# Patient Record
Sex: MALE | Race: BLACK OR AFRICAN AMERICAN | NOT HISPANIC OR LATINO | Employment: OTHER | URBAN - METROPOLITAN AREA
[De-identification: names, ages, dates, MRNs, and addresses within clinical notes are randomized per-mention and may not be internally consistent; named-entity substitution may affect disease eponyms.]

---

## 2017-01-01 ENCOUNTER — HOSPITAL ENCOUNTER (INPATIENT)
Facility: HOSPITAL | Age: 77
LOS: 11 days | Discharge: HOME HEALTH | DRG: 100 | End: 2017-05-19
Attending: EMERGENCY MEDICINE | Admitting: PSYCHIATRY & NEUROLOGY
Payer: MEDICARE

## 2017-01-01 ENCOUNTER — OUTPATIENT CASE MANAGEMENT (OUTPATIENT)
Dept: ADMINISTRATIVE | Facility: OTHER | Age: 77
End: 2017-01-01

## 2017-01-01 ENCOUNTER — HOSPITAL ENCOUNTER (OUTPATIENT)
Dept: VASCULAR SURGERY | Facility: CLINIC | Age: 77
Discharge: HOME | End: 2017-07-11
Attending: SURGERY
Payer: OTHER GOVERNMENT

## 2017-01-01 ENCOUNTER — HOSPITAL ENCOUNTER (INPATIENT)
Facility: HOSPITAL | Age: 77
LOS: 1 days | Discharge: HOME | DRG: 280 | End: 2017-08-09
Attending: EMERGENCY MEDICINE | Admitting: HOSPITALIST
Payer: MEDICARE

## 2017-01-01 ENCOUNTER — HOSPITAL ENCOUNTER (INPATIENT)
Facility: HOSPITAL | Age: 77
LOS: 3 days | DRG: 871 | End: 2017-09-02
Attending: EMERGENCY MEDICINE | Admitting: HOSPITALIST
Payer: MEDICARE

## 2017-01-01 ENCOUNTER — TELEPHONE (OUTPATIENT)
Dept: VASCULAR SURGERY | Facility: CLINIC | Age: 77
End: 2017-01-01

## 2017-01-01 ENCOUNTER — HOSPITAL ENCOUNTER (INPATIENT)
Facility: HOSPITAL | Age: 77
LOS: 1 days | Discharge: HOME | DRG: 246 | End: 2017-08-03
Attending: EMERGENCY MEDICINE | Admitting: HOSPITALIST
Payer: MEDICARE

## 2017-01-01 ENCOUNTER — INITIAL CONSULT (OUTPATIENT)
Dept: VASCULAR SURGERY | Facility: CLINIC | Age: 77
End: 2017-01-01
Attending: SURGERY
Payer: OTHER GOVERNMENT

## 2017-01-01 ENCOUNTER — SURGERY (OUTPATIENT)
Age: 77
End: 2017-01-01

## 2017-01-01 ENCOUNTER — DOCUMENTATION ONLY (OUTPATIENT)
Dept: CARDIOLOGY | Facility: CLINIC | Age: 77
End: 2017-01-01

## 2017-01-01 VITALS
BODY MASS INDEX: 26.33 KG/M2 | SYSTOLIC BLOOD PRESSURE: 144 MMHG | DIASTOLIC BLOOD PRESSURE: 62 MMHG | RESPIRATION RATE: 18 BRPM | OXYGEN SATURATION: 97 % | HEIGHT: 73 IN | TEMPERATURE: 99 F | WEIGHT: 198.69 LBS | HEART RATE: 100 BPM

## 2017-01-01 VITALS
DIASTOLIC BLOOD PRESSURE: 56 MMHG | SYSTOLIC BLOOD PRESSURE: 93 MMHG | HEIGHT: 70 IN | WEIGHT: 176.81 LBS | BODY MASS INDEX: 25.31 KG/M2 | HEART RATE: 114 BPM | OXYGEN SATURATION: 90 % | TEMPERATURE: 98 F | RESPIRATION RATE: 20 BRPM

## 2017-01-01 VITALS
TEMPERATURE: 98 F | DIASTOLIC BLOOD PRESSURE: 59 MMHG | RESPIRATION RATE: 20 BRPM | WEIGHT: 181 LBS | OXYGEN SATURATION: 96 % | SYSTOLIC BLOOD PRESSURE: 112 MMHG | BODY MASS INDEX: 25.91 KG/M2 | HEART RATE: 88 BPM | HEIGHT: 70 IN

## 2017-01-01 VITALS
DIASTOLIC BLOOD PRESSURE: 54 MMHG | OXYGEN SATURATION: 99 % | WEIGHT: 201 LBS | SYSTOLIC BLOOD PRESSURE: 116 MMHG | TEMPERATURE: 97 F | HEART RATE: 88 BPM | HEIGHT: 70 IN | RESPIRATION RATE: 18 BRPM | BODY MASS INDEX: 28.77 KG/M2

## 2017-01-01 DIAGNOSIS — R41.82 ALTERED MENTAL STATUS, UNSPECIFIED ALTERED MENTAL STATUS TYPE: ICD-10-CM

## 2017-01-01 DIAGNOSIS — R79.89 ELEVATED TROPONIN: ICD-10-CM

## 2017-01-01 DIAGNOSIS — Z92.82 TISSUE PLASMINOGEN ACTIVATOR (T-PA) ADMINISTERED AT OTHER FACILITY WITHIN 24 HOURS PRIOR TO CURRENT ADMISSION: ICD-10-CM

## 2017-01-01 DIAGNOSIS — I21.4 NSTEMI (NON-ST ELEVATED MYOCARDIAL INFARCTION): ICD-10-CM

## 2017-01-01 DIAGNOSIS — I10 ESSENTIAL HYPERTENSION: ICD-10-CM

## 2017-01-01 DIAGNOSIS — Z99.2 ESRD ON HEMODIALYSIS: ICD-10-CM

## 2017-01-01 DIAGNOSIS — Z99.2 ESRD (END STAGE RENAL DISEASE) ON DIALYSIS: Primary | ICD-10-CM

## 2017-01-01 DIAGNOSIS — R57.9 SHOCK: Primary | ICD-10-CM

## 2017-01-01 DIAGNOSIS — N18.6 ESRD ON HEMODIALYSIS: ICD-10-CM

## 2017-01-01 DIAGNOSIS — E83.9 CHRONIC KIDNEY DISEASE-MINERAL AND BONE DISORDER: ICD-10-CM

## 2017-01-01 DIAGNOSIS — T82.590A MALFUNCTION OF ARTERIOVENOUS DIALYSIS FISTULA: ICD-10-CM

## 2017-01-01 DIAGNOSIS — N18.6 END STAGE RENAL DISEASE: ICD-10-CM

## 2017-01-01 DIAGNOSIS — N18.6 ESRD (END STAGE RENAL DISEASE) ON DIALYSIS: ICD-10-CM

## 2017-01-01 DIAGNOSIS — I35.9 AORTIC VALVE DISORDERS: ICD-10-CM

## 2017-01-01 DIAGNOSIS — N18.6 ESRD (END STAGE RENAL DISEASE): Primary | ICD-10-CM

## 2017-01-01 DIAGNOSIS — R00.0 TACHYCARDIA: ICD-10-CM

## 2017-01-01 DIAGNOSIS — Z79.4 TYPE 2 DIABETES MELLITUS WITH DIABETIC NEUROPATHY, WITH LONG-TERM CURRENT USE OF INSULIN: ICD-10-CM

## 2017-01-01 DIAGNOSIS — I35.0 AORTIC VALVE STENOSIS, UNSPECIFIED ETIOLOGY: Primary | ICD-10-CM

## 2017-01-01 DIAGNOSIS — I35.0 SEVERE AORTIC STENOSIS: ICD-10-CM

## 2017-01-01 DIAGNOSIS — Z99.2 ESRD ON HEMODIALYSIS: Primary | ICD-10-CM

## 2017-01-01 DIAGNOSIS — I63.512 ACUTE ISCHEMIC LEFT MCA STROKE: ICD-10-CM

## 2017-01-01 DIAGNOSIS — Z92.82 STATUS POST ADMINISTRATION OF TPA (RTPA) IN A DIFFERENT FACILITY WITHIN THE LAST 24 HOURS PRIOR TO ADMISSION TO CURRENT FACILITY: ICD-10-CM

## 2017-01-01 DIAGNOSIS — R56.9 SEIZURE: ICD-10-CM

## 2017-01-01 DIAGNOSIS — M89.9 CHRONIC KIDNEY DISEASE-MINERAL AND BONE DISORDER: ICD-10-CM

## 2017-01-01 DIAGNOSIS — N18.9 ANEMIA ASSOCIATED WITH CHRONIC RENAL FAILURE: ICD-10-CM

## 2017-01-01 DIAGNOSIS — D63.1 ANEMIA ASSOCIATED WITH CHRONIC RENAL FAILURE: ICD-10-CM

## 2017-01-01 DIAGNOSIS — N18.9 CHRONIC KIDNEY DISEASE-MINERAL AND BONE DISORDER: ICD-10-CM

## 2017-01-01 DIAGNOSIS — Z99.2 ESRD (END STAGE RENAL DISEASE) ON DIALYSIS: ICD-10-CM

## 2017-01-01 DIAGNOSIS — I20.0 UNSTABLE ANGINA PECTORIS: ICD-10-CM

## 2017-01-01 DIAGNOSIS — N18.6 ESRD ON HEMODIALYSIS: Primary | ICD-10-CM

## 2017-01-01 DIAGNOSIS — N18.5 CHRONIC KIDNEY DISEASE, STAGE V: ICD-10-CM

## 2017-01-01 DIAGNOSIS — R07.9 CHEST PAIN: ICD-10-CM

## 2017-01-01 DIAGNOSIS — N18.6 ESRD (END STAGE RENAL DISEASE): ICD-10-CM

## 2017-01-01 DIAGNOSIS — I25.10 CAD (CORONARY ARTERY DISEASE): ICD-10-CM

## 2017-01-01 DIAGNOSIS — I35.0 AORTIC STENOSIS, SEVERE: ICD-10-CM

## 2017-01-01 DIAGNOSIS — I10 ESSENTIAL HYPERTENSION: Primary | ICD-10-CM

## 2017-01-01 DIAGNOSIS — I73.9 CLAUDICATION IN PERIPHERAL VASCULAR DISEASE: ICD-10-CM

## 2017-01-01 DIAGNOSIS — E11.40 TYPE 2 DIABETES MELLITUS WITH DIABETIC NEUROPATHY, WITH LONG-TERM CURRENT USE OF INSULIN: ICD-10-CM

## 2017-01-01 DIAGNOSIS — T82.590S MALFUNCTION OF ARTERIOVENOUS DIALYSIS FISTULA, SEQUELA: ICD-10-CM

## 2017-01-01 DIAGNOSIS — T82.858A ARTERIOVENOUS FISTULA STENOSIS, INITIAL ENCOUNTER: Primary | ICD-10-CM

## 2017-01-01 DIAGNOSIS — G40.901 STATUS EPILEPTICUS: ICD-10-CM

## 2017-01-01 DIAGNOSIS — Z99.2 DEPENDENCE ON RENAL DIALYSIS: ICD-10-CM

## 2017-01-01 DIAGNOSIS — R07.9 CHEST PAIN: Primary | ICD-10-CM

## 2017-01-01 DIAGNOSIS — Z95.5 STATUS POST PRIMARY ANGIOPLASTY WITH CORONARY STENT: Primary | ICD-10-CM

## 2017-01-01 DIAGNOSIS — I63.412 EMBOLIC STROKE INVOLVING LEFT MIDDLE CEREBRAL ARTERY: ICD-10-CM

## 2017-01-01 DIAGNOSIS — E78.5 HYPERLIPIDEMIA LDL GOAL <70: ICD-10-CM

## 2017-01-01 DIAGNOSIS — R41.82 ALTERED MENTAL STATUS: ICD-10-CM

## 2017-01-01 DIAGNOSIS — N18.6 ESRD (END STAGE RENAL DISEASE) ON DIALYSIS: Primary | ICD-10-CM

## 2017-01-01 DIAGNOSIS — I25.10 CORONARY ARTERY DISEASE, ANGINA PRESENCE UNSPECIFIED, UNSPECIFIED VESSEL OR LESION TYPE, UNSPECIFIED WHETHER NATIVE OR TRANSPLANTED HEART: ICD-10-CM

## 2017-01-01 LAB
ABO + RH BLD: NORMAL
ABO + RH BLD: NORMAL
ALBUMIN SERPL BCP-MCNC: 2.4 G/DL
ALBUMIN SERPL BCP-MCNC: 2.4 G/DL
ALBUMIN SERPL BCP-MCNC: 2.5 G/DL
ALBUMIN SERPL BCP-MCNC: 2.6 G/DL
ALBUMIN SERPL BCP-MCNC: 2.8 G/DL
ALBUMIN SERPL BCP-MCNC: 2.8 G/DL
ALBUMIN SERPL BCP-MCNC: 2.9 G/DL
ALBUMIN SERPL BCP-MCNC: 3 G/DL
ALBUMIN SERPL BCP-MCNC: 3.1 G/DL
ALBUMIN SERPL BCP-MCNC: 3.2 G/DL
ALBUMIN SERPL BCP-MCNC: 3.3 G/DL
ALBUMIN SERPL BCP-MCNC: 3.4 G/DL
ALBUMIN SERPL BCP-MCNC: 3.5 G/DL
ALLENS TEST: ABNORMAL
ALP SERPL-CCNC: 102 U/L
ALP SERPL-CCNC: 102 U/L
ALP SERPL-CCNC: 104 U/L
ALP SERPL-CCNC: 72 U/L
ALP SERPL-CCNC: 73 U/L
ALP SERPL-CCNC: 73 U/L
ALP SERPL-CCNC: 76 U/L
ALP SERPL-CCNC: 77 U/L
ALP SERPL-CCNC: 78 U/L
ALP SERPL-CCNC: 79 U/L
ALP SERPL-CCNC: 79 U/L
ALP SERPL-CCNC: 83 U/L
ALP SERPL-CCNC: 84 U/L
ALP SERPL-CCNC: 85 U/L
ALP SERPL-CCNC: 87 U/L
ALP SERPL-CCNC: 87 U/L
ALP SERPL-CCNC: 90 U/L
ALP SERPL-CCNC: 91 U/L
ALP SERPL-CCNC: 91 U/L
ALT SERPL W/O P-5'-P-CCNC: 10 U/L
ALT SERPL W/O P-5'-P-CCNC: 12 U/L
ALT SERPL W/O P-5'-P-CCNC: 12 U/L
ALT SERPL W/O P-5'-P-CCNC: 14 U/L
ALT SERPL W/O P-5'-P-CCNC: 15 U/L
ALT SERPL W/O P-5'-P-CCNC: 16 U/L
ALT SERPL W/O P-5'-P-CCNC: 18 U/L
ALT SERPL W/O P-5'-P-CCNC: 18 U/L
ALT SERPL W/O P-5'-P-CCNC: 22 U/L
ALT SERPL W/O P-5'-P-CCNC: 25 U/L
ALT SERPL W/O P-5'-P-CCNC: 28 U/L
ALT SERPL W/O P-5'-P-CCNC: 30 U/L
ALT SERPL W/O P-5'-P-CCNC: 7 U/L
ALT SERPL W/O P-5'-P-CCNC: 8 U/L
ALT SERPL W/O P-5'-P-CCNC: 9 U/L
ALT SERPL W/O P-5'-P-CCNC: 93 U/L
ANION GAP SERPL CALC-SCNC: 10 MMOL/L
ANION GAP SERPL CALC-SCNC: 10 MMOL/L
ANION GAP SERPL CALC-SCNC: 11 MMOL/L
ANION GAP SERPL CALC-SCNC: 12 MMOL/L
ANION GAP SERPL CALC-SCNC: 14 MMOL/L
ANION GAP SERPL CALC-SCNC: 14 MMOL/L
ANION GAP SERPL CALC-SCNC: 15 MMOL/L
ANION GAP SERPL CALC-SCNC: 16 MMOL/L
ANION GAP SERPL CALC-SCNC: 17 MMOL/L
ANION GAP SERPL CALC-SCNC: 18 MMOL/L
ANION GAP SERPL CALC-SCNC: 19 MMOL/L
ANION GAP SERPL CALC-SCNC: 20 MMOL/L
ANION GAP SERPL CALC-SCNC: 21 MMOL/L
ANISOCYTOSIS BLD QL SMEAR: SLIGHT
AORTIC VALVE REGURGITATION: ABNORMAL
AORTIC VALVE STENOSIS: ABNORMAL
APTT BLDCRRT: 24 SEC
APTT BLDCRRT: 26.5 SEC
AST SERPL-CCNC: 10 U/L
AST SERPL-CCNC: 12 U/L
AST SERPL-CCNC: 13 U/L
AST SERPL-CCNC: 135 U/L
AST SERPL-CCNC: 15 U/L
AST SERPL-CCNC: 16 U/L
AST SERPL-CCNC: 16 U/L
AST SERPL-CCNC: 18 U/L
AST SERPL-CCNC: 18 U/L
AST SERPL-CCNC: 21 U/L
AST SERPL-CCNC: 23 U/L
AST SERPL-CCNC: 23 U/L
AST SERPL-CCNC: 24 U/L
AST SERPL-CCNC: 24 U/L
AST SERPL-CCNC: 246 U/L
AST SERPL-CCNC: 25 U/L
AST SERPL-CCNC: 44 U/L
AST SERPL-CCNC: 48 U/L
AST SERPL-CCNC: 7 U/L
BACTERIA #/AREA URNS AUTO: ABNORMAL /HPF
BACTERIA #/AREA URNS AUTO: ABNORMAL /HPF
BACTERIA BLD CULT: NORMAL
BACTERIA SPEC AEROBE CULT: NORMAL
BACTERIA UR CULT: NO GROWTH
BASOPHILS # BLD AUTO: 0 K/UL
BASOPHILS # BLD AUTO: 0.01 K/UL
BASOPHILS # BLD AUTO: 0.02 K/UL
BASOPHILS # BLD AUTO: 0.03 K/UL
BASOPHILS # BLD AUTO: 0.04 K/UL
BASOPHILS NFR BLD: 0 %
BASOPHILS NFR BLD: 0.1 %
BASOPHILS NFR BLD: 0.2 %
BASOPHILS NFR BLD: 0.3 %
BASOPHILS NFR BLD: 0.4 %
BASOPHILS NFR BLD: 0.4 %
BASOPHILS NFR BLD: 0.5 %
BILIRUB SERPL-MCNC: 0.5 MG/DL
BILIRUB SERPL-MCNC: 0.5 MG/DL
BILIRUB SERPL-MCNC: 0.6 MG/DL
BILIRUB SERPL-MCNC: 0.7 MG/DL
BILIRUB SERPL-MCNC: 0.9 MG/DL
BILIRUB SERPL-MCNC: 1.2 MG/DL
BILIRUB UR QL STRIP: NEGATIVE
BILIRUB UR QL STRIP: NEGATIVE
BLD GP AB SCN CELLS X3 SERPL QL: NORMAL
BLD GP AB SCN CELLS X3 SERPL QL: NORMAL
BNP SERPL-MCNC: 377 PG/ML
BNP SERPL-MCNC: 400 PG/ML
BNP SERPL-MCNC: 476 PG/ML
BNP SERPL-MCNC: 476 PG/ML
BUN SERPL-MCNC: 105 MG/DL
BUN SERPL-MCNC: 106 MG/DL
BUN SERPL-MCNC: 107 MG/DL
BUN SERPL-MCNC: 36 MG/DL
BUN SERPL-MCNC: 37 MG/DL
BUN SERPL-MCNC: 41 MG/DL
BUN SERPL-MCNC: 50 MG/DL
BUN SERPL-MCNC: 53 MG/DL
BUN SERPL-MCNC: 53 MG/DL
BUN SERPL-MCNC: 55 MG/DL
BUN SERPL-MCNC: 63 MG/DL
BUN SERPL-MCNC: 65 MG/DL
BUN SERPL-MCNC: 66 MG/DL
BUN SERPL-MCNC: 66 MG/DL
BUN SERPL-MCNC: 67 MG/DL
BUN SERPL-MCNC: 78 MG/DL
BUN SERPL-MCNC: 78 MG/DL
BUN SERPL-MCNC: 79 MG/DL
BUN SERPL-MCNC: 79 MG/DL
BUN SERPL-MCNC: 82 MG/DL
BUN SERPL-MCNC: 84 MG/DL
BUN SERPL-MCNC: 86 MG/DL
BUN SERPL-MCNC: 87 MG/DL
BUN SERPL-MCNC: 88 MG/DL
BURR CELLS BLD QL SMEAR: ABNORMAL
CALCIUM SERPL-MCNC: 8.5 MG/DL
CALCIUM SERPL-MCNC: 8.6 MG/DL
CALCIUM SERPL-MCNC: 8.7 MG/DL
CALCIUM SERPL-MCNC: 8.8 MG/DL
CALCIUM SERPL-MCNC: 8.8 MG/DL
CALCIUM SERPL-MCNC: 8.9 MG/DL
CALCIUM SERPL-MCNC: 9 MG/DL
CALCIUM SERPL-MCNC: 9.2 MG/DL
CALCIUM SERPL-MCNC: 9.3 MG/DL
CALCIUM SERPL-MCNC: 9.4 MG/DL
CALCIUM SERPL-MCNC: 9.5 MG/DL
CALCIUM SERPL-MCNC: 9.5 MG/DL
CALCIUM SERPL-MCNC: 9.7 MG/DL
CALCIUM SERPL-MCNC: 9.8 MG/DL
CHLORIDE SERPL-SCNC: 100 MMOL/L
CHLORIDE SERPL-SCNC: 101 MMOL/L
CHLORIDE SERPL-SCNC: 102 MMOL/L
CHLORIDE SERPL-SCNC: 103 MMOL/L
CHLORIDE SERPL-SCNC: 104 MMOL/L
CHLORIDE SERPL-SCNC: 106 MMOL/L
CHLORIDE SERPL-SCNC: 107 MMOL/L
CHLORIDE SERPL-SCNC: 94 MMOL/L
CHLORIDE SERPL-SCNC: 96 MMOL/L
CHLORIDE SERPL-SCNC: 97 MMOL/L
CHLORIDE SERPL-SCNC: 98 MMOL/L
CLARITY UR REFRACT.AUTO: ABNORMAL
CLARITY UR REFRACT.AUTO: ABNORMAL
CO2 SERPL-SCNC: 15 MMOL/L
CO2 SERPL-SCNC: 16 MMOL/L
CO2 SERPL-SCNC: 17 MMOL/L
CO2 SERPL-SCNC: 17 MMOL/L
CO2 SERPL-SCNC: 18 MMOL/L
CO2 SERPL-SCNC: 19 MMOL/L
CO2 SERPL-SCNC: 19 MMOL/L
CO2 SERPL-SCNC: 20 MMOL/L
CO2 SERPL-SCNC: 21 MMOL/L
CO2 SERPL-SCNC: 21 MMOL/L
CO2 SERPL-SCNC: 22 MMOL/L
CO2 SERPL-SCNC: 23 MMOL/L
CO2 SERPL-SCNC: 24 MMOL/L
CO2 SERPL-SCNC: 25 MMOL/L
CO2 SERPL-SCNC: 25 MMOL/L
CO2 SERPL-SCNC: 26 MMOL/L
CO2 SERPL-SCNC: 27 MMOL/L
CO2 SERPL-SCNC: 9 MMOL/L
COLOR UR AUTO: YELLOW
COLOR UR AUTO: YELLOW
CORONARY STENOSIS: ABNORMAL
CORONARY STENT: YES
CREAT SERPL-MCNC: 10.1 MG/DL
CREAT SERPL-MCNC: 10.2 MG/DL
CREAT SERPL-MCNC: 10.3 MG/DL
CREAT SERPL-MCNC: 10.8 MG/DL
CREAT SERPL-MCNC: 10.9 MG/DL
CREAT SERPL-MCNC: 11.1 MG/DL
CREAT SERPL-MCNC: 11.6 MG/DL
CREAT SERPL-MCNC: 11.6 MG/DL
CREAT SERPL-MCNC: 11.8 MG/DL
CREAT SERPL-MCNC: 11.9 MG/DL
CREAT SERPL-MCNC: 12.3 MG/DL
CREAT SERPL-MCNC: 12.4 MG/DL
CREAT SERPL-MCNC: 12.4 MG/DL
CREAT SERPL-MCNC: 13 MG/DL
CREAT SERPL-MCNC: 13.7 MG/DL
CREAT SERPL-MCNC: 13.8 MG/DL
CREAT SERPL-MCNC: 13.9 MG/DL
CREAT SERPL-MCNC: 14.7 MG/DL
CREAT SERPL-MCNC: 15.5 MG/DL
CREAT SERPL-MCNC: 16.3 MG/DL
CREAT SERPL-MCNC: 8.3 MG/DL
CREAT SERPL-MCNC: 8.9 MG/DL
CREAT SERPL-MCNC: 9.4 MG/DL
CREAT SERPL-MCNC: 9.6 MG/DL
DACRYOCYTES BLD QL SMEAR: ABNORMAL
DELSYS: ABNORMAL
DIASTOLIC DYSFUNCTION: NO
DIASTOLIC DYSFUNCTION: YES
DIFFERENTIAL METHOD: ABNORMAL
EOSINOPHIL # BLD AUTO: 0 K/UL
EOSINOPHIL # BLD AUTO: 0.1 K/UL
EOSINOPHIL # BLD AUTO: 0.2 K/UL
EOSINOPHIL # BLD AUTO: 0.3 K/UL
EOSINOPHIL # BLD AUTO: 0.3 K/UL
EOSINOPHIL NFR BLD: 0 %
EOSINOPHIL NFR BLD: 0.1 %
EOSINOPHIL NFR BLD: 0.7 %
EOSINOPHIL NFR BLD: 0.8 %
EOSINOPHIL NFR BLD: 0.8 %
EOSINOPHIL NFR BLD: 0.9 %
EOSINOPHIL NFR BLD: 0.9 %
EOSINOPHIL NFR BLD: 1.2 %
EOSINOPHIL NFR BLD: 1.4 %
EOSINOPHIL NFR BLD: 1.6 %
EOSINOPHIL NFR BLD: 1.7 %
EOSINOPHIL NFR BLD: 1.9 %
EOSINOPHIL NFR BLD: 2 %
EOSINOPHIL NFR BLD: 2.1 %
EOSINOPHIL NFR BLD: 2.3 %
EOSINOPHIL NFR BLD: 2.4 %
EOSINOPHIL NFR BLD: 2.4 %
EOSINOPHIL NFR BLD: 2.5 %
EOSINOPHIL NFR BLD: 2.8 %
EOSINOPHIL NFR BLD: 3.2 %
EOSINOPHIL NFR BLD: 3.6 %
ERYTHROCYTE [DISTWIDTH] IN BLOOD BY AUTOMATED COUNT: 14.9 %
ERYTHROCYTE [DISTWIDTH] IN BLOOD BY AUTOMATED COUNT: 15 %
ERYTHROCYTE [DISTWIDTH] IN BLOOD BY AUTOMATED COUNT: 15.1 %
ERYTHROCYTE [DISTWIDTH] IN BLOOD BY AUTOMATED COUNT: 15.2 %
ERYTHROCYTE [DISTWIDTH] IN BLOOD BY AUTOMATED COUNT: 15.5 %
ERYTHROCYTE [DISTWIDTH] IN BLOOD BY AUTOMATED COUNT: 15.7 %
ERYTHROCYTE [DISTWIDTH] IN BLOOD BY AUTOMATED COUNT: 15.8 %
ERYTHROCYTE [DISTWIDTH] IN BLOOD BY AUTOMATED COUNT: 15.9 %
ERYTHROCYTE [DISTWIDTH] IN BLOOD BY AUTOMATED COUNT: 15.9 %
ERYTHROCYTE [DISTWIDTH] IN BLOOD BY AUTOMATED COUNT: 16 %
ERYTHROCYTE [DISTWIDTH] IN BLOOD BY AUTOMATED COUNT: 16.1 %
ERYTHROCYTE [DISTWIDTH] IN BLOOD BY AUTOMATED COUNT: 16.2 %
ERYTHROCYTE [DISTWIDTH] IN BLOOD BY AUTOMATED COUNT: 16.3 %
ERYTHROCYTE [DISTWIDTH] IN BLOOD BY AUTOMATED COUNT: 16.4 %
ERYTHROCYTE [DISTWIDTH] IN BLOOD BY AUTOMATED COUNT: 16.5 %
ERYTHROCYTE [DISTWIDTH] IN BLOOD BY AUTOMATED COUNT: 16.6 %
ERYTHROCYTE [DISTWIDTH] IN BLOOD BY AUTOMATED COUNT: 16.8 %
ERYTHROCYTE [SEDIMENTATION RATE] IN BLOOD BY WESTERGREN METHOD: 20 MM/H
EST. GFR  (AFRICAN AMERICAN): 2.8 ML/MIN/1.73 M^2
EST. GFR  (AFRICAN AMERICAN): 3 ML/MIN/1.73 M^2
EST. GFR  (AFRICAN AMERICAN): 3.2 ML/MIN/1.73 M^2
EST. GFR  (AFRICAN AMERICAN): 3.4 ML/MIN/1.73 M^2
EST. GFR  (AFRICAN AMERICAN): 3.5 ML/MIN/1.73 M^2
EST. GFR  (AFRICAN AMERICAN): 3.7 ML/MIN/1.73 M^2
EST. GFR  (AFRICAN AMERICAN): 4 ML/MIN/1.73 M^2
EST. GFR  (AFRICAN AMERICAN): 4.2 ML/MIN/1.73 M^2
EST. GFR  (AFRICAN AMERICAN): 4.2 ML/MIN/1.73 M^2
EST. GFR  (AFRICAN AMERICAN): 4.3 ML/MIN/1.73 M^2
EST. GFR  (AFRICAN AMERICAN): 4.3 ML/MIN/1.73 M^2
EST. GFR  (AFRICAN AMERICAN): 4.5 ML/MIN/1.73 M^2
EST. GFR  (AFRICAN AMERICAN): 4.6 ML/MIN/1.73 M^2
EST. GFR  (AFRICAN AMERICAN): 4.7 ML/MIN/1.73 M^2
EST. GFR  (AFRICAN AMERICAN): 4.9 ML/MIN/1.73 M^2
EST. GFR  (AFRICAN AMERICAN): 5 ML/MIN/1.73 M^2
EST. GFR  (AFRICAN AMERICAN): 5.1 ML/MIN/1.73 M^2
EST. GFR  (AFRICAN AMERICAN): 5.4 ML/MIN/1.73 M^2
EST. GFR  (AFRICAN AMERICAN): 5.5 ML/MIN/1.73 M^2
EST. GFR  (AFRICAN AMERICAN): 5.9 ML/MIN/1.73 M^2
EST. GFR  (AFRICAN AMERICAN): 6.4 ML/MIN/1.73 M^2
EST. GFR  (NON AFRICAN AMERICAN): 2.5 ML/MIN/1.73 M^2
EST. GFR  (NON AFRICAN AMERICAN): 2.6 ML/MIN/1.73 M^2
EST. GFR  (NON AFRICAN AMERICAN): 2.8 ML/MIN/1.73 M^2
EST. GFR  (NON AFRICAN AMERICAN): 3 ML/MIN/1.73 M^2
EST. GFR  (NON AFRICAN AMERICAN): 3.2 ML/MIN/1.73 M^2
EST. GFR  (NON AFRICAN AMERICAN): 3.4 ML/MIN/1.73 M^2
EST. GFR  (NON AFRICAN AMERICAN): 3.4 ML/MIN/1.73 M^2
EST. GFR  (NON AFRICAN AMERICAN): 3.5 ML/MIN/1.73 M^2
EST. GFR  (NON AFRICAN AMERICAN): 3.6 ML/MIN/1.73 M^2
EST. GFR  (NON AFRICAN AMERICAN): 3.6 ML/MIN/1.73 M^2
EST. GFR  (NON AFRICAN AMERICAN): 3.7 ML/MIN/1.73 M^2
EST. GFR  (NON AFRICAN AMERICAN): 3.7 ML/MIN/1.73 M^2
EST. GFR  (NON AFRICAN AMERICAN): 3.9 ML/MIN/1.73 M^2
EST. GFR  (NON AFRICAN AMERICAN): 4 ML/MIN/1.73 M^2
EST. GFR  (NON AFRICAN AMERICAN): 4 ML/MIN/1.73 M^2
EST. GFR  (NON AFRICAN AMERICAN): 4.3 ML/MIN/1.73 M^2
EST. GFR  (NON AFRICAN AMERICAN): 4.3 ML/MIN/1.73 M^2
EST. GFR  (NON AFRICAN AMERICAN): 4.4 ML/MIN/1.73 M^2
EST. GFR  (NON AFRICAN AMERICAN): 4.7 ML/MIN/1.73 M^2
EST. GFR  (NON AFRICAN AMERICAN): 4.8 ML/MIN/1.73 M^2
EST. GFR  (NON AFRICAN AMERICAN): 5.1 ML/MIN/1.73 M^2
EST. GFR  (NON AFRICAN AMERICAN): 5.6 ML/MIN/1.73 M^2
ESTIMATED AVG GLUCOSE: 134 MG/DL
ESTIMATED AVG GLUCOSE: 146 MG/DL
ESTIMATED PA SYSTOLIC PRESSURE: 12.11
FACT X PPP CHRO-ACNC: 0.5 IU/ML
FACT X PPP CHRO-ACNC: 0.56 IU/ML
FACT X PPP CHRO-ACNC: 0.57 IU/ML
FACT X PPP CHRO-ACNC: <0.1 IU/ML
FERRITIN SERPL-MCNC: 365 NG/ML
FIO2: 50
GLOBAL PERICARDIAL EFFUSION: ABNORMAL
GLOBAL PERICARDIAL EFFUSION: ABNORMAL
GLUCOSE SERPL-MCNC: 100 MG/DL
GLUCOSE SERPL-MCNC: 103 MG/DL
GLUCOSE SERPL-MCNC: 103 MG/DL
GLUCOSE SERPL-MCNC: 106 MG/DL
GLUCOSE SERPL-MCNC: 108 MG/DL
GLUCOSE SERPL-MCNC: 111 MG/DL
GLUCOSE SERPL-MCNC: 113 MG/DL
GLUCOSE SERPL-MCNC: 121 MG/DL
GLUCOSE SERPL-MCNC: 125 MG/DL
GLUCOSE SERPL-MCNC: 128 MG/DL
GLUCOSE SERPL-MCNC: 131 MG/DL
GLUCOSE SERPL-MCNC: 139 MG/DL
GLUCOSE SERPL-MCNC: 152 MG/DL
GLUCOSE SERPL-MCNC: 156 MG/DL
GLUCOSE SERPL-MCNC: 188 MG/DL
GLUCOSE SERPL-MCNC: 192 MG/DL
GLUCOSE SERPL-MCNC: 214 MG/DL
GLUCOSE SERPL-MCNC: 232 MG/DL
GLUCOSE SERPL-MCNC: 71 MG/DL
GLUCOSE SERPL-MCNC: 78 MG/DL
GLUCOSE SERPL-MCNC: 88 MG/DL
GLUCOSE SERPL-MCNC: 88 MG/DL
GLUCOSE SERPL-MCNC: 91 MG/DL
GLUCOSE SERPL-MCNC: 97 MG/DL
GLUCOSE SERPL-MCNC: 99 MG/DL
GLUCOSE UR QL STRIP: ABNORMAL
GLUCOSE UR QL STRIP: NEGATIVE
GRAM STN SPEC: NORMAL
HAV IGM SERPL QL IA: NEGATIVE
HBA1C MFR BLD HPLC: 6.3 %
HBA1C MFR BLD HPLC: 6.7 %
HBV CORE IGM SERPL QL IA: NEGATIVE
HBV SURFACE AG SERPL QL IA: NEGATIVE
HCO3 UR-SCNC: 22.6 MMOL/L (ref 24–28)
HCT VFR BLD AUTO: 25.1 %
HCT VFR BLD AUTO: 25.6 %
HCT VFR BLD AUTO: 25.6 %
HCT VFR BLD AUTO: 25.9 %
HCT VFR BLD AUTO: 27 %
HCT VFR BLD AUTO: 27.2 %
HCT VFR BLD AUTO: 27.4 %
HCT VFR BLD AUTO: 27.4 %
HCT VFR BLD AUTO: 27.7 %
HCT VFR BLD AUTO: 28.1 %
HCT VFR BLD AUTO: 28.2 %
HCT VFR BLD AUTO: 28.2 %
HCT VFR BLD AUTO: 28.7 %
HCT VFR BLD AUTO: 29 %
HCT VFR BLD AUTO: 29.3 %
HCT VFR BLD AUTO: 29.5 %
HCT VFR BLD AUTO: 29.5 %
HCT VFR BLD AUTO: 29.8 %
HCT VFR BLD AUTO: 29.8 %
HCT VFR BLD AUTO: 30 %
HCT VFR BLD AUTO: 30.3 %
HCT VFR BLD AUTO: 31 %
HCT VFR BLD AUTO: 31.5 %
HCT VFR BLD AUTO: 31.7 %
HCT VFR BLD AUTO: 32.1 %
HCT VFR BLD AUTO: 32.5 %
HCT VFR BLD AUTO: 33.9 %
HCT VFR BLD AUTO: 35.2 %
HCT VFR BLD AUTO: 35.9 %
HCT VFR BLD AUTO: 36.2 %
HCT VFR BLD AUTO: 36.6 %
HCV AB SERPL QL IA: NEGATIVE
HGB BLD-MCNC: 10.1 G/DL
HGB BLD-MCNC: 10.3 G/DL
HGB BLD-MCNC: 10.5 G/DL
HGB BLD-MCNC: 11 G/DL
HGB BLD-MCNC: 11.2 G/DL
HGB BLD-MCNC: 11.4 G/DL
HGB BLD-MCNC: 11.5 G/DL
HGB BLD-MCNC: 11.7 G/DL
HGB BLD-MCNC: 7.7 G/DL
HGB BLD-MCNC: 7.8 G/DL
HGB BLD-MCNC: 8 G/DL
HGB BLD-MCNC: 8.2 G/DL
HGB BLD-MCNC: 8.4 G/DL
HGB BLD-MCNC: 8.4 G/DL
HGB BLD-MCNC: 8.5 G/DL
HGB BLD-MCNC: 8.6 G/DL
HGB BLD-MCNC: 8.7 G/DL
HGB BLD-MCNC: 8.8 G/DL
HGB BLD-MCNC: 9 G/DL
HGB BLD-MCNC: 9.2 G/DL
HGB BLD-MCNC: 9.2 G/DL
HGB BLD-MCNC: 9.3 G/DL
HGB BLD-MCNC: 9.3 G/DL
HGB BLD-MCNC: 9.4 G/DL
HGB BLD-MCNC: 9.4 G/DL
HGB BLD-MCNC: 9.7 G/DL
HGB BLD-MCNC: 9.7 G/DL
HGB BLD-MCNC: 9.9 G/DL
HGB BLD-MCNC: 9.9 G/DL
HGB UR QL STRIP: ABNORMAL
HGB UR QL STRIP: NEGATIVE
HYALINE CASTS UR QL AUTO: 0 /LPF
HYALINE CASTS UR QL AUTO: 0 /LPF
HYPOCHROMIA BLD QL SMEAR: ABNORMAL
INR PPP: 1
INR PPP: 1.1
IRON SERPL-MCNC: 20 UG/DL
KETONES UR QL STRIP: NEGATIVE
KETONES UR QL STRIP: NEGATIVE
LACTATE SERPL-SCNC: 1.2 MMOL/L
LACTATE SERPL-SCNC: 1.2 MMOL/L
LACTATE SERPL-SCNC: 4.2 MMOL/L
LACTATE SERPL-SCNC: 5.6 MMOL/L
LACTATE SERPL-SCNC: 8.6 MMOL/L
LEUKOCYTE ESTERASE UR QL STRIP: ABNORMAL
LEUKOCYTE ESTERASE UR QL STRIP: ABNORMAL
LEVETIRACETAM SERPL-MCNC: 27.5 UG/ML (ref 3–60)
LYMPHOCYTES # BLD AUTO: 0.5 K/UL
LYMPHOCYTES # BLD AUTO: 0.5 K/UL
LYMPHOCYTES # BLD AUTO: 0.6 K/UL
LYMPHOCYTES # BLD AUTO: 0.6 K/UL
LYMPHOCYTES # BLD AUTO: 0.7 K/UL
LYMPHOCYTES # BLD AUTO: 0.9 K/UL
LYMPHOCYTES # BLD AUTO: 0.9 K/UL
LYMPHOCYTES # BLD AUTO: 1 K/UL
LYMPHOCYTES # BLD AUTO: 1.1 K/UL
LYMPHOCYTES # BLD AUTO: 1.2 K/UL
LYMPHOCYTES # BLD AUTO: 1.2 K/UL
LYMPHOCYTES # BLD AUTO: 1.3 K/UL
LYMPHOCYTES # BLD AUTO: 1.4 K/UL
LYMPHOCYTES # BLD AUTO: 1.5 K/UL
LYMPHOCYTES # BLD AUTO: 1.6 K/UL
LYMPHOCYTES # BLD AUTO: 1.7 K/UL
LYMPHOCYTES # BLD AUTO: 1.8 K/UL
LYMPHOCYTES # BLD AUTO: 2 K/UL
LYMPHOCYTES # BLD AUTO: 2.1 K/UL
LYMPHOCYTES NFR BLD: 10.6 %
LYMPHOCYTES NFR BLD: 12.2 %
LYMPHOCYTES NFR BLD: 12.6 %
LYMPHOCYTES NFR BLD: 14.1 %
LYMPHOCYTES NFR BLD: 14.4 %
LYMPHOCYTES NFR BLD: 15.5 %
LYMPHOCYTES NFR BLD: 15.8 %
LYMPHOCYTES NFR BLD: 17.6 %
LYMPHOCYTES NFR BLD: 18.7 %
LYMPHOCYTES NFR BLD: 18.8 %
LYMPHOCYTES NFR BLD: 18.9 %
LYMPHOCYTES NFR BLD: 19.1 %
LYMPHOCYTES NFR BLD: 19.2 %
LYMPHOCYTES NFR BLD: 20.3 %
LYMPHOCYTES NFR BLD: 21.2 %
LYMPHOCYTES NFR BLD: 22.3 %
LYMPHOCYTES NFR BLD: 23.1 %
LYMPHOCYTES NFR BLD: 23.1 %
LYMPHOCYTES NFR BLD: 24.1 %
LYMPHOCYTES NFR BLD: 24.5 %
LYMPHOCYTES NFR BLD: 27.7 %
LYMPHOCYTES NFR BLD: 4.9 %
LYMPHOCYTES NFR BLD: 4.9 %
LYMPHOCYTES NFR BLD: 5.9 %
LYMPHOCYTES NFR BLD: 6.2 %
LYMPHOCYTES NFR BLD: 6.5 %
LYMPHOCYTES NFR BLD: 6.5 %
LYMPHOCYTES NFR BLD: 6.9 %
LYMPHOCYTES NFR BLD: 7.8 %
LYMPHOCYTES NFR BLD: 8.9 %
LYMPHOCYTES NFR BLD: 9 %
MAGNESIUM SERPL-MCNC: 1.7 MG/DL
MAGNESIUM SERPL-MCNC: 1.8 MG/DL
MAGNESIUM SERPL-MCNC: 1.9 MG/DL
MAGNESIUM SERPL-MCNC: 2 MG/DL
MAGNESIUM SERPL-MCNC: 2 MG/DL
MAGNESIUM SERPL-MCNC: 2.1 MG/DL
MAGNESIUM SERPL-MCNC: 2.2 MG/DL
MCH RBC QN AUTO: 26.9 PG
MCH RBC QN AUTO: 27.1 PG
MCH RBC QN AUTO: 27.1 PG
MCH RBC QN AUTO: 27.2 PG
MCH RBC QN AUTO: 27.3 PG
MCH RBC QN AUTO: 27.6 PG
MCH RBC QN AUTO: 27.6 PG
MCH RBC QN AUTO: 27.7 PG
MCH RBC QN AUTO: 27.8 PG
MCH RBC QN AUTO: 28 PG
MCH RBC QN AUTO: 28.1 PG
MCH RBC QN AUTO: 28.4 PG
MCH RBC QN AUTO: 28.5 PG
MCH RBC QN AUTO: 28.6 PG
MCH RBC QN AUTO: 28.8 PG
MCH RBC QN AUTO: 29.1 PG
MCH RBC QN AUTO: 29.2 PG
MCH RBC QN AUTO: 29.4 PG
MCH RBC QN AUTO: 29.4 PG
MCH RBC QN AUTO: 29.6 PG
MCH RBC QN AUTO: 29.6 PG
MCH RBC QN AUTO: 29.7 PG
MCH RBC QN AUTO: 30.3 PG
MCHC RBC AUTO-ENTMCNC: 29.7 G/DL
MCHC RBC AUTO-ENTMCNC: 30.5 G/DL
MCHC RBC AUTO-ENTMCNC: 30.7 G/DL
MCHC RBC AUTO-ENTMCNC: 30.7 G/DL
MCHC RBC AUTO-ENTMCNC: 30.9 G/DL
MCHC RBC AUTO-ENTMCNC: 31 G/DL
MCHC RBC AUTO-ENTMCNC: 31 G/DL
MCHC RBC AUTO-ENTMCNC: 31.1 %
MCHC RBC AUTO-ENTMCNC: 31.1 %
MCHC RBC AUTO-ENTMCNC: 31.2 %
MCHC RBC AUTO-ENTMCNC: 31.2 G/DL
MCHC RBC AUTO-ENTMCNC: 31.3 %
MCHC RBC AUTO-ENTMCNC: 31.3 G/DL
MCHC RBC AUTO-ENTMCNC: 31.4 G/DL
MCHC RBC AUTO-ENTMCNC: 31.5 G/DL
MCHC RBC AUTO-ENTMCNC: 31.7 G/DL
MCHC RBC AUTO-ENTMCNC: 31.7 G/DL
MCHC RBC AUTO-ENTMCNC: 31.8 G/DL
MCHC RBC AUTO-ENTMCNC: 32.1 %
MCHC RBC AUTO-ENTMCNC: 32.3 %
MCHC RBC AUTO-ENTMCNC: 32.4 %
MCHC RBC AUTO-ENTMCNC: 32.6 %
MCHC RBC AUTO-ENTMCNC: 32.7 %
MCHC RBC AUTO-ENTMCNC: 33.1 %
MCHC RBC AUTO-ENTMCNC: 33.4 %
MCHC RBC AUTO-ENTMCNC: 33.6 %
MCV RBC AUTO: 85 FL
MCV RBC AUTO: 86 FL
MCV RBC AUTO: 87 FL
MCV RBC AUTO: 88 FL
MCV RBC AUTO: 89 FL
MCV RBC AUTO: 90 FL
MCV RBC AUTO: 91 FL
MCV RBC AUTO: 92 FL
MCV RBC AUTO: 94 FL
MCV RBC AUTO: 94 FL
MICROSCOPIC COMMENT: ABNORMAL
MICROSCOPIC COMMENT: ABNORMAL
MIN VOL: 9.43
MITRAL VALVE MOBILITY: NORMAL
MITRAL VALVE MOBILITY: NORMAL
MODE: ABNORMAL
MONOCYTES # BLD AUTO: 0.4 K/UL
MONOCYTES # BLD AUTO: 0.5 K/UL
MONOCYTES # BLD AUTO: 0.6 K/UL
MONOCYTES # BLD AUTO: 0.7 K/UL
MONOCYTES # BLD AUTO: 0.8 K/UL
MONOCYTES # BLD AUTO: 1 K/UL
MONOCYTES # BLD AUTO: 1 K/UL
MONOCYTES # BLD AUTO: 1.1 K/UL
MONOCYTES # BLD AUTO: 1.2 K/UL
MONOCYTES # BLD AUTO: 1.4 K/UL
MONOCYTES # BLD AUTO: 1.4 K/UL
MONOCYTES # BLD AUTO: 1.5 K/UL
MONOCYTES # BLD AUTO: 1.6 K/UL
MONOCYTES NFR BLD: 11.3 %
MONOCYTES NFR BLD: 11.4 %
MONOCYTES NFR BLD: 11.8 %
MONOCYTES NFR BLD: 12.1 %
MONOCYTES NFR BLD: 12.5 %
MONOCYTES NFR BLD: 4.7 %
MONOCYTES NFR BLD: 5.6 %
MONOCYTES NFR BLD: 5.7 %
MONOCYTES NFR BLD: 5.8 %
MONOCYTES NFR BLD: 5.9 %
MONOCYTES NFR BLD: 6.1 %
MONOCYTES NFR BLD: 6.7 %
MONOCYTES NFR BLD: 7 %
MONOCYTES NFR BLD: 7.1 %
MONOCYTES NFR BLD: 7.2 %
MONOCYTES NFR BLD: 7.5 %
MONOCYTES NFR BLD: 7.6 %
MONOCYTES NFR BLD: 7.7 %
MONOCYTES NFR BLD: 7.7 %
MONOCYTES NFR BLD: 7.8 %
MONOCYTES NFR BLD: 7.9 %
MONOCYTES NFR BLD: 7.9 %
MONOCYTES NFR BLD: 8.1 %
MONOCYTES NFR BLD: 8.1 %
MONOCYTES NFR BLD: 8.3 %
MONOCYTES NFR BLD: 8.3 %
MONOCYTES NFR BLD: 8.5 %
MONOCYTES NFR BLD: 8.6 %
MONOCYTES NFR BLD: 8.8 %
MONOCYTES NFR BLD: 9.7 %
MONOCYTES NFR BLD: 9.8 %
NEUTROPHILS # BLD AUTO: 10.7 K/UL
NEUTROPHILS # BLD AUTO: 11.7 K/UL
NEUTROPHILS # BLD AUTO: 12.5 K/UL
NEUTROPHILS # BLD AUTO: 12.6 K/UL
NEUTROPHILS # BLD AUTO: 16.8 K/UL
NEUTROPHILS # BLD AUTO: 4.7 K/UL
NEUTROPHILS # BLD AUTO: 4.8 K/UL
NEUTROPHILS # BLD AUTO: 4.9 K/UL
NEUTROPHILS # BLD AUTO: 5 K/UL
NEUTROPHILS # BLD AUTO: 5.2 K/UL
NEUTROPHILS # BLD AUTO: 5.2 K/UL
NEUTROPHILS # BLD AUTO: 5.4 K/UL
NEUTROPHILS # BLD AUTO: 5.4 K/UL
NEUTROPHILS # BLD AUTO: 5.6 K/UL
NEUTROPHILS # BLD AUTO: 5.7 K/UL
NEUTROPHILS # BLD AUTO: 6 K/UL
NEUTROPHILS # BLD AUTO: 6.1 K/UL
NEUTROPHILS # BLD AUTO: 6.1 K/UL
NEUTROPHILS # BLD AUTO: 6.3 K/UL
NEUTROPHILS # BLD AUTO: 6.4 K/UL
NEUTROPHILS # BLD AUTO: 6.5 K/UL
NEUTROPHILS # BLD AUTO: 6.6 K/UL
NEUTROPHILS # BLD AUTO: 6.6 K/UL
NEUTROPHILS # BLD AUTO: 6.9 K/UL
NEUTROPHILS # BLD AUTO: 8.7 K/UL
NEUTROPHILS # BLD AUTO: 9 K/UL
NEUTROPHILS # BLD AUTO: 9.4 K/UL
NEUTROPHILS # BLD AUTO: 9.6 K/UL
NEUTROPHILS NFR BLD: 61.1 %
NEUTROPHILS NFR BLD: 65.9 %
NEUTROPHILS NFR BLD: 66 %
NEUTROPHILS NFR BLD: 66 %
NEUTROPHILS NFR BLD: 66.6 %
NEUTROPHILS NFR BLD: 67.5 %
NEUTROPHILS NFR BLD: 67.8 %
NEUTROPHILS NFR BLD: 69.5 %
NEUTROPHILS NFR BLD: 70.1 %
NEUTROPHILS NFR BLD: 70.6 %
NEUTROPHILS NFR BLD: 71.1 %
NEUTROPHILS NFR BLD: 71.2 %
NEUTROPHILS NFR BLD: 71.8 %
NEUTROPHILS NFR BLD: 72.1 %
NEUTROPHILS NFR BLD: 72.3 %
NEUTROPHILS NFR BLD: 72.3 %
NEUTROPHILS NFR BLD: 73.1 %
NEUTROPHILS NFR BLD: 74.4 %
NEUTROPHILS NFR BLD: 76.7 %
NEUTROPHILS NFR BLD: 77.5 %
NEUTROPHILS NFR BLD: 79.1 %
NEUTROPHILS NFR BLD: 81.6 %
NEUTROPHILS NFR BLD: 82.1 %
NEUTROPHILS NFR BLD: 82.4 %
NEUTROPHILS NFR BLD: 82.7 %
NEUTROPHILS NFR BLD: 84.6 %
NEUTROPHILS NFR BLD: 85.4 %
NEUTROPHILS NFR BLD: 86.4 %
NEUTROPHILS NFR BLD: 86.9 %
NEUTROPHILS NFR BLD: 87.6 %
NEUTROPHILS NFR BLD: 88.4 %
NITRITE UR QL STRIP: NEGATIVE
NITRITE UR QL STRIP: NEGATIVE
OVALOCYTES BLD QL SMEAR: ABNORMAL
PCO2 BLDA: 42.5 MMHG (ref 35–45)
PEEP: 5
PH SMN: 7.33 [PH] (ref 7.35–7.45)
PH UR STRIP: 5 [PH] (ref 5–8)
PH UR STRIP: 8 [PH] (ref 5–8)
PHOSPHATE SERPL-MCNC: 10 MG/DL
PHOSPHATE SERPL-MCNC: 5.3 MG/DL
PHOSPHATE SERPL-MCNC: 5.3 MG/DL
PHOSPHATE SERPL-MCNC: 5.9 MG/DL
PHOSPHATE SERPL-MCNC: 6.1 MG/DL
PHOSPHATE SERPL-MCNC: 6.2 MG/DL
PHOSPHATE SERPL-MCNC: 6.3 MG/DL
PHOSPHATE SERPL-MCNC: 6.9 MG/DL
PHOSPHATE SERPL-MCNC: 7.1 MG/DL
PHOSPHATE SERPL-MCNC: 7.2 MG/DL
PHOSPHATE SERPL-MCNC: 7.5 MG/DL
PHOSPHATE SERPL-MCNC: 7.8 MG/DL
PHOSPHATE SERPL-MCNC: 8.4 MG/DL
PHOSPHATE SERPL-MCNC: 8.6 MG/DL
PHOSPHATE SERPL-MCNC: 8.7 MG/DL
PHOSPHATE SERPL-MCNC: 9 MG/DL
PHOSPHATE SERPL-MCNC: 9.1 MG/DL
PHOSPHATE SERPL-MCNC: 9.4 MG/DL
PHOSPHATE SERPL-MCNC: 9.8 MG/DL
PIP: 26
PLATELET # BLD AUTO: 108 K/UL
PLATELET # BLD AUTO: 117 K/UL
PLATELET # BLD AUTO: 120 K/UL
PLATELET # BLD AUTO: 131 K/UL
PLATELET # BLD AUTO: 134 K/UL
PLATELET # BLD AUTO: 134 K/UL
PLATELET # BLD AUTO: 137 K/UL
PLATELET # BLD AUTO: 143 K/UL
PLATELET # BLD AUTO: 157 K/UL
PLATELET # BLD AUTO: 157 K/UL
PLATELET # BLD AUTO: 164 K/UL
PLATELET # BLD AUTO: 165 K/UL
PLATELET # BLD AUTO: 170 K/UL
PLATELET # BLD AUTO: 177 K/UL
PLATELET # BLD AUTO: 179 K/UL
PLATELET # BLD AUTO: 181 K/UL
PLATELET # BLD AUTO: 182 K/UL
PLATELET # BLD AUTO: 183 K/UL
PLATELET # BLD AUTO: 192 K/UL
PLATELET # BLD AUTO: 193 K/UL
PLATELET # BLD AUTO: 195 K/UL
PLATELET # BLD AUTO: 198 K/UL
PLATELET # BLD AUTO: 199 K/UL
PLATELET # BLD AUTO: 200 K/UL
PLATELET # BLD AUTO: 205 K/UL
PLATELET # BLD AUTO: 206 K/UL
PLATELET # BLD AUTO: 229 K/UL
PLATELET # BLD AUTO: 231 K/UL
PLATELET # BLD AUTO: 235 K/UL
PLATELET # BLD AUTO: 238 K/UL
PLATELET # BLD AUTO: 250 K/UL
PLATELET BLD QL SMEAR: ABNORMAL
PLATELET RESPONSE PLAVIX: 337 PRU
PMV BLD AUTO: 10 FL
PMV BLD AUTO: 10.1 FL
PMV BLD AUTO: 10.1 FL
PMV BLD AUTO: 10.2 FL
PMV BLD AUTO: 10.3 FL
PMV BLD AUTO: 10.3 FL
PMV BLD AUTO: 10.5 FL
PMV BLD AUTO: 10.5 FL
PMV BLD AUTO: 10.6 FL
PMV BLD AUTO: 10.6 FL
PMV BLD AUTO: 10.7 FL
PMV BLD AUTO: 10.8 FL
PMV BLD AUTO: 10.8 FL
PMV BLD AUTO: 10.9 FL
PMV BLD AUTO: 10.9 FL
PMV BLD AUTO: 11 FL
PMV BLD AUTO: 11.2 FL
PMV BLD AUTO: 11.3 FL
PMV BLD AUTO: 11.4 FL
PMV BLD AUTO: 11.6 FL
PMV BLD AUTO: 11.8 FL
PMV BLD AUTO: 9.6 FL
PMV BLD AUTO: 9.7 FL
PMV BLD AUTO: 9.7 FL
PMV BLD AUTO: 9.9 FL
PO2 BLDA: 32 MMHG (ref 40–60)
POC BE: -3 MMOL/L
POC SATURATED O2: 58 % (ref 95–100)
POC TCO2: 24 MMOL/L (ref 24–29)
POCT GLUCOSE: 102 MG/DL (ref 70–110)
POCT GLUCOSE: 104 MG/DL (ref 70–110)
POCT GLUCOSE: 108 MG/DL (ref 70–110)
POCT GLUCOSE: 110 MG/DL (ref 70–110)
POCT GLUCOSE: 112 MG/DL (ref 70–110)
POCT GLUCOSE: 112 MG/DL (ref 70–110)
POCT GLUCOSE: 113 MG/DL (ref 70–110)
POCT GLUCOSE: 114 MG/DL (ref 70–110)
POCT GLUCOSE: 119 MG/DL (ref 70–110)
POCT GLUCOSE: 121 MG/DL (ref 70–110)
POCT GLUCOSE: 122 MG/DL (ref 70–110)
POCT GLUCOSE: 124 MG/DL (ref 70–110)
POCT GLUCOSE: 126 MG/DL (ref 70–110)
POCT GLUCOSE: 132 MG/DL (ref 70–110)
POCT GLUCOSE: 133 MG/DL (ref 70–110)
POCT GLUCOSE: 136 MG/DL (ref 70–110)
POCT GLUCOSE: 138 MG/DL (ref 70–110)
POCT GLUCOSE: 138 MG/DL (ref 70–110)
POCT GLUCOSE: 139 MG/DL (ref 70–110)
POCT GLUCOSE: 140 MG/DL (ref 70–110)
POCT GLUCOSE: 141 MG/DL (ref 70–110)
POCT GLUCOSE: 142 MG/DL (ref 70–110)
POCT GLUCOSE: 143 MG/DL (ref 70–110)
POCT GLUCOSE: 144 MG/DL (ref 70–110)
POCT GLUCOSE: 145 MG/DL (ref 70–110)
POCT GLUCOSE: 146 MG/DL (ref 70–110)
POCT GLUCOSE: 148 MG/DL (ref 70–110)
POCT GLUCOSE: 149 MG/DL (ref 70–110)
POCT GLUCOSE: 150 MG/DL (ref 70–110)
POCT GLUCOSE: 153 MG/DL (ref 70–110)
POCT GLUCOSE: 163 MG/DL (ref 70–110)
POCT GLUCOSE: 165 MG/DL (ref 70–110)
POCT GLUCOSE: 171 MG/DL (ref 70–110)
POCT GLUCOSE: 174 MG/DL (ref 70–110)
POCT GLUCOSE: 176 MG/DL (ref 70–110)
POCT GLUCOSE: 177 MG/DL (ref 70–110)
POCT GLUCOSE: 185 MG/DL (ref 70–110)
POCT GLUCOSE: 185 MG/DL (ref 70–110)
POCT GLUCOSE: 193 MG/DL (ref 70–110)
POCT GLUCOSE: 195 MG/DL (ref 70–110)
POCT GLUCOSE: 195 MG/DL (ref 70–110)
POCT GLUCOSE: 199 MG/DL (ref 70–110)
POCT GLUCOSE: 209 MG/DL (ref 70–110)
POCT GLUCOSE: 212 MG/DL (ref 70–110)
POCT GLUCOSE: 213 MG/DL (ref 70–110)
POCT GLUCOSE: 214 MG/DL (ref 70–110)
POCT GLUCOSE: 227 MG/DL (ref 70–110)
POCT GLUCOSE: 230 MG/DL (ref 70–110)
POCT GLUCOSE: 233 MG/DL (ref 70–110)
POCT GLUCOSE: 237 MG/DL (ref 70–110)
POCT GLUCOSE: 242 MG/DL (ref 70–110)
POCT GLUCOSE: 285 MG/DL (ref 70–110)
POCT GLUCOSE: 70 MG/DL (ref 70–110)
POCT GLUCOSE: 73 MG/DL (ref 70–110)
POCT GLUCOSE: 75 MG/DL (ref 70–110)
POCT GLUCOSE: 79 MG/DL (ref 70–110)
POCT GLUCOSE: 82 MG/DL (ref 70–110)
POCT GLUCOSE: 88 MG/DL (ref 70–110)
POCT GLUCOSE: 90 MG/DL (ref 70–110)
POCT GLUCOSE: 92 MG/DL (ref 70–110)
POCT GLUCOSE: 92 MG/DL (ref 70–110)
POCT GLUCOSE: 93 MG/DL (ref 70–110)
POCT GLUCOSE: 95 MG/DL (ref 70–110)
POCT GLUCOSE: 96 MG/DL (ref 70–110)
POCT GLUCOSE: 97 MG/DL (ref 70–110)
POIKILOCYTOSIS BLD QL SMEAR: SLIGHT
POLYCHROMASIA BLD QL SMEAR: ABNORMAL
POTASSIUM SERPL-SCNC: 4.1 MMOL/L
POTASSIUM SERPL-SCNC: 4.1 MMOL/L
POTASSIUM SERPL-SCNC: 4.2 MMOL/L
POTASSIUM SERPL-SCNC: 4.2 MMOL/L
POTASSIUM SERPL-SCNC: 4.3 MMOL/L
POTASSIUM SERPL-SCNC: 4.4 MMOL/L
POTASSIUM SERPL-SCNC: 4.5 MMOL/L
POTASSIUM SERPL-SCNC: 4.5 MMOL/L
POTASSIUM SERPL-SCNC: 4.6 MMOL/L
POTASSIUM SERPL-SCNC: 4.6 MMOL/L
POTASSIUM SERPL-SCNC: 4.7 MMOL/L
POTASSIUM SERPL-SCNC: 4.8 MMOL/L
POTASSIUM SERPL-SCNC: 4.9 MMOL/L
POTASSIUM SERPL-SCNC: 4.9 MMOL/L
POTASSIUM SERPL-SCNC: 5.1 MMOL/L
POTASSIUM SERPL-SCNC: 5.1 MMOL/L
POTASSIUM SERPL-SCNC: 5.2 MMOL/L
POTASSIUM SERPL-SCNC: 5.4 MMOL/L
POTASSIUM SERPL-SCNC: 5.4 MMOL/L
POTASSIUM SERPL-SCNC: 5.5 MMOL/L
PRE FEV1 FVC: 79
PRE FEV1: 2.5
PRE FVC: 3.18
PREDICTED FEV1 FVC: 77
PREDICTED FEV1: 3.05
PREDICTED FVC: 3.88
PROCALCITONIN SERPL IA-MCNC: 2.11 NG/ML
PROT SERPL-MCNC: 6.1 G/DL
PROT SERPL-MCNC: 6.3 G/DL
PROT SERPL-MCNC: 6.3 G/DL
PROT SERPL-MCNC: 6.4 G/DL
PROT SERPL-MCNC: 6.5 G/DL
PROT SERPL-MCNC: 6.6 G/DL
PROT SERPL-MCNC: 6.7 G/DL
PROT SERPL-MCNC: 6.7 G/DL
PROT SERPL-MCNC: 6.8 G/DL
PROT SERPL-MCNC: 7 G/DL
PROT SERPL-MCNC: 7 G/DL
PROT SERPL-MCNC: 7.1 G/DL
PROT SERPL-MCNC: 7.1 G/DL
PROT SERPL-MCNC: 7.2 G/DL
PROT SERPL-MCNC: 7.3 G/DL
PROT SERPL-MCNC: 7.7 G/DL
PROT UR QL STRIP: ABNORMAL
PROT UR QL STRIP: ABNORMAL
PROTHROMBIN TIME: 10.9 SEC
PROTHROMBIN TIME: 11 SEC
PROTHROMBIN TIME: 11.1 SEC
PROTHROMBIN TIME: 11.2 SEC
RBC # BLD AUTO: 2.77 M/UL
RBC # BLD AUTO: 2.86 M/UL
RBC # BLD AUTO: 2.89 M/UL
RBC # BLD AUTO: 2.97 M/UL
RBC # BLD AUTO: 3.03 M/UL
RBC # BLD AUTO: 3.09 M/UL
RBC # BLD AUTO: 3.09 M/UL
RBC # BLD AUTO: 3.15 M/UL
RBC # BLD AUTO: 3.16 M/UL
RBC # BLD AUTO: 3.17 M/UL
RBC # BLD AUTO: 3.23 M/UL
RBC # BLD AUTO: 3.23 M/UL
RBC # BLD AUTO: 3.27 M/UL
RBC # BLD AUTO: 3.28 M/UL
RBC # BLD AUTO: 3.31 M/UL
RBC # BLD AUTO: 3.31 M/UL
RBC # BLD AUTO: 3.32 M/UL
RBC # BLD AUTO: 3.32 M/UL
RBC # BLD AUTO: 3.34 M/UL
RBC # BLD AUTO: 3.37 M/UL
RBC # BLD AUTO: 3.38 M/UL
RBC # BLD AUTO: 3.39 M/UL
RBC # BLD AUTO: 3.5 M/UL
RBC # BLD AUTO: 3.54 M/UL
RBC # BLD AUTO: 3.54 M/UL
RBC # BLD AUTO: 3.61 M/UL
RBC # BLD AUTO: 3.82 M/UL
RBC # BLD AUTO: 3.84 M/UL
RBC # BLD AUTO: 3.86 M/UL
RBC # BLD AUTO: 3.88 M/UL
RBC # BLD AUTO: 3.98 M/UL
RBC #/AREA URNS AUTO: 3 /HPF (ref 0–4)
RBC #/AREA URNS AUTO: >100 /HPF (ref 0–4)
RETIRED EF AND QEF - SEE NOTES: 40 (ref 55–65)
RETIRED EF AND QEF - SEE NOTES: 43 (ref 55–65)
RETIRED EF AND QEF - SEE NOTES: 60 (ref 55–65)
RPR SER QL: NORMAL
SAMPLE: ABNORMAL
SATURATED IRON: 11 %
SCHISTOCYTES BLD QL SMEAR: ABNORMAL
SITE: ABNORMAL
SODIUM SERPL-SCNC: 124 MMOL/L
SODIUM SERPL-SCNC: 134 MMOL/L
SODIUM SERPL-SCNC: 135 MMOL/L
SODIUM SERPL-SCNC: 136 MMOL/L
SODIUM SERPL-SCNC: 137 MMOL/L
SODIUM SERPL-SCNC: 138 MMOL/L
SODIUM SERPL-SCNC: 139 MMOL/L
SODIUM SERPL-SCNC: 139 MMOL/L
SODIUM SERPL-SCNC: 140 MMOL/L
SODIUM SERPL-SCNC: 141 MMOL/L
SODIUM SERPL-SCNC: 141 MMOL/L
SP GR UR STRIP: 1.01 (ref 1–1.03)
SP GR UR STRIP: 1.02 (ref 1–1.03)
SP02: 100
SQUAMOUS #/AREA URNS AUTO: 0 /HPF
TB INDURATION 48 - 72 HR READ: 0 MM
TOTAL IRON BINDING CAPACITY: 178 UG/DL
TRANSFERRIN SERPL-MCNC: 120 MG/DL
TRANSFERRIN SERPL-MCNC: 120 MG/DL
TROPONIN I SERPL DL<=0.01 NG/ML-MCNC: 0.05 NG/ML
TROPONIN I SERPL DL<=0.01 NG/ML-MCNC: 0.06 NG/ML
TROPONIN I SERPL DL<=0.01 NG/ML-MCNC: 0.09 NG/ML
TROPONIN I SERPL DL<=0.01 NG/ML-MCNC: 0.1 NG/ML
TROPONIN I SERPL DL<=0.01 NG/ML-MCNC: 0.11 NG/ML
TROPONIN I SERPL DL<=0.01 NG/ML-MCNC: 0.11 NG/ML
TROPONIN I SERPL DL<=0.01 NG/ML-MCNC: 0.12 NG/ML
TROPONIN I SERPL DL<=0.01 NG/ML-MCNC: 0.27 NG/ML
TROPONIN I SERPL DL<=0.01 NG/ML-MCNC: 0.27 NG/ML
TROPONIN I SERPL DL<=0.01 NG/ML-MCNC: 10.32 NG/ML
TROPONIN I SERPL DL<=0.01 NG/ML-MCNC: 12.44 NG/ML
TROPONIN I SERPL DL<=0.01 NG/ML-MCNC: 23.06 NG/ML
TROPONIN I SERPL DL<=0.01 NG/ML-MCNC: 27.91 NG/ML
TROPONIN I SERPL DL<=0.01 NG/ML-MCNC: 43.59 NG/ML
TROPONIN I SERPL DL<=0.01 NG/ML-MCNC: >50 NG/ML
TSH SERPL DL<=0.005 MIU/L-ACNC: 3.73 UIU/ML
URN SPEC COLLECT METH UR: ABNORMAL
URN SPEC COLLECT METH UR: ABNORMAL
UROBILINOGEN UR STRIP-ACNC: NEGATIVE EU/DL
UROBILINOGEN UR STRIP-ACNC: NEGATIVE EU/DL
VANCOMYCIN SERPL-MCNC: 18.5 UG/ML
VANCOMYCIN SERPL-MCNC: 19.7 UG/ML
VIT B12 SERPL-MCNC: 747 PG/ML
VIT B2 SERPL-MCNC: 44 MCG/L (ref 1–19)
VT: 440
WBC # BLD AUTO: 10.01 K/UL
WBC # BLD AUTO: 10.9 K/UL
WBC # BLD AUTO: 11.09 K/UL
WBC # BLD AUTO: 11.73 K/UL
WBC # BLD AUTO: 13.11 K/UL
WBC # BLD AUTO: 14.15 K/UL
WBC # BLD AUTO: 14.32 K/UL
WBC # BLD AUTO: 15.42 K/UL
WBC # BLD AUTO: 19.44 K/UL
WBC # BLD AUTO: 7 K/UL
WBC # BLD AUTO: 7.2 K/UL
WBC # BLD AUTO: 7.3 K/UL
WBC # BLD AUTO: 7.44 K/UL
WBC # BLD AUTO: 7.63 K/UL
WBC # BLD AUTO: 7.69 K/UL
WBC # BLD AUTO: 7.74 K/UL
WBC # BLD AUTO: 7.83 K/UL
WBC # BLD AUTO: 7.83 K/UL
WBC # BLD AUTO: 7.94 K/UL
WBC # BLD AUTO: 7.99 K/UL
WBC # BLD AUTO: 8.12 K/UL
WBC # BLD AUTO: 8.2 K/UL
WBC # BLD AUTO: 8.22 K/UL
WBC # BLD AUTO: 8.27 K/UL
WBC # BLD AUTO: 8.44 K/UL
WBC # BLD AUTO: 8.46 K/UL
WBC # BLD AUTO: 8.55 K/UL
WBC # BLD AUTO: 8.94 K/UL
WBC # BLD AUTO: 8.99 K/UL
WBC # BLD AUTO: 9.01 K/UL
WBC # BLD AUTO: 9.13 K/UL
WBC #/AREA URNS AUTO: 29 /HPF (ref 0–5)
WBC #/AREA URNS AUTO: 47 /HPF (ref 0–5)

## 2017-01-01 PROCEDURE — 80100016 HC MAINTENANCE HEMODIALYSIS

## 2017-01-01 PROCEDURE — 99900026 HC AIRWAY MAINTENANCE (STAT)

## 2017-01-01 PROCEDURE — 93454 CORONARY ARTERY ANGIO S&I: CPT | Mod: 26,,, | Performed by: INTERNAL MEDICINE

## 2017-01-01 PROCEDURE — 93010 ELECTROCARDIOGRAM REPORT: CPT | Mod: 77,,, | Performed by: INTERNAL MEDICINE

## 2017-01-01 PROCEDURE — 63600175 PHARM REV CODE 636 W HCPCS: Performed by: HOSPITALIST

## 2017-01-01 PROCEDURE — 99291 CRITICAL CARE FIRST HOUR: CPT | Mod: ,,, | Performed by: EMERGENCY MEDICINE

## 2017-01-01 PROCEDURE — 80053 COMPREHEN METABOLIC PANEL: CPT

## 2017-01-01 PROCEDURE — 63600175 PHARM REV CODE 636 W HCPCS: Performed by: INTERNAL MEDICINE

## 2017-01-01 PROCEDURE — 99291 CRITICAL CARE FIRST HOUR: CPT | Mod: ,,, | Performed by: PSYCHIATRY & NEUROLOGY

## 2017-01-01 PROCEDURE — 63600175 PHARM REV CODE 636 W HCPCS

## 2017-01-01 PROCEDURE — 85025 COMPLETE CBC W/AUTO DIFF WBC: CPT

## 2017-01-01 PROCEDURE — 25000003 PHARM REV CODE 250: Performed by: PHYSICIAN ASSISTANT

## 2017-01-01 PROCEDURE — 84100 ASSAY OF PHOSPHORUS: CPT

## 2017-01-01 PROCEDURE — 25000003 PHARM REV CODE 250: Performed by: STUDENT IN AN ORGANIZED HEALTH CARE EDUCATION/TRAINING PROGRAM

## 2017-01-01 PROCEDURE — 25000003 PHARM REV CODE 250: Performed by: HOSPITALIST

## 2017-01-01 PROCEDURE — 80177 DRUG SCRN QUAN LEVETIRACETAM: CPT

## 2017-01-01 PROCEDURE — 36415 COLL VENOUS BLD VENIPUNCTURE: CPT

## 2017-01-01 PROCEDURE — 25000003 PHARM REV CODE 250

## 2017-01-01 PROCEDURE — 27000221 HC OXYGEN, UP TO 24 HOURS

## 2017-01-01 PROCEDURE — 85730 THROMBOPLASTIN TIME PARTIAL: CPT

## 2017-01-01 PROCEDURE — 80100014 HC HEMODIALYSIS 1:1

## 2017-01-01 PROCEDURE — 25000003 PHARM REV CODE 250: Performed by: INTERNAL MEDICINE

## 2017-01-01 PROCEDURE — 25000003 PHARM REV CODE 250: Performed by: NURSE PRACTITIONER

## 2017-01-01 PROCEDURE — 85520 HEPARIN ASSAY: CPT

## 2017-01-01 PROCEDURE — 83605 ASSAY OF LACTIC ACID: CPT

## 2017-01-01 PROCEDURE — 99232 SBSQ HOSP IP/OBS MODERATE 35: CPT | Mod: GC,,, | Performed by: HOSPITALIST

## 2017-01-01 PROCEDURE — 94761 N-INVAS EAR/PLS OXIMETRY MLT: CPT

## 2017-01-01 PROCEDURE — 84484 ASSAY OF TROPONIN QUANT: CPT

## 2017-01-01 PROCEDURE — 99214 OFFICE O/P EST MOD 30 MIN: CPT | Mod: ,,, | Performed by: INTERNAL MEDICINE

## 2017-01-01 PROCEDURE — 80202 ASSAY OF VANCOMYCIN: CPT

## 2017-01-01 PROCEDURE — 93010 ELECTROCARDIOGRAM REPORT: CPT | Mod: 76,,, | Performed by: INTERNAL MEDICINE

## 2017-01-01 PROCEDURE — 63600175 PHARM REV CODE 636 W HCPCS: Performed by: PHYSICIAN ASSISTANT

## 2017-01-01 PROCEDURE — 99239 HOSP IP/OBS DSCHRG MGMT >30: CPT | Mod: ,,, | Performed by: NURSE PRACTITIONER

## 2017-01-01 PROCEDURE — 99233 SBSQ HOSP IP/OBS HIGH 50: CPT | Mod: ,,, | Performed by: PSYCHIATRY & NEUROLOGY

## 2017-01-01 PROCEDURE — 93010 ELECTROCARDIOGRAM REPORT: CPT | Mod: ,,, | Performed by: INTERNAL MEDICINE

## 2017-01-01 PROCEDURE — 83735 ASSAY OF MAGNESIUM: CPT

## 2017-01-01 PROCEDURE — G8996 SWALLOW CURRENT STATUS: HCPCS | Mod: CJ

## 2017-01-01 PROCEDURE — 63600175 PHARM REV CODE 636 W HCPCS: Performed by: NURSE PRACTITIONER

## 2017-01-01 PROCEDURE — 85610 PROTHROMBIN TIME: CPT | Mod: 91

## 2017-01-01 PROCEDURE — S0077 INJECTION, CLINDAMYCIN PHOSP: HCPCS

## 2017-01-01 PROCEDURE — C9113 INJ PANTOPRAZOLE SODIUM, VIA: HCPCS | Performed by: EMERGENCY MEDICINE

## 2017-01-01 PROCEDURE — 11000001 HC ACUTE MED/SURG PRIVATE ROOM

## 2017-01-01 PROCEDURE — 99233 SBSQ HOSP IP/OBS HIGH 50: CPT | Mod: GC,,, | Performed by: INTERNAL MEDICINE

## 2017-01-01 PROCEDURE — 02HV33Z INSERTION OF INFUSION DEVICE INTO SUPERIOR VENA CAVA, PERCUTANEOUS APPROACH: ICD-10-PCS | Performed by: INTERNAL MEDICINE

## 2017-01-01 PROCEDURE — 86901 BLOOD TYPING SEROLOGIC RH(D): CPT

## 2017-01-01 PROCEDURE — 97161 PT EVAL LOW COMPLEX 20 MIN: CPT

## 2017-01-01 PROCEDURE — 82962 GLUCOSE BLOOD TEST: CPT

## 2017-01-01 PROCEDURE — 94620 *HC PUL STRESS; SIMPLE (6MIN WALK): CPT | Performed by: INTERNAL MEDICINE

## 2017-01-01 PROCEDURE — 99233 SBSQ HOSP IP/OBS HIGH 50: CPT | Mod: ,,, | Performed by: INTERNAL MEDICINE

## 2017-01-01 PROCEDURE — 99232 SBSQ HOSP IP/OBS MODERATE 35: CPT | Mod: ,,, | Performed by: INTERNAL MEDICINE

## 2017-01-01 PROCEDURE — 80048 BASIC METABOLIC PNL TOTAL CA: CPT

## 2017-01-01 PROCEDURE — 81001 URINALYSIS AUTO W/SCOPE: CPT

## 2017-01-01 PROCEDURE — 97803 MED NUTRITION INDIV SUBSEQ: CPT

## 2017-01-01 PROCEDURE — G8987 SELF CARE CURRENT STATUS: HCPCS | Mod: CL

## 2017-01-01 PROCEDURE — 84443 ASSAY THYROID STIM HORMONE: CPT

## 2017-01-01 PROCEDURE — 83036 HEMOGLOBIN GLYCOSYLATED A1C: CPT

## 2017-01-01 PROCEDURE — 83880 ASSAY OF NATRIURETIC PEPTIDE: CPT

## 2017-01-01 PROCEDURE — 87077 CULTURE AEROBIC IDENTIFY: CPT

## 2017-01-01 PROCEDURE — 85520 HEPARIN ASSAY: CPT | Mod: 91

## 2017-01-01 PROCEDURE — 84145 PROCALCITONIN (PCT): CPT

## 2017-01-01 PROCEDURE — 63600175 PHARM REV CODE 636 W HCPCS: Performed by: PSYCHIATRY & NEUROLOGY

## 2017-01-01 PROCEDURE — 97530 THERAPEUTIC ACTIVITIES: CPT

## 2017-01-01 PROCEDURE — 99285 EMERGENCY DEPT VISIT HI MDM: CPT | Mod: 25

## 2017-01-01 PROCEDURE — 86850 RBC ANTIBODY SCREEN: CPT

## 2017-01-01 PROCEDURE — 80069 RENAL FUNCTION PANEL: CPT

## 2017-01-01 PROCEDURE — 92523 SPEECH SOUND LANG COMPREHEN: CPT

## 2017-01-01 PROCEDURE — 92526 ORAL FUNCTION THERAPY: CPT

## 2017-01-01 PROCEDURE — 63600175 PHARM REV CODE 636 W HCPCS: Performed by: STUDENT IN AN ORGANIZED HEALTH CARE EDUCATION/TRAINING PROGRAM

## 2017-01-01 PROCEDURE — 97110 THERAPEUTIC EXERCISES: CPT

## 2017-01-01 PROCEDURE — 85025 COMPLETE CBC W/AUTO DIFF WBC: CPT | Mod: 91

## 2017-01-01 PROCEDURE — 99233 SBSQ HOSP IP/OBS HIGH 50: CPT | Mod: GC,,, | Performed by: PSYCHIATRY & NEUROLOGY

## 2017-01-01 PROCEDURE — 94003 VENT MGMT INPAT SUBQ DAY: CPT

## 2017-01-01 PROCEDURE — 96365 THER/PROPH/DIAG IV INF INIT: CPT

## 2017-01-01 PROCEDURE — 90935 HEMODIALYSIS ONE EVALUATION: CPT | Mod: GC,,, | Performed by: INTERNAL MEDICINE

## 2017-01-01 PROCEDURE — 20000000 HC ICU ROOM

## 2017-01-01 PROCEDURE — B211YZZ FLUOROSCOPY OF MULTIPLE CORONARY ARTERIES USING OTHER CONTRAST: ICD-10-PCS | Performed by: INTERNAL MEDICINE

## 2017-01-01 PROCEDURE — 97116 GAIT TRAINING THERAPY: CPT

## 2017-01-01 PROCEDURE — 90935 HEMODIALYSIS ONE EVALUATION: CPT | Mod: ,,, | Performed by: INTERNAL MEDICINE

## 2017-01-01 PROCEDURE — 86920 COMPATIBILITY TEST SPIN: CPT

## 2017-01-01 PROCEDURE — 99232 SBSQ HOSP IP/OBS MODERATE 35: CPT | Mod: GC,,, | Performed by: INTERNAL MEDICINE

## 2017-01-01 PROCEDURE — C1752 CATH,HEMODIALYSIS,SHORT-TERM: HCPCS

## 2017-01-01 PROCEDURE — 99233 SBSQ HOSP IP/OBS HIGH 50: CPT | Mod: ,,, | Performed by: NURSE PRACTITIONER

## 2017-01-01 PROCEDURE — 36556 INSERT NON-TUNNEL CV CATH: CPT

## 2017-01-01 PROCEDURE — B2111ZZ FLUOROSCOPY OF MULTIPLE CORONARY ARTERIES USING LOW OSMOLAR CONTRAST: ICD-10-PCS | Performed by: INTERNAL MEDICINE

## 2017-01-01 PROCEDURE — 92507 TX SP LANG VOICE COMM INDIV: CPT

## 2017-01-01 PROCEDURE — 83540 ASSAY OF IRON: CPT

## 2017-01-01 PROCEDURE — 94002 VENT MGMT INPAT INIT DAY: CPT

## 2017-01-01 PROCEDURE — G0378 HOSPITAL OBSERVATION PER HR: HCPCS

## 2017-01-01 PROCEDURE — 99152 MOD SED SAME PHYS/QHP 5/>YRS: CPT

## 2017-01-01 PROCEDURE — 95816 EEG AWAKE AND DROWSY: CPT

## 2017-01-01 PROCEDURE — 97535 SELF CARE MNGMENT TRAINING: CPT

## 2017-01-01 PROCEDURE — 87205 SMEAR GRAM STAIN: CPT

## 2017-01-01 PROCEDURE — 94010 BREATHING CAPACITY TEST: CPT | Performed by: INTERNAL MEDICINE

## 2017-01-01 PROCEDURE — 36901 INTRO CATH DIALYSIS CIRCUIT: CPT | Mod: ,,, | Performed by: INTERNAL MEDICINE

## 2017-01-01 PROCEDURE — G0257 UNSCHED DIALYSIS ESRD PT HOS: HCPCS

## 2017-01-01 PROCEDURE — 93005 ELECTROCARDIOGRAM TRACING: CPT

## 2017-01-01 PROCEDURE — 99238 HOSP IP/OBS DSCHRG MGMT 30/<: CPT | Mod: GC,,, | Performed by: HOSPITALIST

## 2017-01-01 PROCEDURE — 84484 ASSAY OF TROPONIN QUANT: CPT | Mod: 91

## 2017-01-01 PROCEDURE — 99285 EMERGENCY DEPT VISIT HI MDM: CPT | Mod: ,,, | Performed by: EMERGENCY MEDICINE

## 2017-01-01 PROCEDURE — 99223 1ST HOSP IP/OBS HIGH 75: CPT | Mod: ,,, | Performed by: PHYSICIAN ASSISTANT

## 2017-01-01 PROCEDURE — 93306 TTE W/DOPPLER COMPLETE: CPT | Mod: 26,,, | Performed by: INTERNAL MEDICINE

## 2017-01-01 PROCEDURE — A4216 STERILE WATER/SALINE, 10 ML: HCPCS | Performed by: EMERGENCY MEDICINE

## 2017-01-01 PROCEDURE — 25000003 PHARM REV CODE 250: Performed by: EMERGENCY MEDICINE

## 2017-01-01 PROCEDURE — 99285 EMERGENCY DEPT VISIT HI MDM: CPT

## 2017-01-01 PROCEDURE — 99222 1ST HOSP IP/OBS MODERATE 55: CPT | Mod: GC,,, | Performed by: INTERNAL MEDICINE

## 2017-01-01 PROCEDURE — 25500020 PHARM REV CODE 255: Performed by: HOSPITALIST

## 2017-01-01 PROCEDURE — 27000207 HC ISOLATION

## 2017-01-01 PROCEDURE — 99215 OFFICE O/P EST HI 40 MIN: CPT | Mod: S$GLB,,, | Performed by: SURGERY

## 2017-01-01 PROCEDURE — 85610 PROTHROMBIN TIME: CPT

## 2017-01-01 PROCEDURE — 82728 ASSAY OF FERRITIN: CPT

## 2017-01-01 PROCEDURE — 86900 BLOOD TYPING SEROLOGIC ABO: CPT

## 2017-01-01 PROCEDURE — 027034Z DILATION OF CORONARY ARTERY, ONE ARTERY WITH DRUG-ELUTING INTRALUMINAL DEVICE, PERCUTANEOUS APPROACH: ICD-10-PCS | Performed by: INTERNAL MEDICINE

## 2017-01-01 PROCEDURE — 99222 1ST HOSP IP/OBS MODERATE 55: CPT | Mod: GC,,, | Performed by: SURGERY

## 2017-01-01 PROCEDURE — 99152 MOD SED SAME PHYS/QHP 5/>YRS: CPT | Mod: ,,, | Performed by: INTERNAL MEDICINE

## 2017-01-01 PROCEDURE — 25000003 PHARM REV CODE 250: Performed by: ANESTHESIOLOGY

## 2017-01-01 PROCEDURE — S0073 INJECTION, AZTREONAM, 500 MG: HCPCS | Performed by: STUDENT IN AN ORGANIZED HEALTH CARE EDUCATION/TRAINING PROGRAM

## 2017-01-01 PROCEDURE — 80053 COMPREHEN METABOLIC PANEL: CPT | Mod: 91

## 2017-01-01 PROCEDURE — 99291 CRITICAL CARE FIRST HOUR: CPT | Mod: 25

## 2017-01-01 PROCEDURE — 83605 ASSAY OF LACTIC ACID: CPT | Mod: 91

## 2017-01-01 PROCEDURE — 87186 SC STD MICRODIL/AGAR DIL: CPT

## 2017-01-01 PROCEDURE — 96372 THER/PROPH/DIAG INJ SC/IM: CPT

## 2017-01-01 PROCEDURE — 84100 ASSAY OF PHOSPHORUS: CPT | Mod: 91

## 2017-01-01 PROCEDURE — 90791 PSYCH DIAGNOSTIC EVALUATION: CPT | Mod: ,,, | Performed by: NURSE PRACTITIONER

## 2017-01-01 PROCEDURE — B51WYZZ FLUOROSCOPY OF DIALYSIS SHUNT/FISTULA USING OTHER CONTRAST: ICD-10-PCS | Performed by: INTERNAL MEDICINE

## 2017-01-01 PROCEDURE — 99223 1ST HOSP IP/OBS HIGH 75: CPT | Mod: ,,, | Performed by: HOSPITALIST

## 2017-01-01 PROCEDURE — 99232 SBSQ HOSP IP/OBS MODERATE 35: CPT | Mod: ,,, | Performed by: NURSE PRACTITIONER

## 2017-01-01 PROCEDURE — 95951 PR EEG MONITORING/VIDEORECORD: CPT | Mod: 26,52,, | Performed by: PSYCHIATRY & NEUROLOGY

## 2017-01-01 PROCEDURE — 83735 ASSAY OF MAGNESIUM: CPT | Mod: 91

## 2017-01-01 PROCEDURE — 85576 BLOOD PLATELET AGGREGATION: CPT

## 2017-01-01 PROCEDURE — 99233 SBSQ HOSP IP/OBS HIGH 50: CPT | Mod: ,,, | Performed by: PHYSICIAN ASSISTANT

## 2017-01-01 PROCEDURE — 27201236 HC CRRT SET UP & CANCELED

## 2017-01-01 PROCEDURE — 82607 VITAMIN B-12: CPT

## 2017-01-01 PROCEDURE — B548ZZA ULTRASONOGRAPHY OF SUPERIOR VENA CAVA, GUIDANCE: ICD-10-PCS | Performed by: INTERNAL MEDICINE

## 2017-01-01 PROCEDURE — 86580 TB INTRADERMAL TEST: CPT | Performed by: PSYCHIATRY & NEUROLOGY

## 2017-01-01 PROCEDURE — 92610 EVALUATE SWALLOWING FUNCTION: CPT

## 2017-01-01 PROCEDURE — C9113 INJ PANTOPRAZOLE SODIUM, VIA: HCPCS | Performed by: HOSPITALIST

## 2017-01-01 PROCEDURE — 97802 MEDICAL NUTRITION INDIV IN: CPT

## 2017-01-01 PROCEDURE — 80074 ACUTE HEPATITIS PANEL: CPT

## 2017-01-01 PROCEDURE — 27100171 HC OXYGEN HIGH FLOW UP TO 24 HOURS

## 2017-01-01 PROCEDURE — 93454 CORONARY ARTERY ANGIO S&I: CPT | Mod: 26,59,, | Performed by: INTERNAL MEDICINE

## 2017-01-01 PROCEDURE — G8997 SWALLOW GOAL STATUS: HCPCS | Mod: CJ

## 2017-01-01 PROCEDURE — 20600001 HC STEP DOWN PRIVATE ROOM

## 2017-01-01 PROCEDURE — 99222 1ST HOSP IP/OBS MODERATE 55: CPT | Mod: ,,, | Performed by: NURSE PRACTITIONER

## 2017-01-01 PROCEDURE — 84207 ASSAY OF VITAMIN B-6: CPT

## 2017-01-01 PROCEDURE — C1894 INTRO/SHEATH, NON-LASER: HCPCS

## 2017-01-01 PROCEDURE — 87086 URINE CULTURE/COLONY COUNT: CPT

## 2017-01-01 PROCEDURE — 82803 BLOOD GASES ANY COMBINATION: CPT

## 2017-01-01 PROCEDURE — 99223 1ST HOSP IP/OBS HIGH 75: CPT | Mod: ,,, | Performed by: INTERNAL MEDICINE

## 2017-01-01 PROCEDURE — 84252 ASSAY OF VITAMIN B-2: CPT

## 2017-01-01 PROCEDURE — 27200966 HC CLOSED SUCTION SYSTEM

## 2017-01-01 PROCEDURE — 63600175 PHARM REV CODE 636 W HCPCS: Performed by: EMERGENCY MEDICINE

## 2017-01-01 PROCEDURE — 25500020 PHARM REV CODE 255: Performed by: EMERGENCY MEDICINE

## 2017-01-01 PROCEDURE — G8988 SELF CARE GOAL STATUS: HCPCS | Mod: CK

## 2017-01-01 PROCEDURE — 5A1945Z RESPIRATORY VENTILATION, 24-96 CONSECUTIVE HOURS: ICD-10-PCS | Performed by: INTERNAL MEDICINE

## 2017-01-01 PROCEDURE — 99291 CRITICAL CARE FIRST HOUR: CPT | Mod: GC,,, | Performed by: INTERNAL MEDICINE

## 2017-01-01 PROCEDURE — G0365 VESSEL MAPPING HEMO ACCESS: HCPCS | Mod: S$GLB,,, | Performed by: SURGERY

## 2017-01-01 PROCEDURE — 96367 TX/PROPH/DG ADDL SEQ IV INF: CPT

## 2017-01-01 PROCEDURE — 99223 1ST HOSP IP/OBS HIGH 75: CPT | Mod: GC,,, | Performed by: PSYCHIATRY & NEUROLOGY

## 2017-01-01 PROCEDURE — 87040 BLOOD CULTURE FOR BACTERIA: CPT

## 2017-01-01 PROCEDURE — 96376 TX/PRO/DX INJ SAME DRUG ADON: CPT

## 2017-01-01 PROCEDURE — 99214 OFFICE O/P EST MOD 30 MIN: CPT | Mod: ,,, | Performed by: NURSE PRACTITIONER

## 2017-01-01 PROCEDURE — 93306 TTE W/DOPPLER COMPLETE: CPT

## 2017-01-01 PROCEDURE — 94729 DIFFUSING CAPACITY: CPT | Performed by: INTERNAL MEDICINE

## 2017-01-01 PROCEDURE — 96374 THER/PROPH/DIAG INJ IV PUSH: CPT

## 2017-01-01 PROCEDURE — 84425 ASSAY OF VITAMIN B-1: CPT

## 2017-01-01 PROCEDURE — C1887 CATHETER, GUIDING: HCPCS

## 2017-01-01 PROCEDURE — 87040 BLOOD CULTURE FOR BACTERIA: CPT | Mod: 59

## 2017-01-01 PROCEDURE — 99283 EMERGENCY DEPT VISIT LOW MDM: CPT | Mod: GC,,, | Performed by: INTERNAL MEDICINE

## 2017-01-01 PROCEDURE — 92928 PRQ TCAT PLMT NTRAC ST 1 LES: CPT | Mod: LC,,, | Performed by: INTERNAL MEDICINE

## 2017-01-01 PROCEDURE — 31500 INSERT EMERGENCY AIRWAY: CPT

## 2017-01-01 PROCEDURE — 0BH17EZ INSERTION OF ENDOTRACHEAL AIRWAY INTO TRACHEA, VIA NATURAL OR ARTIFICIAL OPENING: ICD-10-PCS | Performed by: INTERNAL MEDICINE

## 2017-01-01 PROCEDURE — 97165 OT EVAL LOW COMPLEX 30 MIN: CPT

## 2017-01-01 PROCEDURE — 87070 CULTURE OTHR SPECIMN AEROBIC: CPT

## 2017-01-01 PROCEDURE — C8929 TTE W OR WO FOL WCON,DOPPLER: HCPCS

## 2017-01-01 PROCEDURE — 96375 TX/PRO/DX INJ NEW DRUG ADDON: CPT

## 2017-01-01 PROCEDURE — 86592 SYPHILIS TEST NON-TREP QUAL: CPT

## 2017-01-01 PROCEDURE — 99220 PR INITIAL OBSERVATION CARE,LEVL III: CPT | Mod: ,,, | Performed by: HOSPITALIST

## 2017-01-01 PROCEDURE — 51798 US URINE CAPACITY MEASURE: CPT

## 2017-01-01 PROCEDURE — 99220 PR INITIAL OBSERVATION CARE,LEVL III: CPT | Mod: ,,, | Performed by: NURSE PRACTITIONER

## 2017-01-01 RX ORDER — RISPERIDONE 0.25 MG/1
0.5 TABLET ORAL ONCE AS NEEDED
Status: ACTIVE | OUTPATIENT
Start: 2017-01-01 | End: 2017-01-01

## 2017-01-01 RX ORDER — DIPHENHYDRAMINE HCL 25 MG
50 CAPSULE ORAL ONCE
Status: CANCELLED | OUTPATIENT
Start: 2017-01-01 | End: 2017-01-01

## 2017-01-01 RX ORDER — RAMELTEON 8 MG/1
8 TABLET ORAL NIGHTLY PRN
Status: DISCONTINUED | OUTPATIENT
Start: 2017-01-01 | End: 2017-01-01

## 2017-01-01 RX ORDER — SEVELAMER CARBONATE 800 MG/1
1600 TABLET, FILM COATED ORAL
Status: DISCONTINUED | OUTPATIENT
Start: 2017-01-01 | End: 2017-01-01

## 2017-01-01 RX ORDER — LORAZEPAM 2 MG/ML
1 INJECTION INTRAMUSCULAR ONCE
Status: DISCONTINUED | OUTPATIENT
Start: 2017-01-01 | End: 2017-01-01 | Stop reason: HOSPADM

## 2017-01-01 RX ORDER — ATORVASTATIN CALCIUM 40 MG/1
40 TABLET, FILM COATED ORAL DAILY
Status: ON HOLD | COMMUNITY
End: 2017-01-01

## 2017-01-01 RX ORDER — ATORVASTATIN CALCIUM 20 MG/1
80 TABLET, FILM COATED ORAL DAILY
Status: DISCONTINUED | OUTPATIENT
Start: 2017-01-01 | End: 2017-01-01

## 2017-01-01 RX ORDER — MORPHINE SULFATE 2 MG/ML
INJECTION, SOLUTION INTRAMUSCULAR; INTRAVENOUS
Status: DISCONTINUED
Start: 2017-01-01 | End: 2017-01-01 | Stop reason: HOSPADM

## 2017-01-01 RX ORDER — METOPROLOL TARTRATE 1 MG/ML
5 INJECTION, SOLUTION INTRAVENOUS EVERY 5 MIN PRN
Status: DISCONTINUED | OUTPATIENT
Start: 2017-01-01 | End: 2017-01-01

## 2017-01-01 RX ORDER — NITROGLYCERIN 0.4 MG/1
0.4 TABLET SUBLINGUAL EVERY 5 MIN PRN
Status: DISCONTINUED | OUTPATIENT
Start: 2017-01-01 | End: 2017-01-01

## 2017-01-01 RX ORDER — ATORVASTATIN CALCIUM 20 MG/1
80 TABLET, FILM COATED ORAL DAILY
Status: DISCONTINUED | OUTPATIENT
Start: 2017-01-01 | End: 2017-01-01 | Stop reason: HOSPADM

## 2017-01-01 RX ORDER — DIPHENHYDRAMINE HCL 50 MG
50 CAPSULE ORAL NIGHTLY PRN
Status: DISCONTINUED | OUTPATIENT
Start: 2017-01-01 | End: 2017-01-01 | Stop reason: HOSPADM

## 2017-01-01 RX ORDER — DEXMEDETOMIDINE HYDROCHLORIDE 4 UG/ML
0.2 INJECTION, SOLUTION INTRAVENOUS CONTINUOUS
Status: DISCONTINUED | OUTPATIENT
Start: 2017-01-01 | End: 2017-01-01 | Stop reason: HOSPADM

## 2017-01-01 RX ORDER — ONDANSETRON 2 MG/ML
INJECTION INTRAMUSCULAR; INTRAVENOUS
Status: DISPENSED
Start: 2017-01-01 | End: 2017-01-01

## 2017-01-01 RX ORDER — FENTANYL CITRATE 50 UG/ML
25 INJECTION, SOLUTION INTRAMUSCULAR; INTRAVENOUS ONCE
Status: DISCONTINUED | OUTPATIENT
Start: 2017-01-01 | End: 2017-01-01 | Stop reason: HOSPADM

## 2017-01-01 RX ORDER — OMEPRAZOLE 40 MG/1
40 CAPSULE, DELAYED RELEASE ORAL DAILY
Status: ON HOLD | COMMUNITY
End: 2017-01-01 | Stop reason: HOSPADM

## 2017-01-01 RX ORDER — ASPIRIN 325 MG
325 TABLET ORAL
Status: DISCONTINUED | OUTPATIENT
Start: 2017-01-01 | End: 2017-01-01

## 2017-01-01 RX ORDER — NITROGLYCERIN 0.4 MG/1
TABLET SUBLINGUAL
Status: DISPENSED
Start: 2017-01-01 | End: 2017-01-01

## 2017-01-01 RX ORDER — SEVELAMER CARBONATE 800 MG/1
1600 TABLET, FILM COATED ORAL
Status: DISCONTINUED | OUTPATIENT
Start: 2017-01-01 | End: 2017-01-01 | Stop reason: HOSPADM

## 2017-01-01 RX ORDER — NITROGLYCERIN 0.4 MG/1
0.4 TABLET SUBLINGUAL EVERY 5 MIN PRN
Status: DISCONTINUED | OUTPATIENT
Start: 2017-01-01 | End: 2017-01-01 | Stop reason: HOSPADM

## 2017-01-01 RX ORDER — ISOSORBIDE MONONITRATE 30 MG/1
30 TABLET, EXTENDED RELEASE ORAL DAILY
Status: DISCONTINUED | OUTPATIENT
Start: 2017-01-01 | End: 2017-01-01

## 2017-01-01 RX ORDER — CITALOPRAM 20 MG/1
20 TABLET, FILM COATED ORAL DAILY
Status: DISCONTINUED | OUTPATIENT
Start: 2017-01-01 | End: 2017-01-01

## 2017-01-01 RX ORDER — LEVETIRACETAM 500 MG/1
1000 TABLET ORAL ONCE
Status: DISCONTINUED | OUTPATIENT
Start: 2017-01-01 | End: 2017-01-01

## 2017-01-01 RX ORDER — MIRTAZAPINE 15 MG/1
15 TABLET, FILM COATED ORAL NIGHTLY
Status: DISCONTINUED | OUTPATIENT
Start: 2017-01-01 | End: 2017-01-01

## 2017-01-01 RX ORDER — SEVELAMER CARBONATE 800 MG/1
800 TABLET, FILM COATED ORAL
Status: ON HOLD | COMMUNITY
End: 2017-01-01

## 2017-01-01 RX ORDER — SODIUM CHLORIDE 9 MG/ML
INJECTION, SOLUTION INTRAVENOUS
Status: DISCONTINUED | OUTPATIENT
Start: 2017-01-01 | End: 2017-01-01 | Stop reason: HOSPADM

## 2017-01-01 RX ORDER — SODIUM CHLORIDE 9 MG/ML
INJECTION, SOLUTION INTRAVENOUS
Status: DISCONTINUED | OUTPATIENT
Start: 2017-01-01 | End: 2017-01-01

## 2017-01-01 RX ORDER — ISOSORBIDE MONONITRATE 30 MG/1
30 TABLET, EXTENDED RELEASE ORAL DAILY
Qty: 90 TABLET | Refills: 0 | Status: ON HOLD | OUTPATIENT
Start: 2017-01-01 | End: 2017-01-01 | Stop reason: HOSPADM

## 2017-01-01 RX ORDER — DIPHENHYDRAMINE HYDROCHLORIDE 50 MG/ML
25 INJECTION INTRAMUSCULAR; INTRAVENOUS ONCE
Status: COMPLETED | OUTPATIENT
Start: 2017-01-01 | End: 2017-01-01

## 2017-01-01 RX ORDER — METOPROLOL TARTRATE 25 MG/1
25 TABLET, FILM COATED ORAL 2 TIMES DAILY
Status: DISCONTINUED | OUTPATIENT
Start: 2017-01-01 | End: 2017-01-01

## 2017-01-01 RX ORDER — LORAZEPAM 0.5 MG/1
0.5 TABLET ORAL 2 TIMES DAILY PRN
Status: ON HOLD | COMMUNITY
End: 2017-01-01 | Stop reason: HOSPADM

## 2017-01-01 RX ORDER — SCOLOPAMINE TRANSDERMAL SYSTEM 1 MG/1
1 PATCH, EXTENDED RELEASE TRANSDERMAL
Status: DISCONTINUED | OUTPATIENT
Start: 2017-01-01 | End: 2017-01-01 | Stop reason: HOSPADM

## 2017-01-01 RX ORDER — GLUCAGON 1 MG
1 KIT INJECTION
Status: DISCONTINUED | OUTPATIENT
Start: 2017-01-01 | End: 2017-01-01 | Stop reason: HOSPADM

## 2017-01-01 RX ORDER — LEVETIRACETAM 5 MG/ML
500 INJECTION INTRAVASCULAR EVERY 12 HOURS
Status: DISCONTINUED | OUTPATIENT
Start: 2017-01-01 | End: 2017-01-01

## 2017-01-01 RX ORDER — MORPHINE SULFATE 2 MG/ML
2 INJECTION, SOLUTION INTRAMUSCULAR; INTRAVENOUS ONCE
Status: DISCONTINUED | OUTPATIENT
Start: 2017-01-01 | End: 2017-01-01 | Stop reason: HOSPADM

## 2017-01-01 RX ORDER — SODIUM CHLORIDE 0.9 % (FLUSH) 0.9 %
3 SYRINGE (ML) INJECTION EVERY 8 HOURS
Status: DISCONTINUED | OUTPATIENT
Start: 2017-01-01 | End: 2017-01-01 | Stop reason: HOSPADM

## 2017-01-01 RX ORDER — SODIUM CHLORIDE 9 MG/ML
INJECTION, SOLUTION INTRAVENOUS ONCE
Status: DISCONTINUED | OUTPATIENT
Start: 2017-01-01 | End: 2017-01-01 | Stop reason: HOSPADM

## 2017-01-01 RX ORDER — IPRATROPIUM BROMIDE AND ALBUTEROL SULFATE 2.5; .5 MG/3ML; MG/3ML
3 SOLUTION RESPIRATORY (INHALATION) EVERY 4 HOURS PRN
Status: DISCONTINUED | OUTPATIENT
Start: 2017-01-01 | End: 2017-01-01 | Stop reason: HOSPADM

## 2017-01-01 RX ORDER — MIDODRINE HYDROCHLORIDE 5 MG/1
10 TABLET ORAL ONCE AS NEEDED
Status: ACTIVE | OUTPATIENT
Start: 2017-01-01 | End: 2017-01-01

## 2017-01-01 RX ORDER — ATORVASTATIN CALCIUM 20 MG/1
40 TABLET, FILM COATED ORAL DAILY
Status: DISCONTINUED | OUTPATIENT
Start: 2017-01-01 | End: 2017-01-01 | Stop reason: HOSPADM

## 2017-01-01 RX ORDER — AMOXICILLIN 250 MG
2 CAPSULE ORAL 2 TIMES DAILY PRN
Status: DISCONTINUED | OUTPATIENT
Start: 2017-01-01 | End: 2017-01-01 | Stop reason: HOSPADM

## 2017-01-01 RX ORDER — LEVETIRACETAM 5 MG/ML
500 INJECTION INTRAVASCULAR EVERY 12 HOURS
Status: DISCONTINUED | OUTPATIENT
Start: 2017-01-01 | End: 2017-01-01 | Stop reason: HOSPADM

## 2017-01-01 RX ORDER — LABETALOL HYDROCHLORIDE 5 MG/ML
INJECTION, SOLUTION INTRAVENOUS
Status: COMPLETED
Start: 2017-01-01 | End: 2017-01-01

## 2017-01-01 RX ORDER — CLOPIDOGREL BISULFATE 75 MG/1
75 TABLET ORAL DAILY
Status: DISCONTINUED | OUTPATIENT
Start: 2017-01-01 | End: 2017-01-01

## 2017-01-01 RX ORDER — DIPHENHYDRAMINE HCL 50 MG
50 CAPSULE ORAL NIGHTLY PRN
Status: ON HOLD | COMMUNITY
End: 2017-01-01 | Stop reason: HOSPADM

## 2017-01-01 RX ORDER — CARBOXYMETHYLCELLULOSE SODIUM 5 MG/ML
1 SOLUTION/ DROPS OPHTHALMIC
Status: ON HOLD | COMMUNITY
End: 2017-01-01 | Stop reason: HOSPADM

## 2017-01-01 RX ORDER — AMLODIPINE BESYLATE 10 MG/1
10 TABLET ORAL DAILY
Status: DISCONTINUED | OUTPATIENT
Start: 2017-01-01 | End: 2017-01-01 | Stop reason: HOSPADM

## 2017-01-01 RX ORDER — ASPIRIN 81 MG/1
81 TABLET ORAL DAILY
Status: DISCONTINUED | OUTPATIENT
Start: 2017-01-01 | End: 2017-01-01 | Stop reason: HOSPADM

## 2017-01-01 RX ORDER — METRONIDAZOLE 500 MG/1
500 TABLET ORAL EVERY 8 HOURS
Status: DISCONTINUED | OUTPATIENT
Start: 2017-01-01 | End: 2017-01-01

## 2017-01-01 RX ORDER — CIPROFLOXACIN 500 MG/1
500 TABLET ORAL DAILY
Qty: 3 TABLET | Refills: 0 | Status: ON HOLD | OUTPATIENT
Start: 2017-01-01 | End: 2017-01-01 | Stop reason: HOSPADM

## 2017-01-01 RX ORDER — CIPROFLOXACIN 500 MG/1
500 TABLET ORAL DAILY
Qty: 5 TABLET | Refills: 0
Start: 2017-01-01 | End: 2017-01-01

## 2017-01-01 RX ORDER — SODIUM CHLORIDE 9 MG/ML
INJECTION, SOLUTION INTRAVENOUS ONCE
Status: COMPLETED | OUTPATIENT
Start: 2017-01-01 | End: 2017-01-01

## 2017-01-01 RX ORDER — ISOSORBIDE MONONITRATE 30 MG/1
30 TABLET, EXTENDED RELEASE ORAL ONCE
Status: COMPLETED | OUTPATIENT
Start: 2017-01-01 | End: 2017-01-01

## 2017-01-01 RX ORDER — FENTANYL CITRATE 50 UG/ML
25 INJECTION, SOLUTION INTRAMUSCULAR; INTRAVENOUS ONCE
Status: COMPLETED | OUTPATIENT
Start: 2017-01-01 | End: 2017-01-01

## 2017-01-01 RX ORDER — LORAZEPAM 0.5 MG/1
0.5 TABLET ORAL 2 TIMES DAILY PRN
Status: DISCONTINUED | OUTPATIENT
Start: 2017-01-01 | End: 2017-01-01 | Stop reason: HOSPADM

## 2017-01-01 RX ORDER — DEXTROSE MONOHYDRATE AND SODIUM CHLORIDE 5; .45 G/100ML; G/100ML
INJECTION, SOLUTION INTRAVENOUS CONTINUOUS
Status: CANCELLED | OUTPATIENT
Start: 2017-01-01

## 2017-01-01 RX ORDER — MIDAZOLAM HYDROCHLORIDE 1 MG/ML
2 INJECTION INTRAMUSCULAR; INTRAVENOUS
Status: DISCONTINUED | OUTPATIENT
Start: 2017-01-01 | End: 2017-01-01

## 2017-01-01 RX ORDER — ISOSORBIDE MONONITRATE 60 MG/1
60 TABLET, EXTENDED RELEASE ORAL DAILY
Status: DISCONTINUED | OUTPATIENT
Start: 2017-01-01 | End: 2017-01-01

## 2017-01-01 RX ORDER — HEPARIN SODIUM 5000 [USP'U]/ML
5000 INJECTION, SOLUTION INTRAVENOUS; SUBCUTANEOUS EVERY 8 HOURS
Status: DISCONTINUED | OUTPATIENT
Start: 2017-01-01 | End: 2017-01-01 | Stop reason: HOSPADM

## 2017-01-01 RX ORDER — LEVETIRACETAM 500 MG/1
500 TABLET ORAL 2 TIMES DAILY
Status: DISCONTINUED | OUTPATIENT
Start: 2017-01-01 | End: 2017-01-01

## 2017-01-01 RX ORDER — INSULIN ASPART 100 [IU]/ML
0-5 INJECTION, SOLUTION INTRAVENOUS; SUBCUTANEOUS
Status: DISCONTINUED | OUTPATIENT
Start: 2017-01-01 | End: 2017-01-01 | Stop reason: HOSPADM

## 2017-01-01 RX ORDER — ONDANSETRON 2 MG/ML
4 INJECTION INTRAMUSCULAR; INTRAVENOUS
Status: COMPLETED | OUTPATIENT
Start: 2017-01-01 | End: 2017-01-01

## 2017-01-01 RX ORDER — IPRATROPIUM BROMIDE AND ALBUTEROL SULFATE 2.5; .5 MG/3ML; MG/3ML
3 SOLUTION RESPIRATORY (INHALATION) EVERY 4 HOURS PRN
Status: DISCONTINUED | OUTPATIENT
Start: 2017-01-01 | End: 2017-01-01

## 2017-01-01 RX ORDER — HYDROMORPHONE HYDROCHLORIDE 1 MG/ML
0.5 INJECTION, SOLUTION INTRAMUSCULAR; INTRAVENOUS; SUBCUTANEOUS ONCE
Status: COMPLETED | OUTPATIENT
Start: 2017-01-01 | End: 2017-01-01

## 2017-01-01 RX ORDER — INSULIN ASPART 100 [IU]/ML
1-10 INJECTION, SOLUTION INTRAVENOUS; SUBCUTANEOUS EVERY 6 HOURS PRN
Status: DISCONTINUED | OUTPATIENT
Start: 2017-01-01 | End: 2017-01-01

## 2017-01-01 RX ORDER — LEVETIRACETAM 10 MG/ML
1000 INJECTION INTRAVASCULAR ONCE
Status: COMPLETED | OUTPATIENT
Start: 2017-01-01 | End: 2017-01-01

## 2017-01-01 RX ORDER — RISPERIDONE 0.25 MG/1
0.5 TABLET ORAL EVERY 8 HOURS PRN
Status: DISCONTINUED | OUTPATIENT
Start: 2017-01-01 | End: 2017-01-01 | Stop reason: HOSPADM

## 2017-01-01 RX ORDER — HYDRALAZINE HYDROCHLORIDE 50 MG/1
50 TABLET, FILM COATED ORAL EVERY 8 HOURS
Status: ON HOLD | COMMUNITY
End: 2017-01-01 | Stop reason: HOSPADM

## 2017-01-01 RX ORDER — CLOPIDOGREL 300 MG/1
300 TABLET, FILM COATED ORAL ONCE
Status: COMPLETED | OUTPATIENT
Start: 2017-01-01 | End: 2017-01-01

## 2017-01-01 RX ORDER — IBUPROFEN 200 MG
16 TABLET ORAL
Status: DISCONTINUED | OUTPATIENT
Start: 2017-01-01 | End: 2017-01-01

## 2017-01-01 RX ORDER — MORPHINE SULFATE 2 MG/ML
4 INJECTION, SOLUTION INTRAMUSCULAR; INTRAVENOUS
Status: COMPLETED | OUTPATIENT
Start: 2017-01-01 | End: 2017-01-01

## 2017-01-01 RX ORDER — FENTANYL CITRATE 50 UG/ML
INJECTION, SOLUTION INTRAMUSCULAR; INTRAVENOUS
Status: COMPLETED
Start: 2017-01-01 | End: 2017-01-01

## 2017-01-01 RX ORDER — HEPARIN SODIUM 1000 [USP'U]/ML
2000 INJECTION, SOLUTION INTRAVENOUS; SUBCUTANEOUS
Status: DISCONTINUED | OUTPATIENT
Start: 2017-01-01 | End: 2017-01-01 | Stop reason: HOSPADM

## 2017-01-01 RX ORDER — ONDANSETRON 2 MG/ML
INJECTION INTRAMUSCULAR; INTRAVENOUS
Status: COMPLETED
Start: 2017-01-01 | End: 2017-01-01

## 2017-01-01 RX ORDER — HYDROCODONE BITARTRATE AND ACETAMINOPHEN 500; 5 MG/1; MG/1
TABLET ORAL
Status: DISCONTINUED | OUTPATIENT
Start: 2017-01-01 | End: 2017-01-01 | Stop reason: HOSPADM

## 2017-01-01 RX ORDER — DIPHENHYDRAMINE HCL 25 MG
25 CAPSULE ORAL NIGHTLY PRN
Status: DISCONTINUED | OUTPATIENT
Start: 2017-01-01 | End: 2017-01-01

## 2017-01-01 RX ORDER — SEVELAMER CARBONATE 800 MG/1
1600 TABLET, FILM COATED ORAL
Status: ON HOLD
Start: 2017-01-01 | End: 2017-01-01 | Stop reason: HOSPADM

## 2017-01-01 RX ORDER — ATORVASTATIN CALCIUM 80 MG/1
80 TABLET, FILM COATED ORAL DAILY
Qty: 30 TABLET | Refills: 11 | Status: ON HOLD | OUTPATIENT
Start: 2017-01-01 | End: 2017-01-01 | Stop reason: HOSPADM

## 2017-01-01 RX ORDER — POLYETHYLENE GLYCOL 3350 17 G/17G
17 POWDER, FOR SOLUTION ORAL DAILY
Status: DISCONTINUED | OUTPATIENT
Start: 2017-01-01 | End: 2017-01-01 | Stop reason: HOSPADM

## 2017-01-01 RX ORDER — AMOXICILLIN 250 MG
1 CAPSULE ORAL 2 TIMES DAILY
Status: DISCONTINUED | OUTPATIENT
Start: 2017-01-01 | End: 2017-01-01

## 2017-01-01 RX ORDER — SODIUM,POTASSIUM PHOSPHATES 280-250MG
1 POWDER IN PACKET (EA) ORAL ONCE
Status: DISCONTINUED | OUTPATIENT
Start: 2017-01-01 | End: 2017-01-01

## 2017-01-01 RX ORDER — IBUPROFEN 200 MG
24 TABLET ORAL
Status: DISCONTINUED | OUTPATIENT
Start: 2017-01-01 | End: 2017-01-01 | Stop reason: HOSPADM

## 2017-01-01 RX ORDER — HEPARIN SODIUM 1000 [USP'U]/ML
500 INJECTION, SOLUTION INTRAVENOUS; SUBCUTANEOUS
Status: DISCONTINUED | OUTPATIENT
Start: 2017-01-01 | End: 2017-01-01 | Stop reason: HOSPADM

## 2017-01-01 RX ORDER — GLUCAGON 1 MG
1 KIT INJECTION
Status: DISCONTINUED | OUTPATIENT
Start: 2017-01-01 | End: 2017-01-01

## 2017-01-01 RX ORDER — METOPROLOL TARTRATE 1 MG/ML
5 INJECTION, SOLUTION INTRAVENOUS ONCE
Status: COMPLETED | OUTPATIENT
Start: 2017-01-01 | End: 2017-01-01

## 2017-01-01 RX ORDER — CITALOPRAM 20 MG/1
20 TABLET, FILM COATED ORAL DAILY
Status: ON HOLD | COMMUNITY
End: 2017-01-01 | Stop reason: HOSPADM

## 2017-01-01 RX ORDER — CARVEDILOL 6.25 MG/1
6.25 TABLET ORAL 2 TIMES DAILY
Status: DISCONTINUED | OUTPATIENT
Start: 2017-01-01 | End: 2017-01-01 | Stop reason: HOSPADM

## 2017-01-01 RX ORDER — HYDROCORTISONE 1 %
CREAM (GRAM) TOPICAL 2 TIMES DAILY
Status: DISCONTINUED | OUTPATIENT
Start: 2017-01-01 | End: 2017-01-01 | Stop reason: HOSPADM

## 2017-01-01 RX ORDER — LISINOPRIL 5 MG/1
5 TABLET ORAL DAILY
Status: DISCONTINUED | OUTPATIENT
Start: 2017-01-01 | End: 2017-01-01 | Stop reason: HOSPADM

## 2017-01-01 RX ORDER — CIPROFLOXACIN 500 MG/1
500 TABLET ORAL DAILY
Qty: 4 TABLET | Refills: 0 | Status: SHIPPED | OUTPATIENT
Start: 2017-01-01 | End: 2017-01-01

## 2017-01-01 RX ORDER — IBUPROFEN 200 MG
16 TABLET ORAL
Status: DISCONTINUED | OUTPATIENT
Start: 2017-01-01 | End: 2017-01-01 | Stop reason: HOSPADM

## 2017-01-01 RX ORDER — LORAZEPAM 2 MG/ML
INJECTION INTRAMUSCULAR
Status: COMPLETED
Start: 2017-01-01 | End: 2017-01-01

## 2017-01-01 RX ORDER — CIPROFLOXACIN 250 MG/1
500 TABLET, FILM COATED ORAL ONCE
Status: COMPLETED | OUTPATIENT
Start: 2017-01-01 | End: 2017-01-01

## 2017-01-01 RX ORDER — HEPARIN SODIUM,PORCINE/D5W 25000/250
17 INTRAVENOUS SOLUTION INTRAVENOUS CONTINUOUS
Status: DISCONTINUED | OUTPATIENT
Start: 2017-01-01 | End: 2017-01-01 | Stop reason: HOSPADM

## 2017-01-01 RX ORDER — FENTANYL CITRATE 50 UG/ML
50 INJECTION, SOLUTION INTRAMUSCULAR; INTRAVENOUS ONCE
Status: DISCONTINUED | OUTPATIENT
Start: 2017-01-01 | End: 2017-01-01

## 2017-01-01 RX ORDER — LORAZEPAM 2 MG/ML
2 INJECTION INTRAMUSCULAR ONCE
Status: COMPLETED | OUTPATIENT
Start: 2017-01-01 | End: 2017-01-01

## 2017-01-01 RX ORDER — HYDROCODONE BITARTRATE AND ACETAMINOPHEN 5; 325 MG/1; MG/1
1 TABLET ORAL EVERY 4 HOURS PRN
Status: DISCONTINUED | OUTPATIENT
Start: 2017-01-01 | End: 2017-01-01 | Stop reason: HOSPADM

## 2017-01-01 RX ORDER — CEFEPIME HYDROCHLORIDE 1 G/50ML
1 INJECTION, SOLUTION INTRAVENOUS
Status: DISCONTINUED | OUTPATIENT
Start: 2017-01-01 | End: 2017-01-01

## 2017-01-01 RX ORDER — SEVELAMER CARBONATE 800 MG/1
2400 TABLET, FILM COATED ORAL
Status: DISCONTINUED | OUTPATIENT
Start: 2017-01-01 | End: 2017-01-01 | Stop reason: HOSPADM

## 2017-01-01 RX ORDER — LORAZEPAM 2 MG/ML
INJECTION INTRAMUSCULAR
Status: DISCONTINUED
Start: 2017-01-01 | End: 2017-01-01 | Stop reason: WASHOUT

## 2017-01-01 RX ORDER — ACETAMINOPHEN 325 MG/1
650 TABLET ORAL EVERY 8 HOURS PRN
Status: DISCONTINUED | OUTPATIENT
Start: 2017-01-01 | End: 2017-01-01 | Stop reason: HOSPADM

## 2017-01-01 RX ORDER — HYDRALAZINE HYDROCHLORIDE 50 MG/1
50 TABLET, FILM COATED ORAL EVERY 8 HOURS
Status: DISCONTINUED | OUTPATIENT
Start: 2017-01-01 | End: 2017-01-01

## 2017-01-01 RX ORDER — PHENYLEPHRINE HCL IN 0.9% NACL 1 MG/10 ML
SYRINGE (ML) INTRAVENOUS
Status: COMPLETED
Start: 2017-01-01 | End: 2017-01-01

## 2017-01-01 RX ORDER — DIPHENHYDRAMINE HCL 50 MG
50 CAPSULE ORAL ONCE
Status: COMPLETED | OUTPATIENT
Start: 2017-01-01 | End: 2017-01-01

## 2017-01-01 RX ORDER — CARVEDILOL 12.5 MG/1
12.5 TABLET ORAL 2 TIMES DAILY
Status: DISCONTINUED | OUTPATIENT
Start: 2017-01-01 | End: 2017-01-01

## 2017-01-01 RX ORDER — MORPHINE SULFATE 2 MG/ML
6 INJECTION, SOLUTION INTRAMUSCULAR; INTRAVENOUS ONCE
Status: DISCONTINUED | OUTPATIENT
Start: 2017-01-01 | End: 2017-01-01 | Stop reason: HOSPADM

## 2017-01-01 RX ORDER — ONDANSETRON 2 MG/ML
4 INJECTION INTRAMUSCULAR; INTRAVENOUS EVERY 6 HOURS PRN
Status: DISCONTINUED | OUTPATIENT
Start: 2017-01-01 | End: 2017-01-01 | Stop reason: HOSPADM

## 2017-01-01 RX ORDER — HYDROCODONE BITARTRATE AND ACETAMINOPHEN 500; 5 MG/1; MG/1
TABLET ORAL
Status: DISCONTINUED | OUTPATIENT
Start: 2017-01-01 | End: 2017-01-01

## 2017-01-01 RX ORDER — NOREPINEPHRINE BITARTRATE/D5W 4MG/250ML
PLASTIC BAG, INJECTION (ML) INTRAVENOUS
Status: DISPENSED
Start: 2017-01-01 | End: 2017-01-01

## 2017-01-01 RX ORDER — ACETAMINOPHEN 325 MG/1
650 TABLET ORAL EVERY 6 HOURS PRN
Status: DISCONTINUED | OUTPATIENT
Start: 2017-01-01 | End: 2017-01-01 | Stop reason: HOSPADM

## 2017-01-01 RX ORDER — ASPIRIN 81 MG/1
81 TABLET ORAL DAILY
Status: DISCONTINUED | OUTPATIENT
Start: 2017-01-01 | End: 2017-01-01

## 2017-01-01 RX ORDER — PROPOFOL 10 MG/ML
INJECTION, EMULSION INTRAVENOUS
Status: COMPLETED
Start: 2017-01-01 | End: 2017-01-01

## 2017-01-01 RX ORDER — PANTOPRAZOLE SODIUM 40 MG/10ML
40 INJECTION, POWDER, LYOPHILIZED, FOR SOLUTION INTRAVENOUS DAILY
Status: DISCONTINUED | OUTPATIENT
Start: 2017-01-01 | End: 2017-01-01

## 2017-01-01 RX ORDER — SEVELAMER CARBONATE 800 MG/1
800 TABLET, FILM COATED ORAL
Status: DISCONTINUED | OUTPATIENT
Start: 2017-01-01 | End: 2017-01-01

## 2017-01-01 RX ORDER — HYDROMORPHONE HYDROCHLORIDE 1 MG/ML
0.5 INJECTION, SOLUTION INTRAMUSCULAR; INTRAVENOUS; SUBCUTANEOUS
Status: DISCONTINUED | OUTPATIENT
Start: 2017-01-01 | End: 2017-01-01

## 2017-01-01 RX ORDER — HYDRALAZINE HYDROCHLORIDE 20 MG/ML
10 INJECTION INTRAMUSCULAR; INTRAVENOUS EVERY 4 HOURS PRN
Status: DISCONTINUED | OUTPATIENT
Start: 2017-01-01 | End: 2017-01-01

## 2017-01-01 RX ORDER — NITROGLYCERIN 0.4 MG/1
0.4 TABLET SUBLINGUAL
Status: COMPLETED | OUTPATIENT
Start: 2017-01-01 | End: 2017-01-01

## 2017-01-01 RX ORDER — INSULIN ASPART 100 [IU]/ML
1-10 INJECTION, SOLUTION INTRAVENOUS; SUBCUTANEOUS EVERY 6 HOURS PRN
Status: DISCONTINUED | OUTPATIENT
Start: 2017-01-01 | End: 2017-01-01 | Stop reason: HOSPADM

## 2017-01-01 RX ORDER — MIRTAZAPINE 15 MG/1
15 TABLET, FILM COATED ORAL NIGHTLY
Status: ON HOLD | COMMUNITY
End: 2017-01-01 | Stop reason: HOSPADM

## 2017-01-01 RX ORDER — CARVEDILOL 6.25 MG/1
6.25 TABLET ORAL 2 TIMES DAILY
Status: DISCONTINUED | OUTPATIENT
Start: 2017-01-01 | End: 2017-01-01

## 2017-01-01 RX ORDER — ATORVASTATIN CALCIUM 20 MG/1
40 TABLET, FILM COATED ORAL DAILY
Status: DISCONTINUED | OUTPATIENT
Start: 2017-01-01 | End: 2017-01-01

## 2017-01-01 RX ORDER — IBUPROFEN 200 MG
24 TABLET ORAL
Status: DISCONTINUED | OUTPATIENT
Start: 2017-01-01 | End: 2017-01-01

## 2017-01-01 RX ORDER — INSULIN ASPART 100 [IU]/ML
1-10 INJECTION, SOLUTION INTRAVENOUS; SUBCUTANEOUS
Status: DISCONTINUED | OUTPATIENT
Start: 2017-01-01 | End: 2017-01-01 | Stop reason: HOSPADM

## 2017-01-01 RX ORDER — MAGNESIUM SULFATE HEPTAHYDRATE 40 MG/ML
2 INJECTION, SOLUTION INTRAVENOUS
Status: DISCONTINUED | OUTPATIENT
Start: 2017-01-01 | End: 2017-01-01 | Stop reason: HOSPADM

## 2017-01-01 RX ORDER — CITALOPRAM 20 MG/1
20 TABLET, FILM COATED ORAL DAILY
Status: DISCONTINUED | OUTPATIENT
Start: 2017-01-01 | End: 2017-01-01 | Stop reason: HOSPADM

## 2017-01-01 RX ORDER — LEVETIRACETAM 10 MG/ML
2000 INJECTION INTRAVASCULAR
Status: DISCONTINUED | OUTPATIENT
Start: 2017-01-01 | End: 2017-01-01 | Stop reason: SDUPTHER

## 2017-01-01 RX ORDER — PANTOPRAZOLE SODIUM 40 MG/1
40 TABLET, DELAYED RELEASE ORAL DAILY
Status: DISCONTINUED | OUTPATIENT
Start: 2017-01-01 | End: 2017-01-01 | Stop reason: HOSPADM

## 2017-01-01 RX ORDER — ASPIRIN 81 MG/1
81 TABLET ORAL DAILY
Qty: 30 TABLET | Refills: 11 | Status: ON HOLD | OUTPATIENT
Start: 2017-01-01 | End: 2017-01-01 | Stop reason: HOSPADM

## 2017-01-01 RX ORDER — HEPARIN SODIUM,PORCINE/D5W 25000/250
17 INTRAVENOUS SOLUTION INTRAVENOUS CONTINUOUS
Status: DISCONTINUED | OUTPATIENT
Start: 2017-01-01 | End: 2017-01-01

## 2017-01-01 RX ORDER — LORAZEPAM 0.5 MG/1
1 TABLET ORAL EVERY 4 HOURS PRN
Status: DISCONTINUED | OUTPATIENT
Start: 2017-01-01 | End: 2017-01-01 | Stop reason: HOSPADM

## 2017-01-01 RX ORDER — LISINOPRIL 2.5 MG/1
2.5 TABLET ORAL DAILY
Status: DISCONTINUED | OUTPATIENT
Start: 2017-01-01 | End: 2017-01-01

## 2017-01-01 RX ORDER — CEFEPIME HYDROCHLORIDE 2 G/50ML
2 INJECTION, SOLUTION INTRAVENOUS
Status: DISCONTINUED | OUTPATIENT
Start: 2017-01-01 | End: 2017-01-01 | Stop reason: HOSPADM

## 2017-01-01 RX ORDER — DIPHENHYDRAMINE HCL 25 MG
25 CAPSULE ORAL EVERY 6 HOURS PRN
Status: DISCONTINUED | OUTPATIENT
Start: 2017-01-01 | End: 2017-01-01 | Stop reason: HOSPADM

## 2017-01-01 RX ORDER — PANTOPRAZOLE SODIUM 40 MG/1
40 TABLET, DELAYED RELEASE ORAL DAILY
Status: DISCONTINUED | OUTPATIENT
Start: 2017-01-01 | End: 2017-01-01

## 2017-01-01 RX ORDER — SEVELAMER CARBONATE 800 MG/1
1600 TABLET, FILM COATED ORAL
Qty: 90 TABLET | Refills: 11 | Status: CANCELLED
Start: 2017-01-01 | End: 2018-05-15

## 2017-01-01 RX ORDER — ISOSORBIDE MONONITRATE 30 MG/1
30 TABLET, EXTENDED RELEASE ORAL DAILY
Status: DISCONTINUED | OUTPATIENT
Start: 2017-01-01 | End: 2017-01-01 | Stop reason: HOSPADM

## 2017-01-01 RX ORDER — HYDROMORPHONE HYDROCHLORIDE 1 MG/ML
1 INJECTION, SOLUTION INTRAMUSCULAR; INTRAVENOUS; SUBCUTANEOUS
Status: DISCONTINUED | OUTPATIENT
Start: 2017-01-01 | End: 2017-01-01 | Stop reason: HOSPADM

## 2017-01-01 RX ORDER — METOPROLOL TARTRATE 25 MG/1
25 TABLET, FILM COATED ORAL 2 TIMES DAILY
Status: DISCONTINUED | OUTPATIENT
Start: 2017-01-01 | End: 2017-01-01 | Stop reason: HOSPADM

## 2017-01-01 RX ORDER — NOREPINEPHRINE BITARTRATE/D5W 4MG/250ML
0.02 PLASTIC BAG, INJECTION (ML) INTRAVENOUS CONTINUOUS
Status: DISCONTINUED | OUTPATIENT
Start: 2017-01-01 | End: 2017-01-01

## 2017-01-01 RX ORDER — FENTANYL CITRAT/DEXTROSE 5%/PF 100 MCG/10
PATIENT CONTROLLED ANALGESIA SYRINGE INTRAVENOUS CONTINUOUS
Status: DISCONTINUED | OUTPATIENT
Start: 2017-01-01 | End: 2017-01-01 | Stop reason: HOSPADM

## 2017-01-01 RX ORDER — NOREPINEPHRINE BITARTRATE/D5W 4MG/250ML
PLASTIC BAG, INJECTION (ML) INTRAVENOUS
Status: COMPLETED
Start: 2017-01-01 | End: 2017-01-01

## 2017-01-01 RX ORDER — MORPHINE SULFATE 2 MG/ML
2 INJECTION, SOLUTION INTRAMUSCULAR; INTRAVENOUS ONCE
Status: COMPLETED | OUTPATIENT
Start: 2017-01-01 | End: 2017-01-01

## 2017-01-01 RX ORDER — DIPHENHYDRAMINE HCL 25 MG
25 CAPSULE ORAL ONCE AS NEEDED
Status: COMPLETED | OUTPATIENT
Start: 2017-01-01 | End: 2017-01-01

## 2017-01-01 RX ORDER — RISPERIDONE 0.5 MG/1
0.5 TABLET ORAL ONCE
Status: COMPLETED | OUTPATIENT
Start: 2017-01-01 | End: 2017-01-01

## 2017-01-01 RX ORDER — CARVEDILOL 6.25 MG/1
6.25 TABLET ORAL 2 TIMES DAILY
Qty: 60 TABLET | Refills: 11 | Status: ON HOLD | OUTPATIENT
Start: 2017-01-01 | End: 2017-01-01 | Stop reason: HOSPADM

## 2017-01-01 RX ORDER — PANTOPRAZOLE SODIUM 40 MG/10ML
40 INJECTION, POWDER, LYOPHILIZED, FOR SOLUTION INTRAVENOUS 2 TIMES DAILY
Status: DISCONTINUED | OUTPATIENT
Start: 2017-01-01 | End: 2017-01-01 | Stop reason: HOSPADM

## 2017-01-01 RX ORDER — POLYETHYLENE GLYCOL 3350 17 G/17G
17 POWDER, FOR SOLUTION ORAL DAILY PRN
Status: DISCONTINUED | OUTPATIENT
Start: 2017-01-01 | End: 2017-01-01

## 2017-01-01 RX ADMIN — HYPROMELLOSE 2910 1 DROP: 5 SOLUTION OPHTHALMIC at 05:08

## 2017-01-01 RX ADMIN — ONDANSETRON 4 MG: 2 INJECTION INTRAMUSCULAR; INTRAVENOUS at 04:05

## 2017-01-01 RX ADMIN — HEPARIN SODIUM 5000 UNITS: 5000 INJECTION, SOLUTION INTRAVENOUS; SUBCUTANEOUS at 05:05

## 2017-01-01 RX ADMIN — HEPARIN SODIUM AND DEXTROSE 17 UNITS/KG/HR: 10000; 5 INJECTION INTRAVENOUS at 11:09

## 2017-01-01 RX ADMIN — HEPARIN SODIUM 5000 UNITS: 5000 INJECTION, SOLUTION INTRAVENOUS; SUBCUTANEOUS at 02:05

## 2017-01-01 RX ADMIN — SODIUM CHLORIDE: 0.9 INJECTION, SOLUTION INTRAVENOUS at 11:05

## 2017-01-01 RX ADMIN — ASCORBIC ACID, FOLIC ACID, NIACIN, THIAMINE, RIBOFLAVIN, PYRIDOXINE, CYANOCOBALAMIN, PANTOTHENIC ACID, BIOTIN 1 CAPSULE: 100; 150; 6; 1; 20; 5; 10; 1.7; 1.5 CAPSULE, LIQUID FILLED ORAL at 09:08

## 2017-01-01 RX ADMIN — DIPHENHYDRAMINE HYDROCHLORIDE 25 MG: 25 CAPSULE ORAL at 11:05

## 2017-01-01 RX ADMIN — Medication 0.02 MCG/KG/MIN: at 02:08

## 2017-01-01 RX ADMIN — SEVELAMER CARBONATE 1600 MG: 800 TABLET, FILM COATED ORAL at 01:05

## 2017-01-01 RX ADMIN — SEVELAMER CARBONATE 1600 MG: 800 TABLET, FILM COATED ORAL at 06:08

## 2017-01-01 RX ADMIN — ASPIRIN 81 MG: 81 TABLET, COATED ORAL at 08:08

## 2017-01-01 RX ADMIN — Medication 3 ML: at 02:05

## 2017-01-01 RX ADMIN — LEVETIRACETAM 500 MG: 500 TABLET, FILM COATED ORAL at 09:05

## 2017-01-01 RX ADMIN — CEFTRIAXONE 1 G: 1 INJECTION, SOLUTION INTRAVENOUS at 01:05

## 2017-01-01 RX ADMIN — ONDANSETRON 4 MG: 2 INJECTION INTRAMUSCULAR; INTRAVENOUS at 01:05

## 2017-01-01 RX ADMIN — LORAZEPAM 2 MG: 2 INJECTION INTRAMUSCULAR at 09:08

## 2017-01-01 RX ADMIN — INSULIN ASPART 4 UNITS: 100 INJECTION, SOLUTION INTRAVENOUS; SUBCUTANEOUS at 11:09

## 2017-01-01 RX ADMIN — HEPARIN SODIUM 5000 UNITS: 5000 INJECTION, SOLUTION INTRAVENOUS; SUBCUTANEOUS at 08:05

## 2017-01-01 RX ADMIN — HEPARIN SODIUM 5000 UNITS: 5000 INJECTION, SOLUTION INTRAVENOUS; SUBCUTANEOUS at 01:05

## 2017-01-01 RX ADMIN — CITALOPRAM HYDROBROMIDE 20 MG: 10 TABLET ORAL at 09:08

## 2017-01-01 RX ADMIN — SEVELAMER CARBONATE 1600 MG: 800 TABLET, FILM COATED ORAL at 08:05

## 2017-01-01 RX ADMIN — HEPARIN SODIUM AND DEXTROSE 17 UNITS/KG/HR: 10000; 5 INJECTION INTRAVENOUS at 01:09

## 2017-01-01 RX ADMIN — SODIUM CHLORIDE: 0.9 INJECTION, SOLUTION INTRAVENOUS at 03:05

## 2017-01-01 RX ADMIN — TICAGRELOR 90 MG: 90 TABLET ORAL at 12:08

## 2017-01-01 RX ADMIN — ACETAMINOPHEN 650 MG: 325 TABLET ORAL at 07:08

## 2017-01-01 RX ADMIN — DIPHENHYDRAMINE HYDROCHLORIDE 25 MG: 25 CAPSULE ORAL at 06:05

## 2017-01-01 RX ADMIN — METRONIDAZOLE 500 MG: 500 TABLET ORAL at 06:05

## 2017-01-01 RX ADMIN — Medication 3 ML: at 09:05

## 2017-01-01 RX ADMIN — DEXMEDETOMIDINE HYDROCHLORIDE 0.8 MCG/KG/HR: 4 INJECTION, SOLUTION INTRAVENOUS at 04:08

## 2017-01-01 RX ADMIN — CEFEPIME HYDROCHLORIDE 2 G: 2 INJECTION, SOLUTION INTRAVENOUS at 08:09

## 2017-01-01 RX ADMIN — HYPROMELLOSE 2910 1 DROP: 5 SOLUTION OPHTHALMIC at 12:08

## 2017-01-01 RX ADMIN — Medication 3 ML: at 05:05

## 2017-01-01 RX ADMIN — ISOSORBIDE MONONITRATE 30 MG: 30 TABLET, EXTENDED RELEASE ORAL at 05:08

## 2017-01-01 RX ADMIN — METOPROLOL TARTRATE 25 MG: 25 TABLET ORAL at 09:08

## 2017-01-01 RX ADMIN — INSULIN ASPART 1 UNITS: 100 INJECTION, SOLUTION INTRAVENOUS; SUBCUTANEOUS at 09:05

## 2017-01-01 RX ADMIN — SEVELAMER CARBONATE 1600 MG: 800 TABLET, FILM COATED ORAL at 08:08

## 2017-01-01 RX ADMIN — HEPARIN SODIUM 5000 UNITS: 5000 INJECTION, SOLUTION INTRAVENOUS; SUBCUTANEOUS at 10:05

## 2017-01-01 RX ADMIN — ASPIRIN 81 MG: 81 TABLET, COATED ORAL at 12:05

## 2017-01-01 RX ADMIN — SODIUM CHLORIDE, PRESERVATIVE FREE 3 ML: 5 INJECTION INTRAVENOUS at 09:09

## 2017-01-01 RX ADMIN — LISINOPRIL 5 MG: 5 TABLET ORAL at 08:08

## 2017-01-01 RX ADMIN — ISOSORBIDE MONONITRATE 30 MG: 30 TABLET, EXTENDED RELEASE ORAL at 09:08

## 2017-01-01 RX ADMIN — LORAZEPAM 0.5 MG: 0.5 TABLET ORAL at 10:08

## 2017-01-01 RX ADMIN — LEVETIRACETAM 500 MG: 5 INJECTION INTRAVENOUS at 08:08

## 2017-01-01 RX ADMIN — CARVEDILOL 6.25 MG: 6.25 TABLET, FILM COATED ORAL at 08:08

## 2017-01-01 RX ADMIN — INSULIN ASPART 2 UNITS: 100 INJECTION, SOLUTION INTRAVENOUS; SUBCUTANEOUS at 02:08

## 2017-01-01 RX ADMIN — TICAGRELOR 180 MG: 90 TABLET ORAL at 05:08

## 2017-01-01 RX ADMIN — LEVETIRACETAM 500 MG: 5 INJECTION INTRAVENOUS at 10:05

## 2017-01-01 RX ADMIN — ASPIRIN 81 MG: 81 TABLET, COATED ORAL at 09:08

## 2017-01-01 RX ADMIN — RISPERIDONE 0.5 MG: 0.25 TABLET, FILM COATED ORAL at 11:05

## 2017-01-01 RX ADMIN — ERYTHROPOIETIN 8000 UNITS: 10000 INJECTION, SOLUTION INTRAVENOUS; SUBCUTANEOUS at 06:08

## 2017-01-01 RX ADMIN — Medication 3 ML: at 06:05

## 2017-01-01 RX ADMIN — METRONIDAZOLE 500 MG: 500 TABLET ORAL at 02:05

## 2017-01-01 RX ADMIN — SEVELAMER CARBONATE 1600 MG: 800 TABLET, FILM COATED ORAL at 06:07

## 2017-01-01 RX ADMIN — ATORVASTATIN CALCIUM 40 MG: 20 TABLET, FILM COATED ORAL at 12:05

## 2017-01-01 RX ADMIN — HYDROMORPHONE HYDROCHLORIDE 0.5 MG: 1 INJECTION, SOLUTION INTRAMUSCULAR; INTRAVENOUS; SUBCUTANEOUS at 05:09

## 2017-01-01 RX ADMIN — AMLODIPINE BESYLATE 10 MG: 10 TABLET ORAL at 12:05

## 2017-01-01 RX ADMIN — INSULIN ASPART 2 UNITS: 100 INJECTION, SOLUTION INTRAVENOUS; SUBCUTANEOUS at 06:08

## 2017-01-01 RX ADMIN — SEVELAMER CARBONATE 1600 MG: 800 TABLET, FILM COATED ORAL at 11:08

## 2017-01-01 RX ADMIN — METOPROLOL TARTRATE 5 MG: 5 INJECTION INTRAVENOUS at 02:09

## 2017-01-01 RX ADMIN — INSULIN ASPART 2 UNITS: 100 INJECTION, SOLUTION INTRAVENOUS; SUBCUTANEOUS at 12:05

## 2017-01-01 RX ADMIN — SEVELAMER CARBONATE 800 MG: 800 TABLET, FILM COATED ORAL at 08:05

## 2017-01-01 RX ADMIN — HEPARIN SODIUM 500 UNITS: 1000 INJECTION, SOLUTION INTRAVENOUS; SUBCUTANEOUS at 10:05

## 2017-01-01 RX ADMIN — ERYTHROPOIETIN 10000 UNITS: 10000 INJECTION, SOLUTION INTRAVENOUS; SUBCUTANEOUS at 04:08

## 2017-01-01 RX ADMIN — AMLODIPINE BESYLATE 10 MG: 10 TABLET ORAL at 08:05

## 2017-01-01 RX ADMIN — Medication 2500 MCG: at 05:08

## 2017-01-01 RX ADMIN — ASPIRIN 81 MG: 81 TABLET, COATED ORAL at 10:05

## 2017-01-01 RX ADMIN — SODIUM CHLORIDE, PRESERVATIVE FREE 3 ML: 5 INJECTION INTRAVENOUS at 05:08

## 2017-01-01 RX ADMIN — ERYTHROPOIETIN 7000 UNITS: 10000 INJECTION, SOLUTION INTRAVENOUS; SUBCUTANEOUS at 10:05

## 2017-01-01 RX ADMIN — INSULIN ASPART 4 UNITS: 100 INJECTION, SOLUTION INTRAVENOUS; SUBCUTANEOUS at 06:09

## 2017-01-01 RX ADMIN — ATORVASTATIN CALCIUM 40 MG: 20 TABLET, FILM COATED ORAL at 08:05

## 2017-01-01 RX ADMIN — METRONIDAZOLE 500 MG: 500 TABLET ORAL at 10:05

## 2017-01-01 RX ADMIN — SODIUM CHLORIDE: 0.9 INJECTION, SOLUTION INTRAVENOUS at 02:05

## 2017-01-01 RX ADMIN — ONDANSETRON 4 MG: 2 INJECTION INTRAMUSCULAR; INTRAVENOUS at 11:08

## 2017-01-01 RX ADMIN — POLYETHYLENE GLYCOL 3350 17 G: 17 POWDER, FOR SOLUTION ORAL at 08:07

## 2017-01-01 RX ADMIN — VANCOMYCIN HYDROCHLORIDE 750 MG: 5 INJECTION, POWDER, LYOPHILIZED, FOR SOLUTION INTRAVENOUS at 12:08

## 2017-01-01 RX ADMIN — SEVELAMER CARBONATE 1600 MG: 800 TABLET, FILM COATED ORAL at 12:05

## 2017-01-01 RX ADMIN — TICAGRELOR 90 MG: 90 TABLET ORAL at 08:09

## 2017-01-01 RX ADMIN — DIPHENHYDRAMINE HYDROCHLORIDE 50 MG: 50 CAPSULE ORAL at 12:08

## 2017-01-01 RX ADMIN — DIPHENHYDRAMINE HYDROCHLORIDE 50 MG: 50 CAPSULE ORAL at 10:08

## 2017-01-01 RX ADMIN — ASCORBIC ACID, FOLIC ACID, NIACIN, THIAMINE, RIBOFLAVIN, PYRIDOXINE, CYANOCOBALAMIN, PANTOTHENIC ACID, BIOTIN 1 CAPSULE: 100; 150; 6; 1; 20; 5; 10; 1.7; 1.5 CAPSULE, LIQUID FILLED ORAL at 08:07

## 2017-01-01 RX ADMIN — HEPARIN SODIUM 5000 UNITS: 5000 INJECTION, SOLUTION INTRAVENOUS; SUBCUTANEOUS at 09:05

## 2017-01-01 RX ADMIN — MOXIFLOXACIN HYDROCHLORIDE 400 MG: 400 INJECTION, SOLUTION INTRAVENOUS at 02:08

## 2017-01-01 RX ADMIN — CEFEPIME HYDROCHLORIDE 2 G: 2 INJECTION, SOLUTION INTRAVENOUS at 08:08

## 2017-01-01 RX ADMIN — MORPHINE SULFATE 2 MG: 2 INJECTION, SOLUTION INTRAMUSCULAR; INTRAVENOUS at 11:08

## 2017-01-01 RX ADMIN — SODIUM CHLORIDE 300 ML: 0.9 INJECTION, SOLUTION INTRAVENOUS at 10:05

## 2017-01-01 RX ADMIN — ASPIRIN 81 MG: 81 TABLET, COATED ORAL at 08:05

## 2017-01-01 RX ADMIN — ASPIRIN 81 MG: 81 TABLET, COATED ORAL at 09:05

## 2017-01-01 RX ADMIN — PANTOPRAZOLE SODIUM 40 MG: 40 INJECTION, POWDER, FOR SOLUTION INTRAVENOUS at 08:08

## 2017-01-01 RX ADMIN — IOHEXOL 100 ML: 350 INJECTION, SOLUTION INTRAVENOUS at 11:05

## 2017-01-01 RX ADMIN — INSULIN ASPART 1 UNITS: 100 INJECTION, SOLUTION INTRAVENOUS; SUBCUTANEOUS at 10:05

## 2017-01-01 RX ADMIN — PANTOPRAZOLE SODIUM 40 MG: 40 TABLET, DELAYED RELEASE ORAL at 09:08

## 2017-01-01 RX ADMIN — PANTOPRAZOLE SODIUM 40 MG: 40 TABLET, DELAYED RELEASE ORAL at 08:08

## 2017-01-01 RX ADMIN — SEVELAMER CARBONATE 1600 MG: 800 TABLET, FILM COATED ORAL at 02:05

## 2017-01-01 RX ADMIN — METOPROLOL TARTRATE 25 MG: 25 TABLET ORAL at 03:08

## 2017-01-01 RX ADMIN — SODIUM CHLORIDE, PRESERVATIVE FREE 3 ML: 5 INJECTION INTRAVENOUS at 09:08

## 2017-01-01 RX ADMIN — METRONIDAZOLE 500 MG: 500 TABLET ORAL at 01:05

## 2017-01-01 RX ADMIN — HEPARIN SODIUM 5000 UNITS: 5000 INJECTION, SOLUTION INTRAVENOUS; SUBCUTANEOUS at 09:07

## 2017-01-01 RX ADMIN — SEVELAMER CARBONATE 1600 MG: 800 TABLET, FILM COATED ORAL at 05:05

## 2017-01-01 RX ADMIN — Medication 0.02 MCG/KG/MIN: at 04:08

## 2017-01-01 RX ADMIN — CEFEPIME HYDROCHLORIDE 2 G: 2 INJECTION, SOLUTION INTRAVENOUS at 12:08

## 2017-01-01 RX ADMIN — CARVEDILOL 6.25 MG: 6.25 TABLET, FILM COATED ORAL at 09:08

## 2017-01-01 RX ADMIN — HYPROMELLOSE 2910 1 DROP: 5 SOLUTION OPHTHALMIC at 11:08

## 2017-01-01 RX ADMIN — METOPROLOL TARTRATE 5 MG: 5 INJECTION INTRAVENOUS at 07:08

## 2017-01-01 RX ADMIN — VANCOMYCIN HYDROCHLORIDE 1250 MG: 100 INJECTION, POWDER, LYOPHILIZED, FOR SOLUTION INTRAVENOUS at 12:08

## 2017-01-01 RX ADMIN — HYDROCORTISONE: 10 CREAM TOPICAL at 09:08

## 2017-01-01 RX ADMIN — SEVELAMER CARBONATE 1600 MG: 800 TABLET, FILM COATED ORAL at 09:08

## 2017-01-01 RX ADMIN — LABETALOL HYDROCHLORIDE 10 MG: 5 INJECTION INTRAVENOUS at 12:05

## 2017-01-01 RX ADMIN — INSULIN ASPART 2 UNITS: 100 INJECTION, SOLUTION INTRAVENOUS; SUBCUTANEOUS at 06:09

## 2017-01-01 RX ADMIN — HEPARIN SODIUM 5000 UNITS: 5000 INJECTION, SOLUTION INTRAVENOUS; SUBCUTANEOUS at 09:08

## 2017-01-01 RX ADMIN — IOHEXOL 100 ML: 350 INJECTION, SOLUTION INTRAVENOUS at 10:08

## 2017-01-01 RX ADMIN — HYPROMELLOSE 2910 1 DROP: 5 SOLUTION OPHTHALMIC at 01:08

## 2017-01-01 RX ADMIN — ATORVASTATIN CALCIUM 40 MG: 20 TABLET, FILM COATED ORAL at 08:08

## 2017-01-01 RX ADMIN — SODIUM CHLORIDE, PRESERVATIVE FREE 3 ML: 5 INJECTION INTRAVENOUS at 05:09

## 2017-01-01 RX ADMIN — Medication 3 ML: at 10:05

## 2017-01-01 RX ADMIN — SEVELAMER CARBONATE 2400 MG: 800 TABLET, FILM COATED ORAL at 01:05

## 2017-01-01 RX ADMIN — HEPARIN SODIUM 5000 UNITS: 5000 INJECTION, SOLUTION INTRAVENOUS; SUBCUTANEOUS at 06:07

## 2017-01-01 RX ADMIN — CLOPIDOGREL 75 MG: 75 TABLET, FILM COATED ORAL at 08:08

## 2017-01-01 RX ADMIN — LEVETIRACETAM 500 MG: 5 INJECTION INTRAVENOUS at 09:05

## 2017-01-01 RX ADMIN — PANTOPRAZOLE SODIUM 40 MG: 40 INJECTION, POWDER, FOR SOLUTION INTRAVENOUS at 09:09

## 2017-01-01 RX ADMIN — HYDROMORPHONE HYDROCHLORIDE 1 MG: 1 INJECTION, SOLUTION INTRAMUSCULAR; INTRAVENOUS; SUBCUTANEOUS at 08:09

## 2017-01-01 RX ADMIN — Medication 3 ML: at 01:05

## 2017-01-01 RX ADMIN — LISINOPRIL 2.5 MG: 2.5 TABLET ORAL at 02:08

## 2017-01-01 RX ADMIN — LISINOPRIL 2.5 MG: 2.5 TABLET ORAL at 08:08

## 2017-01-01 RX ADMIN — LORAZEPAM 0.5 MG: 0.5 TABLET ORAL at 09:08

## 2017-01-01 RX ADMIN — LEVETIRACETAM 1000 MG: 10 INJECTION INTRAVENOUS at 02:08

## 2017-01-01 RX ADMIN — SODIUM CHLORIDE, PRESERVATIVE FREE 3 ML: 5 INJECTION INTRAVENOUS at 06:08

## 2017-01-01 RX ADMIN — NITROGLYCERIN 0.4 MG: 0.4 TABLET SUBLINGUAL at 06:08

## 2017-01-01 RX ADMIN — HEPARIN SODIUM 5000 UNITS: 5000 INJECTION, SOLUTION INTRAVENOUS; SUBCUTANEOUS at 03:05

## 2017-01-01 RX ADMIN — ATORVASTATIN CALCIUM 40 MG: 20 TABLET, FILM COATED ORAL at 08:07

## 2017-01-01 RX ADMIN — Medication 1 CAPSULE: at 10:08

## 2017-01-01 RX ADMIN — SEVELAMER CARBONATE 2400 MG: 800 TABLET, FILM COATED ORAL at 08:08

## 2017-01-01 RX ADMIN — ACETAMINOPHEN 650 MG: 325 TABLET ORAL at 08:08

## 2017-01-01 RX ADMIN — LEVETIRACETAM 500 MG: 5 INJECTION INTRAVENOUS at 09:09

## 2017-01-01 RX ADMIN — DEXMEDETOMIDINE HYDROCHLORIDE 1 MCG/KG/HR: 4 INJECTION, SOLUTION INTRAVENOUS at 07:08

## 2017-01-01 RX ADMIN — ATORVASTATIN CALCIUM 80 MG: 20 TABLET, FILM COATED ORAL at 08:08

## 2017-01-01 RX ADMIN — TICAGRELOR 90 MG: 90 TABLET ORAL at 08:08

## 2017-01-01 RX ADMIN — PANTOPRAZOLE SODIUM 40 MG: 40 INJECTION, POWDER, FOR SOLUTION INTRAVENOUS at 10:08

## 2017-01-01 RX ADMIN — SODIUM CHLORIDE 300 ML: 0.9 INJECTION, SOLUTION INTRAVENOUS at 04:08

## 2017-01-01 RX ADMIN — ASPIRIN 81 MG: 81 TABLET, COATED ORAL at 08:09

## 2017-01-01 RX ADMIN — SEVELAMER CARBONATE 1600 MG: 800 TABLET, FILM COATED ORAL at 10:05

## 2017-01-01 RX ADMIN — LEVETIRACETAM 500 MG: 5 INJECTION INTRAVENOUS at 08:05

## 2017-01-01 RX ADMIN — DEXMEDETOMIDINE HYDROCHLORIDE 0.2 MCG/KG/HR: 4 INJECTION, SOLUTION INTRAVENOUS at 08:08

## 2017-01-01 RX ADMIN — STANDARDIZED SENNA CONCENTRATE AND DOCUSATE SODIUM 1 TABLET: 8.6; 5 TABLET, FILM COATED ORAL at 08:05

## 2017-01-01 RX ADMIN — LEVETIRACETAM 2000 MG: 10 INJECTION INTRAVENOUS at 03:05

## 2017-01-01 RX ADMIN — HYDROMORPHONE HYDROCHLORIDE 0.5 MG: 1 INJECTION, SOLUTION INTRAMUSCULAR; INTRAVENOUS; SUBCUTANEOUS at 12:09

## 2017-01-01 RX ADMIN — ERYTHROPOIETIN 8000 UNITS: 10000 INJECTION, SOLUTION INTRAVENOUS; SUBCUTANEOUS at 02:08

## 2017-01-01 RX ADMIN — POLYETHYLENE GLYCOL 3350 17 G: 17 POWDER, FOR SOLUTION ORAL at 08:08

## 2017-01-01 RX ADMIN — DIPHENHYDRAMINE HYDROCHLORIDE 50 MG: 50 CAPSULE ORAL at 09:08

## 2017-01-01 RX ADMIN — SEVELAMER CARBONATE 1600 MG: 800 TABLET, FILM COATED ORAL at 09:05

## 2017-01-01 RX ADMIN — LEVETIRACETAM 1000 MG: 500 TABLET, FILM COATED ORAL at 11:08

## 2017-01-01 RX ADMIN — PANTOPRAZOLE SODIUM 40 MG: 40 INJECTION, POWDER, FOR SOLUTION INTRAVENOUS at 09:08

## 2017-01-01 RX ADMIN — LEVETIRACETAM 500 MG: 500 TABLET, FILM COATED ORAL at 08:05

## 2017-01-01 RX ADMIN — HYDRALAZINE HYDROCHLORIDE 50 MG: 50 TABLET ORAL at 06:07

## 2017-01-01 RX ADMIN — HEPARIN SODIUM 5000 UNITS: 5000 INJECTION, SOLUTION INTRAVENOUS; SUBCUTANEOUS at 06:05

## 2017-01-01 RX ADMIN — TICAGRELOR 90 MG: 90 TABLET ORAL at 09:08

## 2017-01-01 RX ADMIN — INSULIN ASPART 2 UNITS: 100 INJECTION, SOLUTION INTRAVENOUS; SUBCUTANEOUS at 02:09

## 2017-01-01 RX ADMIN — HEPARIN SODIUM 5000 UNITS: 5000 INJECTION, SOLUTION INTRAVENOUS; SUBCUTANEOUS at 05:08

## 2017-01-01 RX ADMIN — HEPARIN SODIUM 2000 UNITS: 1000 INJECTION, SOLUTION INTRAVENOUS; SUBCUTANEOUS at 09:05

## 2017-01-01 RX ADMIN — AMLODIPINE BESYLATE 10 MG: 10 TABLET ORAL at 01:05

## 2017-01-01 RX ADMIN — LORAZEPAM 2 MG: 2 INJECTION INTRAMUSCULAR; INTRAVENOUS at 11:08

## 2017-01-01 RX ADMIN — SEVELAMER CARBONATE 1600 MG: 800 TABLET, FILM COATED ORAL at 04:05

## 2017-01-01 RX ADMIN — LEVETIRACETAM 500 MG: 5 INJECTION INTRAVENOUS at 10:08

## 2017-01-01 RX ADMIN — HYPROMELLOSE 2910 1 DROP: 5 SOLUTION OPHTHALMIC at 06:07

## 2017-01-01 RX ADMIN — HEPARIN SODIUM 5000 UNITS: 5000 INJECTION, SOLUTION INTRAVENOUS; SUBCUTANEOUS at 01:08

## 2017-01-01 RX ADMIN — DIPHENHYDRAMINE HYDROCHLORIDE 25 MG: 50 INJECTION, SOLUTION INTRAMUSCULAR; INTRAVENOUS at 12:05

## 2017-01-01 RX ADMIN — SEVELAMER CARBONATE 800 MG: 800 TABLET, FILM COATED ORAL at 05:05

## 2017-01-01 RX ADMIN — ASCORBIC ACID, FOLIC ACID, NIACIN, THIAMINE, RIBOFLAVIN, PYRIDOXINE, CYANOCOBALAMIN, PANTOTHENIC ACID, BIOTIN 1 CAPSULE: 100; 150; 6; 1; 20; 5; 10; 1.7; 1.5 CAPSULE, LIQUID FILLED ORAL at 08:08

## 2017-01-01 RX ADMIN — ERYTHROPOIETIN 7000 UNITS: 10000 INJECTION, SOLUTION INTRAVENOUS; SUBCUTANEOUS at 12:05

## 2017-01-01 RX ADMIN — ASCORBIC ACID, THIAMINE MONONITRATE,RIBOFLAVIN, NIACINAMIDE, PYRIDOXINE HYDROCHLORIDE, FOLIC ACID, CYANOCOBALAMIN, BIOTIN, CALCIUM PANTOTHENATE, 1 CAPSULE: 100; 1.5; 1.7; 20; 10; 1; 6000; 150000; 5 CAPSULE, LIQUID FILLED ORAL at 08:09

## 2017-01-01 RX ADMIN — ASCORBIC ACID, THIAMINE MONONITRATE,RIBOFLAVIN, NIACINAMIDE, PYRIDOXINE HYDROCHLORIDE, FOLIC ACID, CYANOCOBALAMIN, BIOTIN, CALCIUM PANTOTHENATE, 1 CAPSULE: 100; 1.5; 1.7; 20; 10; 1; 6000; 150000; 5 CAPSULE, LIQUID FILLED ORAL at 09:08

## 2017-01-01 RX ADMIN — HEPARIN SODIUM 5000 UNITS: 5000 INJECTION, SOLUTION INTRAVENOUS; SUBCUTANEOUS at 08:08

## 2017-01-01 RX ADMIN — INSULIN ASPART 4 UNITS: 100 INJECTION, SOLUTION INTRAVENOUS; SUBCUTANEOUS at 01:05

## 2017-01-01 RX ADMIN — HYDROMORPHONE HYDROCHLORIDE 1 MG: 1 INJECTION, SOLUTION INTRAMUSCULAR; INTRAVENOUS; SUBCUTANEOUS at 04:09

## 2017-01-01 RX ADMIN — SEVELAMER CARBONATE 1600 MG: 800 TABLET, FILM COATED ORAL at 08:07

## 2017-01-01 RX ADMIN — PANTOPRAZOLE SODIUM 40 MG: 40 TABLET, DELAYED RELEASE ORAL at 08:07

## 2017-01-01 RX ADMIN — CITALOPRAM HYDROBROMIDE 20 MG: 20 TABLET ORAL at 12:08

## 2017-01-01 RX ADMIN — DEXMEDETOMIDINE HYDROCHLORIDE 1 MCG/KG/HR: 4 INJECTION, SOLUTION INTRAVENOUS at 11:08

## 2017-01-01 RX ADMIN — FENTANYL CITRATE 25 MCG: 50 INJECTION INTRAMUSCULAR; INTRAVENOUS at 11:09

## 2017-01-01 RX ADMIN — SEVELAMER CARBONATE 1600 MG: 800 TABLET, FILM COATED ORAL at 05:08

## 2017-01-01 RX ADMIN — NOREPINEPHRINE BITARTRATE 0.37 MCG/KG/MIN: 1 INJECTION INTRAVENOUS at 07:09

## 2017-01-01 RX ADMIN — SODIUM CHLORIDE, PRESERVATIVE FREE 3 ML: 5 INJECTION INTRAVENOUS at 02:08

## 2017-01-01 RX ADMIN — Medication 2500 MCG: at 02:08

## 2017-01-01 RX ADMIN — AMLODIPINE BESYLATE 10 MG: 10 TABLET ORAL at 10:05

## 2017-01-01 RX ADMIN — ALUMINUM HYDROXIDE, MAGNESIUM HYDROXIDE, AND SIMETHICONE 50 ML: 200; 200; 20 SUSPENSION ORAL at 12:05

## 2017-01-01 RX ADMIN — CEFTRIAXONE 1 G: 1 INJECTION, SOLUTION INTRAVENOUS at 02:05

## 2017-01-01 RX ADMIN — Medication 50 MCG/HR: at 11:08

## 2017-01-01 RX ADMIN — ATORVASTATIN CALCIUM 40 MG: 20 TABLET, FILM COATED ORAL at 01:05

## 2017-01-01 RX ADMIN — CIPROFLOXACIN HYDROCHLORIDE 500 MG: 250 TABLET, FILM COATED ORAL at 01:05

## 2017-01-01 RX ADMIN — MORPHINE SULFATE 4 MG: 2 INJECTION, SOLUTION INTRAMUSCULAR; INTRAVENOUS at 04:08

## 2017-01-01 RX ADMIN — LORAZEPAM 2 MG: 2 INJECTION INTRAMUSCULAR; INTRAVENOUS at 09:08

## 2017-01-01 RX ADMIN — SEVELAMER CARBONATE 1600 MG: 800 TABLET, FILM COATED ORAL at 11:07

## 2017-01-01 RX ADMIN — SODIUM CHLORIDE: 0.9 INJECTION, SOLUTION INTRAVENOUS at 04:05

## 2017-01-01 RX ADMIN — DIPHENHYDRAMINE HYDROCHLORIDE 25 MG: 25 CAPSULE ORAL at 02:05

## 2017-01-01 RX ADMIN — ASPIRIN 81 MG: 81 TABLET, COATED ORAL at 12:08

## 2017-01-01 RX ADMIN — ATORVASTATIN CALCIUM 80 MG: 20 TABLET, FILM COATED ORAL at 12:08

## 2017-01-01 RX ADMIN — Medication 0.76 MCG/KG/MIN: at 11:08

## 2017-01-01 RX ADMIN — HYDROMORPHONE HYDROCHLORIDE 0.5 MG: 1 INJECTION, SOLUTION INTRAMUSCULAR; INTRAVENOUS; SUBCUTANEOUS at 11:08

## 2017-01-01 RX ADMIN — VANCOMYCIN HYDROCHLORIDE 1250 MG: 1 INJECTION, POWDER, LYOPHILIZED, FOR SOLUTION INTRAVENOUS at 10:08

## 2017-01-01 RX ADMIN — AMLODIPINE BESYLATE 10 MG: 10 TABLET ORAL at 09:05

## 2017-01-01 RX ADMIN — ATORVASTATIN CALCIUM 40 MG: 20 TABLET, FILM COATED ORAL at 09:05

## 2017-01-01 RX ADMIN — RISPERIDONE 0.5 MG: 0.25 TABLET, FILM COATED ORAL at 10:05

## 2017-01-01 RX ADMIN — Medication 5 UNITS: at 10:05

## 2017-01-01 RX ADMIN — TICAGRELOR 90 MG: 90 TABLET ORAL at 10:08

## 2017-01-01 RX ADMIN — Medication 0.3 MG: at 10:08

## 2017-01-01 RX ADMIN — DEXMEDETOMIDINE HYDROCHLORIDE 0.6 MCG/KG/HR: 4 INJECTION, SOLUTION INTRAVENOUS at 06:08

## 2017-01-01 RX ADMIN — ASPIRIN 81 MG: 81 TABLET, COATED ORAL at 01:05

## 2017-01-01 RX ADMIN — SODIUM CHLORIDE, PRESERVATIVE FREE 3 ML: 5 INJECTION INTRAVENOUS at 10:08

## 2017-01-01 RX ADMIN — INSULIN ASPART 3 UNITS: 100 INJECTION, SOLUTION INTRAVENOUS; SUBCUTANEOUS at 09:05

## 2017-01-01 RX ADMIN — HYPROMELLOSE 2910 1 DROP: 5 SOLUTION OPHTHALMIC at 06:08

## 2017-01-01 RX ADMIN — ALUMINUM HYDROXIDE, MAGNESIUM HYDROXIDE, AND SIMETHICONE 50 ML: 200; 200; 20 SUSPENSION ORAL at 04:08

## 2017-01-01 RX ADMIN — FENTANYL CITRATE 50 MCG: 50 INJECTION, SOLUTION INTRAMUSCULAR; INTRAVENOUS at 09:08

## 2017-01-01 RX ADMIN — DIPHENHYDRAMINE HYDROCHLORIDE 25 MG: 25 CAPSULE ORAL at 09:05

## 2017-01-01 RX ADMIN — DIPHENHYDRAMINE HYDROCHLORIDE 25 MG: 25 CAPSULE ORAL at 05:05

## 2017-01-01 RX ADMIN — MIDAZOLAM HYDROCHLORIDE 2 MG: 1 INJECTION, SOLUTION INTRAMUSCULAR; INTRAVENOUS at 10:05

## 2017-01-01 RX ADMIN — SEVELAMER CARBONATE 2400 MG: 800 TABLET, FILM COATED ORAL at 01:08

## 2017-01-01 RX ADMIN — SODIUM CHLORIDE: 0.9 INJECTION, SOLUTION INTRAVENOUS at 03:08

## 2017-01-01 RX ADMIN — METRONIDAZOLE 500 MG: 500 TABLET ORAL at 05:05

## 2017-01-01 RX ADMIN — CITALOPRAM HYDROBROMIDE 20 MG: 20 TABLET ORAL at 08:09

## 2017-01-01 RX ADMIN — RISPERIDONE 0.5 MG: 0.5 TABLET ORAL at 12:05

## 2017-01-01 RX ADMIN — FENTANYL CITRATE 50 MCG: 50 INJECTION INTRAMUSCULAR; INTRAVENOUS at 09:08

## 2017-01-01 RX ADMIN — NITROGLYCERIN 0.4 MG: 0.4 TABLET SUBLINGUAL at 03:08

## 2017-01-01 RX ADMIN — MORPHINE SULFATE 1 MG/HR: 10 INJECTION INTRAMUSCULAR; INTRAVENOUS; SUBCUTANEOUS at 12:09

## 2017-01-01 RX ADMIN — AZTREONAM 1000 MG: 1 INJECTION, POWDER, LYOPHILIZED, FOR SOLUTION INTRAMUSCULAR; INTRAVENOUS at 02:08

## 2017-01-01 RX ADMIN — SEVELAMER CARBONATE 800 MG: 800 TABLET, FILM COATED ORAL at 12:05

## 2017-01-01 RX ADMIN — ATORVASTATIN CALCIUM 40 MG: 20 TABLET, FILM COATED ORAL at 10:05

## 2017-01-01 RX ADMIN — SEVELAMER CARBONATE 1600 MG: 800 TABLET, FILM COATED ORAL at 08:09

## 2017-01-01 RX ADMIN — CITALOPRAM HYDROBROMIDE 20 MG: 20 TABLET ORAL at 08:08

## 2017-01-01 RX ADMIN — HEPARIN SODIUM AND DEXTROSE 17 UNITS/KG/HR: 10000; 5 INJECTION INTRAVENOUS at 08:08

## 2017-01-01 RX ADMIN — SEVELAMER CARBONATE 1600 MG: 800 TABLET, FILM COATED ORAL at 06:05

## 2017-01-01 RX ADMIN — METRONIDAZOLE 500 MG: 500 TABLET ORAL at 09:05

## 2017-01-01 RX ADMIN — HEPARIN SODIUM 2000 UNITS: 1000 INJECTION, SOLUTION INTRAVENOUS; SUBCUTANEOUS at 10:05

## 2017-01-01 RX ADMIN — HYDROCODONE BITARTRATE AND ACETAMINOPHEN 1 TABLET: 5; 325 TABLET ORAL at 05:07

## 2017-01-01 RX ADMIN — ASPIRIN 81 MG: 81 TABLET, COATED ORAL at 08:07

## 2017-01-01 RX ADMIN — INSULIN ASPART 4 UNITS: 100 INJECTION, SOLUTION INTRAVENOUS; SUBCUTANEOUS at 05:09

## 2017-01-01 RX ADMIN — FENTANYL CITRATE 25 MCG: 50 INJECTION INTRAMUSCULAR; INTRAVENOUS at 09:09

## 2017-01-01 RX ADMIN — NOREPINEPHRINE BITARTRATE 0.05 MCG/KG/MIN: 1 INJECTION INTRAVENOUS at 01:09

## 2017-01-01 RX ADMIN — STANDARDIZED SENNA CONCENTRATE AND DOCUSATE SODIUM 1 TABLET: 8.6; 5 TABLET, FILM COATED ORAL at 10:05

## 2017-01-01 RX ADMIN — SODIUM CHLORIDE 300 ML: 0.9 INJECTION, SOLUTION INTRAVENOUS at 09:05

## 2017-01-01 RX ADMIN — ALUMINUM HYDROXIDE, MAGNESIUM HYDROXIDE, AND SIMETHICONE 50 ML: 200; 200; 20 SUSPENSION ORAL at 11:09

## 2017-01-01 RX ADMIN — CLOPIDOGREL BISULFATE 300 MG: 300 TABLET, FILM COATED ORAL at 06:07

## 2017-01-01 RX ADMIN — ATORVASTATIN CALCIUM 80 MG: 20 TABLET, FILM COATED ORAL at 09:08

## 2017-01-01 RX ADMIN — LEVETIRACETAM 500 MG: 5 INJECTION INTRAVENOUS at 08:09

## 2017-01-01 RX ADMIN — Medication 0.72 MCG/KG/MIN: at 01:09

## 2017-01-01 RX ADMIN — NITROGLYCERIN 0.4 MG: 0.4 TABLET SUBLINGUAL at 02:08

## 2017-01-01 RX ADMIN — INSULIN ASPART 2 UNITS: 100 INJECTION, SOLUTION INTRAVENOUS; SUBCUTANEOUS at 05:05

## 2017-01-01 RX ADMIN — HYDROMORPHONE HYDROCHLORIDE 1 MG: 1 INJECTION, SOLUTION INTRAMUSCULAR; INTRAVENOUS; SUBCUTANEOUS at 12:09

## 2017-01-01 RX ADMIN — ATORVASTATIN CALCIUM 80 MG: 20 TABLET, FILM COATED ORAL at 08:09

## 2017-01-01 RX ADMIN — HYPROMELLOSE 2910 1 DROP: 5 SOLUTION OPHTHALMIC at 08:08

## 2017-05-08 PROBLEM — R41.82 ALTERED MENTAL STATUS: Status: ACTIVE | Noted: 2017-01-01

## 2017-05-08 PROBLEM — Z92.82 TISSUE PLASMINOGEN ACTIVATOR (T-PA) ADMINISTERED AT OTHER FACILITY WITHIN 24 HOURS PRIOR TO CURRENT ADMISSION: Status: ACTIVE | Noted: 2017-01-01

## 2017-05-08 PROBLEM — I63.412 EMBOLIC STROKE INVOLVING LEFT MIDDLE CEREBRAL ARTERY: Status: ACTIVE | Noted: 2017-01-01

## 2017-05-08 PROBLEM — I63.512 ACUTE ISCHEMIC LEFT MCA STROKE: Status: ACTIVE | Noted: 2017-01-01

## 2017-05-08 PROBLEM — N18.9 CHRONIC RENAL FAILURE: Status: ACTIVE | Noted: 2017-01-01

## 2017-05-08 NOTE — ED NOTES
Pt following commands when asked to do so by family members. Pt lifted right lower extremity and wiggled toes on right foot when asked to do so by family member,

## 2017-05-08 NOTE — CONSULTS
[facility]  Vascular Neurology  Comprehensive Stroke Center  Consult Note      Inpatient consult to Vascular (Stroke) Neurology  Consult performed by: MARGIE PARNELL  Consult ordered by: ANDRE ROMAN  Reason for consult: L MCA Syndrome  Assessment/Recommendations: See below         Subjective:     History of Present Illness:  Patient is a 77 year old male with PMH of DM, HTN, HLD, prior stroke (no residual deficits), seizures who was transferred from [place] after receiving IV-tPA for left gaze preference and right side neglect.  Symptoms began at 8:00 am while with home health nurse.  He told her he was having a headache at the time of symptoms onset.When he arrived to [place] staff witnessed a seizure (EMS did not have description).  Wife said she noticed he was not moving left arm or both of his legs.  He also mumbled frequently and said this is exactly the same the last time he had a stroke.  CTA multiphase done and no LVO seen.              Past Medical History:   Diagnosis Date    Arthritis     Diabetes mellitus     Hyperlipidemia     Hypertension     Stroke     Syncope and collapse      Past Surgical History:   Procedure Laterality Date    HIP SURGERY      replacement right    JOINT REPLACEMENT      right hip      Family History   Problem Relation Age of Onset    Hypertension Mother      Social History   Substance Use Topics    Smoking status: Former Smoker     Packs/day: 0.25     Quit date: 1/1/1978    Smokeless tobacco: Not on file    Alcohol use No     Review of patient's allergies indicates:   Allergen Reactions    Penicillins Rash     Medications: I have reviewed the current medication administration record.      (Not in a hospital admission)    Review of Systems  Objective:     Vital Signs (Most Recent):  Temp: 97.2 °F (36.2 °C) (05/08/17 1125)  Pulse: (!) 115 (05/08/17 1240)  Resp: 12 (05/08/17 1240)  BP: (!) 159/72 (05/08/17 1240)  SpO2: 97 %  (05/08/17 1240)    Vital Signs Range (Last 24H):  Temp:  [96.7 °F (35.9 °C)-97.2 °F (36.2 °C)]   Pulse:  []   Resp:  [6-16]   BP: (123-191)/(70-95)   SpO2:  [96 %-100 %]     Physical Exam   Constitutional: He appears well-developed and well-nourished.   HENT:   Head: Normocephalic and atraumatic.   Eyes: EOM are normal. Pupils are equal, round, and reactive to light.   Cardiovascular: Tachycardia present.    Pulmonary/Chest: Effort normal. No respiratory distress.   Abdominal: He exhibits no distension.   Musculoskeletal: Normal range of motion.   Neurological: GCS eye subscore is 2 - to pain. GCS verbal subscore is 2 - incomprehensible speech. GCS motor subscore is 5 - localizes pain.   See below        Neurological Exam:   LOC: does not follow requests and obtunded  Language: Global aphasia  Speech: Dysarthria  Orientation: Aphasic  Memory: aphasic  Visual Fields (CN II): Hemianopsia  right  EOM (CN III, IV, VI): Gaze preference left  Oculocephalics: not examined  Pupils (CN III, IV, VI): PERRL  Facial Sensation (CN V): Symmetric  Facial Movement (CN VII): lower weakness left lower per family   Hearing (CN VIII) difficult to test   Gag Reflex (CN IX, X):not done   Shoulder/Neck (CN XI): difficult to test   Tongue (CN XII): not following commands  Reflexes: not examined   Motor*: Arm Left:  Plegic (0/5), Leg Left:   Paretic:  3/5, Arm Right:   Plegic (0/5), Leg Right:   Paretic:  3/5  Beginning of exam not moving BLE but when on CT scanner he moved both  Cerebellar*: Not testable due to patient participation   Sensation: intact   Tone: Arm-Left: flaccid; Leg-Left: normal; Arm-Right: flaccid; Leg-Right: normal    Stroke Team Times:   Last Known Normal Date and Time : 5/8/201708:00  Symptom Onset Date and Time:5/8/201708:00  Stroke Team Called Date and Time: 5/8/201710:56  Stroke Team Arrived Date and Time: 5/8/201711:00  Intervention Time: 11:10 (Bolus end time 1110 initated at outside hospital then  transferred to [facility])    NIH Stroke Scale:  Interval: baseline (upon arrival/admit)  Level of Consciousness: 2 - stuporous  LOC Questions: 2 - answers none correctly  LOC Commands: 2 - performs neither correctly  Best Gaze: 2 - forced deviation  Visual: 2 - complete hemianopia  Facial Palsy: 1 - minor  Motor Left Arm: 4 - no movement  Motor Right Arm: 4 - no movement  Motor Left Le - can't resist gravity  Motor Right Le - can't resist gravity  Limb Ataxia: 0 - absent  Sensory: 1 - mild to moderate loss  Best Language: 2 - severe aphasia  Dysarthria: 2 - near to unintelligible  Extinction and Inattention: 0 - no neglect  NIH Stroke Scale Total: 28    [place] Coma Scale:  Best Eye Response: 2 - to pain  Best Motor Response: 5 - localizes pain  Best Verbal Response: 2 - incomprehensible speech  [place] Coma Scale Total: 9    Modified [place] Scale:   Timeline: Prior to symptoms onset  Modified Nikki Score: 2 - slight disability        Laboratory:  CMP:   Recent Labs  Lab 17  0929   CALCIUM 9.8   ALBUMIN 4.4   PROT 8.0      K 4.8   CO2 23   CL 98   BUN 94*   CREATININE 13.46*   ALKPHOS 119   ALT 22   AST 18   BILITOT 0.8     CBC:   Recent Labs  Lab 17  0929   WBC 12.68   RBC 3.97*   HGB 11.8*   HCT 37.4*      MCV 94   MCH 29.7   MCHC 31.6*     Lipid Panel: No results for input(s): CHOL, LDLCALC, HDL, TRIG in the last 168 hours.  Hgb A1C: No results for input(s): HGBA1C in the last 168 hours.    Diagnostic Results:  Brain Imaging:   CTA multiphase 17  No LVO seen    Assessment/Plan:     Patient is a 77 y.o. year old male with:    Embolic stroke involving left middle cerebral artery  Presented at OSH with L MCA syndrome and witnessed seizures  S/p TPA no LVO seen on CTA multiphase    Antithrombotics for secondary stroke prevention:  Hold all Antithrombotics x 24 hours after IV t-PA administration  Statins for secondary stroke prevention and hyperlipidemia, if present: Lipid Panel  Pending   Aggressive risk factor modification: Hypertension, Diabetes, High Cholesterol, Diet, Exercise and Obesity  Rehab Efforts: Physical Therapy, Occupational Therapy and Speech and Language Pathology  Diagnostics: Ordered/Pending Modified Barium study to assess swallowing function/status, MRI head without contrast to assess brain parenchyma, Trans-thoracic cardiac echo to assess cardiac function/status  VTE Prophylaxis: None: Reason for No Pharmacological VTE Prophylaxis: Mechanical prophylaxis: Place SCDs  BP Parameters: SBP <180      Essential hypertension  Risk factor for stroke      Obesity  Risk factor for stroke  Nutrition consult recommended     Hyperlipidemia LDL goal <70  Risk factor for stroke  LDL pending     Type 2 diabetes mellitus with neurologic complication  Risk factor for stroke  HgA1C pending     Seizure History   H/o of seizures  EEG ordered      Thrombolysis Candidate?Interventional Revascularization Candidate? S/p TPA, no LVO     Research Candidate?no    Dee Dee Kidd PA-C  Comprehensive Stroke Center  Department of Vascular Neurology   [facility]

## 2017-05-08 NOTE — ED TRIAGE NOTES
Pt arrived [facility] from [facility]. Pt arrived with TPA infusing. Pt arrived to [facility] with complaint of abrupt headache, left gaze, and right sided neglect. Pt was LSN at 0800 this morning at home. En route to [facility] pt only able to say yes and no. Pt began having a seizure at 0943; eyes open pt not responding verbally. TPA was initiated at [facility]. Pt not speaking or following commands on arrival to ED. Hx of CVA

## 2017-05-08 NOTE — ASSESSMENT & PLAN NOTE
Presented at OSH with L MCA syndrome and witnessed seizures  S/p TPA no LVO seen on CTA multiphase    Antithrombotics for secondary stroke prevention:  Hold all Antithrombotics x 24 hours after IV t-PA administration  Statins for secondary stroke prevention and hyperlipidemia, if present: Lipid Panel Pending   Aggressive risk factor modification: Hypertension, Diabetes, High Cholesterol, Diet, Exercise and Obesity  Rehab Efforts: Physical Therapy, Occupational Therapy and Speech and Language Pathology  Diagnostics: Ordered/Pending Modified Barium study to assess swallowing function/status, MRI head without contrast to assess brain parenchyma, Trans-thoracic cardiac echo to assess cardiac function/status  VTE Prophylaxis: None: Reason for No Pharmacological VTE Prophylaxis: Mechanical prophylaxis: Place SCDs  BP Parameters: SBP <180

## 2017-05-08 NOTE — CONSULTS
PM&R consult received.    Reason for consult:  assess rehabilitation needs    Reviewed patient history and current admission.  Full consult to follow.    ASH Rausch, FNP-C  Physical Medicine & Rehabilitation   05/08/2017  Spectralink: [id number]

## 2017-05-08 NOTE — CONSULTS
Consult Note  Nephrology    Consult Requested By: Perry Cadet MD  Reason for Consult: ESRD (MWF)    SUBJECTIVE:     History of Present Illness:  Patient is a 77 y.o. male with PMHx of DM2, HLD, HTN, prior strokes, and seizure disorder, and ESRD (MWF) who was transferred here from [place] after receiving IV-TPA for L gaze preference and R side neglect.  Imaging revealed no signs of bleeding or large vessel occlusion.   Wife at bedside reports that he his symptoms have improved since this morning.  He has been having frequent episodes of N/V/D for the past week, but not other problems reported.  She reports that he was c/o headache prior to symptom onset.    He dialyzes at [facility] with Dr. Sandhu being his primary nephrologist.  Plans were in place to have fistulogram performed tomorrow for poor clearances with dialysis.  He has AVF to [facility] with ++bruit/thrill.  Reports his last HD treatment was on Friday.      Past Medical History:   Diagnosis Date    Arthritis     Diabetes mellitus     Hyperlipidemia     Hypertension     Stroke     Syncope and collapse      Past Surgical History:   Procedure Laterality Date    HIP SURGERY      replacement right    JOINT REPLACEMENT      right hip      Family History   Problem Relation Age of Onset    Hypertension Mother      Social History   Substance Use Topics    Smoking status: Former Smoker     Packs/day: 0.25     Quit date: 1/1/1978    Smokeless tobacco: Not on file    Alcohol use No       Review of patient's allergies indicates:   Allergen Reactions    Penicillins Rash        Review of Systems:  CHRISTIAN, lethargic.  Slow to respond.    OBJECTIVE:     Vital Signs (Most Recent)  Temp: 97 °F (36.1 °C) (05/08/17 1440)  Pulse: 104 (05/08/17 1540)  Resp: 12 (05/08/17 1540)  BP: (!) 140/71 (05/08/17 1540)  SpO2: 98 % (05/08/17 1540)    Vital Signs Range (Last 24H):  Temp:  [96.7 °F (35.9 °C)-97.2 °F (36.2 °C)]   Pulse:  []   Resp:  [6-16]   BP:  (123-191)/(68-95)   SpO2:  [96 %-100 %]     Physical Exam:  General: AAM in NAD.  Able to follow commands.  AOx1.  Respiratory: Clear to auscultation bilaterally, respirations unlabored, no rales/rhonchi/wheezing  Cardiovacular: Tachycardia, S1, S2 normal, no murmurs, rubs or gallops  Gastrointestinal: Soft, non-tender, bowel sounds normal  Extremities: No clubbing or cyanosis of bilateral upper extremities; no lower extremity edema bilaterally, radial pulses 2+ bilaterally, symmetric.  Weakness to L side.  Skin: warm and dry; no rash on exposed skin      Laboratory:  CBC:   Recent Labs  Lab 05/08/17  0929   WBC 12.68   RBC 3.97*   HGB 11.8*   HCT 37.4*      MCV 94   MCH 29.7   MCHC 31.6*     BMP:   Recent Labs  Lab 05/08/17  0929   *   CL 98   CO2 23   BUN 94*   CREATININE 13.46*   CALCIUM 9.8         ASSESSMENT/PLAN:   77 y.o. male with PMHx of DM2, HLD, HTN, prior strokes, and seizure disorder, and ESRD (MWF) who was transferred here from [place] after receiving IV-TPA for L gaze preference and R side neglect.  Imaging revealed no signs of bleeding or large vessel occlusion. Dialysis unit is [facility].      Active Hospital Problems    Diagnosis  POA    Embolic stroke involving left middle cerebral artery [I63.412]  Yes    Type 2 diabetes mellitus with neurologic complication [E11.49]  Yes    Essential hypertension [I10]  Yes    Obesity [E66.9]  Yes    Hyperlipidemia LDL goal <70 [E78.5]  Yes      Resolved Hospital Problems    Diagnosis Date Resolved POA   No resolved problems to display.         Plan:   ESRD (MWF)  Outpatient Dialysis Center- [facility]  Estimated dry weight- unkown at this time.  Access-  RFA AVF    -Last HD on Friday, missed outpatient appointment today.  -will call outpatient unit for records.  -electrolytes/volume status acceptable.  -due to recent TPA administration/risk of neurological deterioration, will hold off on HD today.  -if remains HDS, will  provide HD treatment tomorrow.        Mineral Bone Disease in CKD   Daily renal panel so that phos and albumin is monitored daily.   Continue home phos binder once able to tolerate PO.      ASH Jacobson, [facility]  Nephrology  Pager:  [phone]  I have reviewed and concur with the NP's history, physical, assessment, and plan. I have personally interviewed and examined the patient at bedside. See below addendum for my evaluation and additional findings

## 2017-05-08 NOTE — SUBJECTIVE & OBJECTIVE
Past Medical History:   Diagnosis Date    Arthritis     Diabetes mellitus     Hyperlipidemia     Hypertension     Stroke     Syncope and collapse      Past Surgical History:   Procedure Laterality Date    HIP SURGERY      replacement right    JOINT REPLACEMENT      right hip      Family History   Problem Relation Age of Onset    Hypertension Mother      Social History   Substance Use Topics    Smoking status: Former Smoker     Packs/day: 0.25     Quit date: 1/1/1978    Smokeless tobacco: Not on file    Alcohol use No     Review of patient's allergies indicates:   Allergen Reactions    Penicillins Rash     Medications: I have reviewed the current medication administration record.      (Not in a hospital admission)    Review of Systems  Objective:     Vital Signs (Most Recent):  Temp: 97.2 °F (36.2 °C) (05/08/17 1125)  Pulse: (!) 115 (05/08/17 1240)  Resp: 12 (05/08/17 1240)  BP: (!) 159/72 (05/08/17 1240)  SpO2: 97 % (05/08/17 1240)    Vital Signs Range (Last 24H):  Temp:  [96.7 °F (35.9 °C)-97.2 °F (36.2 °C)]   Pulse:  []   Resp:  [6-16]   BP: (123-191)/(70-95)   SpO2:  [96 %-100 %]     Physical Exam   Constitutional: He appears well-developed and well-nourished.   HENT:   Head: Normocephalic and atraumatic.   Eyes: EOM are normal. Pupils are equal, round, and reactive to light.   Cardiovascular: Tachycardia present.    Pulmonary/Chest: Effort normal. No respiratory distress.   Abdominal: He exhibits no distension.   Musculoskeletal: Normal range of motion.   Neurological: GCS eye subscore is 2 - to pain. GCS verbal subscore is 2 - incomprehensible speech. GCS motor subscore is 5 - localizes pain.   See below        Neurological Exam:   LOC: does not follow requests and obtunded  Language: Global aphasia  Speech: Dysarthria  Orientation: Aphasic  Memory: aphasic  Visual Fields (CN II): Hemianopsia  right  EOM (CN III, IV, VI): Gaze preference left  Oculocephalics: not examined  Pupils (CN  III, IV, VI): PERRL  Facial Sensation (CN V): Symmetric  Facial Movement (CN VII): lower weakness left lower per family   Hearing (CN VIII) difficult to test   Gag Reflex (CN IX, X):not done   Shoulder/Neck (CN XI): difficult to test   Tongue (CN XII): not following commands  Reflexes: not examined   Motor*: Arm Left:  Plegic (0/5), Leg Left:   Paretic:  3/5, Arm Right:   Plegic (0/5), Leg Right:   Paretic:  3/5  Beginning of exam not moving BLE but when on CT scanner he moved both  Cerebellar*: Not testable due to patient participation   Sensation: intact   Tone: Arm-Left: flaccid; Leg-Left: normal; Arm-Right: flaccid; Leg-Right: normal    Stroke Team Times:   Last Known Normal Date and Time : 201708:00  Symptom Onset Date and Time:201708:00  Stroke Team Called Date and Time: 201710:56  Stroke Team Arrived Date and Time: 201711:00  Intervention Time: 11:10 (Bolus end time 1110 initated at outside hospital then transferred to [facility])    NIH Stroke Scale:  Interval: baseline (upon arrival/admit)  Level of Consciousness: 2 - stuporous  LOC Questions: 2 - answers none correctly  LOC Commands: 2 - performs neither correctly  Best Gaze: 2 - forced deviation  Visual: 2 - complete hemianopia  Facial Palsy: 1 - minor  Motor Left Arm: 4 - no movement  Motor Right Arm: 4 - no movement  Motor Left Le - can't resist gravity  Motor Right Le - can't resist gravity  Limb Ataxia: 0 - absent  Sensory: 1 - mild to moderate loss  Best Language: 2 - severe aphasia  Dysarthria: 2 - near to unintelligible  Extinction and Inattention: 0 - no neglect  NIH Stroke Scale Total: 28    [place] Coma Scale:  Best Eye Response: 2 - to pain  Best Motor Response: 5 - localizes pain  Best Verbal Response: 2 - incomprehensible speech  [place] Coma Scale Total: 9    Modified [place] Scale:   Timeline: Prior to symptoms onset  Modified Nikki Score: 2 - slight disability        Laboratory:  CMP:   Recent Labs  Lab 17  0929    CALCIUM 9.8   ALBUMIN 4.4   PROT 8.0      K 4.8   CO2 23   CL 98   BUN 94*   CREATININE 13.46*   ALKPHOS 119   ALT 22   AST 18   BILITOT 0.8     CBC:   Recent Labs  Lab 05/08/17  0929   WBC 12.68   RBC 3.97*   HGB 11.8*   HCT 37.4*      MCV 94   MCH 29.7   MCHC 31.6*     Lipid Panel: No results for input(s): CHOL, LDLCALC, HDL, TRIG in the last 168 hours.  Hgb A1C: No results for input(s): HGBA1C in the last 168 hours.    Diagnostic Results:  Brain Imaging:   CTA multiphase 05/08/17  No LVO seen

## 2017-05-08 NOTE — ED NOTES
LOC: The patient is awake; pt not following commands at this time; pt not verbally responsive, mumbling; disoriented to person place and time  APPEARANCE: Patient in no acute distress  SKIN: The skin is warm and dry, patient has normal skin turgor and moist mucus membranes, skin intact, no breakdown or brusing noted.  MUSKULOSKELETAL: spontaneous movements to bilateral upper and lower extremities; no obvious swelling or deformities noted.  RESPIRATORY: Airway is open and patent; snoring respirations on arrival with respiratory rate of 16  CARDIAC: Normal heart sounds; Pt tachycardic with rate of 139  ABDOMEN: Soft and non tender to palpation, no distention noted. Bowel sounds present.

## 2017-05-08 NOTE — PROCEDURES
DATE OF PROCEDURE:  05/08/2017    EEG #:  [id number]    REFERRING PHYSICIAN:  Dr. Andrews.    This EEG was performed to assess for subclinical seizures.    ELECTROENCEPHALOGRAM REPORT     METHODOLOGY:  Electroencephalographic (EEG) recording is recorded with   electrodes placed according to the International 10-20 placement system.  Thirty   two (32) channels of digital signal (sampling rate of 512/sec), including T1   and T2, were simultaneously recorded from the scalp and may include EKG, EMG,   and/or eye monitors.  Recording band pass was 0.1 to 512 Hz.  Digital video   recording of the patient is simultaneously recorded with the EEG.  The patient   is instructed to report clinical symptoms which may occur during the recording   session.  EEG and video recording are stored and archived in digital format.    Activation procedures, which include photic stimulation, hyperventilation and   instructing patients to perform simple tasks, are done in selected patients  The EEG is displayed on a monitor screen and can be reviewed using different   montages.  Computer assisted-analysis is employed to detect spike and   electrographic seizure activity.   The entire record is submitted for computer   analysis.  The entire recording is visually reviewed, and the times identified   by computer analysis as being spikes or seizures are reviewed again.    Compressed spectral analysis (CSA) is also performed on the activity recorded   from each individual channel.  This is displayed as a power display of   frequencies from 0 to 30 Hz over time.   The CSA is reviewed looking for   asymmetries in power between homologous areas of the scalp, then compared with   the original EEG recording.    [employer] software was also utilized in the review of this study.  This software   suite analyzes the EEG recording in multiple domains.  Coherence and rhythmicity   are computed to identify EEG sections which may contain organized seizures.     Each channel undergoes analysis to detect the presence of spike and sharp waves   which have special and morphological characteristics of epileptic activity.  The   routine EEG recording is converted from special into frequency domain.  This is   then displayed comparing homologous areas to identify areas of significant   asymmetry.  Algorithm to identify non-cortically generated artifact is used to   separate artifact from the EEG.       EEG FINDINGS:  The recording was obtained with a number of standard bipolar and   referential montages during wakefulness and drowsiness.  In the alert state, the   background was notably asymmetric.  The left hemisphere post-dominant rhythm of   11 Hz, which reacted to eye opening.  There was a relative paucity of faster   frequencies over the right hemisphere intermixed with irregular mixed frequency   slowing.  In addition, pseudoperiodic slow wave complexes were noted in the   right hemisphere, which waxed and waned.  No evolving electrographic seizures   were visualized.  During drowsiness, the background rhythm waxed and waned and   there were periods of slowing.  No sleep was recorded.  There were no interictal   epileptiform abnormalities and no clinical or electrographic seizures were   recorded.    The EKG channel revealed tachycardia.    IMPRESSION:  This is an abnormal EEG during wakefulness and drowsiness.  The   background was asymmetric with slowing over the right hemisphere.    Pseudoperiodic slow wave complexes were noted intermittently in the right   hemisphere.    CLINICAL CORRELATION:  The patient is a 77-year-old male with a prior history of   stroke who presented with witnessed seizures and is currently maintained on   Keppra.  This is an abnormal EEG during wakefulness and drowsiness.  Irregular   slowing was noted over the right hemisphere, which was continuous and is   suggestive of a structural abnormality in this hemisphere and correlates with   history  of right hemisphere encephalomalacia.  The presence of pseudoperiodic   irregular slowing is suggestive of neuronal dysfunction in this hemisphere.    There is no evidence of an epileptic process on this recording.  No seizures   were recorded during this study.  These findings were relayed to the   Neurocritical Care Team.      FAK/WENDY  dd: 05/08/2017 16:09:17 (CDT)  td: 05/08/2017 16:23:02 (CDT)  Doc ID   #[id number]  Job ID #[id number]    CC:

## 2017-05-08 NOTE — IP AVS SNAPSHOT
[facility]  [address]  [facility] [address]  Phone: [phone]           Patient Discharge Instructions   Our goal is to set you up for success. This packet includes information on your condition, medications, and your home care.  It will help you care for yourself to prevent having to return to the hospital.     Please ask your nurse if you have any questions.      There are many details to remember when preparing to leave the hospital. Here is what you will need to do:    1. Take your medicine. If you are prescribed medications, review your Medication List on the following pages. You may have new medications to  at the pharmacy and others that you'll need to stop taking. Review the instructions for how and when to take your medications. Talk with your doctor or nurses if you are unsure of what to do.     2. Go to your follow-up appointments. Specific follow-up information is listed in the following pages. Your may be contacted by a nurse or clinical provider about future appointments. Be sure we have all of the phone numbers to reach you. Please contact your provider's office if you are unable to make an appointment.     3. Watch for warning signs. Your doctor or nurse will give you detailed warning signs to watch for and when to call for assistance. These instructions may also include educational information about your condition. If you experience any of warning signs to your health, call your doctor.           [facility]  Unless otherwise directed by your provider, please   contact [facility], our nurse care line   that is available for 24/7 assistance.     [phone] (toll-free)     Registered nurses in the [facility]   provide: appointment scheduling, clinical advisement, health education, and other advisory services.                  ** Verify the list of medication(s) below is accurate and up to date. Carry this with you in case of  emergency. If your medications have changed, please notify your healthcare provider.             Medication List      START taking these medications        Additional Info                      ciprofloxacin HCl 500 MG tablet   Commonly known as:  CIPRO   Quantity:  3 tablet   Refills:  0   Dose:  500 mg    Last time this was given:  500 mg on 5/19/2017  1:31 PM   Instructions:  Take 1 tablet (500 mg total) by mouth once daily. LAST DAY 5/22/17     Begin Date    AM    Noon    PM    Bedtime         CHANGE how you take these medications        Additional Info                      sevelamer carbonate 800 mg Tab   Commonly known as:  RENVELA   Refills:  0   Dose:  1600 mg   What changed:  how much to take    Last time this was given:  2,400 mg on 5/19/2017  1:32 PM   Instructions:  Take 2 tablets (1,600 mg total) by mouth 3 (three) times daily with meals.     Begin Date    AM    Noon    PM    Bedtime         CONTINUE taking these medications        Additional Info                      aspirin 81 MG EC tablet   Commonly known as:  ECOTRIN   Refills:  0   Dose:  81 mg    Last time this was given:  81 mg on 5/19/2017  1:31 PM   Instructions:  Take 81 mg by mouth once daily.     Begin Date    AM    Noon    PM    Bedtime       atorvastatin 40 MG tablet   Commonly known as:  LIPITOR   Refills:  0   Dose:  40 mg    Last time this was given:  40 mg on 5/19/2017  1:31 PM   Instructions:  Take 40 mg by mouth once daily.     Begin Date    AM    Noon    PM    Bedtime       carboxymethylcellulose 0.5 % Dpet   Commonly known as:  REFRESH PLUS   Refills:  0   Dose:  1 drop    Instructions:  Place 1 drop into both eyes 5 (five) times daily.     Begin Date    AM    Noon    PM    Bedtime       hydrALAZINE 50 MG tablet   Commonly known as:  APRESOLINE   Refills:  0   Dose:  50 mg    Instructions:  Take 50 mg by mouth every 8 (eight) hours.     Begin Date    AM    Noon    PM    Bedtime       lorazepam 0.5 MG tablet   Commonly known as:   ATIVAN   Refills:  0   Dose:  0.5 mg    Instructions:  Take 0.5 mg by mouth 2 (two) times daily as needed for Anxiety.     Begin Date    AM    Noon    PM    Bedtime       NEPHROCAPS 1 mg Cap   Refills:  0   Dose:  1 capsule   Generic drug:  vitamin renal formula (B-complex-vitamin c-folic acid)    Instructions:  Take 1 capsule by mouth once daily.     Begin Date    AM    Noon    PM    Bedtime       omeprazole 40 MG capsule   Commonly known as:  PRILOSEC   Refills:  0   Dose:  40 mg    Instructions:  Take 40 mg by mouth once daily.     Begin Date    AM    Noon    PM    Bedtime       REFRESH LACRI-LUBE 56.8-42.5 % Oint   Refills:  0   Generic drug:  white petrolatum-mineral oil 56.8-42.5%    Instructions:  Apply a thin ribbon to bottom eye lid every night     Begin Date    AM    Noon    PM    Bedtime            Where to Get Your Medications      These medications were sent to [id number] - [place], [place] - [address] Pending sale to [facility] [address]  [address], [place] [address]     Phone:  [phone]     ciprofloxacin HCl 500 MG tablet         Information about where to get these medications is not yet available     ! Ask your nurse or doctor about these medications     sevelamer carbonate 800 mg Tab                  Please bring to all follow up appointments:    1. A copy of your discharge instructions.  2. All medicines you are currently taking in their original bottles.  3. Identification and insurance card.    Please arrive 15 minutes ahead of scheduled appointment time.    Please call 24 hours in advance if you must reschedule your appointment and/or time.        Your Scheduled Appointments     May 25, 2017 11:00 AM [facility]   Hospital Follow Up with MAICOL Chen - [facility] ([facility])    [address]  [place] [id number] [phone]              Follow-up Information     Follow up with Darvin Mayers MD In 1 week.    Specialty:  Vascular Surgery     Why:  for new AVF/AVG creation; needs vein mapping    Contact information:    [address]  [facility] [address]  [phone]          Follow up with Nixon Garciailda - [facility] On 5/25/2017.    Specialty:  [facility]    Why:  At 11:00 AM in [facility] in [facility]    Contact information:    [address]  [facility] [address] [phone]    Additional information:    [facility]      Referrals     Future Orders    Ambulatory Referral to Neurology     Ambulatory referral to Outpatient Case Management     Questions:    Does the patient have a chronic or uncontrolled disease process?:      Does the patient have a new diagnosis of a catastrophic or life altering illness/treatment?:      Does the patient have any psycho-social issues that may affect their ability to adhere to treatment plan?:      Does patient have any behaviors or circumstances that may impede ability to adhere to treatment plan?:      Is patient at risk for admission/readmission?:          Discharge Instructions     Future Orders    Call MD for:  difficulty breathing or increased cough     Call MD for:  increased confusion or weakness     Call MD for:  persistent dizziness, light-headedness, or visual disturbances     Call MD for:  persistent nausea and vomiting or diarrhea     Call MD for:  redness, tenderness, or signs of infection (pain, swelling, redness, odor or green/yellow discharge around incision site)     Call MD for:  severe persistent headache     Call MD for:  severe uncontrolled pain     Call MD for:  temperature >100.4     Call MD for:  worsening rash         Primary Diagnosis     Your primary diagnosis was:  Seizure      Admission Information     Date & Time Provider Department CSN    5/8/2017 10:56 AM Pb Concepcion MD [facility] [id number]      Care Providers     Provider Role Specialty  "Primary office phone    Pb Concepcion MD Attending Provider Hospitalist [phone]    Seth Sandhu MD Consulting Physician  Nephrology [phone]    Pb Concepcion MD Team Attending  Hospitalist [phone]      Your Vitals Were     BP Pulse Temp Resp Height Weight    146/67 (BP Location: Left arm, Patient Position: Lying, BP Method: Automatic) 92 98.9 °F (37.2 °C) (Oral) 18 6' 1" (1.854 m) 90.1 kg (198 lb 11.2 oz)    SpO2 BMI             99% 26.22 kg/m2         Recent Lab Values        5/29/2014 12/7/2014 12/24/2014 5/9/2017                  6:19 AM  5:52 PM  5:23 PM  3:00 AM        A1C 6.1 5.3 5.7 6.3 (H)        Comment for A1C at  3:00 AM on 5/9/2017:  According to ADA guidelines, hemoglobin A1C <7.0% represents  optimal control in non-pregnant diabetic patients.  Different  metrics may apply to specific populations.   Standards of Medical Care in Diabetes - 2016.  For the purpose of screening for the presence of diabetes:  <5.7%     Consistent with the absence of diabetes  5.7-6.4%  Consistent with increasing risk for diabetes   (prediabetes)  >or=6.5%  Consistent with diabetes  Currently no consensus exists for use of hemoglobin A1C  for diagnosis of diabetes for children.        Allergies as of 5/19/2017        Reactions    Penicillins Rash      Advance Directives     An advance directive is a document which, in the event you are no longer able to make decisions for yourself, tells your healthcare team what kind of treatment you do or do not want to receive, or who you would like to make those decisions for you.  If you do not currently have an advance directive, [facility] encourages you to create one.  For more information call:  [phone]), [phone] ([phone]),  or log on to [email or web address].        Smoking Cessation     If you would like to quit smoking:   You may be eligible for free services if you are a [place] resident and started smoking cigarettes " before September 1, 1988.  Call the Smoking Cessation Trust (SCT) toll free at (782) 511-7643 or [phone] 246-1550.   Call -432-QUIT-NOW if you do not meet the above criteria.   Contact us via email: [email or web address]   View our website for more information: [email or web address]        Language Assistance Services     ATTENTION: Language assistance services are available, free of charge. Please call [phone].      ATENCIÓN: Si habla español, tiene a sanders disposición servicios gratuitos de asistencia lingüística. Llame al [phone].     CHÚ Ý: N?u b?n nói Ti?ng Vi?t, có các d?ch v? h? tr? ngôn ng? mi?n phí dành cho b?n. G?i s? [phone].        Stroke Education              Chronic Kindey Disease Education             Diabetes Discharge Instructions                                   [facility] Sign-Up     Activating your [facility] account is as easy as 1-2-3!     1) Visit [email or web address], select Sign Up Now, enter this activation code and your date of birth, then select Next.  [email or web address]  Expires: 7/2/2017  7:15 PM      2) Create a username and password to use when you visit [facility] in the future and select a security question in case you lose your password and select Next.    3) Enter your e-mail address and click Sign Up!    Additional Information  If you have questions, please e-mail [email or web address] or call [phone] to talk to our [facility] staff. Remember, [facility] is NOT to be used for urgent needs. For medical emergencies, dial 911.          [facility] complies with applicable Federal civil rights laws and does not discriminate on the basis of race, color, national origin, age, disability, or sex.

## 2017-05-08 NOTE — ED PROVIDER NOTES
Encounter Date: 5/8/2017    SCRIBE #1 NOTE: I, Bam James, am scribing for, and in the presence of,  Perry Cadet MD. I have scribed the entire note.       History     Chief Complaint   Patient presents with    TPA transfer     Pt transfered from [facility] for TPA transfer. Pt unresponsive, shallow resp.      Review of patient's allergies indicates:   Allergen Reactions    Penicillins Rash     HPI Comments: 77 year old male presents as a transfer from outside facility after acute CVA. Initially presented to outside facility with left sided weakness. At the time he was awake and alert but throughout the course of his presentation, he became less responsive. Decision to administer tPA was made after consultation with vascular neurology at our hospital. Transferring physician recommended intubation given his decline in mental status however family declined this and formal DNR was placed. I reviewed this document and confirmed that no resuscitative measures will be preformed in the event of cardiopulmonary arrest including intubation.     The history is provided by the EMS personnel and medical records. The history is limited by the condition of the patient.     Past Medical History:   Diagnosis Date    Arthritis     Diabetes mellitus     Hyperlipidemia     Hypertension     Stroke     Syncope and collapse      Past Surgical History:   Procedure Laterality Date    HIP SURGERY      replacement right    JOINT REPLACEMENT      right hip      Family History   Problem Relation Age of Onset    Hypertension Mother      Social History   Substance Use Topics    Smoking status: Former Smoker     Packs/day: 0.25     Quit date: 1/1/1978    Smokeless tobacco: None    Alcohol use No     Review of Systems   Unable to perform ROS: Acuity of condition       Physical Exam   Initial Vitals   BP Pulse Resp Temp SpO2   05/08/17 1055 05/08/17 1055 05/08/17 1055 -- 05/08/17 1055   180/87 125 6  100 %     Physical  Exam    Nursing note and vitals reviewed.  Constitutional: He is not diaphoretic.   HENT:   Head: Normocephalic and atraumatic.   Eyes:   Pupils 2-3+ and minimally reactive bilaterally   Neck: Normal range of motion. Neck supple.   Pulmonary/Chest: No stridor.   Breathing spontaneously. No agonal breathing   Abdominal: Soft. He exhibits no distension. There is no tenderness. There is no rebound and no guarding.   Neurological:   Decreased level of consciousness. Unresponsive to pain. He is verbal, making incomprehensible sounds. Spontaneous movement of left and right legs visualized. No movement seen in arms bilaterally. Difficult to assess given mental status.    Skin: Skin is warm and dry.         ED Course   Procedures  Labs Reviewed   URINALYSIS - Abnormal; Notable for the following:        Result Value    Appearance, UA Hazy (*)     Protein, UA 2+ (*)     Glucose, UA 1+ (*)     Occult Blood UA 3+ (*)     Leukocytes, UA 2+ (*)     All other components within normal limits   TROPONIN I - Abnormal; Notable for the following:     Troponin I 0.057 (*)     All other components within normal limits    Narrative:     ADD ON PER DR DEGROOT, [facility], ORDER #[id number]   13:27  05/08/2017    URINALYSIS MICROSCOPIC - Abnormal; Notable for the following:     RBC, UA >100 (*)     WBC, UA 29 (*)     Bacteria, UA Few (*)     All other components within normal limits   POCT GLUCOSE - Abnormal; Notable for the following:     POCT Glucose 233 (*)     All other components within normal limits   CULTURE, URINE   CULTURE, URINE    Narrative:     URINE CULTURE ADD ON PER DR HINA DEGROOT ORDER #[id number]   05/08/2017  14:45    PROTIME-INR   TYPE & SCREEN     EKG Readings: (Independently Interpreted)   SVT at rate of 133. ST depression in inferior and lateral leads.          Medical Decision Making:   History:   Old Medical Records: I decided to obtain old medical records.  Initial Assessment:   77 year old male presents with acute CVA s/p tPA. IV  access confirmed upon arrival. Pt placed on cardiac monitor. Stroke team consulted upon arrival and immediately were at bedside. He has a concerning mental status and would otherwise require intubation at this time however in regards to his family's wishes to not intubate or preform CPR, will continue to monitor his depressed mental status. CTA multiphase ordered per stroke recommendations, neuro ICU consulted. Pt is critically ill and will be reassess numerous times through ED course.   Independently Interpreted Test(s):   I have ordered and independently interpreted EKG Reading(s) - see prior notes  Clinical Tests:   Radiological Study: Ordered and Reviewed  Medical Tests: Ordered and Reviewed  Other:   I have discussed this case with another health care provider.            Scribe Attestation:   Scribe #1: I performed the above scribed service and the documentation accurately describes the services I performed. I attest to the accuracy of the note.    Attending Attestation:         Attending Critical Care:   Critical Care Times:   ==============================================================  · Total Critical Care Time - exclusive of procedural time: 30 minutes.  ==============================================================  Critical care was necessary to treat or prevent imminent or life-threatening deterioration of the following conditions: stroke and CNS failure.   Critical Care Condition: critical     Physician Attestation for Scribe:  Physician Attestation Statement for Scribe #1: I, Perry Cadet MD, reviewed documentation, as scribed by Bam James in my presence, and it is both accurate and complete.         Attending ED Notes:   Neuro ICU will admit the patient.           ED Course     Clinical Impression:   Diagnoses of Acute ischemic left MCA stroke, Tachycardia, and Embolic stroke involving left middle cerebral artery were pertinent to this visit.    Disposition:   Disposition: Admitted        Perry Cadet MD  05/09/17 1127

## 2017-05-09 PROBLEM — N18.6 ESRD (END STAGE RENAL DISEASE) ON DIALYSIS: Status: ACTIVE | Noted: 2017-01-01

## 2017-05-09 PROBLEM — I63.512 ACUTE ISCHEMIC LEFT MCA STROKE: Status: ACTIVE | Noted: 2017-01-01

## 2017-05-09 PROBLEM — Z99.2 ESRD (END STAGE RENAL DISEASE) ON DIALYSIS: Status: ACTIVE | Noted: 2017-01-01

## 2017-05-09 PROBLEM — N18.9 CHRONIC RENAL FAILURE: Status: ACTIVE | Noted: 2017-01-01

## 2017-05-09 PROBLEM — M89.9 CHRONIC KIDNEY DISEASE-MINERAL AND BONE DISORDER: Status: ACTIVE | Noted: 2017-01-01

## 2017-05-09 PROBLEM — E83.9 CHRONIC KIDNEY DISEASE-MINERAL AND BONE DISORDER: Status: ACTIVE | Noted: 2017-01-01

## 2017-05-09 PROBLEM — N18.9 CHRONIC KIDNEY DISEASE-MINERAL AND BONE DISORDER: Status: ACTIVE | Noted: 2017-01-01

## 2017-05-09 NOTE — PLAN OF CARE
Problem: Physical Therapy Goal  Goal: Physical Therapy Goal  Goals to be met by: 5/20/17     Patient will increase functional independence with mobility by performing:    Supine to sit with Minimal Assistance.  Sit to supine with Minimal Assistance.   Sit to stand transfer with Contact Guard Assistance using Rolling Walker.  Bed to chair transfer via Stand Pivot with Minimal Assistance using Rolling Walker.  Gait x 50 feet with Contact Guard Assistance using Rolling Walker.   Static sitting at edge of bed x 10 minutes with BUE support and BLE support with Stand-by Assistance.  Dynamic sitting at edge of bed x 8 minutes with Contact Guard Assistance.  Able to tolerate exercise for 15-20 reps with supervision.  Patient and family able to teachback stroke & positioning education independently.  Outcome: Ongoing (interventions implemented as appropriate)     Pt would benefit from SNF in [place] upon discharge. Pt progressing towards goals.  Appropriate to transfer with: therapy  Mary Gillis PT, DPT  5/9/2017  Pager: [phone]

## 2017-05-09 NOTE — PROGRESS NOTES
ICU Progress Note  Neurocritical Care    Admit Date: 5/8/2017  LOS: 1    CC: <principal problem not specified>    Code Status: DNR     SUBJECTIVE:     Interval History/Significant Events:  No acute events overnight        Medications:  Continuous Infusions:   Scheduled Meds:   amlodipine  10 mg Oral Daily    atorvastatin  40 mg Oral Daily    levetiracetam in NaCl (iso-os)  500 mg Intravenous Q12H    metronidazole  500 mg Oral Q8H    senna-docusate 8.6-50 mg  1 tablet Oral BID    sodium chloride 0.9%  3 mL Intravenous Q8H     PRN Meds:.dextrose 50%, glucagon (human recombinant), hydrALAZINE, insulin aspart, midazolam (PF), ondansetron, risperidone    OBJECTIVE:   Vital Signs (Most Recent):   Temp: 98.6 °F (37 °C) (05/09/17 0310)  Pulse: 78 (05/09/17 0800)  Resp: 14 (05/09/17 0800)  BP: 119/65 (05/09/17 0610)  SpO2: 98 % (05/09/17 0800)    Vital Signs (24h Range):   Temp:  [97 °F (36.1 °C)-99.2 °F (37.3 °C)] 98.6 °F (37 °C)  Pulse:  [] 78  Resp:  [11-32] 14  SpO2:  [94 %-99 %] 98 %  BP: (118-159)/(56-87) 119/65    ICP/CPP (Last 24h):        I & O (Last 24h):   Intake/Output Summary (Last 24 hours) at 05/09/17 1213  Last data filed at 05/09/17 0600   Gross per 24 hour   Intake              100 ml   Output             1050 ml   Net             -950 ml     Physical Exam:  Alert, oriented to place, NAD  No jaundice  Lungs are clear to auscultation, good air entry bilateral  Normal S1/S2, no murmurs.  Abdomen is soft, lax, +ve BS.      Neuro:  Alert, oriented to place. Intermittently follow commands  PERRL, EOMI  CN ii-xii are intact.  Moves all extremities spontaneously, less on the left side.         Vent Data:        Lines/Drains/Airway:          Hemodialysis Catheter (Active)            Percutaneous Central Line Insertion/Assessment - double lumen  01/16/15 right femoral (Active)             Urethral Catheter 05/08/17 0947 (Active)   Site Assessment Clean;Intact;Bleeding 5/9/2017  3:00 AM   Collection  "Container Urimeter 5/9/2017  3:00 AM   Securement Method secured to top of thigh w/ adhesive device 5/9/2017  3:00 AM   Catheter Care Performed yes 5/9/2017  3:00 AM   Reason for Continuing Urinary Catheterization Critically ill in ICU requiring intensive monitoring 5/9/2017  3:00 AM   CAUTI Prevention Bundle StatLock in place w 1" slack;Intact seal between catheter & drainage tubing;Drainage bag off the floor;Green sheeting clip in use;No dependent loops or kinks;Drainage bag not overfilled (<2/3 full);Drainage bag below bladder 5/8/2017  8:10 PM   Output (mL) 15 mL 5/9/2017  6:00 AM            Hemodialysis Catheter (Active)            Fecal Incontinence  05/09/17 0300 (Active)   Application fecal incontinence  applied 5/9/2017  3:00 AM   Drainage Method attached to drainage bag 5/9/2017  3:00 AM   Securement to gravity 5/9/2017  3:00 AM   Skin no breakdown 5/9/2017  3:00 AM   Tolerance no signs/symptoms of discomfort 5/9/2017  3:00 AM            Hemodialysis AV Fistula Right upper arm (Active)   Site Assessment Clean;Dry;Intact;No redness;No swelling 5/9/2017  3:00 AM   Patency Bruit;Thrill 5/9/2017  3:00 AM   Status Deaccessed 5/9/2017  3:00 AM   Site Condition No complications 5/9/2017  3:00 AM   Dressing Open to air (None) 5/9/2017  3:00 AM         Labs:  ABG: No results for input(s): PH, PO2, PCO2, HCO3, POCSATURATED, BE in the last 24 hours.  BMP:  Recent Labs  Lab 05/09/17  0300      K 5.4*      CO2 18*   *   CREATININE 14.7*   *   MG 1.9   PHOS 9.4*     LFT: Lab Results   Component Value Date    AST 18 05/09/2017    ALT 14 05/09/2017    ALKPHOS 85 05/09/2017    BILITOT 0.7 05/09/2017    ALBUMIN 3.5 05/09/2017    PROT 7.3 05/09/2017     CBC:   Lab Results   Component Value Date    WBC 10.01 05/09/2017    HGB 11.2 (L) 05/09/2017    HCT 35.9 (L) 05/09/2017    MCV 94 05/09/2017     05/09/2017     Microbiology x 7d:   Microbiology Results (last 7 days)     " Procedure Component Value Units Date/Time    Urine culture [[id number]] Collected:  05/08/17 1325    Order Status:  No result Specimen:  Urine Updated:  05/08/17 1446    Narrative:       URINE CULTURE ADD ON PER DR HINA DEGROOT ORDER #[id number]   05/08/2017  14:45     Urine culture [[id number]]     Order Status:  Completed Specimen:  Urine from Urine, Catheterized     Urine culture [[id number]]     Order Status:  Canceled Specimen:  Urine         Imaging:  CTH showed bilateral parietal occipital encephalomalacia   I personally reviewed the above image.    ASSESSMENT/PLAN:     1. Suspected ischemic stroke s/p tPA  2. Suspected seizure  3. Hyperkalemia   4. ESRD on HD  5. DM  6. HLP    A/P:  - Neurological exam unchanged.  - Continue post tpa care.  - MRI brain today  - EEG with no seizures, continue home dose of keppra   - Restart home dose of risperidone  - On RA  - F/U TTE  - Restart home dose of amlodipine  - Plan for HD today  - Passed swallow eval, start diet       Care level 3.

## 2017-05-09 NOTE — PLAN OF CARE
Problem: SLP Goal  Goal: SLP Goal  Outcome: Ongoing (interventions implemented as appropriate)  Evaluation completed.  Rec dental soft diet with thin liquids with strict aspiration precautions, crushed meds, okay for straws. ABDIRASHID Pan, CCC/SLP  5/9/2017

## 2017-05-09 NOTE — PROGRESS NOTES
Phone consent obtained from daughter nahed. Witnessed by pt nurse namita. optison given ivp via left forearm sl for imaging. Denies transfusion rxn. Tolerated well. Sl flushed before and after with ns.

## 2017-05-09 NOTE — H&P
[facility]  Neurocritical Care  H&P  Admit Date: 5/8/2017  Length of Stay: 0    Subjective:     Chief Complaint: <principal problem not specified>    History of Present Illness: Patient is a 77 year old male with PMH of DM, HTN, HLD, prior stroke (no residual deficits), seizures who was transferred from [place] after receiving IV-tPA . Symptoms began at 8:00 am while with home health nurse.  He told her he was having a headache at the time of symptoms onset.When he arrived to [place] staff witnessed a seizure (EMS did not have description).  Wife said she noticed he was not moving left arm or both of his legs.  He also mumbled frequently and said this is exactly the same the last time he had a stroke.  CTA multiphase done and no LVO seen.         Per ED staff, patient had GTC seizure like activity and received 1 g of keppra. On my exam patient slightly confused and agitated, on low flow nasal cannula maintaining O2 sats. Not cooperative with exam but follows commands when daughter asks.      EEG with mild slowing on R. Will admit to [facility] for post tpa monitoring and obtain MRI brain for completion of stroke work up. Of note, patient is on chronic dialysis, missing today's treatment. Creatine 13 today . Nephrology consulted who agrees to dialyze tomorrow.         He and his wife are very clear that he does not want to be intubated or on a ventialtor, even if it is life threatening.        Vitals:   Temp: 98.9 °F (37.2 °C) (05/08/17 1840)  Pulse: 80 (05/08/17 1910)  Resp: 13 (05/08/17 1910)  BP: 130/63 (05/08/17 1910)  SpO2: 99 % (05/08/17 1910)     Temp: [96.7 °F (35.9 °C)-99 °F (37.2 °C)] 98.9 °F (37.2 °C)  Pulse: [] 80  Resp: [6-16] 13  SpO2: [96 %-100 %] 99 %  BP: (123-191)/(60-95) 130/63                 Examination:   Constitutional: Well-nourished and -developed. No apparent distress.   Eyes: Conjunctiva clear, anicteric. Lids no lesions.  Head/Ears/Nose/Mouth/Throat/Neck: Moist  mucous membranes. External ears, nose atraumatic.   Cardiovascular: Regular rhythm. No murmurs. No leg edema.  Respiratory: Comfortable respirations. Clear to auscultation.  Gastrointestinal: No hernia. Soft, nondistended, nontender. + bowel sounds.     Neurologic:   -[id number]  -Alert. Oriented to person and place, not oriented to time/situation . Agitated, follows commands when daughter asks. No gaze deviation on my exam, L facial droop, left hemiparesis   Does not cooperate with finger to nose test  No nystagmus noted.         Today I independently reviewed pertinent medications, lines/drains/airways, imaging, cardiology, lab results, notably , BUN, Cr, blood glucose   Assessment/Plan:     Neuro  Altered mental status  --new stroke vs metabolic  -- GTC seizures reported, none since arrival to [facility], sp keppra load, continuing 500 bid.   --eeg negative, just R slowing.     Renal  Chronic renal failure  Cr 13, MWF dialysis, nephrology called  No urgent need today per their team. Will start dialysis tomorrow.     Other  Tissue plasminogen activator (t-PA) administered at other facility within 24 hours prior to current admission  --admit to [facility] for post tpa monitoring  --hourly neuro checks  --MRI tomorrow , post tpa 24 hours is approx 10 am.   --continue home statin  -- pt/ot speech therapy    He and his wife are very clear that he does not want to be intubated or on a ventialtor, even if it is life threatening.        Activity Orders          None        DNR    Chana Abbott PA-C  Neurocritical Care  [facility]

## 2017-05-09 NOTE — NURSING
Renal MD on call paged @ [phone].      Fellow called back at [phone]. Explained elevated labs, diarrhea and low urine output.  Was informed that nephrology would be by in the morning to see patient.  No further orders given at this time.

## 2017-05-09 NOTE — CONSULTS
[facility]  Adult Nutrition  Consult Note    SUMMARY     Recommendations    1. Change current diet to renal, dental soft (texture per SLP).   2. Add Novasource OS to aid in caloric intake.   3. RD to monitor & follow-up.    Goals: PO intake >50%  Nutrition Goal Status: new  Communication of RD Recs: reviewed with RN    Reason for Assessment    Reason for Assessment: physician consult  Diagnosis: stroke/CVA  Relevent Medical History: HTN, HLD, DM   Interdisciplinary Rounds: did not attend     General Information Comments: ST recommends dental soft diet w/ thin liquids. Pt disoriented during visit, but RN reports pt with poor appetite. Per chart review, wt loss PTA. Plan for HD today.    Nutrition Discharge Planning: Adequate PO intake    Nutrition Prescription Ordered    Current Diet Order: Dental soft w/ thin liquids     Nutrition Risk Screen     Nutrition Risk Screen: no indicators present    Nutrition/Diet History    Patient Reported Diet/Restrictions/Preferences: other (see comments) (CHRISTIAN)     Factors Affecting Nutritional Intake: decreased appetite, other (see comments) (AMS)    Labs/Tests/Procedures/Meds    Pertinent Labs Reviewed: reviewed, pertinent  Pertinent Labs Comments: P 9.4, K 5.4, , Creat 14.7, A1C 6.3  Pertinent Medications Reviewed: reviewed, pertinent  Pertinent Medications Comments: Statin    Physical Findings    Overall Physical Appearance: lethargic  Oral/Mouth Cavity: WDL  Skin: intact    Anthropometrics    Height (inches): 72.99 in  Weight Method: Bed Scale  Weight (kg): 91.4 kg    Ideal Body Weight (IBW), Male: 183.94 lb  % Ideal Body Weight, Male (lb): 109.55 lb     BMI (kg/m2): 26.59  BMI Grade: 25 - 29.9 - overweight  Usual Body Weight (UBW), k kg  % Usual Body Weight: 88.74     Weight Loss: unintentional    Estimated/Assessed Needs    Weight Used For Calorie Calculations: 91.4 kg (201 lb 8 oz)   Height (cm): 185.4 cm     Energy Need Method: [place], other (see comments) (2116 kcal/d)     Weight Used For Protein Calculations: 91.4 kg (201 lb 8 oz)  Protein Requirements: 110-128 g/d    Fluid Need Method: RDA Method, other (see comments) (Per MD or 1 mL/kcal)    Assessment and Plan    Inadequate energy intake r/t decreased appetite AEB poor PO intake, wt loss PTA.  Status: New    Monitor and Evaluation    Food and Nutrient Intake: energy intake, food and beverage intake  Food and Nutrient Adminstration: diet order     Physical Activity and Function: nutrition-related ADLs and IADLs  Anthropometric Measurements: weight, weight change, body mass index  Biochemical Data, Medical Tests and Procedures: electrolyte and renal panel, gastrointestinal profile, glucose/endocrine profile, inflammatory profile, lipid profile  Nutrition-Focused Physical Findings: overall appearance     Nutrition Risk    Level of Risk: other (see comments) (2x/week)    Nutrition Follow-Up    RD Follow-up?: Yes

## 2017-05-09 NOTE — PLAN OF CARE
Problem: Patient Care Overview  Goal: Plan of Care Review  Outcome: Ongoing (interventions implemented as appropriate)  1. Change current diet to renal, dental soft (texture per SLP).   2. Add Novasource OS to aid in caloric intake.   3. RD to monitor & follow-up.

## 2017-05-09 NOTE — NURSING
Dr. Guy notified of patient's critical phosphorus level of 9.4 and pt's increased renal labs. He was also informed  Of patient's excessive diarrhea during the night and of a 2.5 hour output of 600mL into a flexiseal. PO flagyl and a KUB ordered. Also told to contact renal team.

## 2017-05-09 NOTE — PROGRESS NOTES
Patient arrived to [facility] from ED via stretcher attached to monitor with oxygen.  Pt not in any pain or respiratory distress.  NCC called and made aware that patient is now in room 7087.  Charge nurse and stroke resource nurse aware of patients arrival.    Type of Stroke: Ischemic stroke    TPA start time: 1010  TPA end time: 1110    Patients current symptoms include left sided gazed with right sided neglect    Family is at bedside. Stroke booklet provided to patient.  Call light in reach of patient and he is aware to press nurse light if nurse is needed.

## 2017-05-09 NOTE — SUBJECTIVE & OBJECTIVE
Interval History:   NAEON.  Mental status improved from yesterday.  Remains weak to L side.  Hyperkalemic this morning,  oxygenating well on RA.  Hemodynamically stable.    Review of patient's allergies indicates:   Allergen Reactions    Penicillins Rash     Current Facility-Administered Medications   Medication Frequency    amlodipine tablet 10 mg Daily    atorvastatin tablet 40 mg Daily    dextrose 50% injection 12.5 g PRN    glucagon (human recombinant) injection 1 mg PRN    hydrALAZINE injection 10 mg Q4H PRN    insulin aspart pen 1-10 Units Q6H PRN    levetiracetam in NaCl (iso-os) IVPB 500 mg Q12H    metronidazole tablet 500 mg Q8H    midazolam injection 2 mg On Call Procedure    ondansetron injection 4 mg Q6H PRN    risperidone tablet 0.5 mg Q8H PRN    senna-docusate 8.6-50 mg per tablet 1 tablet BID    sodium chloride 0.9% flush 3 mL Q8H       Objective:     Vital Signs (Most Recent):  Temp: 98.7 °F (37.1 °C) (05/09/17 0701)  Pulse: 81 (05/09/17 1230)  Resp: 14 (05/09/17 1230)  BP: 136/63 (05/09/17 1215)  SpO2: 100 % (05/09/17 1230)  O2 Device (Oxygen Therapy): room air (05/09/17 0800) Vital Signs (24h Range):  Temp:  [97 °F (36.1 °C)-99.2 °F (37.3 °C)] 98.7 °F (37.1 °C)  Pulse:  [] 81  Resp:  [11-32] 14  SpO2:  [94 %-100 %] 100 %  BP: (118-146)/(56-78) 136/63     Weight: 91.4 kg (201 lb 8 oz) (05/08/17 2000)  Body mass index is 26.58 kg/(m^2).  Body surface area is 2.17 meters squared.    I/O last 3 completed shifts:  In: 100 [IV Piggyback:100]  Out: 1050 [Urine:250; Stool:800]    Physical Exam   Constitutional: He appears well-developed and well-nourished. No distress.   HENT:   Head: Normocephalic and atraumatic.   Right Ear: External ear normal.   Left Ear: External ear normal.   Mouth/Throat: Oropharynx is clear and moist.   Neck: Normal range of motion. Neck supple. No JVD present.   Cardiovascular: Normal rate, regular rhythm and normal heart sounds.  Exam reveals no gallop and no  friction rub.    No murmur heard.  Pulmonary/Chest: Effort normal and breath sounds normal. No respiratory distress. He has no wheezes. He has no rales.   Abdominal: Soft. Bowel sounds are normal. He exhibits no distension. There is no tenderness.   Musculoskeletal: He exhibits no edema.   Neurological: He is alert. He is disoriented (time).   Weakness L > R   Skin: Skin is warm and dry. He is not diaphoretic.       Significant Labs:  CBC:   Recent Labs  Lab 05/09/17  0300   WBC 10.01   RBC 3.84*   HGB 11.2*   HCT 35.9*      MCV 94   MCH 29.2   MCHC 31.2*     CMP:   Recent Labs  Lab 05/09/17  0300   *   CALCIUM 9.4   ALBUMIN 3.5   PROT 7.3      K 5.4*   CO2 18*      *   CREATININE 14.7*   ALKPHOS 85   ALT 14   AST 18   BILITOT 0.7

## 2017-05-09 NOTE — CONSULTS
Consult Note  Physical Medicine & Rehab    Consult Requested By:  Luke Andrews MD      Admit Date:  5/8/2017  Admitting Diagnosis:  Tachycardia [R00.0], Acute ischemic left MCA stroke [I63.512], Embolic stroke involving left middle cerebral artery [I63.412], Malfunction of arteriovenous dialysis fistula, subsequent encounter [T82.590D]    Reason for Consult:  assess rehabilitation needs    SUBJECTIVE:   History of Present Illness:  Bernabe Greco is a 77-year-old male with PMHx of HTN, HLD, DMII, arthritis, ESRD on HD, seizures, and CVA.  Patient presented to [facility] with left gaze preference and right side neglect.  CTH without acute pathology, and IV tPA administered.  Patient with witnessed seizure while in ED.  Transferred to [facility] on 5/8/17 for further evaluation and management.  Upon admission, CTA without large vessel occlusion; therefore, not thrombectomy candidate.  MRI without acute pathology.  EEG without seizures.  Etiology of symptoms likely 2/2 seizure and not due to new stroke.  Stepped down to floor on 5/10/17.     Current Functional Status:  Evaluated by OT, PT evaluation pending.  Bed mobility min-totalA.  Sit to stand Janice.  No transfers or gait.  UBD maxA and LBD totalA.     Functional History: Per chart review, patient lives in [place] with his wife and daughter in a single story home without steps to enter.  Prior to admission, he required assistance with bathing and ambulated with RW.  Nursing aid twice per week.  DME: RW, TTB, 3-in-1 commode, WC.    Review of Systems  Unable to obtain 2/2 patient's mental status/participation     Past Medical History:   Diagnosis Date    Arthritis     Diabetes mellitus     Hyperlipidemia     Hypertension     Stroke     Syncope and collapse      Past Surgical History:   Procedure Laterality Date    HIP SURGERY      replacement right    JOINT REPLACEMENT      right hip      Family History   Problem Relation Age of Onset     Hypertension Mother      Social History   Substance Use Topics    Smoking status: Former Smoker     Packs/day: 0.25     Quit date: 1/1/1978    Smokeless tobacco: None    Alcohol use No     Review of patient's allergies indicates:   Allergen Reactions    Penicillins Rash        Scheduled Medications:   amlodipine  10 mg Oral Daily    atorvastatin  40 mg Oral Daily    levetiracetam in NaCl (iso-os)  500 mg Intravenous Q12H    metronidazole  500 mg Oral Q8H    senna-docusate 8.6-50 mg  1 tablet Oral BID    sevelamer carbonate  800 mg Oral TID WM    sodium chloride 0.9%  3 mL Intravenous Q8H     PRN Medications:  dextrose 50%, glucagon (human recombinant), hydrALAZINE, insulin aspart, midazolam (PF), ondansetron, risperidone    OBJECTIVE:     Vital Signs (Most Recent)  Temp: 98.4 °F (36.9 °C) (05/09/17 1400)  Pulse: 97 (05/09/17 1515)  Resp: (!) 21 (05/09/17 1515)  BP: 127/65 (05/09/17 1515)  SpO2: 99 % (05/09/17 1515)    Vital Signs Range (Last 24H):  Temp:  [98.4 °F (36.9 °C)-99.2 °F (37.3 °C)]   Pulse:  []   Resp:  [11-32]   BP: (118-146)/(56-76)   SpO2:  [94 %-100 %]     Physical Exam:  Vital signs reviewed.   General appearance:  No apparent distress.  Appears well-developed and well-nourished.  Skin:  Color and texture normal.  Warm and dry.  No jaundice.  No visible rashes or visible lesions.  HEENT:  Normocephalic and atraumatic.  No scleral icterus.  No nasal discharge.    Pulmonary:  Normal respiratory effort.  No cough.  Cardiac:  Normal heart rate.  Extremities: No calf tenderness.  Distal pulses intact.  No edema.    Abdomen:  Soft, non-tender and non-distended.  Musculoskeletal:  Moves all extremities spontaneously.  ROM: normal.    Neurologic:  Somnolent.  Opens eyes to gentle rub and returns to sleep.  Followed 1/3 commands.   Behavioral/Psych: Calm and cooperative.    Diagnostic Results:  CT: Reviewed  MRI: Reviewed  CTA: Reviewed  Labs: Reviewed    ASSESSMENT/PLAN:      Bernabe Greco   is a 77 y.o. male admitted on 5/8/2017 from [facility] s/p tPA for L MCA syndrome with left gaze preference and right side neglect.  Witnessed seizure while in ED.  CTA without large vessel occlusion; therefore, not thrombectomy candidate.  MRI without acute pathology.  EEG without seizures.       PM&R consulted to assess rehabilitation needs.     Functional status: Evaluated by OT, PT evaluation pending.  Bed mobility min-totalA.  Sit to stand Janice.  No transfers or gait.  UBD maxA and LBD totalA.   Cognitive/Speech/Language status:  Cognitive-Linguistic Impairment, questionable visual spatial impairment  Nutrition/Swallow Status:  Passed bedside swallow evaluation.  Speech recommended dental soft diet and thin liquids.    -  Reviewed discharge options with patient (inpatient rehab, SNF, home health therapy, and outpatient therapy).  Encourage participation with therapy.  -  Recommendations  · PT and OT evaluate and treat.   · SLP cognitive and speech evaluate and treat.   · Mobility, OOB in chair at least 3 hours per day, and early ambulation as appropriate.  · Establish consistent sleep-wake cycle and monitor for sleep disturbances.  · Monitor for bowel and bladder dysfunction.   · Monitor for skin breakdown and pressure ulcers.  Turn patient every 2 hours and repositioning/weight shifting every 20-30 minutes when sitting.  Pressure relief/heel protector boots and proper mattress/overlay and chair cushioning.   · DVT prophylaxis:  PIO, SCD.    PT evaluation pending.  Not ready for discharge.  Will follow patient's progress and discuss with rehab team for further rehab recommendations.    Thank you for consult.    ASH Rausch, FNP-C    Physical Medicine & Rehabilitation   05/09/2017  Spectralink: [id number]    Collaborating Physician : Smith Mcaiel MD

## 2017-05-09 NOTE — ASSESSMENT & PLAN NOTE
--new stroke vs metabolic  -- GTC seizures reported, none since arrival to [facility], sp keppra load, continuing 500 bid.   --eeg negative, just R slowing.

## 2017-05-09 NOTE — ASSESSMENT & PLAN NOTE
--admit to [facility] for post tpa monitoring  --hourly neuro checks  --MRI tomorrow , post tpa 24 hours is approx 10 am.   --continue home statin  -- pt/ot speech therapy    He and his wife are very clear that he does not want to be intubated or on a ventialtor, even if it is life threatening.

## 2017-05-09 NOTE — PT/OT/SLP EVAL
"Speech Language Pathology Evaluation    Bernabe Greco Jr.   MRN: [id number]   Admitting Diagnosis: stroke work-up, L gaze, LE weakness    Diet recommendations: Solid Diet Level: Dental Soft  Liquid Diet Level: Thin Feed only when awake/alert, HOB to 90 degrees, Small bites/sips, 1 bite/sip at a time, Meds crushed in puree, Eliminate distractions, Avoid talking while eating and Assistance with meals    SLP Treatment Date: 17  Speech Start Time: 825     Speech Stop Time: 848     Speech Total (min): 23 min       TREATMENT BILLABLE MINUTES:  Eval 13  and Eval Swallow and Oral Function 10     Diagnosis: stroke work-up, L gaze, LE weakness    Past Medical History:   Diagnosis Date    Arthritis     Diabetes mellitus     Hyperlipidemia     Hypertension     Stroke     Syncope and collapse      Past Surgical History:   Procedure Laterality Date    HIP SURGERY      replacement right    JOINT REPLACEMENT      right hip        Has the patient been evaluated by SLP for swallowing? : Yes  Keep patient NPO?: No   General Precautions: Standard, aspiration, fall          Social Hx: Patient lives with spouse per pt-questionable historian. Pt reports some time in NH following previous stroke.  He denied speech, language, cognitive,s wlalow deficits though reported "a little trouble with my vision and walking".    Prior diet: reg/thin.    Subjective:  "I'm choking on air" Intermittent confusion noted t/o session  Patient goals: none stated.    Pain Ratin/10              Pain Rating Post-Intervention: 0/10    Objective:   Patient found with: pulse ox (continuous), telemetry, SCD, bowel management system, shen catheter, peripheral IV, bed alarm, blood pressure cuff    Oral Musculature Evaluation  Oral Musculature: left weakness  Dentition: edentulous  Mucosal Quality: adequate  Mandibular Strength and Mobility: WFL  Oral Labial Strength and Mobility: impaired retraction, functional seal, functional pursing  Lingual " Strength and Mobility: WFL  Volitional Cough: fair strength  Volitional Swallow: decreased rise  Voice Prior to PO Intake: clear     Cognitive Status:  Behavioral Observations: confused and cooperative-  Memory and Orientation: Pt Ox3, cues to recall month, immediate recall up to 5 digits/words was WFL though 0/3 unassociated words recalled after delay  Attention: impaired.  Problem Solving: impaired, 67% with simple verbal problem solving, 50% with compare/contrast and convergent categorization, Pt consistently sequenced 3/4 words per set.  6 items stated in concrete cat in 1 min (15-20 is WFL)  Pragmatics: poor eye contact and topic maintenance problems  Executive Function: insight/awareness, mental flexibility and organization    Language: no    Auditory Comprehension: WFL    Verbal Expression: WFL, pt able to express basic/wants needs    Motor Speech: WFL    Voice: WFL    Reading: tba    Writing: tba    Visual-Spatial: to be further assessed. Pt reports blindness, initially unable to visualize to name objs.  Given max cues to open eyes, pt able to describe room, SLP and name ADL objs presented on R.  Pt did not track though localized to SLP on L given mod cues.     Bedside Swallow Eval:  Consistencies Assessed: thin, puree, solids  Oral Phase: excess chewing with solids 2/2 edentulous  Pharyngeal Phase: no overt clinical  signs/symptoms of aspiration  Pt taking large sips despite cues, no overt s/s aspiration, no change in vocal quality.  Occasional mild oral hold to verbalize during trials despite cues to swallow prior to phonation.  Swallow response appeared fairly timely with adequate hyolaryngeal rise palpated.     Additional Treatment:  Education provided on role of SLP, POC, swallow precs and diet recs.  Pt verbalized understanding though limited carryover noted.  Nursing alerted re: results and recs.     FIM:  Social Interaction: 3 Moderate Direction--The patient interacts appropriately 50 to 74% of the  time.   Problem Solving: 3 Moderate Direction--The patient solves routine problems 50 to 74% of the time.   Comprehension: 6 Modified [place]--In most situations, the patient understands readily or with only mild dificulty complex or abstract directions and conversation.  The patient does not require prompting, though (s)he may require a hearing or visual aid, other x   Expression: 5 Standby Prompting--The patient expresses basic daily needs and ideas more than 90% of the time.  Requires prompting (e.g., frequent repetition) less than 10% of the time to be understood.   Memory: 3 Moderate Prompting--The patient recognizes and remembers 50 to 74% of the time.     Assessment:  Bernabe Greco Jr. is a 77 y.o. male with a SLP diagnosis of Cognitive-Linguistic Impairment, questionable visual spatial impairment, increased aspiration risk.     Do you have any cultural, spiritual, Yarsanism conflicts, given your current situation?: none    Discharge recommendations: Discharge Facility/Level Of Care Needs:  (pending PT/OT)     Goals:   SLP Goals        Problem: SLP Goal    Goal Priority Disciplines Outcome   SLP Goal     SLP Ongoing (interventions implemented as appropriate)   Description:  Speech Language Pathology Goals  Goals expected to be met by 5/16  1. Pt will tolerate dental soft diet with thin liquids without overt s/s aspiration.  2. Pt will complete simple problem solving/reasoning tasks with 80% accy with min cues.  3. Pt will name 10 items per concrete cat with min cues to improve attn/thought organization.  4. Pt will complete further assessment of reading, writing and visual spatial skills.                    Plan:   Patient to be seen Therapy Frequency: 5 x/week   Plan of Care expires: 06/08/17  Plan of Care reviewed with: patient  SLP Follow-up?: Yes  SLP - Next Visit Date: 05/10/17      SLP G-Codes  Functional Assessment Tool Used: noms  Score: 5  Functional Limitations: Swallowing  Swallow Current  Status (): ALAINA  Swallow Goal Status (): ABDIRASHID Shields, CCC-SLP  05/09/2017

## 2017-05-09 NOTE — PROGRESS NOTES
[facility]  Neurocritical Care  Progress Note    Admit Date: 5/8/2017  Length of Stay: 0    Subjective:     Chief Complaint: <principal problem not specified>    History of Present Illness: Patient is a 77 year old male with PMH of DM, HTN, HLD, prior stroke (no residual deficits), seizures who was transferred from [facility] after receiving IV-tPA . Symptoms began at 8:00 am while with home health nurse.  He told her he was having a headache at the time of symptoms onset.When he arrived to [facility] staff witnessed a seizure (EMS did not have description).  Wife said she noticed he was not moving left arm or both of his legs.  He also mumbled frequently and said this is exactly the same the last time he had a stroke.  CTA multiphase done and no LVO seen.         Per ED staff, patient had GTC seizure like activity and received 1 g of keppra. On my exam patient slightly confused and agitated, on low flow nasal cannula maintaining O2 sats. Not cooperative with exam but follows commands when daughter asks.      EEG with mild slowing on R. Will admit to [facility] for post tpa monitoring and obtain MRI brain for completion of stroke work up. Of note, patient is on chronic dialysis, missing today's treatment. Creatine 13 today . Nephrology consulted who agrees to dialyze tomorrow.       He and his wife are very clear that he does not want to be intubated or on a ventialtor, even if it is life threatening.      Vitals:   Temp: 98.9 °F (37.2 °C) (05/08/17 1840)  Pulse: 80 (05/08/17 1910)  Resp: 13 (05/08/17 1910)  BP: 130/63 (05/08/17 1910)  SpO2: 99 % (05/08/17 1910)    Temp:  [96.7 °F (35.9 °C)-99 °F (37.2 °C)] 98.9 °F (37.2 °C)  Pulse:  [] 80  Resp:  [6-16] 13  SpO2:  [96 %-100 %] 99 %  BP: (123-191)/(60-95) 130/63              Examination:   Constitutional: Well-nourished and -developed. No apparent distress.   Eyes: Conjunctiva clear, anicteric. Lids no lesions.  Head/Ears/Nose/Mouth/Throat/Neck:  Moist mucous membranes. External ears, nose atraumatic.   Cardiovascular: Regular rhythm. No murmurs. No leg edema.  Respiratory: Comfortable respirations. Clear to auscultation.  Gastrointestinal: No hernia. Soft, nondistended, nontender. + bowel sounds.    Neurologic:   -[id number]  -Alert. Oriented to person and place, not oriented to time/situation . Agitated, follows commands when daughter asks. No gaze deviation on my exam, L facial droop, left hemiparesis   Does not cooperate with finger to nose test  No nystagmus noted.       Today I independently reviewed pertinent medications, lines/drains/airways, imaging, cardiology, lab results, notably , BUN, Cr, blood glucose   Assessment/Plan:     Neuro  Altered mental status  --new stroke vs metabolic  -- GTC seizures reported, none since arrival to [facility], sp keppra load, continuing 500 bid.   --eeg negative, just R slowing.     Renal  Chronic renal failure  Cr 13, MWF dialysis, nephrology called  No urgent need today per their team. Will start dialysis tomorrow.     Other  Tissue plasminogen activator (t-PA) administered at other facility within 24 hours prior to current admission  --admit to [facility] for post tpa monitoring  --hourly neuro checks  --MRI tomorrow , post tpa 24 hours is approx 10 am.   --continue home statin  -- pt/ot speech therapy    He and his wife are very clear that he does not want to be intubated or on a ventialtor, even if it is life threatening.        Activity Orders          None        DNR    Chana Abbott PA-C  Neurocritical Care  [facility]

## 2017-05-09 NOTE — ASSESSMENT & PLAN NOTE
ESRD (MWF)  Outpatient Dialysis Center- [facility]  Estimated dry weight- unkown at this time.  Access- RFA AVF     -Last HD on Friday, missed outpatient appointment yesterday.  -Hyperkalemic this morning at 5.4.  -net negative 950 ml/24h.  -spoke with NCC, ok with iHD.  -will provide 3 hour HD treatment today for metabolic clearance/volume management  -UF 1L as tolerated.

## 2017-05-09 NOTE — PLAN OF CARE
SW met with Pt at bedside regarding SNF recs. Left list in room for him and wife to review. SW will follow up with her in the AM tomorrow.    Stephy Huber LMSW  Neurocritical Care   [facility]  [id number]

## 2017-05-09 NOTE — PLAN OF CARE
Problem: Patient Care Overview  Goal: Plan of Care Review  Outcome: Ongoing (interventions implemented as appropriate)  VS and assessment per flow sheet, patient progressing towards goal as tolerated. Plan of care reviewed with Bernabe Greco Jr. and family, all concerns addressed. Will continue to monitor.

## 2017-05-09 NOTE — PROGRESS NOTES
[facility]  Nephrology  Progress Note    Patient Name: Bernabe Greco Jr.  MRN: [id number]  Admission Date: 5/8/2017  Hospital Length of Stay: 1 days  Attending Provider: Luke Andrews MD   Primary Care Physician: Provider Notinsystem  Principal Problem:<principal problem not specified>    Subjective:     HPI:   Patient is a 77 y.o. male with PMHx of DM2, HLD, HTN, prior strokes, and seizure disorder, and ESRD (MWF) who was transferred here from [place] after receiving IV-TPA for L gaze preference and R side neglect. Imaging revealed no signs of bleeding or large vessel occlusion.   Wife at bedside reports that he his symptoms have improved since this morning. He has been having frequent episodes of N/V/D for the past week, but not other problems reported. She reports that he was c/o headache prior to symptom onset.   He dialyzes at [facility] with Dr. Sandhu being his primary nephrologist. Plans were in place to have fistulogram performed tomorrow for poor clearances with dialysis. He has AVF to RFA with ++bruit/thrill.      Interval History:   NAEON.  Mental status improved from yesterday.  Remains weak to L side.  Hyperkalemic this morning,  oxygenating well on RA.  Hemodynamically stable.    Review of patient's allergies indicates:   Allergen Reactions    Penicillins Rash     Current Facility-Administered Medications   Medication Frequency    amlodipine tablet 10 mg Daily    atorvastatin tablet 40 mg Daily    dextrose 50% injection 12.5 g PRN    glucagon (human recombinant) injection 1 mg PRN    hydrALAZINE injection 10 mg Q4H PRN    insulin aspart pen 1-10 Units Q6H PRN    levetiracetam in NaCl (iso-os) IVPB 500 mg Q12H    metronidazole tablet 500 mg Q8H    midazolam injection 2 mg On Call Procedure    ondansetron injection 4 mg Q6H PRN    risperidone tablet 0.5 mg Q8H PRN    senna-docusate 8.6-50 mg per tablet 1 tablet BID    sodium chloride 0.9% flush 3 mL Q8H        Objective:     Vital Signs (Most Recent):  Temp: 98.7 °F (37.1 °C) (05/09/17 0701)  Pulse: 81 (05/09/17 1230)  Resp: 14 (05/09/17 1230)  BP: 136/63 (05/09/17 1215)  SpO2: 100 % (05/09/17 1230)  O2 Device (Oxygen Therapy): room air (05/09/17 0800) Vital Signs (24h Range):  Temp:  [97 °F (36.1 °C)-99.2 °F (37.3 °C)] 98.7 °F (37.1 °C)  Pulse:  [] 81  Resp:  [11-32] 14  SpO2:  [94 %-100 %] 100 %  BP: (118-146)/(56-78) 136/63     Weight: 91.4 kg (201 lb 8 oz) (05/08/17 2000)  Body mass index is 26.58 kg/(m^2).  Body surface area is 2.17 meters squared.    I/O last 3 completed shifts:  In: 100 [IV Piggyback:100]  Out: 1050 [Urine:250; Stool:800]    Physical Exam   Constitutional: He appears well-developed and well-nourished. No distress.   HENT:   Head: Normocephalic and atraumatic.   Right Ear: External ear normal.   Left Ear: External ear normal.   Mouth/Throat: Oropharynx is clear and moist.   Neck: Normal range of motion. Neck supple. No JVD present.   Cardiovascular: Normal rate, regular rhythm and normal heart sounds.  Exam reveals no gallop and no friction rub.    No murmur heard.  Pulmonary/Chest: Effort normal and breath sounds normal. No respiratory distress. He has no wheezes. He has no rales.   Abdominal: Soft. Bowel sounds are normal. He exhibits no distension. There is no tenderness.   Musculoskeletal: He exhibits no edema.   Neurological: He is alert. He is disoriented (time).   Weakness L > R   Skin: Skin is warm and dry. He is not diaphoretic.       Significant Labs:  CBC:   Recent Labs  Lab 05/09/17  0300   WBC 10.01   RBC 3.84*   HGB 11.2*   HCT 35.9*      MCV 94   MCH 29.2   MCHC 31.2*     CMP:   Recent Labs  Lab 05/09/17  0300   *   CALCIUM 9.4   ALBUMIN 3.5   PROT 7.3      K 5.4*   CO2 18*      *   CREATININE 14.7*   ALKPHOS 85   ALT 14   AST 18   BILITOT 0.7            Assessment/Plan:     Anemia associated with chronic renal failure  H/H stable at 11.2.   Goal in ESRD of 10-11.  -no need for EPO at this time.        ESRD (end stage renal disease) on dialysis  ESRD (MWF)  Outpatient Dialysis Center- [facility]  Estimated dry weight- unkown at this time.  Access- RFA AVF     -Last HD on Friday, missed outpatient appointment yesterday.  -Hyperkalemic this morning at 5.4.  -net negative 950 ml/24h.  -spoke with NCC, ok with iHD.  -will provide 3 hour HD treatment today for metabolic clearance/volume management  -UF 1L as tolerated.    Chronic kidney disease-mineral and bone disorder  -Phos elevated at 9.4  -Start Renvela 800 mg PO TID with meals  -Corrected calcium WNL at 9.4          Christos Conteh NP  Nephrology  [facility]  Pager:  [phone]

## 2017-05-09 NOTE — PT/OT/SLP EVAL
"Physical Therapy  Evaluation    Bernabe Greco Jr.   MRN: [id number]   Admitting Diagnosis: L MCA, s/p tPA    PT Received On: 17  PT Start Time: 926     PT Stop Time: 1000    PT Total Time (min): 34 min       Billable Minutes:  Evaluation 20 and Therapeutic Activity 14    Diagnosis: L MCA, s/p tPA    Past Medical History:   Diagnosis Date    Arthritis     Diabetes mellitus     Hyperlipidemia     Hypertension     Stroke     Syncope and collapse       Past Surgical History:   Procedure Laterality Date    HIP SURGERY      replacement right    JOINT REPLACEMENT      right hip        Referring physician:Bernadine Garces MD  Date referred to PT: 17    General Precautions: Standard, aspiration, fall, vision impaired  Orthopedic Precautions: N/A   Braces: N/A       Patient History:  Pt states living in [place], [place] in a 1SH with no BRYSON with his wife. Pt states being independent with all ADLs and functional mobility PTA with use of RW.   Equipment used at home:  Rolling Walker.  Equipment owned but not using:  No Assistive Device.  Work: retired.   Drive: no.   Cooking/Cleaning/Managing Home: no.   Hand dominance:right.   Hobbies: carpentry.    Previous Level of Function:  Ambulation Skills: needs device  Transfer Skills: needs device  ADL Skills: independent    Subjective:  Communicated with RN prior to session.  "Am I at [facility]?"   Chief Complaint: weakness  Patient goals: none stated    Pain Ratin/10   Pain Rating Post-Intervention: 0/10    Objective:   Patient found with: blood pressure cuff, peripheral IV, shen catheter, SCD, bowel management system, bed alarm, telemetry, pulse ox (continuous)     Cognitive Exam:  Oriented to: Person and Situation    Follows Commands/attention: Follows one-step commands, easily distracted  Communication: clear/fluent  Safety awareness/insight to disability: impaired    Physical Exam:  Postural examination/scapula alignment: Rounded shoulder and Head forward    Skin " integrity: Visible skin intact  Edema: None noted    Sensation:   Intact  light/touch BLE    Lower Extremity Range of Motion:  Right Lower Extremity: WFL  Left Lower Extremity: WFL    Lower Extremity Strength:  Right Lower Extremity: grossly 4/5  Left Lower Extremity: grossly 4/5     Functional Mobility:  Pt found supine, sleeping.  Bed Mobility:    Rolling to the left: use of bedrail: Minimal Assistance   Supine <> Sit: From left sidelying: Minimal Assistance   Sit <> Supine: To left sidelying: Moderate Assistance   Pt required increased assistance to return to supine due to not following commands.    Sitting Balance at Edge of Bed:   Assistance Level Required: Contact Guard Assistance   Time: 10 minutes   Postural deviations noted: Rounded shoulder, Head forward and Posterior pelvic tilt   Encouraged: BUE & BLE weight bearing for postural control, attention to task   Comments: pt with eyes closed majority of EOB trial. Pt able to localize therapist and objects in the room when asked to open eyes.    Transfers:    Sit <> Stand: x 2 trials from EOB with Minimal Assistance with No Assistive Device    Stand <> Sit: x 2 trials to EOB with Minimal Assistance with No Assistive Device   Comments: PT standing infront of pt, guiding motion. Once returned to sitting, pt c/o discomfort due to BMS, pt immediately stood up and pulled BMS out. RN made aware.    Gait:   Distance Ambulated: 4 side steps to the left    Assistance level: Minimal Assistance   Assistive Device used:  No Assistive Device   Gait Pattern: swing-through gait   Gait Deviation(s): decreased shawn, decreased step length, decreased stride length and decreased weight-shifting ability   Impairments due to: decreased comprehension of task asked    Therapeutic Activities and Exercises:  Education:  Patient provided with daily orientation and goals of this PT session. Patient agreed to participate in session.Patient aware of patient's deficits and therapy  progression. They were educated to transfer with therapy only.  Time provided for therapeutic counseling and discussion of health disposition. All questions answered to patient's satisfaction, within scope of PT practice; voiced no other concerns. White board updated in patient's room, RN notified of session.    AM-PAC 6 CLICK MOBILITY  How much help from another person does this patient currently need?   1 = Unable, Total/Dependent Assistance  2 = A lot, Maximum/Moderate Assistance  3 = A little, Minimum/Contact Guard/Supervision  4 = None, Modified [place]/Independent    Turning over in bed (including adjusting bedclothes, sheets and blankets)?: 3  Sitting down on and standing up from a chair with arms (e.g., wheelchair, bedside commode, etc.): 3  Moving from lying on back to sitting on the side of the bed?: 3  Moving to and from a bed to a chair (including a wheelchair)?: 3  Need to walk in hospital room?: 2  Climbing 3-5 steps with a railing?: 2  Total Score: 16     AM-PAC Raw Score CMS G-Code Modifier Level of Impairment Assistance   6 % Total / Unable   7 - 9 CM 80 - 100% Maximal Assist   10 - 14 CL 60 - 80% Moderate Assist   15 - 19 CK 40 - 60% Moderate Assist   20 - 22 CJ 20 - 40% Minimal Assist   23 CI 1-20% SBA / CGA   24 CH 0% Independent/ Mod I     Patient left with bed in chair position with all lines intact, call button in reach and RN notified.    Assessment:   Bernabe Greco Jr. is a 77 y.o. male with a medical diagnosis of L MCA stroke, s/p tPA. Pt presents with generalized weakness, poor awareness and visual deficits. Pt with right preferential gaze but is able to cross midline to the left and track therapist. Pt with decreased functional mobility. Mr. Greco's deficits affect their ability to safely and independently participate in self-care tasks and functional mobility. Due to his physical therapy diagnosis of debility and deconditioning, they continue to benefit from acute PT services  to address the following limitations: high fall risk, weakness, instability, the need for supervised instructions of exercise, and the decreased ability to perform functional activities which they were able to complete PTA. Education was provided to patient regarding importance of continued participation in therapy, patient's progress and discharge disposition. Pt would benefit from SNF in [place] upon discharge to improve quality of life and focus on recovery of impairments.     Rehab identified problem list/impairments: Rehab identified problem list/impairments: weakness, impaired endurance, impaired self care skills, impaired functional mobilty, gait instability, impaired balance, visual deficits, decreased upper extremity function, decreased lower extremity function, decreased safety awareness    Rehab potential is fair.    Activity tolerance: Fair    Discharge recommendations: Discharge Facility/Level Of Care Needs: nursing facility, skilled     Barriers to discharge: Barriers to Discharge: Decreased caregiver support    Equipment recommendations: Equipment Needed After Discharge: wheelchair     GOALS:   Physical Therapy Goals        Problem: Physical Therapy Goal    Goal Priority Disciplines Outcome Goal Variances Interventions   Physical Therapy Goal     PT/OT, PT Ongoing (interventions implemented as appropriate)     Description:  Goals to be met by: 5/20/17     Patient will increase functional independence with mobility by performing:    Supine to sit with Minimal Assistance.  Sit to supine with Minimal Assistance.   Sit to stand transfer with Contact Guard Assistance using Rolling Walker.  Bed to chair transfer via Stand Pivot with Minimal Assistance using Rolling Walker.  Gait  x 50 feet with Contact Guard Assistance using Rolling Walker.    Static sitting at edge of bed x 10 minutes with BUE support and BLE support with Stand-by Assistance.  Dynamic sitting at edge of bed x 8 minutes with Contact Guard  Assistance.  Able to tolerate exercise for 15-20 reps with supervision.  Patient and family able to teachback stroke & positioning education independently.                PLAN:    Patient to be seen 6 x/week to address the above listed problems via gait training, therapeutic activities, therapeutic exercises, neuromuscular re-education  Plan of Care expires: 06/08/17  Plan of Care reviewed with: patient    Personal factors/comorbidities: prior stroke, DM. Due to the listed comorbidities the patient cannot fully participate in PT POC.  Body systems elements affected: lower extremities, upper extremities, trunk, neuromuscular system, orientation/level of consciousness  Clinical Presentation: stable  Functional Outcome Tools: AMPAC, ROM, MMT  Evaluation Complexity Level: Low    Mary Gillis PT, DPT  5/9/2017  Pager: [phone]

## 2017-05-09 NOTE — PROGRESS NOTES
Notified PEDRO Marina, of pt complaining of pain from shen catheter, orders to remove shen, will continue to monitor.

## 2017-05-09 NOTE — PLAN OF CARE
Problem: Occupational Therapy Goal  Goal: Occupational Therapy Goal  Goals to be met by: 5/16/17     Patient will increase functional independence with ADLs by performing:    UE Dressing with Minimal Assistance.  LE Dressing with Moderate Assistance.  Grooming while standing with Stand-by Assistance.  Toileting from bedside commode with Moderate Assistance for hygiene and clothing management.   Rolling to Bilateral with Minimal Assistance.   Supine to sit with Minimal Assistance.  Stand pivot transfers with Stand-by Assistance.  Outcome: Ongoing (interventions implemented as appropriate)  OT eval completed.

## 2017-05-09 NOTE — PLAN OF CARE
Problem: Patient Care Overview  Goal: Plan of Care Review  Outcome: Ongoing (interventions implemented as appropriate)  POC reviewed with patient. Pt verbalized understanding. Questions and concerns addressed.   Pt progressing toward goals. Will continue to monitor. See flow sheets for full assessment and VS info

## 2017-05-09 NOTE — PROGRESS NOTES
[facility]  Vascular Neurology  Comprehensive Stroke Center  Progress Note      Neurologic Chief Complaint: Left gaze preference and right side neglect    Subjective:     Interval History: Patient is seen for follow-up neurological assessment and treatment recommendations:  SAMMY.  Postural hypotension occurred while working with therapy.      HPI, Past Medical, Family, and Social History remains the same as documented in the initial encounter.     Review of Systems   Constitutional: Negative for chills and fever.   Respiratory: Negative for cough and shortness of breath.      Scheduled Meds:   amlodipine  10 mg Oral Daily    atorvastatin  40 mg Oral Daily    levetiracetam in NaCl (iso-os)  500 mg Intravenous Q12H    metronidazole  500 mg Oral Q8H    senna-docusate 8.6-50 mg  1 tablet Oral BID    sevelamer carbonate  800 mg Oral TID WM    sodium chloride 0.9%  3 mL Intravenous Q8H     Continuous Infusions:   PRN Meds:dextrose 50%, glucagon (human recombinant), hydrALAZINE, insulin aspart, midazolam (PF), ondansetron, risperidone    Objective:     Vital Signs (Most Recent):  Temp: 98.4 °F (36.9 °C) (05/09/17 1400)  Pulse: 81 (05/09/17 1430)  Resp: 11 (05/09/17 1430)  BP: 136/74 (05/09/17 1430)  SpO2: 99 % (05/09/17 1430)  BP Location: Right arm    Vital Signs Range (Last 24H):  Temp:  [98.4 °F (36.9 °C)-99.2 °F (37.3 °C)]   Pulse:  []   Resp:  [11-32]   BP: (118-146)/(56-76)   SpO2:  [94 %-100 %]   BP Location: Right arm    Physical Exam   Constitutional: He appears well-developed and well-nourished.   HENT:   Head: Normocephalic and atraumatic.   Eyes: EOM are normal. Pupils are equal, round, and reactive to light.   Pulmonary/Chest: Effort normal. No respiratory distress.       Neurological Exam:   LOC: does not follow requests and drowsy  Language: Receptive aphasia  Speech: Dysarthria  Orientation: Person, Not oriented to time  Visual Fields (CN II): Full  EOM (CN III, IV, VI):  Full/intact  Pupils (CN III, IV, VI): PERRL  Motor*: Arm Left:  Paretic:  3/5, Leg Left:   Paretic:  4/5, Arm Right:   Paretic:  3/5, Leg Right:   Paretic:  4/5  Tone: Arm-Left: normal; Leg-Left: normal; Arm-Right: normal; Leg-Right: normal    NIH Stroke Scale:    Level of Consciousness: 0 - alert  LOC Questions: 1 - answers one correctly  LOC Commands: 0 - performs both correctly  Best Gaze: 0 - normal  Visual: 0 - no visual loss  Facial Palsy: 0 - normal  Motor Left Arm: 3 - no effort against gravity  Motor Right Arm: 3 - no effort against gravity  Motor Left Le - drift  Motor Right Le - drift  Limb Ataxia: 0 - absent  Sensory: 0 - normal  Best Language: 0 - no aphasia  Dysarthria: 1 - mild to moderate dysarthria  Extinction and Inattention: 0 - no neglect  NIH Stroke Scale Total: 10      Laboratory:  CMP:   Recent Labs  Lab 17  0300   CALCIUM 9.4   ALBUMIN 3.5   PROT 7.3      K 5.4*   CO2 18*      *   CREATININE 14.7*   ALKPHOS 85   ALT 14   AST 18   BILITOT 0.7     CBC:   Recent Labs  Lab 17  0300   WBC 10.01   RBC 3.84*   HGB 11.2*   HCT 35.9*      MCV 94   MCH 29.2   MCHC 31.2*     Lipid Panel:   Recent Labs  Lab 17  0929   CHOL 92*   LDLCALC 38.4*   HDL 39*   TRIG 73     Hgb A1C:   Recent Labs  Lab 17  0300   HGBA1C 6.3*     TSH:   Recent Labs  Lab 17  0929   TSH 4.960*       Diagnostic Results:  CTA multiphase 17  No LVO seen    Assessment/Plan:     Patient is a 77 year old male with PMH of DM, HTN, HLD, prior stroke (no residual deficits), seizures who was transferred from [facility] after receiving IV-tPA for left gaze preference and right side neglect.  Witnessed seizure.     17 NAEON.  Postural hypotension occurred while working with therapy.        Embolic stroke involving left middle cerebral artery  Presented at [facility] with L MCA syndrome and witnessed seizures  S/p TPA no LVO seen on CTA multiphase    Antithrombotics for  secondary stroke prevention:  Hold all Antithrombotics until MRI brain complete and no hemorrhage  Statins for secondary stroke prevention and hyperlipidemia, if present: LDL 38.4  Rehab Efforts: Physical Therapy, Occupational Therapy and Speech and Language Pathology  Diagnostics: Ordered/Pending Modified Barium study to assess swallowing function/status, MRI head without contrast to assess brain parenchyma, Trans-thoracic cardiac echo to assess cardiac function/status  VTE Prophylaxis: None: Reason for No Pharmacological VTE Prophylaxis: Mechanical prophylaxis: Place SCDs  BP Parameters: SBP <180    Essential hypertension  Risk factor for stroke      Obesity  Risk factor for stroke  Nutrition consult recommended     Hyperlipidemia LDL goal <70  Risk factor for stroke  LDL pending     Type 2 diabetes mellitus with neurologic complication  Risk factor for stroke  HgA1C pending     Chronic kidney disease-mineral and bone disorder  Hyperkalemic   Going to dialysis today       Dee Dee Kidd PA-C  Comprehensive Stroke Center  Department of Vascular Neurology   [facility]

## 2017-05-09 NOTE — PLAN OF CARE
[facility] [address] [place], [place] - [address] [facility] [address]  [address]  [place] [address]  Phone: [phone] Fax: [phone]      This CM spoke with patient at bedside, was informed by patient he is partially blind.       05/09/17 1015   Discharge Assessment   Assessment Type Discharge Planning Assessment   Confirmed/corrected address and phone number on facesheet? Yes   Assessment information obtained from? Patient   Expected Length of Stay (days) 3   Communicated expected length of stay with patient/caregiver yes   Prior to hospitilization cognitive status: Alert/Oriented  (patient states he is partially blind)   Prior to hospitalization functional status: Assistive Equipment;Needs Assistance   Current cognitive status: Alert/Oriented   Current Functional Status: Assistive Equipment;Needs Assistance   Arrived From admitted as an inpatient   Lives With spouse   Able to Return to Prior Arrangements yes   Is patient able to care for self after discharge? Yes   How many people do you have in your home that can help with your care after discharge? 2   Who are your caregiver(s) and their phone number(s)? (patient spouse Marnie Greco [phone] & Deanna Greco [phone])   Patient's perception of discharge disposition home or selfcare   Readmission Within The Last 30 Days no previous admission in last 30 days   Patient currently being followed by outpatient case management? No   Patient currently receives home health services? No   Does the patient currently use HME? No   Patient currently receives private duty nursing? No   Patient currently receives any other outside agency services? No   Equipment Currently Used at Home cane, straight;walker, rolling   Do you have any problems affording any of your prescribed medications? No   Is the patient taking medications as prescribed? yes   Do you have any financial concerns preventing you from receiving the healthcare you need? No   Does the patient have transportation to  healthcare appointments? Yes   On Dialysis? Yes   If yes, what is the name of the dialysis unit?    Does the patient receive outpatient dialysis? Yes  ([facility] in [place])   Does the patient receive services at the Coumadin Clinic? No   Are there any open cases? No   Discharge Plan A Home with family   Discharge Plan B Home;Home Health   Patient/Family In Agreement With Plan yes       Isabel Rich CM  [id number]

## 2017-05-09 NOTE — PT/OT/SLP EVAL
Occupational Therapy  Evaluation    Bernabe Greco Jr.   MRN: [id number]   Admitting Diagnosis: altered mental status    OT Date of Treatment: 17   OT Start Time: 1018  OT Stop Time: 1050  OT Total Time (min): 32 min    Billable Minutes:  Evaluation 22  Therapeutic Activity 10    Diagnosis: altered mental status, s/p tPA       Past Medical History:   Diagnosis Date    Arthritis     Diabetes mellitus     Hyperlipidemia     Hypertension     Stroke     Syncope and collapse       Past Surgical History:   Procedure Laterality Date    HIP SURGERY      replacement right    JOINT REPLACEMENT      right hip        Referring physician: eduard Powers MD  Date referred to OT: 17    General Precautions: Standard, aspiration, fall, NPO, vision impaired (DNR, cardiac)    Do you have any cultural, spiritual, Anabaptist conflicts, given your current situation?: Oriental orthodox     Patient History:  Living Environment  Living Environment Comment: Pt resides with wife & daughter in 1 story house with no steps to enter.  Pt has RW, TTB, 3 in 1 commode, & w/c.  Will need to verify history as pt with fluctuating answers & alertness during questions.    Prior level of function:   Bed Mobility/Transfers: needs device  Grooming: independent  Bathing: needs device  Upper Body Dressing: independent  Lower Body Dressing: independent  Toileting: independent  Driving License: No  Occupation: Retired  Type of Occupation:  &   Leisure and Hobbies: being at home  IADL Comments: Pt uses RW with ambulation.  Pt has an aide come 2 x/wk to assist him with bathing.  Pt reports that he is able to perform dressing, toileting, & ambulation without (A).     Dominant hand: right    Subjective:  Communicated with RN prior to session.  Pt reported that he has problems with his vision.  Chief Complaint: none stated  Patient/Family stated goals: none stated    Pain Ratin/10  Pain Rating Post-Intervention: 0/10    Objective:  Patient found  with: blood pressure cuff, bed alarm, telemetry, SCD, pulse ox (continuous), peripheral IV, shen catheter    Cognitive Exam:  Oriented to: Person, Time and Situation (orientation fluctuated during session)  Follows Commands/attention: Follows one-step commands, easily distracted  Communication: dysarthria  Memory:  questionable  Safety awareness/insight to disability: impaired  Coping skills/emotional control: Appropriate to situation    Visual/perceptual:  Per pt, poor vision, unable to accurately assess    Physical Exam:  Postural examination/scapula alignment: Rounded shoulder, Head forward and Posterior pelvic tilt  Skin integrity: Visible skin intact  Edema: None noted     Sensation:   Per pt intact for light touch with BUE    Upper Extremity Range of Motion:  Right Upper Extremity: AAROM shoulder flexion 90* all others A/AAROM WFL  Left Upper Extremity: AAROM shoulder flexion 90* all others A/AAROM WFL    Upper Extremity Strength:  Right Upper Extremity: CHRISTIAN for shoulder flexion, all others 3/5  Left Upper Extremity: CHRISTIAN for shoulder flexion, all others 3/5   Strength: fair BUE    Fine motor coordination:   Impaired BUE    Gross motor coordination: impaired BUE    Functional Mobility:  Bed Mobility:  Rolling/Turning to Left: Maximum assistance  Scooting/Bridging: Minimum Assistance (scooting forward on EOB; dependent drawsheet transfer up HOB while supine)  Supine to Sit: Maximum Assistance  Sit to Supine: Total Assistance    Transfers:  Sit <> Stand Assistance: Minimum Assistance (x 2 trials from EOB)  Sit <> Stand Assistive Device: No Assistive Device    Activities of Daily Living:     UE Dressing Level of Assistance: Maximum assistance (donning gown around back while seated EOB with (A) for around back, fastener, & 1 UE)  LE Dressing Level of Assistance: Total assistance (donning socks seated EOB)  Grooming Position: Standing  Grooming Level of Assistance: Minimum assistance     Therapeutic Activities  "and Exercises:  Pt required SBA-Min (A) with postural control while seated EOB & CGA-Min (A) while standing during ADL's.  Provided education safety & need for staff (A) for EOB & OOB activities & on role of OT & POC.  Pt had no further questions & when asked whether there were any concerns pt reported none.      AM-PAC 6 CLICK ADL  How much help from another person does this patient currently need?  1 = Unable, Total/Dependent Assistance  2 = A lot, Maximum/Moderate Assistance  3 = A little, Minimum/Contact Guard/Supervision  4 = None, Modified [place]/Independent    Putting on and taking off regular lower body clothing? : 1  Bathing (including washing, rinsing, drying)?: 1  Toileting, which includes using toilet, bedpan, or urinal? : 2  Putting on and taking off regular upper body clothing?: 2  Taking care of personal grooming such as brushing teeth?: 3  Eating meals?: 2  Total Score: 11    AM-PAC Raw Score CMS "G-Code Modifier Level of Impairment Assistance   6 % Total / Unable   7 - 9 CM 80 - 100% Maximal Assist   10 - 14 CL 60 - 80% Moderate Assist   15 - 19 CK 40 - 60% Moderate Assist   20 - 22 CJ 20 - 40% Minimal Assist   23 CI 1-20% SBA / CGA   24 CH 0% Independent/ Mod I       Patient left supine with all lines intact, call button in reach, bed alarm on, RN notified and white board updated.    Assessment:  Bernabe Greco Jr. is a 77 y.o. male with a medical diagnosis of altered mental status and presents with deficits in cognition, vision, strength, ROM, & postural control affecting pt's ability to perform ADL's, IADL's, roles & responsibilities at level he was previously performing them.  Pt would benefit from OT to address independence with ADL's.    Rehab identified problem list/impairments: Rehab identified problem list/impairments: weakness, impaired endurance, impaired self care skills, impaired functional mobilty, impaired balance, visual deficits, impaired cognition, decreased lower " extremity function, decreased upper extremity function, decreased coordination, decreased safety awareness, impaired sensation    Rehab potential is fair.    Activity tolerance: Fair    Discharge recommendations: Discharge Facility/Level Of Care Needs: nursing facility, skilled     GOALS:   Occupational Therapy Goals        Problem: Occupational Therapy Goal    Goal Priority Disciplines Outcome Interventions   Occupational Therapy Goal     OT, PT/OT Ongoing (interventions implemented as appropriate)    Description:  Goals to be met by: 5/16/17     Patient will increase functional independence with ADLs by performing:    UE Dressing with Minimal Assistance.  LE Dressing with Moderate Assistance.  Grooming while standing with Stand-by Assistance.  Toileting from bedside commode with Moderate Assistance for hygiene and clothing management.   Rolling to Bilateral with Minimal Assistance.   Supine to sit with Minimal Assistance.  Stand pivot transfers with Stand-by Assistance.                PLAN:  Patient to be seen 6 x/week to address the above listed problems via self-care/home management, neuromuscular re-education, cognitive retraining, sensory integration, therapeutic activities, therapeutic exercises  Plan of Care expires: 06/08/17  Plan of Care reviewed with: patient    OT G-codes  Functional Assessment Tool Used: [facility]  Score: 11  Functional Limitation: Self care  Self Care Current Status (): CL  Self Care Goal Status (): SKYLER Mai  05/09/2017

## 2017-05-09 NOTE — SUBJECTIVE & OBJECTIVE
Neurologic Chief Complaint: Left gaze preference and right side neglect    Subjective:     Interval History: Patient is seen for follow-up neurological assessment and treatment recommendations:  SAMMY.  Postural hypotension occurred while working with therapy.      HPI, Past Medical, Family, and Social History remains the same as documented in the initial encounter.     Review of Systems   Constitutional: Negative for chills and fever.   Respiratory: Negative for cough and shortness of breath.      Scheduled Meds:   amlodipine  10 mg Oral Daily    atorvastatin  40 mg Oral Daily    levetiracetam in NaCl (iso-os)  500 mg Intravenous Q12H    metronidazole  500 mg Oral Q8H    senna-docusate 8.6-50 mg  1 tablet Oral BID    sevelamer carbonate  800 mg Oral TID WM    sodium chloride 0.9%  3 mL Intravenous Q8H     Continuous Infusions:   PRN Meds:dextrose 50%, glucagon (human recombinant), hydrALAZINE, insulin aspart, midazolam (PF), ondansetron, risperidone    Objective:     Vital Signs (Most Recent):  Temp: 98.4 °F (36.9 °C) (05/09/17 1400)  Pulse: 81 (05/09/17 1430)  Resp: 11 (05/09/17 1430)  BP: 136/74 (05/09/17 1430)  SpO2: 99 % (05/09/17 1430)  BP Location: Right arm    Vital Signs Range (Last 24H):  Temp:  [98.4 °F (36.9 °C)-99.2 °F (37.3 °C)]   Pulse:  []   Resp:  [11-32]   BP: (118-146)/(56-76)   SpO2:  [94 %-100 %]   BP Location: Right arm    Physical Exam   Constitutional: He appears well-developed and well-nourished.   HENT:   Head: Normocephalic and atraumatic.   Eyes: EOM are normal. Pupils are equal, round, and reactive to light.   Pulmonary/Chest: Effort normal. No respiratory distress.       Neurological Exam:   LOC: does not follow requests and drowsy  Language: Receptive aphasia  Speech: Dysarthria  Orientation: Person, Not oriented to time  Visual Fields (CN II): Full  EOM (CN III, IV, VI): Full/intact  Pupils (CN III, IV, VI): PERRL  Motor*: Arm Left:  Paretic:  3/5, Leg Left:   Paretic:   4/5, Arm Right:   Paretic:  3/5, Leg Right:   Paretic:  4/5  Tone: Arm-Left: normal; Leg-Left: normal; Arm-Right: normal; Leg-Right: normal    NIH Stroke Scale:    Level of Consciousness: 0 - alert  LOC Questions: 1 - answers one correctly  LOC Commands: 0 - performs both correctly  Best Gaze: 0 - normal  Visual: 0 - no visual loss  Facial Palsy: 0 - normal  Motor Left Arm: 3 - no effort against gravity  Motor Right Arm: 3 - no effort against gravity  Motor Left Le - drift  Motor Right Le - drift  Limb Ataxia: 0 - absent  Sensory: 0 - normal  Best Language: 0 - no aphasia  Dysarthria: 1 - mild to moderate dysarthria  Extinction and Inattention: 0 - no neglect  NIH Stroke Scale Total: 10      Laboratory:  CMP:   Recent Labs  Lab 17  0300   CALCIUM 9.4   ALBUMIN 3.5   PROT 7.3      K 5.4*   CO2 18*      *   CREATININE 14.7*   ALKPHOS 85   ALT 14   AST 18   BILITOT 0.7     CBC:   Recent Labs  Lab 17  0300   WBC 10.01   RBC 3.84*   HGB 11.2*   HCT 35.9*      MCV 94   MCH 29.2   MCHC 31.2*     Lipid Panel:   Recent Labs  Lab 17  0929   CHOL 92*   LDLCALC 38.4*   HDL 39*   TRIG 73     Hgb A1C:   Recent Labs  Lab 17  0300   HGBA1C 6.3*     TSH:   Recent Labs  Lab 17  0929   TSH 4.960*       Diagnostic Results:  CTA multiphase 17  No LVO seen

## 2017-05-10 NOTE — PT/OT/SLP PROGRESS
Occupational Therapy  Treatment    Bernabe Greco Jr.   MRN: [id number]     OT Date of Treatment: 05/10/17   OT Start Time: 1108  OT Stop Time: 1139  OT Total Time (min): 31 min    Billable Minutes:  Self Care/Home Management 20 and Therapeutic Exercise 11    General Precautions: Standard, aspiration, fall (DNR, cardiac)    Do you have any cultural, spiritual, Pentecostal conflicts, given your current situation?: Yarsanism    Subjective:  Communicated with RN prior to session.  Pt reported that he was not feeling well today.    Pain Ratin/10  Pain Rating Post-Intervention: 0/10    Objective:  Patient found with: telemetry     Functional Mobility:  Bed Mobility:  Rolling/Turning Right:  (Min-Mod (A))  Supine to Sit: Moderate Assistance  Sit to Supine: Minimum Assistance    Transfers:   Sit <> Stand Assistance:  (SBA-CGA without AD from EOB)  Sit <> Stand Assistive Device: No Assistive Device    Functional Ambulation: Pt took ~ 4 steps to the right with CGA.    Activities of Daily Living:     UE Dressing Level of Assistance: Maximum assistance (seated EOB)  LE Dressing Level of Assistance: Minimum assistance (seated EOB)  Toileting Where Assessed: Bed level  Toileting Level of Assistance: Total assistance      Therapeutic Activities and Exercises:  Pt required SBA-CGA with postural control while seated EOB.  Pt performed A/AAROM with BUE in all planes x 10 reps each while seated EOB.  Pt had no further questions & when asked whether there were any concerns pt reported none.      AM-PAC 6 CLICK ADL   How much help from another person does this patient currently need?   1 = Unable, Total/Dependent Assistance  2 = A lot, Maximum/Moderate Assistance  3 = A little, Minimum/Contact Guard/Supervision  4 = None, Modified [place]/Independent    Putting on and taking off regular lower body clothing? : 3  Bathing (including washing, rinsing, drying)?: 2  Toileting, which includes using toilet, bedpan, or urinal? : 1  Putting on  and taking off regular upper body clothing?: 2  Taking care of personal grooming such as brushing teeth?: 3  Eating meals?: 3  Total Score: 14     AM-PAC Raw Score CMS G-Code Modifier Level of Impairment Assistance   6 % Total / Unable   7 - 9 CM 80 - 100% Maximal Assist   10 - 14 CL 60 - 80% Moderate Assist   15 - 19 CK 40 - 60% Moderate Assist   20 - 22 CJ 20 - 40% Minimal Assist   23 CI 1-20% SBA / CGA   24 CH 0% Independent/ Mod I     Patient left supine with all lines intact, call button in reach, bed alarm on, RN notified and white board updated.    ASSESSMENT:  Bernabe Greco Jr. is a 77 y.o. male with fair participation and motivation.  Pt continues to require (A) with all activities.  Pt with improved alertness & active participation in session.    Rehab identified problem list/impairments: Rehab identified problem list/impairments: weakness, impaired endurance, impaired self care skills, impaired balance, impaired functional mobilty, decreased upper extremity function, decreased lower extremity function, decreased safety awareness, impaired cognition    Rehab potential is fair.    Activity tolerance: Fair    Discharge recommendations: Discharge Facility/Level Of Care Needs: nursing facility, skilled     GOALS:   Occupational Therapy Goals        Problem: Occupational Therapy Goal    Goal Priority Disciplines Outcome Interventions   Occupational Therapy Goal     OT, PT/OT Ongoing (interventions implemented as appropriate)    Description:  Goals to be met by: 5/16/17     Patient will increase functional independence with ADLs by performing:    UE Dressing with Minimal Assistance.  LE Dressing with Moderate Assistance.  Grooming while standing with Stand-by Assistance.  Toileting from bedside commode with Moderate Assistance for hygiene and clothing management.   Rolling to Bilateral with Minimal Assistance.   Supine to sit with Minimal Assistance.  Stand pivot transfers with Stand-by Assistance.                 Plan:  Patient to be seen 6 x/week to address the above listed problems via self-care/home management, neuromuscular re-education, cognitive retraining, sensory integration, therapeutic activities, therapeutic exercises  Plan of Care expires: 06/08/17  Plan of Care reviewed with: patient         Alma Ki Maciel, SKYLER  05/10/2017

## 2017-05-10 NOTE — PT/OT/SLP PROGRESS
Speech Language Pathology  Treatment    Bernabe Greco Jr.   MRN: [id number]   Admitting Diagnosis: ro stroke    Diet recommendations: Solid Diet Level: Dental Soft  Liquid Diet Level: Thin   Feed only when awake/alert, HOB to 90 degrees, Small bites/sips, 1 bite/sip at a time, Meds crushed in puree, Eliminate distractions, Avoid talking while eating and Assistance with meals    SLP Treatment Date: 05/10/17  Speech Start Time: 923     Speech Stop Time: 939     Speech Total (min): 16 min       TREATMENT BILLABLE MINUTES:  Speech Therapy Individual 8 and Treatment Swallowing Dysfunction 8    Has the patient been evaluated by SLP for swallowing? : Yes  Keep patient NPO?: No   General Precautions: Standard, aspiration, fall          Subjective:  Pt awake with stimulation; pleasant    Pain Ratin/10              Pain Rating Post-Intervention: 0/10    Objective:     Pt denies difficulty with PO tolerance. Pt tolerated 1 saltine cracker and 6 oz thin liquid via straw without s/s of airway compromise. Oral phase cb excess mastication of cracker that benefited from multiple liquid washes to clear; functional. Skilled education provided on aspiration precautions and diet recommendations. Whiteboard updated with diet recommendations/aspiration precautions.     Pt states visual impairment at BL that wax/wanes during day; therefore, unable to successfully assess writing, reading, and visual spatial skills. Pt states impaired skills at BL. Pt completed simple divergent categorization task in 1 minute listing 7 items indep; benefited from min cues to sustain attention and complete task. Pt completed simple reasoning task of 3 word category exclusion x6 with 100% acc indep; benefited from self corrections. Pt states he is not at cognitive-linguistic baseline as it requires him longer to respond during conversation. No further questions.                                   Assessment:  Bernabe Greco Jr. is a 77 y.o. male with a medical  diagnosis of ro stroke and presents with mild cognitive-linguistic impairment; impaired visual skills at BL. continued progress towards goals.      Discharge recommendations: Discharge Facility/Level Of Care Needs: nursing facility, skilled (pending PT OT recs)     Goals:   SLP Goals        Problem: SLP Goal    Goal Priority Disciplines Outcome   SLP Goal     SLP Ongoing (interventions implemented as appropriate)   Description:  Speech Language Pathology Goals  Goals expected to be met by 5/16  1. Pt will tolerate dental soft diet with thin liquids without overt s/s aspiration.  2. Pt will complete simple problem solving/reasoning tasks with 80% accy with min cues.  3. Pt will name 10 items per concrete cat with min cues to improve attn/thought organization.  4. Pt will complete further assessment of reading, writing and visual spatial skills.                    Plan:   Patient to be seen Therapy Frequency: 5 x/week   Plan of Care expires: 06/08/17  Plan of Care reviewed with: patient  SLP Follow-up?: Yes  SLP - Next Visit Date: 05/11/17           Autumn Ryan M.A. CCC-SLP  Speech Language Pathologist  [phone]  5/10/2017

## 2017-05-10 NOTE — PLAN OF CARE
Problem: Physical Therapy Goal  Goal: Physical Therapy Goal  Goals to be met by: 5/20/17     Patient will increase functional independence with mobility by performing:    Supine to sit with Minimal Assistance. MET  Revised: Supine to sit with Supervision.  Sit to supine with Minimal Assistance. MET  Revised: sit to supine with Supervision.  Sit to stand transfer with Contact Guard Assistance using Rolling Walker.  Bed to chair transfer via Stand Pivot with Minimal Assistance using Rolling Walker.  Gait x 50 feet with Contact Guard Assistance using Rolling Walker.   Static sitting at edge of bed x 10 minutes with BUE support and BLE support with Stand-by Assistance.  Dynamic sitting at edge of bed x 8 minutes with Contact Guard Assistance.  Able to tolerate exercise for 15-20 reps with supervision.  Patient and family able to teachback stroke & positioning education independently.   Outcome: Ongoing (interventions implemented as appropriate)     Pt would benefit from SNF upon discharge. Pt progressing towards goals.  Appropriate to transfer with: CGA of 1 person  Mary Gillis PT, DPT  5/10/2017  Pager: [phone]

## 2017-05-10 NOTE — PT/OT/SLP PROGRESS
"Physical Therapy  Treatment    Bernabe Greco Jr.   MRN: [id number]   Admitting Diagnosis: stroke    PT Received On: 05/10/17  PT Start Time: 1436     PT Stop Time: 1500    PT Total Time (min): 24 min       Billable Minutes:  Gait [id number] and Therapeutic Exercise 12    Treatment Type: Treatment  PT/PTA: PT       General Precautions: Standard, aspiration, fall  Orthopedic Precautions: N/A   Braces: N/A    Subjective:  Communicated with RN prior to session.  "I need to do this so I can get out of here."    Pain Ratin/10  Pain Rating Post-Intervention: 0/10    Objective:   Patient found with: telemetry    Functional Mobility:  Pt found supine, sleeping.  Bed Mobility:    Rolling to the right: use of bedrail: Contact Guard Assistance   Supine <> Sit: From right sidelying: Contact Guard Assistance   Pt required vc's for directional safety    Sitting Balance at Edge of Bed:   Assistance Level Required: Stand-by Assistance   Time: 10 minutes   Postural deviations noted: Rounded shoulder, Head forward and Posterior pelvic tilt   Encouraged: upright posture    Transfers:    Sit <> Stand: x 1 trial from EOB, x2  trials from bedside chair with Contact Guard Assistance with Rolling Walker    Stand <> Sit: x3 trials to EOB with Contact Guard Assistance with Rolling Walker   Comments: Pt with narrow LEAH     Gait:   Distance Ambulated: 50ft x2 trials with seated rest break    Assistance level: Contact Guard Assistance   Assistive Device used:  Rolling Walker, bedside chair follow for safety by PT   Gait Pattern: 3-point gait   Gait Deviation(s): decreased shawn, decreased step length, decreased stride length and decreased swing-to-stance ratio   Impairments due to: decreased strength and balance.   Comments: Pt required vc's for LEAH and shawn, pt able to correct.    Therapeutic Activities and Exercises:  Seated unsupported at EOB: Pt performed BLE hip flexion & LAQs 10 repetitions with facilitation for correct performance and " sequencing. Pt required Stand-by Assistance for balance and postural control. Exercises performed to develop and maintain pt's  strength, endurance and ROM.     Education:  Patient provided with daily orientation and goals of this PT session. Patient agreed to participate in session.Patient aware of patient's deficits and therapy progression. They were educated to transfer with RN/PCT only; CGA of 1 person. Time provided for therapeutic counseling and discussion of health disposition. All questions answered to patient's satisfaction, within scope of PT practice; voiced no other concerns. White board updated in patient's room, RN notified of session.     AM-PAC 6 CLICK MOBILITY  How much help from another person does this patient currently need?   1 = Unable, Total/Dependent Assistance  2 = A lot, Maximum/Moderate Assistance  3 = A little, Minimum/Contact Guard/Supervision  4 = None, Modified [place]/Independent    Turning over in bed (including adjusting bedclothes, sheets and blankets)?: 3  Sitting down on and standing up from a chair with arms (e.g., wheelchair, bedside commode, etc.): 3  Moving from lying on back to sitting on the side of the bed?: 3  Moving to and from a bed to a chair (including a wheelchair)?: 3  Need to walk in hospital room?: 3  Climbing 3-5 steps with a railing?: 2  Total Score: 17    AM-PAC Raw Score CMS G-Code Modifier Level of Impairment Assistance   6 % Total / Unable   7 - 9 CM 80 - 100% Maximal Assist   10 - 14 CL 60 - 80% Moderate Assist   15 - 19 CK 40 - 60% Moderate Assist   20 - 22 CJ 20 - 40% Minimal Assist   23 CI 1-20% SBA / CGA   24 CH 0% Independent/ Mod I     Patient left UI with all lines intact, call button in reach and RN & PCT notified.    Assessment:  Bernabe Greco Jr. is a 77 y.o. male with a medical diagnosis of stroke. Patient is making progress towards goals, participating well in this session. Pt continues to require significant assist with ambulation.  Pt with noted improvement with attention and mobility. Mr. Greco's deficits affect their ability to safely and independently participate in self-care tasks and functional mobility. Due to his physical therapy diagnosis of debility and deconditioning, they continue to benefit from acute PT services to address the following limitations: high fall risk, weakness, instability, the need for supervised instructions of exercise, and the decreased ability to perform functional activities which they were able to complete PTA. Education was provided to patient regarding importance of continued participation in therapy, patient's progress and discharge disposition. Pt would benefit from SNF upon discharge to improve quality of life and focus on recovery of impairments.   .    Rehab identified problem list/impairments: Rehab identified problem list/impairments: weakness, impaired endurance, impaired self care skills, impaired functional mobilty, gait instability, impaired balance, impaired cognition, decreased safety awareness    Rehab potential is good.    Activity tolerance: Good    Discharge recommendations: Discharge Facility/Level Of Care Needs: nursing facility, skilled     Barriers to discharge: Barriers to Discharge: Decreased caregiver support    Equipment recommendations: Equipment Needed After Discharge: walker, rolling, wheelchair     GOALS:   Physical Therapy Goals        Problem: Physical Therapy Goal    Goal Priority Disciplines Outcome Goal Variances Interventions   Physical Therapy Goal     PT/OT, PT Ongoing (interventions implemented as appropriate)     Description:  Goals to be met by: 5/20/17     Patient will increase functional independence with mobility by performing:    Supine to sit with Minimal Assistance. MET  Revised: Supine to sit with Supervision.  Sit to supine with Minimal Assistance. MET  Revised: sit to supine with Supervision.  Sit to stand transfer with Contact Guard Assistance using Rolling  Walker.  Bed to chair transfer via Stand Pivot with Minimal Assistance using Rolling Walker.  Gait  x 50 feet with Contact Guard Assistance using Rolling Walker.    Static sitting at edge of bed x 10 minutes with BUE support and BLE support with Stand-by Assistance.  Dynamic sitting at edge of bed x 8 minutes with Contact Guard Assistance.  Able to tolerate exercise for 15-20 reps with supervision.  Patient and family able to teachback stroke & positioning education independently.                 PLAN:    Patient to be seen 6 x/week  to address the above listed problems via gait training, therapeutic activities, therapeutic exercises, neuromuscular re-education  Plan of Care expires: 06/08/17  Plan of Care reviewed with: patient    Mary DAVI Gillis PT, DPT  5/11/2017   Pager: [phone]

## 2017-05-10 NOTE — PROGRESS NOTES
[facility]  Vascular Neurology  Comprehensive Stroke Center  Progress Note      Neurologic Chief Complaint: Left gaze preference and right side neglect    Subjective:     Interval History: Patient is seen for follow-up neurological assessment and treatment recommendations:  SAMMY.      HPI, Past Medical, Family, and Social History remains the same as documented in the initial encounter.     Review of Systems   Constitutional: Negative for chills and fever.   Respiratory: Negative for cough and shortness of breath.    Gastrointestinal: Negative for nausea and vomiting.   Neurological: Negative for dizziness, facial asymmetry, speech difficulty, weakness, numbness and headaches.   Psychiatric/Behavioral: Negative for agitation and behavioral problems.     Scheduled Meds:   amlodipine  10 mg Oral Daily    aspirin  81 mg Oral Daily    atorvastatin  40 mg Oral Daily    levetiracetam in NaCl (iso-os)  500 mg Intravenous Q12H    metronidazole  500 mg Oral Q8H    senna-docusate 8.6-50 mg  1 tablet Oral BID    sevelamer carbonate  800 mg Oral TID WM    sodium chloride 0.9%  3 mL Intravenous Q8H     Continuous Infusions:   PRN Meds:dextrose 50%, glucagon (human recombinant), insulin aspart, midazolam (PF), ondansetron, risperidone    Objective:     Vital Signs (Most Recent):  Temp: 98.6 °F (37 °C) (05/10/17 0817)  Pulse: 98 (05/10/17 0817)  Resp: 18 (05/10/17 0817)  BP: 130/65 (05/10/17 0817)  SpO2: 97 % (05/10/17 0817)  BP Location: Right arm    Vital Signs Range (Last 24H):  Temp:  [98.2 °F (36.8 °C)-99.1 °F (37.3 °C)]   Pulse:  []   Resp:  [11-29]   BP: (101-141)/(55-75)   SpO2:  [95 %-100 %]   BP Location: Right arm    Physical Exam   Constitutional: He appears well-developed and well-nourished.   HENT:   Head: Normocephalic and atraumatic.   Eyes: EOM are normal. Pupils are equal, round, and reactive to light.   Cardiovascular: Normal rate.    Pulmonary/Chest: Effort normal. No  respiratory distress.   Abdominal: Soft. He exhibits no distension.   Neurological: He is alert.   Skin: Skin is warm and dry.   Psychiatric: He has a normal mood and affect. His behavior is normal.       Neurological Exam:   LOC: alert and follows requests  Language: no aphasia  Speech: No dysarthria  Orientation: Person, Not oriented to time  Visual Fields (CN II): Full  EOM (CN III, IV, VI): Full/intact  Pupils (CN III, IV, VI): PERRL  Motor*: Arm Left:  Paretic:  5/5, Leg Left:   Paretic:  5/5, Arm Right:   Paretic:  5/5, Leg Right:   Paretic:  5/5  Tone: Arm-Left: normal; Leg-Left: normal; Arm-Right: normal; Leg-Right: normal    NIH Stroke Scale:    Level of Consciousness: 0 - alert  LOC Questions: 0 - answers both correctly  LOC Commands: 0 - performs both correctly  Best Gaze: 0 - normal  Visual: 0 - no visual loss  Facial Palsy: 0 - normal  Motor Left Arm: 0 - no drift  Motor Right Arm: 0 - no drift  Motor Left Le - no drift  Motor Right Le - no drift  Limb Ataxia: 0 - absent  Sensory: 0 - normal  Best Language: 0 - no aphasia  Dysarthria: 0 - normal articulation  Extinction and Inattention: 0 - no neglect  NIH Stroke Scale Total: 0      Laboratory:  CMP:     Recent Labs  Lab 05/10/17  0447   CALCIUM 9.4   ALBUMIN 3.4*   PROT 7.2      K 4.9   CO2 16*      BUN 79*   CREATININE 13.0*   ALKPHOS 78   ALT 15   AST 23   BILITOT 0.7     CBC:     Recent Labs  Lab 05/10/17  0447   WBC 7.94   RBC 3.88*   HGB 11.5*   HCT 35.2*   *   MCV 91   MCH 29.6   MCHC 32.7     Lipid Panel:     Recent Labs  Lab 17  0929   CHOL 92*   LDLCALC 38.4*   HDL 39*   TRIG 73     Hgb A1C:     Recent Labs  Lab 17  0300   HGBA1C 6.3*     TSH:     Recent Labs  Lab 17  0929   TSH 4.960*       Diagnostic Results:    MRI. Date: 17  -no acute infarct  -remote infarct in R posterior frontal/bilateral occipital lobes    CTA multiphase 17  No LVO seen    Assessment/Plan:     Patient is a 77  year old male with PMH of DM, HTN, HLD, prior stroke (no residual deficits), seizures who was transferred from [place] after receiving IV-tPA for left gaze preference and right side neglect.  Witnessed seizure.     05/09/17 NAEON.  Postural hypotension occurred while working with therapy.    05/10/17 Neurologically improved, stepped down from ICU, MRI negative for acute stroke      Seizure  Presented at OSH with L MCA syndrome and witnessed seizures  S/p TPA no LVO seen on CTA multiphase. MRI negative for acute stroke. Etiology of patient's symptoms more likely due to seizure and not likely stroke.     Continue aspirin and atorvastatin 40 for secondary stroke prevention.  EEG negative for seizure activity  Continue keppra      Hyperlipidemia LDL goal <70  Risk factor for stroke  Continue home atorvastatin 40    Essential hypertension  Risk factor for stroke      Type 2 diabetes mellitus with neurologic complication  Risk factor for stroke  HgA1C 6.3  Continue sliding scale insulin    Obesity  Risk factor for stroke  Nutrition consult recommended     ESRD (end stage renal disease) on dialysis  Last dialysis 5/9    Chronic kidney disease-mineral and bone disorder  Hyperkalemic   Dialysis on 5/9            Annie Busch PA-C  Comprehensive Stroke Center  Department of Vascular Neurology   [facility]

## 2017-05-10 NOTE — PLAN OF CARE
Problem: Patient Care Overview  Goal: Plan of Care Review  Outcome: Ongoing (interventions implemented as appropriate)  POC reviewed with pt at 1541. Pt verbalized understanding. Questions and concerns addressed. No acute events today. Pt progressing toward goals. Will continue to monitor. See flowsheets for full assessment and VS info.

## 2017-05-10 NOTE — ASSESSMENT & PLAN NOTE
Presented at OSH with L MCA syndrome and witnessed seizures  S/p TPA no LVO seen on CTA multiphase. MRI negative for acute stroke. Etiology of patient's symptoms more likely due to seizure and not likely stroke.     Continue aspirin and atorvastatin 40 for secondary stroke prevention.  EEG negative for seizure activity  Continue keppra

## 2017-05-10 NOTE — NURSING TRANSFER
Nursing Transfer Note      5/10/2017     Transfer To: 730B    Transfer via bed    Transfer with cardiac monitoring    Transported by RNx2    Medicines sent: yes    Chart send with patient: Yes    Notified: daughter    Patient reassessed at: 0810 5/10/17    Upon arrival to floor: cardiac monitor applied, patient oriented to room, call bell in reach and bed in lowest position

## 2017-05-10 NOTE — PLAN OF CARE
Transition of care note    Transfer to room 730B    DX:Tachycardia [R00.0]  Acute ischemic left MCA stroke [I63.512]  Embolic stroke involving left middle cerebral artery [I63.412]  Malfunction of arteriovenous dialysis fistula, subsequent encounter [T82.590D]  Acute ischemic left MCA stroke [I63.512]     PT/OT:Discharge Facility/Level Of Care Needs: nursing facility, skilled      Insurance:Payor: [employer] MANAGED MEDICARE / Plan: [employer] MEDICARE PPO / Product Type: Medicare Advantage /      PCP:Provider Not[employer]ystem     Pharmacy:  [id number] - [place], [place] - [address]  [id number]  [place] [address]  Phone: [phone] Fax: [phone]      No future appointments.     Isabel Rich [facility]  [phone]

## 2017-05-10 NOTE — PLAN OF CARE
Problem: SLP Goal  Goal: SLP Goal  Speech Language Pathology Goals  Goals expected to be met by 5/16  1. Pt will tolerate dental soft diet with thin liquids without overt s/s aspiration.  2. Pt will complete simple problem solving/reasoning tasks with 80% accy with min cues.  3. Pt will name 10 items per concrete cat with min cues to improve attn/thought organization.  4. Pt will complete further assessment of reading, writing and visual spatial skills.         Pt with continued progress towards goals.     Autumn Ryan M.A. CCC-SLP  Speech Language Pathologist  [phone]  5/10/2017

## 2017-05-10 NOTE — SUBJECTIVE & OBJECTIVE
Neurologic Chief Complaint: Left gaze preference and right side neglect    Subjective:     Interval History: Patient is seen for follow-up neurological assessment and treatment recommendations:  SAMMY.      HPI, Past Medical, Family, and Social History remains the same as documented in the initial encounter.     Review of Systems   Constitutional: Negative for chills and fever.   Respiratory: Negative for cough and shortness of breath.    Gastrointestinal: Negative for nausea and vomiting.   Neurological: Negative for dizziness, facial asymmetry, speech difficulty, weakness, numbness and headaches.   Psychiatric/Behavioral: Negative for agitation and behavioral problems.     Scheduled Meds:   amlodipine  10 mg Oral Daily    aspirin  81 mg Oral Daily    atorvastatin  40 mg Oral Daily    levetiracetam in NaCl (iso-os)  500 mg Intravenous Q12H    metronidazole  500 mg Oral Q8H    senna-docusate 8.6-50 mg  1 tablet Oral BID    sevelamer carbonate  800 mg Oral TID WM    sodium chloride 0.9%  3 mL Intravenous Q8H     Continuous Infusions:   PRN Meds:dextrose 50%, glucagon (human recombinant), insulin aspart, midazolam (PF), ondansetron, risperidone    Objective:     Vital Signs (Most Recent):  Temp: 98.6 °F (37 °C) (05/10/17 0817)  Pulse: 98 (05/10/17 0817)  Resp: 18 (05/10/17 0817)  BP: 130/65 (05/10/17 0817)  SpO2: 97 % (05/10/17 0817)  BP Location: Right arm    Vital Signs Range (Last 24H):  Temp:  [98.2 °F (36.8 °C)-99.1 °F (37.3 °C)]   Pulse:  []   Resp:  [11-29]   BP: (101-141)/(55-75)   SpO2:  [95 %-100 %]   BP Location: Right arm    Physical Exam   Constitutional: He appears well-developed and well-nourished.   HENT:   Head: Normocephalic and atraumatic.   Eyes: EOM are normal. Pupils are equal, round, and reactive to light.   Cardiovascular: Normal rate.    Pulmonary/Chest: Effort normal. No respiratory distress.   Abdominal: Soft. He exhibits no distension.   Neurological: He is alert.   Skin: Skin  is warm and dry.   Psychiatric: He has a normal mood and affect. His behavior is normal.       Neurological Exam:   LOC: alert and follows requests  Language: no aphasia  Speech: No dysarthria  Orientation: Person, Not oriented to time  Visual Fields (CN II): Full  EOM (CN III, IV, VI): Full/intact  Pupils (CN III, IV, VI): PERRL  Motor*: Arm Left:  Paretic:  5/5, Leg Left:   Paretic:  5/5, Arm Right:   Paretic:  5/5, Leg Right:   Paretic:  5/5  Tone: Arm-Left: normal; Leg-Left: normal; Arm-Right: normal; Leg-Right: normal    NIH Stroke Scale:    Level of Consciousness: 0 - alert  LOC Questions: 0 - answers both correctly  LOC Commands: 0 - performs both correctly  Best Gaze: 0 - normal  Visual: 0 - no visual loss  Facial Palsy: 0 - normal  Motor Left Arm: 0 - no drift  Motor Right Arm: 0 - no drift  Motor Left Le - no drift  Motor Right Le - no drift  Limb Ataxia: 0 - absent  Sensory: 0 - normal  Best Language: 0 - no aphasia  Dysarthria: 0 - normal articulation  Extinction and Inattention: 0 - no neglect  NIH Stroke Scale Total: 0      Laboratory:  CMP:     Recent Labs  Lab 05/10/17  0447   CALCIUM 9.4   ALBUMIN 3.4*   PROT 7.2      K 4.9   CO2 16*      BUN 79*   CREATININE 13.0*   ALKPHOS 78   ALT 15   AST 23   BILITOT 0.7     CBC:     Recent Labs  Lab 05/10/17  0447   WBC 7.94   RBC 3.88*   HGB 11.5*   HCT 35.2*   *   MCV 91   MCH 29.6   MCHC 32.7     Lipid Panel:     Recent Labs  Lab 17  0929   CHOL 92*   LDLCALC 38.4*   HDL 39*   TRIG 73     Hgb A1C:     Recent Labs  Lab 17  0300   HGBA1C 6.3*     TSH:     Recent Labs  Lab 17  0929   TSH 4.960*       Diagnostic Results:    MRI. Date: 17  -no acute infarct  -remote infarct in R posterior frontal/bilateral occipital lobes    CTA multiphase 17  No LVO seen

## 2017-05-10 NOTE — PROGRESS NOTES
Contacted telemetry at 1235 to get a tele box for the transfer of pt to 730B and they had none available to send up. Just contacted them again and they are still waiting on one to send.

## 2017-05-10 NOTE — PROGRESS NOTES
Consult Note  Physical Medicine & Rehab    Consult Requested By:  Chadwick Cardona MD      Admit Date:  5/8/2017  Admitting Diagnosis:  Tachycardia [R00.0], Acute ischemic left MCA stroke [I63.512], Embolic stroke involving left middle cerebral artery [I63.412], Malfunction of arteriovenous dialysis fistula, subsequent encounter [T82.641D]    Reason for Consult:  assess rehabilitation needs    SUBJECTIVE:   Interval History (05/10/2017):  Patient is seen for follow-up rehab evaluation and recommendations.  No acute events over night.  Stepped down to floor this morning.  Much more alert and interactive today.  Functional status:  Participating with therapy.  Bed mobility min-totalA.  Sit to stand and stand to sit Janice.  Ambulated 4 side steps Janice.  UBD maxA and LBD totalA.   Barriers for discharge/rehab admission: not ready for discharge.     HPI, Past Medical, Surgical, Family, and Social History remains the same as documented in the initial encounter.    Review of Systems  Constitutional: No fever or chills.  No malaise or fatigue.  Skin: No rashes or lesions.   Eyes: No visual changes. 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  ENT: No nasal congestion, sore throat, or ear pain.  No difficulty swallowing.   Respiratory: No shortness of breath or wheezing.  No cough.  Cardiovascular: No chest pain or palpitations.  No edema.   Gl: No nausea or vomiting.  No abdominal pain.  No incontinence of bowel.  No constipation.   : No incontinence of bladder.   Musculoskeletal: No arthralgias.  No bone pain.    Neurological: No seizures or tremors.  No weakness.   Behavioral/Psych: No changes in mood or hallucinations.    Past Medical History:   Diagnosis Date    Arthritis     Diabetes mellitus     Hyperlipidemia     Hypertension     Stroke     Syncope and collapse      Past Surgical History:   Procedure Laterality Date    HIP SURGERY      replacement right    JOINT REPLACEMENT      right hip       Family History   Problem Relation Age of Onset    Hypertension Mother      Social History   Substance Use Topics    Smoking status: Former Smoker     Packs/day: 0.25     Quit date: 1978    Smokeless tobacco: None    Alcohol use No     Review of patient's allergies indicates:   Allergen Reactions    Penicillins Rash        Scheduled Medications:   amlodipine  10 mg Oral Daily    aspirin  81 mg Oral Daily    atorvastatin  40 mg Oral Daily    heparin (porcine)  5,000 Units Subcutaneous Q8H    levetiracetam in NaCl (iso-os)  500 mg Intravenous Q12H    metronidazole  500 mg Oral Q8H    senna-docusate 8.6-50 mg  1 tablet Oral BID    sevelamer carbonate  800 mg Oral TID WM    sodium chloride 0.9%  3 mL Intravenous Q8H     PRN Medications:  dextrose 50%, glucagon (human recombinant), insulin aspart, midazolam (PF), ondansetron, risperidone    OBJECTIVE:     Vital Signs (Most Recent)  Temp: 97.7 °F (36.5 °C) (05/10/17 1217)  Pulse: 107 (05/10/17 121)  Resp: 18 (05/10/17 1217)  BP: 121/61 (05/10/17 1217)  SpO2: 100 % (05/10/17 1217)    Vital Signs Range (Last 24H):  Temp:  [97.7 °F (36.5 °C)-99.1 °F (37.3 °C)]   Pulse:  []   Resp:  [11-29]   BP: (101-141)/(55-75)   SpO2:  [95 %-100 %]     Physical Exam:  Vital signs reviewed.   General appearance:  No apparent distress.  Appears well-developed and well-nourished.  Skin:  Color and texture normal.  Warm and dry.  No jaundice.  No visible rashes or visible lesions.  HEENT:  Normocephalic and atraumatic.  No scleral icterus.  No nasal discharge.    Pulmonary:  Normal respiratory effort.  No cough.  Cardiac:  Normal heart rate.  Extremities: No calf tenderness.  Distal pulses intact.  No edema.    Abdomen:  Soft, non-tender and non-distended.  Musculoskeletal:  Moves all extremities spontaneously.  ROM: normal.    Neurologic:  -  Mental Status:  AAOx3.  Follows commands.  Answers correct age and .   -  Speech and language:  no aphasia, mild  dysarthria.    -  Vision:  L hemianopsia.   -  Motor:  RUE: 5/5.  LUE: 5/5.  RLE: 4/5.  LLE: 4/5.  -  Tone:  Normal.   -  Sensory:  Intact to light touch and pin prick.  Behavioral/Psych: Calm and cooperative.    Diagnostic Results:  CT: Reviewed  MRI: Reviewed  CTA: Reviewed  Labs: Reviewed    ASSESSMENT/PLAN:      Bernabe Greco Jr. is a 77 y.o. male admitted on 5/8/2017 from [facility] s/p tPA for L MCA syndrome with left gaze preference and right side neglect.  Witnessed seizure while in ED.  CTA without large vessel occlusion; therefore, not thrombectomy candidate.  MRI without acute pathology.  EEG without seizures.  Etiology of symptoms likely 2/2 seizure and not due to new stroke.        PM&R consulted to assess rehabilitation needs.     Functional status:  Participating with therapy.  Bed mobility min-totalA.  Sit to stand and stand to sit Janice.  Ambulated 4 side steps Janice.  UBD maxA and LBD totalA.  Cognitive/Speech/Language status:  Mild cognitive-Linguistic Impairment and visual spatial impairment skills at baseline.   Nutrition/Swallow Status:  Passed bedside swallow evaluation.  Speech recommended dental soft diet and thin liquids.    -  Reviewed discharge options with patient (inpatient rehab, SNF, home health therapy, and outpatient therapy).  Encourage participation with therapy.  -  Recommendations  · PT and OT evaluate and treat.   · SLP cognitive and speech evaluate and treat.   · Mobility, OOB in chair at least 3 hours per day, and early ambulation as appropriate.  · Establish consistent sleep-wake cycle and monitor for sleep disturbances.  · Monitor for bowel and bladder dysfunction.   · Monitor for skin breakdown and pressure ulcers.  Turn patient every 2 hours and repositioning/weight shifting every 20-30 minutes when sitting.  Pressure relief/heel protector boots and proper mattress/overlay and chair cushioning.   · DVT prophylaxis:  PIO, SCD.    Patient with rehab goals.   Required ADL assistance PTA 2/2 vision impairment.  Will follow patient's progress and discuss with rehab team for further rehab recommendations.    Thank you for consult.    ASH Rausch, FNP-C    Physical Medicine & Rehabilitation   05/10/2017  Spectralink: [id number]    Collaborating Physician : Smith Maciel MD

## 2017-05-10 NOTE — PLAN OF CARE
Problem: Occupational Therapy Goal  Goal: Occupational Therapy Goal  Goals to be met by: 5/16/17     Patient will increase functional independence with ADLs by performing:    UE Dressing with Minimal Assistance.  LE Dressing with Moderate Assistance.  Grooming while standing with Stand-by Assistance.  Toileting from bedside commode with Moderate Assistance for hygiene and clothing management.   Rolling to Bilateral with Minimal Assistance.   Supine to sit with Minimal Assistance.  Stand pivot transfers with Stand-by Assistance.   Outcome: Ongoing (interventions implemented as appropriate)  Goals remain appropriate

## 2017-05-11 PROBLEM — R45.851 SUICIDAL IDEATION: Status: ACTIVE | Noted: 2017-01-01

## 2017-05-11 NOTE — ASSESSMENT & PLAN NOTE
- H/H stable at 11.7.  Goal in ESRD of 10-11.  - Currently at goal   - No need for supplementation at this moment

## 2017-05-11 NOTE — PLAN OF CARE
Problem: Patient Care Overview  Goal: Plan of Care Review  Outcome: Ongoing (interventions implemented as appropriate)  Plan of care reviewed with pt, pt able to verbalize understanding and acceptance.   Pt is very labile and displays anger and frustration with his current condition and dependence on healthcare providers.   Pt had diarrhea throughout night, has c/o burning at rectum.   Pt free from falls or injury. No new skin breakdown. VS stable throughout shift. No s/sx of distress noted. WCTM     Temp:  [97.3 °F (36.3 °C)-98.6 °F (37 °C)]   Pulse:  []   Resp:  [16-18]   BP: (127-139)/(66-71)   SpO2:  [97 %-100 %]

## 2017-05-11 NOTE — PLAN OF CARE
Problem: Patient Care Overview  Goal: Plan of Care Review  Outcome: Ongoing (interventions implemented as appropriate)  Pt went for fistulaogram  of the right forearm graft. Partial occulusion so MD consulted vascular surgery for dialysis access. Pt had dialysis and pulled 1 liter off. Pt then said that he wanted to kill him self so then he was made PEC.

## 2017-05-11 NOTE — ASSESSMENT & PLAN NOTE
Presented at OSH with L MCA syndrome and witnessed seizures  S/p TPA no LVO seen on CTA multiphase. MRI negative for acute stroke. Etiology of patient's symptoms more likely due to seizure and not likely stroke. He is back to baseline neurologically    Continue aspirin and atorvastatin 40 for secondary stroke prevention.  EEG negative for seizure activity  Continue keppra

## 2017-05-11 NOTE — ASSESSMENT & PLAN NOTE
"- Will provide dialysis for metabolic clearance and volume   - Seen and examined today during hemodialysis; tolerating treatment well. Calmly stated, "I want to die. They won't let me die. I know how to end it. I need a gun."   - Target ultrafiltration up to 1-2L as tolerated by patient  - Dialysate adjusted to current labs   - Patient with poor clearance during HD   - Will place a vascular surgery consult for evaluation and possible graft placement          "

## 2017-05-11 NOTE — PT/OT/SLP PROGRESS
Speech Language Pathology      Bernabe Greco Jr.  MRN: [id number]    Patient not seen today secondary to dialysis. Will follow-up at next scheduled visit.    ABDIRASHID Pan, CCC-SLP

## 2017-05-11 NOTE — PROGRESS NOTES
[facility]  Vascular Neurology  Comprehensive Stroke Center  Progress Note      Neurologic Chief Complaint: Left gaze preference and right side neglect    Subjective:     Interval History: Patient is seen for follow-up neurological assessment and treatment recommendations:  SAMMY. Patient to be transferred to medicine in the morning. Determined that his symptoms were likely due to seizure, not stroke. Nephrology following- dialysis today.   During dialysis patient expressed SI and so he was PEC'd. Psych consulted.     HPI, Past Medical, Family, and Social History remains the same as documented in the initial encounter.     Review of Systems   Constitutional: Negative for chills and fever.   Respiratory: Negative for cough and shortness of breath.    Gastrointestinal: Negative for nausea and vomiting.   Neurological: Negative for dizziness, facial asymmetry, speech difficulty, weakness, numbness and headaches.   Psychiatric/Behavioral: Positive for dysphoric mood and suicidal ideas. Negative for agitation.     Scheduled Meds:   sodium chloride 0.9%   Intravenous Once    amlodipine  10 mg Oral Daily    aspirin  81 mg Oral Daily    atorvastatin  40 mg Oral Daily    heparin (porcine)  5,000 Units Subcutaneous Q8H    levetiracetam in NaCl (iso-os)  500 mg Intravenous Q12H    metronidazole  500 mg Oral Q8H    senna-docusate 8.6-50 mg  1 tablet Oral BID    sevelamer carbonate  800 mg Oral TID WM    sodium chloride 0.9%  3 mL Intravenous Q8H     Continuous Infusions:   PRN Meds:dextrose 50%, glucagon (human recombinant), insulin aspart, midazolam (PF), ondansetron, risperidone    Objective:     Vital Signs (Most Recent):  Temp: 97.9 °F (36.6 °C) (05/11/17 1210)  Pulse: 105 (05/11/17 1530)  Resp: 18 (05/11/17 1400)  BP: (!) 115/57 (05/11/17 1530)  SpO2: 98 % (05/11/17 1210)  BP Location: Left arm    Vital Signs Range (Last 24H):  Temp:  [97.1 °F (36.2 °C)-98.8 °F (37.1 °C)]   Pulse:  []    Resp:  [16-18]   BP: (112-149)/(57-80)   SpO2:  [97 %-100 %]   BP Location: Left arm    Physical Exam   Constitutional: He appears well-developed and well-nourished.   HENT:   Head: Normocephalic and atraumatic.   Eyes: EOM are normal. Pupils are equal, round, and reactive to light.   Cardiovascular: Normal rate.    Pulmonary/Chest: Effort normal. No respiratory distress.   Abdominal: Soft. He exhibits no distension.   Neurological: He is alert.   Skin: Skin is warm and dry.   Psychiatric: He expresses suicidal ideation.       Neurological Exam:   LOC: alert and follows requests  Language: no aphasia  Speech: No dysarthria  Orientation: Person, Not oriented to time  Visual Fields (CN II): Full  EOM (CN III, IV, VI): Full/intact  Pupils (CN III, IV, VI): PERRL  Motor*: Arm Left:  Paretic:  5/5, Leg Left:   Paretic:  5/5, Arm Right:   Paretic:  5/5, Leg Right:   Paretic:  5/5  Tone: Arm-Left: normal; Leg-Left: normal; Arm-Right: normal; Leg-Right: normal    NIH Stroke Scale:    Level of Consciousness: 0 - alert  LOC Questions: 0 - answers both correctly  LOC Commands: 0 - performs both correctly  Best Gaze: 0 - normal  Visual: 0 - no visual loss  Facial Palsy: 0 - normal  Motor Left Arm: 0 - no drift  Motor Right Arm: 0 - no drift  Motor Left Le - no drift  Motor Right Le - no drift  Limb Ataxia: 0 - absent  Sensory: 0 - normal  Best Language: 0 - no aphasia  Dysarthria: 0 - normal articulation  Extinction and Inattention: 0 - no neglect  NIH Stroke Scale Total: 0      Laboratory:  CMP:     Recent Labs  Lab 17  0610 17  0844   CALCIUM 9.2 9.2   ALBUMIN 3.2* 3.3*   PROT 7.0  --    * 137   K 4.7 5.2*   CO2 9* 15*   CL 94* 102   * 107*   CREATININE 15.5* 16.3*   ALKPHOS 90  --    ALT 16  --    AST 24  --    BILITOT 0.6  --      CBC:     Recent Labs  Lab 17  0610   WBC 9.01   RBC 3.86*   HGB 11.7*   HCT 36.2*   *   MCV 94   MCH 30.3   MCHC 32.3     Lipid Panel:     Recent  Labs  Lab 05/08/17  0929   CHOL 92*   LDLCALC 38.4*   HDL 39*   TRIG 73     Hgb A1C:     Recent Labs  Lab 05/09/17  0300   HGBA1C 6.3*     TSH:     Recent Labs  Lab 05/08/17  0929   TSH 4.960*       Diagnostic Results:    MRI. Date: 05/09/17  -no acute infarct  -remote infarct in R posterior frontal/bilateral occipital lobes    CTA multiphase 05/08/17  No LVO seen    Assessment/Plan:     Patient is a 77 year old male with PMH of DM, HTN, HLD, prior stroke (no residual deficits), seizures who was transferred from [place] after receiving IV-tPA for left gaze preference and right side neglect.  Witnessed seizure.     05/09/17 NAEON.  Postural hypotension occurred while working with therapy.    05/10/17 Neurologically improved, stepped down from ICU, MRI negative for acute stroke    5/11/17  Patient remains without focal deficit. He was stepped down to NSU but will transfer to medicine given his renal disease in the morning. Dialysis today. During dialysis, he expressed calmly that he wishes to be left alone and die. PEC and psych consult. May consider involving palliative care if patient is found to be with decision making capacity.       * Seizure  Presented at OSH with L MCA syndrome and witnessed seizures  S/p TPA no LVO seen on CTA multiphase. MRI negative for acute stroke. Etiology of patient's symptoms more likely due to seizure and not likely stroke. He is back to baseline neurologically    Continue aspirin and atorvastatin 40 for secondary stroke prevention.  EEG negative for seizure activity  Continue keppra    Essential hypertension  Risk factor for stroke      Hyperlipidemia LDL goal <70  Risk factor for stroke  Continue home atorvastatin 40    Type 2 diabetes mellitus with neurologic complication  Risk factor for stroke  HgA1C 6.3  Continue sliding scale insulin    ESRD (end stage renal disease) on dialysis  Dialysis today 5/11/17   Nephrology following     Chronic kidney disease-mineral and bone  disorder  Hyperkalemic   Dialysis on 5/11/17    Suicidal ideation  - Expressed today during dialysis  - PEC completed, sitter ordered   - Psych consulted         Annie Gordon PA-C  Comprehensive Stroke Center  Department of Vascular Neurology   [facility]

## 2017-05-11 NOTE — SIGNIFICANT EVENT
"Pt very irate and upset about having a bowel movement on himself. Pt was yelling out into the hallway despite having the call light within reach. Pt then proceeded to call the PCT "you fucking bitch, you're a mother fucking liar". De-escalation attempts successful, pt changed without incidence.   "

## 2017-05-11 NOTE — SUBJECTIVE & OBJECTIVE
Neurologic Chief Complaint: Left gaze preference and right side neglect    Subjective:     Interval History: Patient is seen for follow-up neurological assessment and treatment recommendations:  SAMMY. Patient to be transferred to medicine in the morning. Determined that his symptoms were likely due to seizure, not stroke. Nephrology following- dialysis today.   During dialysis patient expressed SI and so he was PEC'd. Psych consulted.     HPI, Past Medical, Family, and Social History remains the same as documented in the initial encounter.     Review of Systems   Constitutional: Negative for chills and fever.   Respiratory: Negative for cough and shortness of breath.    Gastrointestinal: Negative for nausea and vomiting.   Neurological: Negative for dizziness, facial asymmetry, speech difficulty, weakness, numbness and headaches.   Psychiatric/Behavioral: Positive for dysphoric mood and suicidal ideas. Negative for agitation.     Scheduled Meds:   sodium chloride 0.9%   Intravenous Once    amlodipine  10 mg Oral Daily    aspirin  81 mg Oral Daily    atorvastatin  40 mg Oral Daily    heparin (porcine)  5,000 Units Subcutaneous Q8H    levetiracetam in NaCl (iso-os)  500 mg Intravenous Q12H    metronidazole  500 mg Oral Q8H    senna-docusate 8.6-50 mg  1 tablet Oral BID    sevelamer carbonate  800 mg Oral TID WM    sodium chloride 0.9%  3 mL Intravenous Q8H     Continuous Infusions:   PRN Meds:dextrose 50%, glucagon (human recombinant), insulin aspart, midazolam (PF), ondansetron, risperidone    Objective:     Vital Signs (Most Recent):  Temp: 97.9 °F (36.6 °C) (05/11/17 1210)  Pulse: 105 (05/11/17 1530)  Resp: 18 (05/11/17 1400)  BP: (!) 115/57 (05/11/17 1530)  SpO2: 98 % (05/11/17 1210)  BP Location: Left arm    Vital Signs Range (Last 24H):  Temp:  [97.1 °F (36.2 °C)-98.8 °F (37.1 °C)]   Pulse:  []   Resp:  [16-18]   BP: (112-149)/(57-80)   SpO2:  [97 %-100 %]   BP Location: Left arm    Physical  Exam   Constitutional: He appears well-developed and well-nourished.   HENT:   Head: Normocephalic and atraumatic.   Eyes: EOM are normal. Pupils are equal, round, and reactive to light.   Cardiovascular: Normal rate.    Pulmonary/Chest: Effort normal. No respiratory distress.   Abdominal: Soft. He exhibits no distension.   Neurological: He is alert.   Skin: Skin is warm and dry.   Psychiatric: He expresses suicidal ideation.       Neurological Exam:   LOC: alert and follows requests  Language: no aphasia  Speech: No dysarthria  Orientation: Person, Not oriented to time  Visual Fields (CN II): Full  EOM (CN III, IV, VI): Full/intact  Pupils (CN III, IV, VI): PERRL  Motor*: Arm Left:  Paretic:  5/5, Leg Left:   Paretic:  5/5, Arm Right:   Paretic:  5/5, Leg Right:   Paretic:  5/5  Tone: Arm-Left: normal; Leg-Left: normal; Arm-Right: normal; Leg-Right: normal    NIH Stroke Scale:    Level of Consciousness: 0 - alert  LOC Questions: 0 - answers both correctly  LOC Commands: 0 - performs both correctly  Best Gaze: 0 - normal  Visual: 0 - no visual loss  Facial Palsy: 0 - normal  Motor Left Arm: 0 - no drift  Motor Right Arm: 0 - no drift  Motor Left Le - no drift  Motor Right Le - no drift  Limb Ataxia: 0 - absent  Sensory: 0 - normal  Best Language: 0 - no aphasia  Dysarthria: 0 - normal articulation  Extinction and Inattention: 0 - no neglect  NIH Stroke Scale Total: 0      Laboratory:  CMP:     Recent Labs  Lab 17  0610 17  0844   CALCIUM 9.2 9.2   ALBUMIN 3.2* 3.3*   PROT 7.0  --    * 137   K 4.7 5.2*   CO2 9* 15*   CL 94* 102   * 107*   CREATININE 15.5* 16.3*   ALKPHOS 90  --    ALT 16  --    AST 24  --    BILITOT 0.6  --      CBC:     Recent Labs  Lab 17  0610   WBC 9.01   RBC 3.86*   HGB 11.7*   HCT 36.2*   *   MCV 94   MCH 30.3   MCHC 32.3     Lipid Panel:     Recent Labs  Lab 17  0929   CHOL 92*   LDLCALC 38.4*   HDL 39*   TRIG 73     Hgb A1C:     Recent  Labs  Lab 05/09/17  0300   HGBA1C 6.3*     TSH:     Recent Labs  Lab 05/08/17  0929   TSH 4.960*       Diagnostic Results:    MRI. Date: 05/09/17  -no acute infarct  -remote infarct in R posterior frontal/bilateral occipital lobes    CTA multiphase 05/08/17  No LVO seen

## 2017-05-11 NOTE — PROGRESS NOTES
"[facility]  History & Physical    Subjective:      Chief Complaint/Reason for Admission: Poor clearences and failed urea kinetics.    Bernabe Greco Jr. is a 77 y.o. male with right RCAVF presents here with Poor clearences and failed urea kinetics.    Past Medical History:   Diagnosis Date    Arthritis     Diabetes mellitus     Hyperlipidemia     Hypertension     Stroke     Syncope and collapse      Past Surgical History:   Procedure Laterality Date    HIP SURGERY      replacement right    JOINT REPLACEMENT      right hip      Family History   Problem Relation Age of Onset    Hypertension Mother      Social History   Substance Use Topics    Smoking status: Former Smoker     Packs/day: 0.25     Quit date: 1/1/1978    Smokeless tobacco: None    Alcohol use No       PTA Medications   Medication Sig    amlodipine (NORVASC) 10 MG tablet Take 1 tablet (10 mg total) by mouth once daily.    aspirin (ECOTRIN) 81 MG EC tablet Take 81 mg by mouth once daily.    atorvastatin (LIPITOR) 40 MG tablet Take 1 tablet (40 mg total) by mouth once daily.    insulin asp prt-insulin aspart, NOVOLOG 70/30, (NOVOLOG MIX 70-30) 100 unit/mL (70-30) Soln Inject 25 Units into the skin 2 (two) times daily.    insulin syringe-needle U-100 1 mL 30 x 5/16" Syrg     risperidone (RISPERDAL M-TABS) 0.5 MG TbDL Take 1 tablet (0.5 mg total) by mouth every 8 (eight) hours as needed (agitation).    sevelamer carbonate (RENVELA) 800 mg Tab Take 1 tablet (800 mg total) by mouth 3 (three) times daily with meals.     Review of patient's allergies indicates:   Allergen Reactions    Penicillins Rash        ROS Constitutional: No fever or chills, no weight changes.  Eyes: No visual changes or photophobia  HEENT: No nasal congestion or sore throat  Respiratory: No cough or shortness of breath  Cardiovascular: No chest pain or palpitations  Gastrointestinal: Good appetite, no nausea or vomiting, no change in bowel " habits  Genitourinary: No hematuria or dysuria  Skin: No rash or pruritis  Hematologic/lymphatic: No easy bruising, bleeding or lymphadenopathy  Musculoskeletal: No arthralgias or myalgias  Neurological: No seizures or tremors  Endocrine: No heat/cold intolerance.  No polyuria/polydipsia.  Psychiatric:  No depression or anxiety.    Objective:      Vital Signs (Most Recent)  Temp: 97.1 °F (36.2 °C) (05/11/17 0700)  Pulse: 97 (05/11/17 0700)  Resp: 18 (05/11/17 0700)  BP: 138/72 (05/11/17 0700)  SpO2: 98 % (05/11/17 0700)    Vital Signs Range (Last 24H):  Temp:  [97.1 °F (36.2 °C)-98.8 °F (37.1 °C)]   Pulse:  []   Resp:  [16-18]   BP: (121-139)/(61-77)   SpO2:  [97 %-100 %]     Physical Exam   General appearance: Well developed, well nourished  Head: Normocephalic, atraumatic  Eyes:  Conjunctivae nl. Sclera anicteric. PERRL.  HEENT: Lips, mucosa, and tongue normal; teeth and gums normal and oropharynx clear.  Neck: Supple, trachea midline, thyroid not enlarged,   Lungs: Clear to auscultation bilaterally and normal respiratory effort  Heart: Regular rate and rhythm, S1, S2 normal, no murmur, click, rub or gallop  Abdomen: Soft, non-tender non-distended; bowel sounds normal; no masses,  no organomegaly  Extremities: No cyanosis or clubbing. No edema  Pulses: 2+ and symmetric  Skin: Skin color, texture, turgor normal. No rashes or lesions  Lymph nodes: Cervical, supraclavicular, and axillary nodes normal.  Neurologic: Normal strength and tone. No focal numbness or weakness  Psychiatric:  Alert and oriented times 3.  Affect appropriate.  Right RC AVF; Good thrill at AA, Water hammer pulse,     Assessment:      Active Hospital Problems    Diagnosis  POA    ESRD (end stage renal disease) on dialysis [N18.6, Z99.2]  Not Applicable    Chronic kidney disease-mineral and bone disorder [N18.9, E83.9, M89.9]  Yes             Tissue plasminogen activator (t-PA) administered at other facility within 24 hours prior to  current admission [Z92.82]  Not Applicable    Altered mental status [R41.82]  Yes    Anemia associated with chronic renal failure [D63.1]  Yes    Seizure [R56.9]  Yes    Type 2 diabetes mellitus with neurologic complication [E11.49]  Yes    Essential hypertension [I10]  Yes    Obesity [E66.9]  Yes    Hyperlipidemia LDL goal <70 [E78.5]  Yes      Resolved Hospital Problems    Diagnosis Date Resolved POA   No resolved problems to display.       Plan:    1. Fistulagram with PTA

## 2017-05-11 NOTE — SIGNIFICANT EVENT
Notified by nurse of critical lab values- phosphorus 9.8 and Co2 9. Called nephrology and they have no further recs aside from moving dialysis up to this morning ASAP. Nephrology team called dialysis to increase urgency of patient's need for dialysis today.       Annie Gordon PA-C  Vascular Neurology  [phone]

## 2017-05-11 NOTE — PROGRESS NOTES
"In dialysis.  Calmly stated, "I want to die.  They won't let me die.  I know how to end it.  I need a gun."  Refused offer to speak with .  Dr. Richardson and RAMANDEEP Gordon PA-C notified.  Will monitor closely.  "

## 2017-05-11 NOTE — PROGRESS NOTES
"Dialysis completed. Needles removed from right upper arm fistula with pressure held to needles sites for 10 minutes each with hemostasis achieved. Gauze and tape to sites . Patient dialyzed for 3 hours with fluid removal of liter. Vital signs stable, but during dialysis stated calmly," I want to end it all", I could take these needles out and bleed to death.".  Primary team notified and Dr. Richardson of nephrology. Dr. Cm visited patient on dialysis and suicide precautions were ordered. Patient monitored closely and sitter at bedside. Patient left unit via escort accompanied by sitter and security.    "

## 2017-05-11 NOTE — PLAN OF CARE
Hospital Medicine Consult Team - [facility] Med Team M    Requesting Physician for transfer to Hospital Medicine service /Team: Chadwick Cardona MD/Vascular Neurology    Code Status: DNR     Accepting Physician: Renea Cm     Date of Request for transfer: 05/11/2017     Reason for request for transfer to medicine service: Further inpatient hospitalization for management of seizure disorder, dialysis needs and evaluation for depression    Hospital Course: Patient is a 77 y.o. with past medical history of ESRD on hemodialysis, seizure disorder, essential HTN, Type 2 diabetes and HLP who was admitted to the hospital to Neuro ICU on 5/8/2017 for suspected stroke. Patient had presented he was not moving left arm or both of his legs with left gaze preference after a seizure and mumbling speech. Concern for an acute stroke so patient did recive IV TPA at outside facility and transferred to [facility] for further evaluation. Subsequent brain imaging did not reveal any evidence of an acute stroke and at this point Vascular Neurology feels patient likely just had a seizure. Patient transferred from Neuro ICU to stroke team today but at this point since patient did not have  A stroke request is being made for patient ot be transferred to medicine team for continued management of his medical issues including treatment of seizures, continued dialysis. Patient noted to have issues with dialysis graft and patient was not clearing toxins well so today underwent fistulagram. Fistulagram showed access was working well so no intervention was done. Today while in dialysis, patient stated that he wanted to die and how he wanted to end his life and wanted a gun to end it. I went to dialysis and spoke with patient. Patient was very calm and stated he was tired and just wanted to be left alone and wanted to die in peace. Patient states that his wife does not care about him. Patient stated he is not depressed and just wanted  to end his life. I PEC'ed patient. Suicide precautions and sitter ordered. Psychiatry was called and to see patient in the am.     To Do List:   Patient is PEC with suicide precautions and sitter at bedside.  Psychiatry consult placed and need to follow-up recommendations     Anticipated Discharge Disposition: To be determined     Medicine will accept patient for transfer to a hospital medicine service but patient not to be transferred to medicine team until tomorrow, 05/12/17 at 7:00 am. I spoke with primary service who is requesting transfer and informed them they are responsible for patient's care until tomorrow morning. Patient to be assigned to a medicine team by nocturnist tonight and to be temporarily placed on IMT list for reassignment in the morning.       Thank you and please call if you have any further questions.      VAHE DEL CASTILLO MD  Attending Staff Physician   [facility] Medicine  Pager: [phone]  Spectralink: [phone]

## 2017-05-11 NOTE — PROGRESS NOTES
Patient received from cath lab with no intervention to right upper arm fistula.  Maintenance dialysis began per orders via 15 gauge fistula needles to right upper arm fistula.

## 2017-05-11 NOTE — SUBJECTIVE & OBJECTIVE
"Interval History: Evaluated during HD. Calmly stated, "I want to die. They won't let me die. I know how to end it. I need a gun."  To be evaluated by palliative and psychiatry. Patient having poor clearances during HD.     Review of patient's allergies indicates:   Allergen Reactions    Penicillins Rash     Current Facility-Administered Medications   Medication Frequency    0.9%  NaCl infusion Once    amlodipine tablet 10 mg Daily    aspirin EC tablet 81 mg Daily    atorvastatin tablet 40 mg Daily    dextrose 50% injection 12.5 g PRN    glucagon (human recombinant) injection 1 mg PRN    heparin (porcine) injection 5,000 Units Q8H    insulin aspart pen 1-10 Units QID (AC + HS) PRN    levetiracetam in NaCl (iso-os) IVPB 500 mg Q12H    metronidazole tablet 500 mg Q8H    midazolam injection 2 mg On Call Procedure    ondansetron injection 4 mg Q6H PRN    risperidone tablet 0.5 mg Q8H PRN    senna-docusate 8.6-50 mg per tablet 1 tablet BID    sevelamer carbonate tablet 800 mg TID WM    sodium chloride 0.9% flush 3 mL Q8H       Objective:     Vital Signs (Most Recent):  Temp: 97.9 °F (36.6 °C) (05/11/17 1210)  Pulse: 101 (05/11/17 1542)  Resp: 18 (05/11/17 1400)  BP: (!) 115/57 (05/11/17 1530)  SpO2: 98 % (05/11/17 1210)  O2 Device (Oxygen Therapy): room air (05/11/17 1230) Vital Signs (24h Range):  Temp:  [97.1 °F (36.2 °C)-98.8 °F (37.1 °C)] 97.9 °F (36.6 °C)  Pulse:  [] 101  Resp:  [16-18] 18  SpO2:  [97 %-100 %] 98 %  BP: (112-149)/(57-80) 115/57     Weight: 74.3 kg (163 lb 12.8 oz) (05/10/17 0605)  Body mass index is 21.61 kg/(m^2).  Body surface area is 1.96 meters squared.    I/O last 3 completed shifts:  In: 680 [P.O.:480; IV Piggyback:200]  Out: 100 [Urine:100]    Physical Exam   Constitutional: He appears well-developed and well-nourished. No distress.   HENT:   Head: Normocephalic and atraumatic.   Right Ear: External ear normal.   Left Ear: External ear normal.   Mouth/Throat: Oropharynx " is clear and moist.   Neck: Normal range of motion. Neck supple. No JVD present.   Cardiovascular: Normal rate, regular rhythm and normal heart sounds.  Exam reveals no gallop and no friction rub.    No murmur heard.  Pulmonary/Chest: Effort normal and breath sounds normal. No respiratory distress. He has no wheezes. He has no rales.   Abdominal: Soft. Bowel sounds are normal. He exhibits no distension. There is no tenderness.   Musculoskeletal: He exhibits no edema.   Neurological: He is alert.   Skin: Skin is warm and dry. He is not diaphoretic.         Significant Labs:  CBC:   Recent Labs  Lab 05/11/17  0610   WBC 9.01   RBC 3.86*   HGB 11.7*   HCT 36.2*   *   MCV 94   MCH 30.3   MCHC 32.3     CMP:   Recent Labs  Lab 05/11/17  0610 05/11/17  0844    121*   CALCIUM 9.2 9.2   ALBUMIN 3.2* 3.3*   PROT 7.0  --    * 137   K 4.7 5.2*   CO2 9* 15*   CL 94* 102   * 107*   CREATININE 15.5* 16.3*   ALKPHOS 90  --    ALT 16  --    AST 24  --    BILITOT 0.6  --         Significant Imaging:  Labs: Reviewed  X-Ray: Reviewed

## 2017-05-11 NOTE — PROGRESS NOTES
"[facility]  Nephrology  Progress Note    Patient Name: Bernabe Greco Jr.  MRN: [id number]  Admission Date: 5/8/2017  Hospital Length of Stay: 3 days  Attending Provider: Chadwick Cardona MD   Primary Care Physician: Provider Notinsystem  Principal Problem:Seizure    Subjective:     Interval History: Evaluated during HD. Calmly stated, "I want to die. They won't let me die. I know how to end it. I need a gun."  To be evaluated by palliative and psychiatry. Patient having poor clearances during HD.     Review of patient's allergies indicates:   Allergen Reactions    Penicillins Rash     Current Facility-Administered Medications   Medication Frequency    0.9%  NaCl infusion Once    amlodipine tablet 10 mg Daily    aspirin EC tablet 81 mg Daily    atorvastatin tablet 40 mg Daily    dextrose 50% injection 12.5 g PRN    glucagon (human recombinant) injection 1 mg PRN    heparin (porcine) injection 5,000 Units Q8H    insulin aspart pen 1-10 Units QID (AC + HS) PRN    levetiracetam in NaCl (iso-os) IVPB 500 mg Q12H    metronidazole tablet 500 mg Q8H    midazolam injection 2 mg On Call Procedure    ondansetron injection 4 mg Q6H PRN    risperidone tablet 0.5 mg Q8H PRN    senna-docusate 8.6-50 mg per tablet 1 tablet BID    sevelamer carbonate tablet 800 mg TID WM    sodium chloride 0.9% flush 3 mL Q8H       Objective:     Vital Signs (Most Recent):  Temp: 97.9 °F (36.6 °C) (05/11/17 1210)  Pulse: 101 (05/11/17 1542)  Resp: 18 (05/11/17 1400)  BP: (!) 115/57 (05/11/17 1530)  SpO2: 98 % (05/11/17 1210)  O2 Device (Oxygen Therapy): room air (05/11/17 1230) Vital Signs (24h Range):  Temp:  [97.1 °F (36.2 °C)-98.8 °F (37.1 °C)] 97.9 °F (36.6 °C)  Pulse:  [] 101  Resp:  [16-18] 18  SpO2:  [97 %-100 %] 98 %  BP: (112-149)/(57-80) 115/57     Weight: 74.3 kg (163 lb 12.8 oz) (05/10/17 0605)  Body mass index is 21.61 kg/(m^2).  Body surface area is 1.96 meters squared.    I/O last 3 completed " "shifts:  In: 680 [P.O.:480; IV Piggyback:200]  Out: 100 [Urine:100]    Physical Exam   Constitutional: He appears well-developed and well-nourished. No distress.   HENT:   Head: Normocephalic and atraumatic.   Right Ear: External ear normal.   Left Ear: External ear normal.   Mouth/Throat: Oropharynx is clear and moist.   Neck: Normal range of motion. Neck supple. No JVD present.   Cardiovascular: Normal rate, regular rhythm and normal heart sounds.  Exam reveals no gallop and no friction rub.    No murmur heard.  Pulmonary/Chest: Effort normal and breath sounds normal. No respiratory distress. He has no wheezes. He has no rales.   Abdominal: Soft. Bowel sounds are normal. He exhibits no distension. There is no tenderness.   Musculoskeletal: He exhibits no edema.   Neurological: He is alert.   Skin: Skin is warm and dry. He is not diaphoretic.         Significant Labs:  CBC:   Recent Labs  Lab 05/11/17  0610   WBC 9.01   RBC 3.86*   HGB 11.7*   HCT 36.2*   *   MCV 94   MCH 30.3   MCHC 32.3     CMP:   Recent Labs  Lab 05/11/17  0610 05/11/17  0844    121*   CALCIUM 9.2 9.2   ALBUMIN 3.2* 3.3*   PROT 7.0  --    * 137   K 4.7 5.2*   CO2 9* 15*   CL 94* 102   * 107*   CREATININE 15.5* 16.3*   ALKPHOS 90  --    ALT 16  --    AST 24  --    BILITOT 0.6  --         Significant Imaging:  Labs: Reviewed  X-Ray: Reviewed    Assessment/Plan:     ESRD (end stage renal disease) on dialysis  - Will provide dialysis for metabolic clearance and volume   - Seen and examined today during hemodialysis; tolerating treatment well. Calmly stated, "I want to die. They won't let me die. I know how to end it. I need a gun."   - Target ultrafiltration up to 1-2L as tolerated by patient  - Dialysate adjusted to current labs   - Patient with poor clearance during HD   - Will place a vascular surgery consult for evaluation and possible graft placement            Anemia associated with chronic renal failure  - H/H " stable at 11.7.  Goal in ESRD of 10-11.  - Currently at goal   - No need for supplementation at this moment       Chronic kidney disease-mineral and bone disorder  - Phos elevated at 10.0   - Will increase Renvela to 1,600 mg PO TID with meals  -Corrected calcium WNL at 9.8    Duy Bravo MD  Nephrology  [facility]  I have reviewed and concur with the fellow's history, physical, assessment, and plan. I have personally interviewed and examined the patient at bedside.

## 2017-05-11 NOTE — ASSESSMENT & PLAN NOTE
- Phos elevated at 10.0   - Will increase Renvela to 1,600 mg PO TID with meals  -Corrected calcium WNL at 9.8

## 2017-05-11 NOTE — PT/OT/SLP PROGRESS
Occupational Therapy      Bernabe Greco Jr.  MRN: [id number]    Patient not seen today secondary to Unavailable (Comment) (pt in dialysis, nurse expects pt back at 5. Reattempted at 4:00 and 4:20 pt still not back ). Will follow-up tomorrow.    SKYLER Troncoso  5/11/2017

## 2017-05-11 NOTE — SIGNIFICANT EVENT
Received call from dialysis nurse. Patient is stating he wants to die and mentions that he desires a gun. Informed attending Dr. Chavira. Consulted psych- they will see patient in the morning. Recommend PEC now and that patient may be appropriate for palliative care as he is a DNR and is expressing desire to not pursue further life-prolonging treatments. Patient is in dialysis, and he calmly expresses again that he wants to end his life. Dr. Cm with medicine team interviewed patient and filled out PEC. Charge nurse on 7th floor NSU made aware and suicide precautions and sitter ordered per Dr. Cm.       Annie Gordon PA-C  Vascular Neurology  [phone]

## 2017-05-11 NOTE — PROGRESS NOTES
Consult Note  Physical Medicine & Rehab    Consult Requested By:  Chadwick Cardona MD      Admit Date:  5/8/2017  Admitting Diagnosis:  Tachycardia [R00.0], Acute ischemic left MCA stroke [I63.512], Embolic stroke involving left middle cerebral artery [I63.412], Malfunction of arteriovenous dialysis fistula, subsequent encounter [T82.993D]    Reason for Consult:  assess rehabilitation needs    SUBJECTIVE:   Interval History (05/11/2017):  Patient is seen for follow-up rehab evaluation and recommendations.  No acute events over night.  Fistulogram and Dialysis today.  Functional status:  Progressing with therapy.  Bed mobility min-modA.  Sit to stand SBA-CGA and transfers CGA.  Ambulated 50 ft x 2 CGA and RW.  UBD maxA and LBD Janice.  Barriers for discharge/rehab admission: Insurance approval pending for rehab admission.      HPI, Past Medical, Surgical, Family, and Social History remains the same as documented in the initial encounter.    Review of Systems  Constitutional: No fever or chills.  No malaise or fatigue.  Skin: No rashes or lesions.   Eyes: No visual changes. 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  ENT: No nasal congestion, sore throat, or ear pain.  No difficulty swallowing.   Respiratory: No shortness of breath or wheezing.  No cough.  Cardiovascular: No chest pain or palpitations.  No edema.   Gl: No nausea or vomiting.  No abdominal pain.  No incontinence of bowel.  No constipation.   : No incontinence of bladder.   Musculoskeletal: No arthralgias.  No bone pain.    Neurological: No seizures or tremors.  No weakness.   Behavioral/Psych: No changes in mood or hallucinations.    Past Medical History:   Diagnosis Date    Arthritis     Diabetes mellitus     Hyperlipidemia     Hypertension     Stroke     Syncope and collapse      Past Surgical History:   Procedure Laterality Date    HIP SURGERY      replacement right    JOINT REPLACEMENT      right hip      Family History    Problem Relation Age of Onset    Hypertension Mother      Social History   Substance Use Topics    Smoking status: Former Smoker     Packs/day: 0.25     Quit date: 1978    Smokeless tobacco: None    Alcohol use No     Review of patient's allergies indicates:   Allergen Reactions    Penicillins Rash        Scheduled Medications:   sodium chloride 0.9%   Intravenous Once    amlodipine  10 mg Oral Daily    aspirin  81 mg Oral Daily    atorvastatin  40 mg Oral Daily    heparin (porcine)  5,000 Units Subcutaneous Q8H    levetiracetam in NaCl (iso-os)  500 mg Intravenous Q12H    metronidazole  500 mg Oral Q8H    senna-docusate 8.6-50 mg  1 tablet Oral BID    sevelamer carbonate  800 mg Oral TID WM    sodium chloride 0.9%  3 mL Intravenous Q8H     PRN Medications:  dextrose 50%, glucagon (human recombinant), insulin aspart, midazolam (PF), ondansetron, risperidone    OBJECTIVE:     Vital Signs (Most Recent)  Temp: 97.9 °F (36.6 °C) (17 1210)  Pulse: 80 (17 1245)  Resp: 18 (17 1245)  BP: 126/69 (17 1245)  SpO2: 98 % (17 1210)    Vital Signs Range (Last 24H):  Temp:  [97.1 °F (36.2 °C)-98.8 °F (37.1 °C)]   Pulse:  []   Resp:  [16-18]   BP: (126-149)/(66-77)   SpO2:  [97 %-100 %]     Physical Exam:  Vital signs reviewed.   General appearance:  No apparent distress.  Appears well-developed and well-nourished.  Skin:  Color and texture normal.  Warm and dry.  No jaundice.  No visible rashes or visible lesions.  HEENT:  Normocephalic and atraumatic.  No scleral icterus.  No nasal discharge.    Pulmonary:  Normal respiratory effort.  No cough.  Cardiac:  Normal heart rate.  Extremities: No calf tenderness.  Distal pulses intact.  No edema.    Abdomen:  Soft, non-tender and non-distended.  Musculoskeletal:  Moves all extremities spontaneously.  ROM: normal.    Neurologic:  -  Mental Status:  AAOx3.  Follows commands.  Answers correct age and .   -  Speech and language:   no aphasia, mild dysarthria.    -  Vision:  L hemianopsia.   -  Motor:  RUE: 5/5.  LUE: 5/5.  RLE: 4/5.  LLE: 4/5.  -  Tone:  Normal.   -  Sensory:  Intact to light touch and pin prick.  Behavioral/Psych: Calm and cooperative.    Diagnostic Results:  CT: Reviewed  MRI: Reviewed  CTA: Reviewed  Labs: Reviewed    ASSESSMENT/PLAN:      Bernabe Greco Jr. is a 77 y.o. male admitted on 5/8/2017 from [facility] s/p tPA for L MCA syndrome with left gaze preference and right side neglect.  Witnessed seizure while in ED.  CTA without large vessel occlusion; therefore, not thrombectomy candidate.  MRI without acute pathology.  EEG without seizures.  Etiology of symptoms likely 2/2 seizure and not due to new stroke.        * 5/11/17- RC AVF- poor clearances and failed urea kinetics--> Fistulogram showed significant collaterals from mid fistula.  Dialysis today.     PM&R consulted to assess rehabilitation needs.     Functional status:  Progressing with therapy.  Bed mobility min-modA.  Sit to stand SBA-CGA and transfers CGA.  Ambulated 50 ft x 2 CGA and RW.  UBD maxA and LBD Janice.   Cognitive/Speech/Language status:  Mild cognitive-Linguistic Impairment and visual spatial impairment skills at baseline.   Nutrition/Swallow Status:  Passed bedside swallow evaluation.  Speech recommended dental soft diet and thin liquids.    -  Reviewed discharge options with patient (inpatient rehab, SNF, home health therapy, and outpatient therapy).  Encourage participation with therapy.  -  Recommendations  · PT and OT evaluate and treat.   · SLP cognitive and speech evaluate and treat.   · Mobility, OOB in chair at least 3 hours per day, and early ambulation as appropriate.  · Establish consistent sleep-wake cycle and monitor for sleep disturbances.  · Monitor for bowel and bladder dysfunction.   · Monitor for skin breakdown and pressure ulcers.  Turn patient every 2 hours and repositioning/weight shifting every 20-30 minutes  when sitting.  Pressure relief/heel protector boots and proper mattress/overlay and chair cushioning.   · DVT prophylaxis:  PIO, SCD.    Patient approved for [facility].  Insurance pending admission.  Transfer to rehab when insurance clears and he is medically ready for discharge.    Thank you for consult.    ASH Rausch, FNP-C    Physical Medicine & Rehabilitation   05/11/2017  Spectralink: [phone]    Collaborating Physician : Smith Maciel MD

## 2017-05-11 NOTE — PLAN OF CARE
SW completed LOCET via phone and faxed PASSR to the state.    Stephy Huber, RUFUS  Neurocritical Care   [facility]  [id number]

## 2017-05-11 NOTE — PLAN OF CARE
Transition of care note    Transfer to room 730B    DX: Tachycardia [R00.0]  Acute ischemic left MCA stroke [I63.512]  Embolic stroke involving left middle cerebral artery [I63.412]  Malfunction of arteriovenous dialysis fistula, subsequent encounter [T82.590D]  Acute ischemic left MCA stroke [I63.512]     Pt/OT:Discharge Facility/Level Of Care Needs: nursing facility, skilled      Insurance: Payor: HU[employer] MANAGED MEDICARE / Plan: HU[employer] MEDICARE PPO / Product Type: Medicare Advantage /      PCP:Provider Notinsystem     Pharmacy:  [id number] - [place], [place] - [address]  [address]  [place] [address]  Phone: [phone] Fax: [phone]      Future Appointments  Date Time Provider Department Center   5/11/2017 12:00 PM DIALYSIS, SHERRIE RAYMUNDO [facility]  [id number]

## 2017-05-11 NOTE — PT/OT/SLP PROGRESS
Physical Therapy      Bernabe Greco Jr.  MRN: [id number]    Patient not seen today secondary to Dialysis (Attempted at 1050, pt being transferred to HD. PT unable to return in PM.). Will follow-up as POC allows.    Mary Gillis PT, DPT  5/11/2017  Pager: [phone]

## 2017-05-11 NOTE — BRIEF OP NOTE
[facility]  Brief Operative Note     SUMMARY     Surgery Date: 5/11/2017     Surgeon(s) and Role:     * Seth Sandhu MD - Primary    Assisting Surgeon: None    Pre-op Diagnosis:  Complications due to device, implant, and graft, initial encounter [T85.9XXA]    Post-op Diagnosis:  Right RC AVF: Fitulagram showed significant collaterals from mid fistula    Procedure(s) (LRB):  FISTULOGRAM (N/A)    Anesthesia: RN IV Sedation  Estimated Blood Loss: 5 ml.         Specimens:   Specimen     None

## 2017-05-12 PROBLEM — R19.7 DIARRHEA: Status: ACTIVE | Noted: 2017-01-01

## 2017-05-12 PROBLEM — E46 MALNUTRITION: Status: ACTIVE | Noted: 2017-01-01

## 2017-05-12 NOTE — PROGRESS NOTES
Pharmacist Intervention IV to PO Note    Bernabe Greco Jr. is a 77 y.o. male being treated with IV medication levetiracetam    Patient Data:    Vital Signs (Most Recent):  Temp: 96.8 °F (36 °C) (05/12/17 0700)  Pulse: 90 (05/12/17 0700)  Resp: 20 (05/12/17 0700)  BP: 130/66 (05/12/17 0700)  SpO2: 97 % (05/12/17 0700) Vital Signs (72h Range):  Temp:  [96.8 °F (36 °C)-99.1 °F (37.3 °C)]   Pulse:  []   Resp:  [11-29]   BP: (101-149)/(51-80)   SpO2:  [95 %-100 %]      CBC:    Recent Labs     Lab 05/10/17  0447 05/11/17  0610 05/12/17  0629   WBC 7.94 9.01 7.69   RBC 3.88* 3.86* 3.98*   HGB 11.5* 11.7* 11.4*   HCT 35.2* 36.2* 36.6*   * 120* 137*   MCV 91 94 92   MCH 29.6 30.3 28.6   MCHC 32.7 32.3 31.1*     CMP:     Recent Labs     Lab 05/10/17  0447 05/11/17  0610 05/11/17  0844 05/12/17  0629   GLU 97 103 121* 106   CALCIUM 9.4 9.2 9.2 9.3   ALBUMIN 3.4* 3.2* 3.3* 3.3*   PROT 7.2 7.0 --  7.0    124* 137 140   K 4.9 4.7 5.2* 4.2   CO2 16* 9* 15* 23    94* 102 98   BUN 79* 105* 107* 66*   CREATININE 13.0* 15.5* 16.3* 12.3*   ALKPHOS 78 90 --  77   ALT 15 16 --  14   AST 23 24 --  18   BILITOT 0.7 0.6 --  0.7       Dietary Orders:  Diet Orders              Diet Dental Soft: Dental soft starting at 05/10 0952            Based on the following criteria, this patient qualifies for intravenous to oral conversion:  [x] The patients gastrointestinal tract is functioning (tolerating medications via oral or enteral route for 24 hours and tolerating food or enteral feeds for 24 hours).  [x] The patient is hemodynamically stable for 24 hours (heart rate <100 beats per minute, systolic blood pressure >99 mm Hg, and respiratory rate <20 breaths per minute).  [x] The patient shows clinical improvement (afebrile for at least 24 hours and white blood cell count downtrending or normalized). Additionally, the patient must be non-neutropenic (absolute neutrophil count >500 cells/mm3).    IV medication levetiracetam  500mg BID  will be changed to oral medication levetiracetam 500mg PO BID    Rowena Lea, PharmD  [id number]

## 2017-05-12 NOTE — OP NOTE
DATE OF PROCEDURE:  05/11/2017    PROCEDURE TYPE:  Right radiocephalic AV fistula, fistulogram.    INDICATION:  Failed urea kinetics and poor clearances.    OPERATORS:  1.  Seth Sandhu M.D.  2.  Juve Curiel M.D. is assisting the case as there was no qualified   resident.    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    PROCEDURE NOTE IN DETAIL:  After informed consent was obtained from Mr. Greco,   he was brought into the Access Suite and placed in a supine position.  The area   of his right radiocephalic fistula was cleaned and draped in the usual sterile   fashion.  After achieving local anesthesia with lidocaine and epinephrine,   access was cannulated with a micropuncture needle in antegrade direction using   tactile sensation.  Then, a mandril wire was advanced.  A 5-[place] sheath was   established.  Multiple angiographic runs revealed there was patent superior vena   cava, subclavian vein, axillary vein, dual outflow, venous outflow with   cephalic and basilic venous system, but at the antecubital fossa level there was   significant collateral veins and fistula was chronic totally occluded and all   the drainage was through this with collaterals.  Given significant chronic total   occlusion and collaterals, I did not choose to intervene the lesion as the   outflow in the AC foci area was not a viable option even with multiple   angioplasties even if we cross the chronic total occlusion.  In any case, given   his significant collaterals and failure mode of the access site, I abandoned the   procedure.  A 5-[place] sheath was removed after applying direct  pressure and   hemostasis was secured.  No immediate complications.  Estimated blood loss was 5   mL.  The patient tolerated the procedure well and was discharged from Access   Suite in a stable condition.    PLAN:  Given the access and failure mode, we will abandon the access and create   a new upper extremity access as upper extremity deep basilic venous system and   also the cephalic vein appeared to be conducive for a creation of AV graft.  We   will discuss with vascular surgery and possibly arrange for an early cannulation   graft.      DRK/HN  dd: 05/11/2017 16:17:56 (CDT)  td: 05/11/2017 20:54:11 (CDT)  Doc ID   #[id number]  Job ID #[id number]    CC:

## 2017-05-12 NOTE — ASSESSMENT & PLAN NOTE
-witnessed seizure activity at OSH, hx of seizure  -received TPA d/t concern for stroke. MRI negative for stroke  -back to baseline  -EEG in NICU did not show any seizure activity  -continue keppra 500mg BID

## 2017-05-12 NOTE — PROGRESS NOTES
[facility] Hospital Medicine  Progress Note    Patient Name: Bernabe Greco Jr.  MRN: [id number]  Patient Class: IP- Inpatient   Admission Date: 5/8/2017  Length of Stay: 4 days  Attending Physician: Viviana Gutiérrez MD  Primary Care Provider: Provider [facility] Medicine Team: [facility] 1 Smith Ruiz MD    Subjective:     Principal Problem:Seizure    HPI:  Patient is a 77 year old male with PMH of DM, HTN, HLD, prior stroke (no residual deficits), seizures who was transferred from [facility] after receiving IV-tPA . Symptoms began at 8:00 am while with home health nurse.  He told her he was having a headache at the time of symptoms onset.When he arrived to [facility] staff witnessed a seizure (EMS did not have description).  Wife said she noticed he was not moving left arm or both of his legs.  He also mumbled frequently and said this is exactly the same the last time he had a stroke.  CTA multiphase done and no LVO seen.         Per ED staff, patient had GTC seizure like activity and received 1 g of keppra. On my exam patient slightly confused and agitated, on low flow nasal cannula maintaining O2 sats. Not cooperative with exam but follows commands when daughter asks.      EEG with mild slowing on R. Will admit to [facility] for post tpa monitoring and obtain MRI brain for completion of stroke work up. Of note, patient is on chronic dialysis, missing today's treatment. Creatine 13 today . Nephrology consulted who agrees to dialyze tomorrow.         Hospital Course:  5/12/17: Transferred to hospital medicine. Psych consulted regarding SI, rec rescinding PEC. Patient to have vascular access issue addressed as outpatient per Nephrology. PT/Ot rec SNF    Interval History: Denies SI/HI. No hx suicide attempts. PEC rescinded    Review of Systems   Constitutional: Negative for activity change, appetite change, chills and fever.   HENT: Negative for trouble swallowing and voice change.     Eyes: Negative for photophobia and visual disturbance.   Respiratory: Negative for cough.    Cardiovascular: Negative for chest pain.   Gastrointestinal: Positive for diarrhea. Negative for constipation, nausea and vomiting.   Musculoskeletal: Negative for joint swelling.   Skin: Negative for color change, pallor, rash and wound.     Objective:     Vital Signs (Most Recent):  Temp: 98.8 °F (37.1 °C) (05/12/17 1100)  Pulse: 97 (05/12/17 1100)  Resp: 18 (05/12/17 1100)  BP: 127/65 (05/12/17 1100)  SpO2: 96 % (05/12/17 1100) Vital Signs (24h Range):  Temp:  [96.8 °F (36 °C)-99 °F (37.2 °C)] 98.8 °F (37.1 °C)  Pulse:  [] 97  Resp:  [18-20] 18  SpO2:  [96 %-98 %] 96 %  BP: (103-137)/(51-66) 127/65     Weight: 74.3 kg (163 lb 12.8 oz)  Body mass index is 21.61 kg/(m^2).    Intake/Output Summary (Last 24 hours) at 05/12/17 1538  Last data filed at 05/12/17 0600   Gross per 24 hour   Intake              180 ml   Output                0 ml   Net              180 ml      Physical Exam   Constitutional: He is oriented to person, place, and time. He appears well-nourished. No distress.   HENT:   Head: Normocephalic and atraumatic.   Nose: Nose normal.   Mouth/Throat: No oropharyngeal exudate.   Eyes: EOM are normal. Pupils are equal, round, and reactive to light.   Neck: Normal range of motion. Neck supple.   Cardiovascular: Normal rate, regular rhythm, normal heart sounds and intact distal pulses.  Exam reveals no gallop and no friction rub.    No murmur heard.  Pulmonary/Chest: Effort normal and breath sounds normal. No respiratory distress. He has no wheezes. He has no rales.   Abdominal: Soft. Bowel sounds are normal. He exhibits no distension. There is no tenderness.   Musculoskeletal: Normal range of motion. He exhibits no edema.   Neurological: He is alert and oriented to person, place, and time. No cranial nerve deficit. Coordination normal.   Skin: Skin is warm and dry. He is not diaphoretic.   Psychiatric: He  expresses no homicidal and no suicidal ideation. He expresses no suicidal plans and no homicidal plans.        Significant Labs:   CBC:   Recent Labs  Lab 05/11/17  0610 05/12/17  0629   WBC 9.01 7.69   HGB 11.7* 11.4*   HCT 36.2* 36.6*   * 137*     CMP:   Recent Labs  Lab 05/11/17  0610 05/11/17  0844 05/12/17  0629   * 137 140   K 4.7 5.2* 4.2   CL 94* 102 98   CO2 9* 15* 23    121* 106   * 107* 66*   CREATININE 15.5* 16.3* 12.3*   CALCIUM 9.2 9.2 9.3   PROT 7.0  --  7.0   ALBUMIN 3.2* 3.3* 3.3*   BILITOT 0.6  --  0.7   ALKPHOS 90  --  77   AST 24  --  18   ALT 16  --  14   ANIONGAP 21* 20* 19*   EGFRNONAA 2.6* 2.5* 3.5*       Significant Imaging: I have reviewed all pertinent imaging results/findings within the past 24 hours.    Assessment/Plan:      * Seizure  -witnessed seizure activity at OSH, hx of seizure  -received TPA d/t concern for stroke. MRI negative for stroke  -back to baseline  -EEG in NICU did not show any seizure activity  -continue keppra 500mg BID      Type 2 diabetes mellitus with neurologic complication  -HgA1C 6.3  -Continue sliding scale insulin    Anemia associated with chronic renal failure  -h/h stable    Tissue plasminogen activator (t-PA) administered at other facility within 24 hours prior to current admission  -no LVO seen on CTA multiphase. MRI negative for acute stroke. Etiology of patient's symptoms more likely due to seizure and not likely stroke.        ESRD (end stage renal disease) on dialysis  -HD on MWF, dialysis yesterday  -nephrology following, patient having poor clearance  -vascular surgery consulted, per Nephrology access to be addressed as outpatient    Suicidal ideation  -SI yesterday afternoon, and was PEC'd  -No SI today  -Psych consulted, recommend rescinding PEC      Diarrhea  -patient was on flagyl empirically  -will d/c flagyl and send for stool studies    VTE Risk Mitigation         Ordered     heparin (porcine) injection 5,000 Units   Every 8 hours     Route:  Subcutaneous        05/10/17 1204     Medium Risk of VTE  Once      05/08/17 1141     Reason for No Pharmacological VTE Prophylaxis  Once      05/08/17 1141     Place sequential compression device  Until discontinued      05/08/17 1141        Dispo: PT/OT recommending SNF    Smith Ruiz MD  Department of Hospital Medicine   [facility]

## 2017-05-12 NOTE — SUBJECTIVE & OBJECTIVE
Interval History: Denies SI/HI. No hx suicide attempts. PEC rescinded    Review of Systems   Constitutional: Negative for activity change, appetite change, chills and fever.   HENT: Negative for trouble swallowing and voice change.    Eyes: Negative for photophobia and visual disturbance.   Respiratory: Negative for cough.    Cardiovascular: Negative for chest pain.   Gastrointestinal: Positive for diarrhea. Negative for constipation, nausea and vomiting.   Musculoskeletal: Negative for joint swelling.   Skin: Negative for color change, pallor, rash and wound.     Objective:     Vital Signs (Most Recent):  Temp: 98.8 °F (37.1 °C) (05/12/17 1100)  Pulse: 97 (05/12/17 1100)  Resp: 18 (05/12/17 1100)  BP: 127/65 (05/12/17 1100)  SpO2: 96 % (05/12/17 1100) Vital Signs (24h Range):  Temp:  [96.8 °F (36 °C)-99 °F (37.2 °C)] 98.8 °F (37.1 °C)  Pulse:  [] 97  Resp:  [18-20] 18  SpO2:  [96 %-98 %] 96 %  BP: (103-137)/(51-66) 127/65     Weight: 74.3 kg (163 lb 12.8 oz)  Body mass index is 21.61 kg/(m^2).    Intake/Output Summary (Last 24 hours) at 05/12/17 1538  Last data filed at 05/12/17 0600   Gross per 24 hour   Intake              180 ml   Output                0 ml   Net              180 ml      Physical Exam   Constitutional: He is oriented to person, place, and time. He appears well-nourished. No distress.   HENT:   Head: Normocephalic and atraumatic.   Nose: Nose normal.   Mouth/Throat: No oropharyngeal exudate.   Eyes: EOM are normal. Pupils are equal, round, and reactive to light.   Neck: Normal range of motion. Neck supple.   Cardiovascular: Normal rate, regular rhythm, normal heart sounds and intact distal pulses.  Exam reveals no gallop and no friction rub.    No murmur heard.  Pulmonary/Chest: Effort normal and breath sounds normal. No respiratory distress. He has no wheezes. He has no rales.   Abdominal: Soft. Bowel sounds are normal. He exhibits no distension. There is no tenderness.   Musculoskeletal:  Normal range of motion. He exhibits no edema.   Neurological: He is alert and oriented to person, place, and time. No cranial nerve deficit. Coordination normal.   Skin: Skin is warm and dry. He is not diaphoretic.   Psychiatric: He expresses no homicidal and no suicidal ideation. He expresses no suicidal plans and no homicidal plans.        Significant Labs:   CBC:   Recent Labs  Lab 05/11/17  0610 05/12/17  0629   WBC 9.01 7.69   HGB 11.7* 11.4*   HCT 36.2* 36.6*   * 137*     CMP:   Recent Labs  Lab 05/11/17  0610 05/11/17  0844 05/12/17  0629   * 137 140   K 4.7 5.2* 4.2   CL 94* 102 98   CO2 9* 15* 23    121* 106   * 107* 66*   CREATININE 15.5* 16.3* 12.3*   CALCIUM 9.2 9.2 9.3   PROT 7.0  --  7.0   ALBUMIN 3.2* 3.3* 3.3*   BILITOT 0.6  --  0.7   ALKPHOS 90  --  77   AST 24  --  18   ALT 16  --  14   ANIONGAP 21* 20* 19*   EGFRNONAA 2.6* 2.5* 3.5*       Significant Imaging: I have reviewed all pertinent imaging results/findings within the past 24 hours.

## 2017-05-12 NOTE — PLAN OF CARE
Problem: Physical Therapy Goal  Goal: Physical Therapy Goal  Goals to be met by: 17     Patient will increase functional independence with mobility by performin.Supine to sit with Minimal Assistance. MET  Revised: Supine to sit with Supervision.    2.Sit to supine with Minimal Assistance. MET  Revised: sit to supine with Supervision.    3.Sit to stand transfer with Contact Guard Assistance using Rolling Walker    4.Gait x 50 feet with Contact Guard Assistance using Rolling Walker. -- MET   Revised: Gait x 150 feet with stand by assistance using rolling walker or least restrictive AD.     5. Dynamic sitting at edge of bed x 8 minutes with Contact Guard Assistance.    6.Able to tolerate exercise for 15-20 reps with supervision.    7.Patient and family able to teachback stroke & positioning education independently.   Outcome: Ongoing (interventions implemented as appropriate)  Pt progressing well with functional mobility, demonstrating good effort throughout visit. Goals updated. Continue current POC.      Irma Bell PT, DPT   2017  Pager: [phone]

## 2017-05-12 NOTE — ASSESSMENT & PLAN NOTE
- Will provide dialysis for metabolic clearance and volume   - Seen and examined today during hemodialysis; tolerating treatment well without issues. Denied headaches, chest pain, abdominal pain, or muscle cramps   - Target ultrafiltration up to 1-2L as tolerated by patient  - Dialysate adjusted to current labs  - Evaluated by vascular surgery who recommended to use current AVF and to plan for vein mapping and AVF/AVG as outpatient

## 2017-05-12 NOTE — PROGRESS NOTES
Maintenance HD treatment started. First 2 attempts unsuccessful sticks for venous line. Third attempt successful. Lines secured and telemetry in placed. No complaints of discomfort at this time.

## 2017-05-12 NOTE — PLAN OF CARE
Problem: Patient Care Overview  Goal: Plan of Care Review  Outcome: Ongoing (interventions implemented as appropriate)  HD treatment complete. Duration of treatment 2 hours and 1 L removed. Treatment was tolerated well and no complications with access to right lower arm during treatment. Needles removed and hemostasis achieved.  Dressing intact and no drainage noted. Thrill and bruit present.

## 2017-05-12 NOTE — SUBJECTIVE & OBJECTIVE
Interval History: NAEON. Patient calm and aggred to have HD done today. No complaints at the time of evaluation.     Review of patient's allergies indicates:   Allergen Reactions    Penicillins Rash     Current Facility-Administered Medications   Medication Frequency    0.9%  NaCl infusion PRN    0.9%  NaCl infusion Once    amlodipine tablet 10 mg Daily    aspirin EC tablet 81 mg Daily    atorvastatin tablet 40 mg Daily    dextrose 50% injection 12.5 g PRN    glucagon (human recombinant) injection 1 mg PRN    heparin (porcine) injection 5,000 Units Q8H    insulin aspart pen 1-10 Units QID (AC + HS) PRN    levetiracetam tablet 500 mg BID    ondansetron injection 4 mg Q6H PRN    risperidone tablet 0.5 mg Q8H PRN    sevelamer carbonate tablet 1,600 mg TID WM    sodium chloride 0.9% flush 3 mL Q8H       Objective:     Vital Signs (Most Recent):  Temp: 98.8 °F (37.1 °C) (05/12/17 1100)  Pulse: 94 (05/12/17 1630)  Resp: 18 (05/12/17 1100)  BP: (!) 120/59 (05/12/17 1630)  SpO2: 96 % (05/12/17 1100)  O2 Device (Oxygen Therapy): room air (05/12/17 1100) Vital Signs (24h Range):  Temp:  [96.8 °F (36 °C)-99 °F (37.2 °C)] 98.8 °F (37.1 °C)  Pulse:  [] 94  Resp:  [18-20] 18  SpO2:  [96 %-98 %] 96 %  BP: (103-137)/(51-66) 120/59     Weight: 74.3 kg (163 lb 12.8 oz) (05/10/17 0605)  Body mass index is 21.61 kg/(m^2).  Body surface area is 1.96 meters squared.    I/O last 3 completed shifts:  In: 780 [P.O.:180; Other:600]  Out: 1600 [Other:1600]    Physical Exam   Constitutional: He appears well-developed and well-nourished. No distress.   HENT:   Head: Normocephalic and atraumatic.   Right Ear: External ear normal.   Left Ear: External ear normal.   Mouth/Throat: Oropharynx is clear and moist.   Neck: Normal range of motion. Neck supple. No JVD present.   Cardiovascular: Normal rate, regular rhythm and normal heart sounds.  Exam reveals no gallop and no friction rub.    No murmur heard.  Pulmonary/Chest:  Effort normal and breath sounds normal. No respiratory distress. He has no wheezes. He has no rales.   Abdominal: Soft. Bowel sounds are normal. He exhibits no distension. There is no tenderness.   Musculoskeletal: He exhibits no edema.   Neurological: He is alert.   Skin: Skin is warm and dry. He is not diaphoretic.      Significant Labs:  CBC:   Recent Labs  Lab 05/12/17  0629   WBC 7.69   RBC 3.98*   HGB 11.4*   HCT 36.6*   *   MCV 92   MCH 28.6   MCHC 31.1*     CMP:   Recent Labs  Lab 05/12/17  0629      CALCIUM 9.3   ALBUMIN 3.3*   PROT 7.0      K 4.2   CO2 23   CL 98   BUN 66*   CREATININE 12.3*   ALKPHOS 77   ALT 14   AST 18   BILITOT 0.7        Significant Imaging:  Labs: Reviewed  X-Ray: Reviewed

## 2017-05-12 NOTE — PLAN OF CARE
Problem: SLP Goal  Goal: SLP Goal  Speech Language Pathology Goals  Goals expected to be met by 5/16  1. Pt will tolerate dental soft diet with thin liquids without overt s/s aspiration.  2. Pt will complete simple problem solving/reasoning tasks with 80% accy with min cues.  3. Pt will name 10 items per concrete cat with min cues to improve attn/thought organization.  4. Pt will complete further assessment of reading, writing and visual spatial skills.      Outcome: Ongoing (interventions implemented as appropriate)  Pt participated well w/ tx tasks.  Pt reports feeling close to baseline for cognition.  Pt is tolerating current diet well.  Cont ST per POC.     Alma Esparza, CHIARA-SLP  5/12/2017

## 2017-05-12 NOTE — PT/OT/SLP PROGRESS
"Physical Therapy  Treatment    Bernabe Greco Jr.   MRN: [id number]   Admitting Diagnosis: Seizure     PT Received On: 17  PT Start Time: 909     PT Stop Time: 934    PT Total Time (min): 25 min       Billable Minutes:  Gait [id number] min and Therapeutic Activity 15 min    Treatment Type: Treatment  PT/PTA: PT     PTA Visit Number: 0       General Precautions: Standard, fall (PEC )  Orthopedic Precautions: N/A   Braces: N/A    Subjective:  Communicated with RN prior to session. Pt agreeable to participate in tx session. Pt reported feeling "tired." Sitter at bedside for supervision. Pt pleasant, cooperative and appropriately engaged in conversation with therapist throughout visit.     Pain Ratin/10  Pain Rating Post-Intervention: 0/10    Objective:   Patient found with: telemetry    Functional Mobility:  Bed Mobility:   Supine to Sit: Stand by Assistance  Sit to Supine: Stand by Assistance    Transfers:  Sit <> Stand Assistance: Contact Guard Assistance (from EOB x 1 trial, from chair x 1 trial)  Sit <> Stand Assistive Device: Rolling Walker    Gait:   Gait Distance: ~50' x 1 trial, 75' x 2nd trial with seated rest break in between; CGA for safety; no LOB; pt with pathway deviation to R, requiring min A to navigate obstacles  Assistance 1: Contact Guard Assistance, Minimum assistance  Gait Assistive Device: Rolling walker  Gait Pattern: reciprocal  Gait Deviation(s): decreased step length, decreased stride length (narrow base of support)    Balance:   Static Sit: GOOD-: Takes MODERATE challenges from all directions but inconsistently  Dynamic Sit: GOOD-: Maintains balance through MODERATE excursions of active trunk movement,     Static Stand: FAIR: Maintains without assist but unable to take challenges  Dynamic stand: FAIR: Needs CONTACT GUARD during gait     Therapeutic Activities and Exercises:  Pt supine upon PT arrival.     Therapeutic activities aimed to:  * increase pt's independence, safety, and " efficiency with bed mobility and functional transfers. See above for assistance levels.   * increase functional strength through participation in OOB activity, therex, and gait   * improve dynamic standing balance and coordination  * pt performed reaching of LUE towards target x 5 reps (therapists hands)     Therapist facilitated progression of gait training to improve gait stability, endurance, and independence with functional ambulation. Pt with pathway deviation to R, difficulty navigating obstacles due to visual deficits. When ambulating in straight, clear hallway pt able to perform with CGA-SBA, however when presented with obstacles or going through doorways, pt required min A.     Therex for BLE functional strengthening 1x10:   * ankle pumps   * LAQ  * gavin       AM-PAC 6 CLICK MOBILITY  How much help from another person does this patient currently need?   1 = Unable, Total/Dependent Assistance  2 = A lot, Maximum/Moderate Assistance  3 = A little, Minimum/Contact Guard/Supervision  4 = None, Modified [place]/Independent    Turning over in bed (including adjusting bedclothes, sheets and blankets)?: 4  Sitting down on and standing up from a chair with arms (e.g., wheelchair, bedside commode, etc.): 3  Moving from lying on back to sitting on the side of the bed?: 3  Moving to and from a bed to a chair (including a wheelchair)?: 3  Need to walk in hospital room?: 3  Climbing 3-5 steps with a railing?: 3  Total Score: 19    AM-PAC Raw Score CMS G-Code Modifier Level of Impairment Assistance   6 % Total / Unable   7 - 9 CM 80 - 100% Maximal Assist   10 - 14 CL 60 - 80% Moderate Assist   15 - 19 CK 40 - 60% Moderate Assist   20 - 22 CJ 20 - 40% Minimal Assist   23 CI 1-20% SBA / CGA   24 CH 0% Independent/ Mod I     Patient left supine with all lines intact, call button in reach, RN notified and sitter present.    Assessment:  Bernabe Greco Jr. is a 77 y.o. male with a medical diagnosis of Seizure. Pt  progressing well with functional mobility, demonstrating improvement in bed mobility and tolerance for functional ambulation with RW. Pt demonstrated good effort and cooperation throughout visit. Pt remains at risk for falls due to visual deficits and impaired dynamic standing balance. Pt would benefit from continued PT intervention to address below listed deficits and maximize return to PLOF.       Rehab identified problem list/impairments: Rehab identified problem list/impairments: weakness, impaired endurance, impaired functional mobilty, gait instability, impaired self care skills, impaired balance, decreased safety awareness    Rehab potential is good.    Activity tolerance: Good    Discharge recommendations: Discharge Facility/Level Of Care Needs: nursing facility, skilled     Barriers to discharge: Barriers to Discharge: Decreased caregiver support    Equipment recommendations: Equipment Needed After Discharge: wheelchair, walker, rolling     GOALS:   Physical Therapy Goals        Problem: Physical Therapy Goal    Goal Priority Disciplines Outcome Goal Variances Interventions   Physical Therapy Goal     PT/OT, PT Ongoing (interventions implemented as appropriate)     Description:  Goals to be met by: 17     Patient will increase functional independence with mobility by performin.Supine to sit with Minimal Assistance. MET  Revised: Supine to sit with Supervision.    2.Sit to supine with Minimal Assistance. MET  Revised: sit to supine with Supervision.    3.Sit to stand transfer with Contact Guard Assistance using Rolling Walker    4.Gait  x 50 feet with Contact Guard Assistance using Rolling Walker. -- MET    Revised: Gait x 150 feet with stand by assistance using rolling walker or least restrictive AD.     5. Dynamic sitting at edge of bed x 8 minutes with Contact Guard Assistance.    6.Able to tolerate exercise for 15-20 reps with supervision.    7.Patient and family able to teachback stroke  & positioning education independently.                  PLAN:    Patient to be seen 5 x/week  to address the above listed problems via gait training, therapeutic activities, therapeutic exercises, neuromuscular re-education  Plan of Care expires: 06/08/17  Plan of Care reviewed with: patient        Irma Ray, PT, DPT   5/12/2017  Pager: [phone]

## 2017-05-12 NOTE — PLAN OF CARE
SW uploaded PASSR and 142 to [facility].    SW received message from Main[employer] with [employer] ([phone], option 2, ext [phone]) reporting they are denying rehab but would auth SNF or .    Stephy Huber, RUFUS  Neurocritical Care   [facility]  [id number]

## 2017-05-12 NOTE — PLAN OF CARE
Problem: Occupational Therapy Goal  Goal: Occupational Therapy Goal  Goals to be met by: 5/16/17     Patient will increase functional independence with ADLs by performing:    UE Dressing with Minimal Assistance. GOAL MET 05/12/17  NEW GOAL: UE Dressing with SBA  LE Dressing with Moderate Assistance. GOAL MET 05/12/17  NEW GOAL: LE dressing with CGA  Grooming while standing with Stand-by Assistance.  Toileting from bedside commode with Moderate Assistance for hygiene and clothing management.   Rolling to Bilateral with Minimal Assistance.   Supine to sit with Minimal Assistance.  Stand pivot transfers with Stand-by Assistance.   Outcome: Ongoing (interventions implemented as appropriate)     Pt was agreeable to OT and was noted to make progress towards his goals in therapy.  Pt met goals for UB and LB dressing during today's session. Pt's goals were updated and remain appropriate at this time.  He will continue to benefit from skilled OT services in order to assist him with increasing his safety and level of independence with self care and mobility tasks.      Tamika Castillo, OT  5/12/2017

## 2017-05-12 NOTE — PT/OT/SLP PROGRESS
"Speech Language Pathology  Treatment    Bernabe Greco Jr.   MRN: [id number]   Admitting Diagnosis: Seizure    Diet recommendations: Solid Diet Level: Dental Soft  Liquid Diet Level: Thin Feed only when awake/alert, HOB to 90 degrees, Small bites/sips, Alternating bites/sips, 1 bite/sip at a time and Meds whole 1 at a time     Speech Start Time: 1132   Speech Stop Time:  1152   Speech Total Time:  20 min          TREATMENT BILLABLE MINUTES:  Speech Therapy Individual 10 and Treatment Swallowing Dysfunction 10           General Precautions: Standard, aspiration, fall          Subjective:  "They don't trust me to walk yet."  Pt stated                        Objective:    Pt was awake and alert upon SLP presentation.  He was offered and accepted po trials of thin liquids by cup edge and self-fed bites of soft solids.  He tolerated all trials well w/ adequate oral clearance and no overt s/s of aspiration.  Pt reported no difficulty w/ meals.  Education was provided to pt re: basic aspiration precautions listed above.  He indicated fair understanding.    Pt completed word fluency tasks w/ recall of 10 and 11 targets from a given category in 60 second trials given min cues.  He completed verbal problem-solving tasks for daily solutions w/ 100% acc indep.  He completed basic reasoning tasks w/ 100% acc for similarities and differences given min cues.  Pt appears close to baseline.  Further information re: Pt PLOF from family is indicated.    FIM:                                 Assessment:  Bernabe Greco Jr. is a 77 y.o. male with a medical diagnosis of Seizure and presents with a normal oropharyngeal swallow and mild cognitive-linguistic impairment.    Discharge recommendations: Discharge Facility/Level Of Care Needs: nursing facility, skilled (pending PT OT recs)     Goals:   SLP Goals        Problem: SLP Goal    Goal Priority Disciplines Outcome   SLP Goal     SLP Ongoing (interventions implemented as appropriate) "   Description:  Speech Language Pathology Goals  Goals expected to be met by 5/16  1. Pt will tolerate dental soft diet with thin liquids without overt s/s aspiration.  2. Pt will complete simple problem solving/reasoning tasks with 80% accy with min cues.  3. Pt will name 10 items per concrete cat with min cues to improve attn/thought organization.  4. Pt will complete further assessment of reading, writing and visual spatial skills.                    Plan:   Patient to be seen Therapy Frequency: 3 x/week   Plan of Care expires: 06/08/17  Plan of Care reviewed with: patient  SLP Follow-up?: Yes           Alma Esparza CCC-SLP  05/12/2017

## 2017-05-12 NOTE — PLAN OF CARE
05/12/17 1446   Discharge Reassessment   Assessment Type Discharge Planning Reassessment   Can the patient answer the patient profile reliably? Yes, cognitively intact   How does the patient rate their overall health at the present time? Fair   Describe the patient's ability to walk at the present time. Walks with the help of equipment   How often would a person be available to care for the patient? Whenever needed   Number of comorbid conditions (as recorded on the chart) Three   During the past month, has the patient often been bothered by feeling down, depressed or hopeless? No   During the past month, has the patient often been bothered by little interest or pleasure in doing things? No   Discharge plan remains the same: No   Provided patient/caregiver education on the expected discharge date and the discharge plan Yes   Discharge Plan A Skilled Nursing Facility   Discharge Plan B Psychiatric hospital   Change in patient condition or support system No

## 2017-05-12 NOTE — CONSULTS
5/12/2017 8:24 AM   Bernabe Greco Jr.   1940   [id number]  DATE OF ADMISSION: 5/8/2017 10:56 AM           PSYCHIATRY CONSULT NOTE    Brief HPI:  Chief Complaint /Reason for Consult: suicidal ideation       Assessment:    Adjustment Reaction      Recommendations:  · PSYCH Meds- None    · Legal- Rescind PEC as patient is not a danger to himself or others; nor is he gravely disabled  Dispo-Seek  Does not meet criteria for Inpt admission to a psychiatric facility  · The above was discussed with staff psychiatrist   · Thank you for this consult will sign off - please re consult as needed        Subjective:    History of Present Illness:   Bernabe Greco Jr. is a 77 y.o. male with no past psychiatric history, currently presenting with Seizure.  Psychiatry was originally consulted to address the patient's symptoms of suicidal ideation.  Mr Greco was lying in bed with a sitter present in the room.  He was awake, alert, and oriented x 4.  He stated that he did state that he wanted to die yesterday; however he said it out of frustration.  He also admits that if he had a gun he would use it; however he also admitted that he has no access to a gun.  He looked for them recently and was unable to find them. He then stated that his son took them out of the house.  He then stated that his mood is good today and that he would never kill himself.  He currently denies SI/HI, denies A/V hallucinations.     Collateral:  None    Currently, the patient is endorsing the following:    Psychiatric Review Of Systems - Is patient experiencing or having changes in:  sleep: no  appetite: no  weight: no  energy/anergy: no  interest/pleasure/anhedonia: no  somatic symptoms: no  libido: not assessed  guilty/hopelessness: no  concentration: no  S.I.B.s/risky behavior: no  SI/SA:  no    anxiety/panic: no  Irritability: no  Racing thoughts: no  Impulsive behaviors: no  Pressured speech:  no    Paranoia:no  Delusions: no  AVH:no    Past Medical History:    Diagnosis Date    Arthritis     Diabetes mellitus     Hyperlipidemia     Hypertension     Stroke     Syncope and collapse        Past Surgical History:   Procedure Laterality Date    HIP SURGERY      replacement right    JOINT REPLACEMENT      right hip        Social History     Social History    Marital status:      Spouse name: N/A    Number of children: N/A    Years of education: N/A     Social History Main Topics    Smoking status: Former Smoker     Packs/day: 0.25     Quit date: 1/1/1978    Smokeless tobacco: None    Alcohol use No    Drug use: 7.00 per week     Special: Marijuana      Comment: states he smoked marijuana 2 weeks ago    Sexual activity: Not Asked     Other Topics Concern    None     Social History Narrative       Current Facility-Administered Medications   Medication Dose Route Frequency Provider Last Rate Last Dose    amlodipine tablet 10 mg  10 mg Oral Daily Pallavi Busch PA-C   10 mg at 05/10/17 0814    aspirin EC tablet 81 mg  81 mg Oral Daily Annie Busch PA-C   81 mg at 05/10/17 1039    atorvastatin tablet 40 mg  40 mg Oral Daily Bernadine Garces MD   40 mg at 05/10/17 0814    dextrose 50% injection 12.5 g  12.5 g Intravenous PRN Chana Abbott PA-C        glucagon (human recombinant) injection 1 mg  1 mg Intramuscular PRN Chana Abbott PA-C        heparin (porcine) injection 5,000 Units  5,000 Units Subcutaneous Q8H Annie Busch PA-C   5,000 Units at 05/12/17 0634    insulin aspart pen 1-10 Units  1-10 Units Subcutaneous QID (AC + HS) PRN Chadwick Cardona MD        levetiracetam in NaCl (iso-os) IVPB 500 mg  500 mg Intravenous Q12H Luke Andrews  mL/hr at 05/11/17 2205 500 mg at 05/11/17 2205    metronidazole tablet 500 mg  500 mg Oral Q8H Malvin Guy MD   500 mg at 05/12/17 0634    ondansetron injection 4 mg  4 mg Intravenous Q6H PRN Bernadine Garces MD   4 mg at 05/08/17 1658    risperidone tablet 0.5 mg   0.5 mg Oral Q8H PRN Pallavi Busch PA-C   0.5 mg at 05/11/17 2206    senna-docusate 8.6-50 mg per tablet 1 tablet  1 tablet Oral BID Bernadine Garces MD   1 tablet at 05/10/17 0814    sevelamer carbonate tablet 1,600 mg  1,600 mg Oral TID  Duy Bravo MD   1,600 mg at 05/12/17 0804    sodium chloride 0.9% flush 3 mL  3 mL Intravenous Q8H Bernadine Garces MD   3 mL at 05/12/17 0600       Review of patient's allergies indicates:   Allergen Reactions    Penicillins Rash       Scheduled Meds:   amlodipine  10 mg Oral Daily    aspirin  81 mg Oral Daily    atorvastatin  40 mg Oral Daily    heparin (porcine)  5,000 Units Subcutaneous Q8H    levetiracetam in NaCl (iso-os)  500 mg Intravenous Q12H    metronidazole  500 mg Oral Q8H    senna-docusate 8.6-50 mg  1 tablet Oral BID    sevelamer carbonate  1,600 mg Oral TID     sodium chloride 0.9%  3 mL Intravenous Q8H     dextrose 50%, glucagon (human recombinant), insulin aspart, ondansetron, risperidone  [employer]     Start     Stop Route Frequency Ordered    05/09/17 1001  risperidone tablet 0.5 mg      -- Oral Every 8 hours PRN 05/09/17 0901          Past Psychiatric History:  Previous Medication Trials: no   Previous Psychiatric Hospitalizations: no   Previous Suicide Attempts: no   History of Violence: no  Outpatient Psychiatrist: no    Social History:  Marital Status:   Children: 3   Employment Status/Info: retired  Education: 10th grade  Special Ed: no  : [employer]  [employer]: [employer]  Housing Status: lives with wife who is his caretaker  Hobbies/Leisure time: watching TV, used to enjoy carpentry  History of phys/sexual abuse: no  Access to gun: no, son removed from the house    Family Psychiatric History:   Denies    Substance Abuse History:  Recreational Drugs: Denies  Use of Alcohol: denied  Rehab History:no   Tobacco Use:no  Use of Caffeine: denies use  Use of OTC: denies  Legal consequences of chemical use:  no    Legal History:  Past Charges/Incarcerations:no    Pending charges:no     Psychosocial Stressors: health.   Functioning Relationships: good support system  Strengths AND Liabilities  Strength: Patient accepts guidance/feedback, Strength: Patient is expressive/articulate., Strength: Patient is intelligent., Strength: Patient has positive support network., Strength: Patient has reasonable judgment., Liability: Patient has poor health.    Is the patient aware of the biomedical complications associated with substance abuse and mental illness? yes    Does the patient have an Advance Directive for Mental Health treatment? No   - if yes, inform patient to bring copy.    Objective:    Vitals:    05/12/17 0700   BP: 130/66   Pulse: 90   Resp: 20   Temp: 96.8 °F (36 °C)           Recent Labs  Lab 05/11/17  0844   *   CALCIUM 9.2   ALBUMIN 3.3*      K 5.2*   CO2 15*      *   CREATININE 16.3*     Lab Results   Component Value Date    WBC 9.01 05/11/2017    HGB 11.7 (L) 05/11/2017    HCT 36.2 (L) 05/11/2017    MCV 94 05/11/2017     (L) 05/11/2017     Lab Results   Component Value Date     05/11/2017     (H) 05/11/2017    CREATININE 16.3 (H) 05/11/2017    TSH 4.960 (H) 05/08/2017    WBC 9.01 05/11/2017     No results found for: PHENYTOIN, PHENOBARB, VALPROATE, CBMZ  Urinalysis  No results for input(s): COLORU, CLARITYU, SPECGRAV, PHUR, PROTEINUA, GLUCOSEU, BILIRUBINCON, BLOODU, WBCU, RBCU, BACTERIA, MUCUS, NITRITE, LEUKOCYTESUR, UROBILINOGEN, HYALINECASTS in the last 24 hours.    Recent Labs  Lab 05/12/17  0717   POCTGLUCOSE 122*   Labs reviewed    Medical ROS  General ROS: negative for - chills, fatigue or fever  Respiratory ROS: no cough, shortness of breath, or wheezing  Cardiovascular ROS: no chest pain or dyspnea on exertion  Gastrointestinal ROS: no abdominal pain, change in bowel habits, or black or bloody stools  Musculoskeletal ROS: negative for - gait disturbance, joint  stiffness, muscle pain or muscular weakness  Neurological ROS: negative for - confusion, dizziness, gait disturbance, headaches, impaired coordination/balance, seizures or visual changes    Mental Status Exam:  Appearance: unremarkable, age appropriate  Behavior/Cooperation:  normal, cooperative  Speech:  normal tone, normal rate, normal pitch, normal volume  Mood: steady  Affect:  congruent with mood and appropriate to situation/content Normal  Thought Process: normal and logical  Thought Content: normal, no suicidality, no homicidality, delusions, or paranoia  Sensorium:   grossly intact  Alert and Oriented: x3  Memory: 3/3 immediate, 2/3 at 5 minutes    Recent:   Intact; able to report recent events   Remote:  Intact; Named 4/4 past presidents   Attention/concentration: appropriate for age/education. [id number]; unable to spell backwards  Similarities:   Intact; (difference between apple and orange?)  Abstract reasoning:   Intact  Insight: Intact  Judgment:  Intact      5/12/2017 8:24 AM  Shira Miranda NP

## 2017-05-12 NOTE — PROGRESS NOTES
Consult Note  Physical Medicine & Rehab    Consult Requested By:  Viviana Gutiérrez MD      Admit Date:  5/8/2017  Admitting Diagnosis:  Tachycardia [R00.0], Acute ischemic left MCA stroke [I63.512], Embolic stroke involving left middle cerebral artery [I63.412], Malfunction of arteriovenous dialysis fistula, subsequent encounter [T82.486D]    Reason for Consult:  assess rehabilitation needs    SUBJECTIVE:   Interval History (05/12/2017):  Patient is seen for follow-up rehab evaluation and recommendations.  No acute events over night.  Sitter at bedside.  Patient reports feeling better today.   Functional status:  No therapy yesterday.  Barriers for discharge/rehab admission: [employer] denied rehab admission    HPI, Past Medical, Surgical, Family, and Social History remains the same as documented in the initial encounter.    Review of Systems  Constitutional: No fever or chills.  No malaise or fatigue.  Skin: No rashes or lesions.   Eyes: No visual changes. 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  ENT: No nasal congestion, sore throat, or ear pain.  No difficulty swallowing.   Respiratory: No shortness of breath or wheezing.  No cough.  Cardiovascular: No chest pain or palpitations.  No edema.   Gl: No nausea or vomiting.  No abdominal pain.  No incontinence of bowel.  No constipation.   : No incontinence of bladder.   Musculoskeletal: No arthralgias.  No bone pain.    Neurological: No seizures or tremors.  No weakness.   Behavioral/Psych: No changes in mood or hallucinations.    Past Medical History:   Diagnosis Date    Arthritis     Diabetes mellitus     Hyperlipidemia     Hypertension     Stroke     Syncope and collapse      Past Surgical History:   Procedure Laterality Date    HIP SURGERY      replacement right    JOINT REPLACEMENT      right hip      Family History   Problem Relation Age of Onset    Hypertension Mother      Social History   Substance Use Topics    Smoking status: Former  Smoker     Packs/day: 0.25     Quit date: 1978    Smokeless tobacco: None    Alcohol use No     Review of patient's allergies indicates:   Allergen Reactions    Penicillins Rash        Scheduled Medications:   amlodipine  10 mg Oral Daily    aspirin  81 mg Oral Daily    atorvastatin  40 mg Oral Daily    heparin (porcine)  5,000 Units Subcutaneous Q8H    levetiracetam  500 mg Oral BID    sevelamer carbonate  1,600 mg Oral TID WM    sodium chloride 0.9%  3 mL Intravenous Q8H     PRN Medications:  dextrose 50%, glucagon (human recombinant), insulin aspart, ondansetron, risperidone    OBJECTIVE:     Vital Signs (Most Recent)  Temp: 98.8 °F (37.1 °C) (17 1100)  Pulse: 97 (17 1100)  Resp: 18 (17 1100)  BP: 127/65 (17 1100)  SpO2: 96 % (17)    Vital Signs Range (Last 24H):  Temp:  [96.8 °F (36 °C)-99 °F (37.2 °C)]   Pulse:  []   Resp:  [18-20]   BP: (103-137)/(51-80)   SpO2:  [96 %-100 %]     Physical Exam:  Vital signs reviewed.   General appearance:  No apparent distress.  Appears well-developed and well-nourished.  Skin:  Color and texture normal.  Warm and dry.  No jaundice.  No visible rashes or visible lesions.  HEENT:  Normocephalic and atraumatic.  No scleral icterus.  No nasal discharge.    Pulmonary:  Normal respiratory effort.  No cough.  Cardiac:  Normal heart rate.  Extremities: No calf tenderness.  Distal pulses intact.  No edema.    Abdomen:  Soft, non-tender and non-distended.  Musculoskeletal:  Moves all extremities spontaneously.  ROM: normal.    Neurologic:  -  Mental Status:  AAOx3.  Follows commands.  Answers correct age and .   -  Speech and language:  no aphasia, mild dysarthria.    -  Vision:  L hemianopsia.   -  Motor:  RUE: 5/5.  LUE: 5/5.  RLE: 4/5.  LLE: 4/5.  -  Tone:  Normal.   -  Sensory:  Intact to light touch and pin prick.  Behavioral/Psych: Calm and cooperative.    Diagnostic Results:  CT: Reviewed  MRI: Reviewed  CTA:  Reviewed  Labs: Reviewed    ASSESSMENT/PLAN:      Bernabe Greco Jr. is a 77 y.o. male admitted on 5/8/2017 from [facility] s/p tPA for L MCA syndrome with left gaze preference and right side neglect.  Witnessed seizure while in ED.  CTA without large vessel occlusion; therefore, not thrombectomy candidate.  MRI without acute pathology.  EEG without seizures.  Etiology of symptoms likely 2/2 seizure and not due to new stroke.        * 5/11/17- RC AVF- poor clearances and failed urea kinetics--> Fistulogram showed significant collaterals from mid fistula.  Dialysis today.     PM&R consulted to assess rehabilitation needs.     Functional status:  Progressing with therapy.  Bed mobility min-modA.  Sit to stand SBA-CGA and transfers CGA.  Ambulated 50 ft x 2 CGA and RW.  UBD maxA and LBD Janice.   Cognitive/Speech/Language status:  Mild cognitive-Linguistic Impairment and visual spatial impairment skills at baseline.   Nutrition/Swallow Status:  Passed bedside swallow evaluation.  Speech recommended dental soft diet and thin liquids.    -  Reviewed discharge options with patient (inpatient rehab, SNF, home health therapy, and outpatient therapy).  Encourage participation with therapy.  -  Recommendations  · PT and OT evaluate and treat.   · SLP cognitive and speech evaluate and treat.   · Mobility, OOB in chair at least 3 hours per day, and early ambulation as appropriate.  · Establish consistent sleep-wake cycle and monitor for sleep disturbances.  · Monitor for bowel and bladder dysfunction.   · Monitor for skin breakdown and pressure ulcers.  Turn patient every 2 hours and repositioning/weight shifting every 20-30 minutes when sitting.  Pressure relief/heel protector boots and proper mattress/overlay and chair cushioning.   · DVT prophylaxis:  PIO, SCD.    Patient approved for [facility]; however, inpatient rehab stay was denied by his insurance.  Will sign off.  Recommend SNF.      Thank  you for consult.    ASH Rausch, FNP-C    Physical Medicine & Rehabilitation   05/12/2017  Spectralink: [id number]    Collaborating Physician : Smith Maciel MD

## 2017-05-12 NOTE — ASSESSMENT & PLAN NOTE
Nutrition Problem:   Inadequate energy intake    Etiology/Related to:   Decreased appetite, poor po intake and significant weight loss     Signs/Symptoms (as evidenced by):  Weight loss 45 lb (20.5 kg) x 2 months and po intake <0-25%     Treatment Strategy:   Please re-weight patient. Noted a 19 lb weight change x 2 days.     1. Change diet order to renal diet, texture per SLP recommendation (dental soft)   2. Continue Novasource Renal OS TID if po intake is <50%. Encourage pt to consume at least 2 daily while po intake is poor  3. RD to monitor    Nutrition Diagnosis Status:   New

## 2017-05-12 NOTE — PROGRESS NOTES
[facility]  Adult Nutrition  Progress Note    SUMMARY     Recommendations    Recommendation/Intervention:   1. Change diet order to renal diet, texture per SLP recommendation (dental soft)   2. Continue Novasource Renal OS TID if po intake is <50%   3. RD to monitor    Goals: PO intake >50%  Nutrition Goal Status: progressing towards goal  Communication of RD Recs: reviewed with RN    Reason for Assessment    Reason for Assessment: RD follow-up  Diagnosis: stroke/CVA  Relevent Medical History: HTN, HLD, DM   Interdisciplinary Rounds: did not attend     General Information Comments: HD yesterday. Pt notes feeling tired and fatigued. Poor po intake continues.      Nutrition Discharge Planning: PO intake >50% meals + ONS as needed    Nutrition Prescription Ordered    Current Diet Order: Dental soft w/ thin liquids     Evaluation of Received Nutrients/Fluid Intake      Energy Calories Required: not meeting needs   Protein Required: not meeting needs     IV Fluid (mL): 180  Other Fluid (mL): 600 (post HD)      Fluid Required: not meeting needs    % Intake of Estimated Energy Needs: 0 - 25 %  % Meal Intake: 25%    Nutrition Risk Screen     Nutrition Risk Screen: no indicators present    Nutrition/Diet History    Patient Reported Diet/Restrictions/Preferences: other (see comments) (CHRISTIAN)  Typical Food/Fluid Intake: 0-25% meals  Food Preferences: CHRISTIAN     Factors Affecting Nutritional Intake: decreased appetite, other (see comments) (AMS)     Labs/Tests/Procedures/Meds     Pertinent Labs Reviewed: reviewed  Pertinent Labs Comments: BUN 66, Cr 12.3, CO2 19, Phos 7.8  Pertinent Medications Reviewed: reviewed  Pertinent Medications Comments: Statin    Physical Findings    Overall Physical Appearance: lethargic     Oral/Mouth Cavity: WDL  Skin: intact    Anthropometrics    Height (inches): 72.99 in  Weight Method: Bed Scale  Weight (kg): 74.3 kg  Ideal Body Weight (IBW), Male: 183.94 lb     % Ideal Body  Weight, Male (lb): 89.05 lb     BMI (kg/m2): 21.62  BMI Grade: 18.5-24.9 - normal  Usual Body Weight (UBW), k kg  % Usual Body Weight: 88.74  % Weight Change: 20.5 kg (20% x 2 months)  Weight Loss: unintentional      Estimated/Assessed Needs    Weight Used For Calorie Calculations: 91.4 kg (201 lb 8 oz)   Height (cm): 185.4 cm     Energy Need Method: [place]-St Jeor, other (see comments) (2116 kcal/d)   RMR ([place]-St. Jeor Equation): 1522.25        Weight Used For Protein Calculations: 91.4 kg (201 lb 8 oz)  Protein Requirements: 110-128 g/d    Fluid Need Method: RDA Method, other (see comments) (Per MD or 1 mL/kcal)     Assessment and Plan    Malnutrition  Nutrition Problem:   Inadequate energy intake    Etiology/Related to:   Decreased appetite, poor po intake and significant weight loss     Signs/Symptoms (as evidenced by):  Weight loss 45 lb (20.5 kg) x 2 months and po intake <0-25%     Treatment Strategy:   Please re-weight patient. Noted a 19 lb weight change x 2 days.     1. Change diet order to renal diet, texture per SLP recommendation (dental soft)   2. Continue Novasource Renal OS TID if po intake is <50%. Encourage pt to consume at least 2 daily while po intake is poor  3. RD to monitor    Nutrition Diagnosis Status:   New      Monitor and Evaluation    Food and Nutrient Intake: energy intake, food and beverage intake  Food and Nutrient Adminstration: diet order     Physical Activity and Function: nutrition-related ADLs and IADLs  Anthropometric Measurements: weight, weight change, body mass index  Biochemical Data, Medical Tests and Procedures: electrolyte and renal panel, gastrointestinal profile, glucose/endocrine profile, inflammatory profile, lipid profile  Nutrition-Focused Physical Findings: overall appearance      Nutrition Risk    Level of Risk: other (see comments) (2x/week)    Nutrition Follow-Up    RD Follow-up?: Yes

## 2017-05-12 NOTE — PLAN OF CARE
Problem: Patient Care Overview  Goal: Plan of Care Review  Outcome: Ongoing (interventions implemented as appropriate)  Pt had dialysis today. Pt placed on c-diff precautions.

## 2017-05-12 NOTE — ASSESSMENT & PLAN NOTE
-SI yesterday afternoon, and was PEC'd  -No SI today  -Psych consulted, recommend rescinding PEC

## 2017-05-12 NOTE — ASSESSMENT & PLAN NOTE
-HD on MWF, dialysis yesterday  -nephrology following, patient having poor clearance  -vascular surgery consulted, per Nephrology access to be addressed as outpatient

## 2017-05-12 NOTE — PROGRESS NOTES
[facility]  Nephrology  Progress Note    Patient Name: Bernabe Greco Jr.  MRN: [id number]  Admission Date: 5/8/2017  Hospital Length of Stay: 4 days  Attending Provider: Viviana Gutiérrez MD   Primary Care Physician: Provider Notinsystem  Principal Problem:Seizure    Subjective:     Interval History: NAEON. Patient calm and aggred to have HD done today. No complaints at the time of evaluation.     Review of patient's allergies indicates:   Allergen Reactions    Penicillins Rash     Current Facility-Administered Medications   Medication Frequency    0.9%  NaCl infusion PRN    0.9%  NaCl infusion Once    amlodipine tablet 10 mg Daily    aspirin EC tablet 81 mg Daily    atorvastatin tablet 40 mg Daily    dextrose 50% injection 12.5 g PRN    glucagon (human recombinant) injection 1 mg PRN    heparin (porcine) injection 5,000 Units Q8H    insulin aspart pen 1-10 Units QID (AC + HS) PRN    levetiracetam tablet 500 mg BID    ondansetron injection 4 mg Q6H PRN    risperidone tablet 0.5 mg Q8H PRN    sevelamer carbonate tablet 1,600 mg TID WM    sodium chloride 0.9% flush 3 mL Q8H       Objective:     Vital Signs (Most Recent):  Temp: 98.8 °F (37.1 °C) (05/12/17 1100)  Pulse: 94 (05/12/17 1630)  Resp: 18 (05/12/17 1100)  BP: (!) 120/59 (05/12/17 1630)  SpO2: 96 % (05/12/17 1100)  O2 Device (Oxygen Therapy): room air (05/12/17 1100) Vital Signs (24h Range):  Temp:  [96.8 °F (36 °C)-99 °F (37.2 °C)] 98.8 °F (37.1 °C)  Pulse:  [] 94  Resp:  [18-20] 18  SpO2:  [96 %-98 %] 96 %  BP: (103-137)/(51-66) 120/59     Weight: 74.3 kg (163 lb 12.8 oz) (05/10/17 0605)  Body mass index is 21.61 kg/(m^2).  Body surface area is 1.96 meters squared.    I/O last 3 completed shifts:  In: 780 [P.O.:180; Other:600]  Out: 1600 [Other:1600]    Physical Exam   Constitutional: He appears well-developed and well-nourished. No distress.   HENT:   Head: Normocephalic and atraumatic.   Right Ear: External ear normal.    Left Ear: External ear normal.   Mouth/Throat: Oropharynx is clear and moist.   Neck: Normal range of motion. Neck supple. No JVD present.   Cardiovascular: Normal rate, regular rhythm and normal heart sounds.  Exam reveals no gallop and no friction rub.    No murmur heard.  Pulmonary/Chest: Effort normal and breath sounds normal. No respiratory distress. He has no wheezes. He has no rales.   Abdominal: Soft. Bowel sounds are normal. He exhibits no distension. There is no tenderness.   Musculoskeletal: He exhibits no edema.   Neurological: He is alert.   Skin: Skin is warm and dry. He is not diaphoretic.      Significant Labs:  CBC:   Recent Labs  Lab 05/12/17  0629   WBC 7.69   RBC 3.98*   HGB 11.4*   HCT 36.6*   *   MCV 92   MCH 28.6   MCHC 31.1*     CMP:   Recent Labs  Lab 05/12/17  0629      CALCIUM 9.3   ALBUMIN 3.3*   PROT 7.0      K 4.2   CO2 23   CL 98   BUN 66*   CREATININE 12.3*   ALKPHOS 77   ALT 14   AST 18   BILITOT 0.7        Significant Imaging:  Labs: Reviewed  X-Ray: Reviewed    Assessment/Plan:     ESRD (end stage renal disease) on dialysis  - Will provide dialysis for metabolic clearance and volume   - Seen and examined today during hemodialysis; tolerating treatment well without issues. Denied headaches, chest pain, abdominal pain, or muscle cramps   - Target ultrafiltration up to 1-2L as tolerated by patient  - Dialysate adjusted to current labs  - Evaluated by vascular surgery who recommended to use current AVF and to plan for vein mapping and AVF/AVG as outpatient              Duy Bravo MD  Nephrology  [facility]

## 2017-05-12 NOTE — CONSULTS
Consult Note  Vascular Surgery    Consult Requested By:medicine  Reason for Consult: AV access    SUBJECTIVE:     History of Present Illness:  Patient is a 77 y.o.  male with a PMHx of HTN, HLD, DM, Stroke, Seizures, ESRD (MWF) and Arthritis who presented 5/8/17 via transfer from OSH after witnessed seizure followed by paresis of his b/l LE and LUE. The patient was given tPA due to concern for stroke & transferred to [facility]. He underwent multiphasic CT of the head/neck which was negative for acute stroke or large vessel occclusion. The patient was admitted to the neurocritical care service for workup/management of his seizure disorder. Nephrology was consulted for HD via a RUE forarm AVF. Per nephrology notes the patient has been having poor clearance from his AVF and was scheduled to undergo a fistulogram on 5/9/17 for further evaluation, however, the patient experienced the above events and thus did not undergo his scheduled procedure. He did undergo a fistulogram yesterday demonstrating significant collaterals from midfistula.  Nephrology has been dialyzing patient during this hospital stay and note persistent issues with poor clearance for which vascular surgery has been consulted.     Patient reports fistula was created approximately a year ago. Is right handed; denies h/o permacath.  Also reports history of right femoral several years prior that has thrombosed.    Scheduled Meds:   sodium chloride 0.9%   Intravenous Once    amlodipine  10 mg Oral Daily    aspirin  81 mg Oral Daily    atorvastatin  40 mg Oral Daily    heparin (porcine)  5,000 Units Subcutaneous Q8H    levetiracetam  500 mg Oral BID    sevelamer carbonate  1,600 mg Oral TID WM    sodium chloride 0.9%  3 mL Intravenous Q8H     Continuous Infusions:   PRN Meds:sodium chloride 0.9%, dextrose 50%, glucagon (human recombinant), insulin aspart, ondansetron, risperidone    Review of patient's allergies indicates:   Allergen  Reactions    Penicillins Rash       Past Medical History:   Diagnosis Date    Arthritis     Diabetes mellitus     Hyperlipidemia     Hypertension     Stroke     Syncope and collapse      Past Surgical History:   Procedure Laterality Date    HIP SURGERY      replacement right    JOINT REPLACEMENT      right hip      Family History   Problem Relation Age of Onset    Hypertension Mother      Social History   Substance Use Topics    Smoking status: Former Smoker     Packs/day: 0.25     Quit date: 1/1/1978    Smokeless tobacco: None    Alcohol use No        Review of Systems:  Peripheral Vascular: Leg Claudication: none  Constitutional: no fever or chills  Eyes: no visual changes  Respiratory: no cough or shortness of breath  Cardiovascular: no chest pain or palpitations  Gastrointestinal: no nausea or vomiting, tolerating diet  Hematologic/Lymphatic: no easy bruising or lymphadenopathy  Musculoskeletal: no arthralgias or myalgias  Neurological: positive for seizures    OBJECTIVE:     Vital Signs (Most Recent)  Temp: 98.8 °F (37.1 °C) (05/12/17 1100)  Pulse: 97 (05/12/17 1100)  Resp: 18 (05/12/17 1100)  BP: 127/65 (05/12/17 1100)  SpO2: 96 % (05/12/17 1100)    Vital Signs Range (Last 24H):  Temp:  [96.8 °F (36 °C)-99 °F (37.2 °C)]   Pulse:  []   Resp:  [18-20]   BP: (103-137)/(51-66)   SpO2:  [96 %-98 %]     Physical Exam:  General: well developed, no distress  Head: normocephalic  Eyes:  conjunctivae/corneas clear.  Lungs:  clear to auscultation bilaterally and normal respiratory effort  Heart: regular rate and rhythm, S1, S2 normal, no murmur, rub or gallop  Abdomen: soft, non-tender non-distented; bowel sounds normal; no masses,  no organomegaly  Extremities: warm, well perfused and right radiocephalic fistula with aneurysmal changes  Pulses: 1+ radial pulse bilaterally; right RC fistula with proximal thrill and distal pulsatility; 1+ right femoral, monophasic DP and biphasic PT; 2+ left femoral,  biphasic DP and PT    Laboratory:  CBC:   Recent Labs  Lab 05/12/17  0629   WBC 7.69   RBC 3.98*   HGB 11.4*   HCT 36.6*   *     CMP:   Recent Labs  Lab 05/12/17  0629      CALCIUM 9.3   ALBUMIN 3.3*   PROT 7.0      K 4.2   CO2 23   CL 98   BUN 66*   CREATININE 12.3*   ALKPHOS 77   ALT 14   AST 18   BILITOT 0.7     Coagulation:   Recent Labs  Lab 05/08/17  1145   INR 1.0       Diagnostic Results:      ASSESSMENT/PLAN:       Patient is a 77 y.o.  male with a PMHx of HTN, HLD, DM, Stroke, Seizures, ESRD (MWF) and Arthritis who presented 5/8/17 via transfer from OSH after witnessed seizure followed by paresis of his b/l LE and LUE, with poor clearance from his right RC AVF.    Will plan for vein mapping and AVF/AVG as outpatient  To follow up with Dr. Mayers in clinic next week  Can continue to use R Mauro AVF  Please call vascular surgery with questions or concerns    Mercedez Hendrickson DO  PGY-6, Vascular fellow   [phone]

## 2017-05-13 NOTE — PROGRESS NOTES
[facility] Hospital Medicine  Progress Note    Patient Name: Bernabe Greco Jr.  MRN: [id number]  Patient Class: IP- Inpatient   Admission Date: 5/8/2017  Length of Stay: 5 days  Attending Physician: Viviana Gutiérrez MD  Primary Care Provider: Provider [facility] Medicine Team: [facility] MED 1 Mervat Parham MD    Subjective:     Principal Problem:Seizure    HPI:  Patient is a 77 year old male with PMH of DM, HTN, HLD, prior stroke (no residual deficits), seizures who was transferred from [place] after receiving IV-tPA . Symptoms began at 8:00 am while with home health nurse.  He told her he was having a headache at the time of symptoms onset.When he arrived to [place] staff witnessed a seizure (EMS did not have description).  Wife said she noticed he was not moving left arm or both of his legs.  He also mumbled frequently and said this is exactly the same the last time he had a stroke.  CTA multiphase done and no LVO seen.         Per ED staff, patient had GTC seizure like activity and received 1 g of keppra. On my exam patient slightly confused and agitated, on low flow nasal cannula maintaining O2 sats. Not cooperative with exam but follows commands when daughter asks.      EEG with mild slowing on R. Will admit to [facility] for post tpa monitoring and obtain MRI brain for completion of stroke work up. Of note, patient is on chronic dialysis, missing today's treatment. Creatine 13 today . Nephrology consulted who agrees to dialyze tomorrow.         Hospital Course:  5/12/17: Transferred to hospital medicine. Psych consulted regarding SI, rec rescinding PEC. Patient to have vascular access issue addressed as outpatient per Nephrology. PT/Ot rec SNF    5/13: SAMMY pt tolerated HD well, will need vascular surg f/u for new AVG       Interval History: NAEON tolerated HD well    Review of Systems   Constitutional: Negative for activity change, appetite change, chills and fever.    HENT: Negative for trouble swallowing and voice change.    Eyes: Negative for photophobia and visual disturbance.   Respiratory: Negative for cough.    Cardiovascular: Negative for chest pain.   Gastrointestinal: Positive for diarrhea. Negative for constipation, nausea and vomiting.   Musculoskeletal: Negative for joint swelling.   Skin: Negative for color change, pallor, rash and wound.     Objective:     Vital Signs (Most Recent):  Temp: 98.8 °F (37.1 °C) (05/13/17 0000)  Pulse: 86 (05/13/17 0300)  Resp: 18 (05/13/17 0000)  BP: 121/63 (05/13/17 0000)  SpO2: 99 % (05/13/17 0000) Vital Signs (24h Range):  Temp:  [96.8 °F (36 °C)-98.8 °F (37.1 °C)] 98.8 °F (37.1 °C)  Pulse:  [] 86  Resp:  [18-20] 18  SpO2:  [96 %-99 %] 99 %  BP: ()/(53-70) 121/63     Weight: 74.3 kg (163 lb 12.8 oz)  Body mass index is 21.61 kg/(m^2).    Intake/Output Summary (Last 24 hours) at 05/13/17 0436  Last data filed at 05/12/17 1830   Gross per 24 hour   Intake             1220 ml   Output             1500 ml   Net             -280 ml      Physical Exam   Constitutional: He appears well-developed and well-nourished. No distress.   HENT:   Head: Normocephalic and atraumatic.   Right Ear: External ear normal.   Left Ear: External ear normal.   Mouth/Throat: Oropharynx is clear and moist.   Neck: Normal range of motion. Neck supple. No JVD present.   Cardiovascular: Normal rate, regular rhythm and normal heart sounds.  Exam reveals no gallop and no friction rub.    No murmur heard.  Pulmonary/Chest: Effort normal and breath sounds normal. No respiratory distress. He has no wheezes. He has no rales.   Abdominal: Soft. Bowel sounds are normal. He exhibits no distension. There is no tenderness.   Musculoskeletal: He exhibits no edema.   Neurological: He is alert.   Skin: Skin is warm and dry. He is not diaphoretic.       Significant Labs:   A1C:   Recent Labs  Lab 05/09/17  0300   HGBA1C 6.3*     CBC:   Recent Labs  Lab  05/11/17  0610 05/12/17  0629   WBC 9.01 7.69   HGB 11.7* 11.4*   HCT 36.2* 36.6*   * 137*     CMP:   Recent Labs  Lab 05/11/17  0610 05/11/17  0844 05/12/17  0629   * 137 140   K 4.7 5.2* 4.2   CL 94* 102 98   CO2 9* 15* 23    121* 106   * 107* 66*   CREATININE 15.5* 16.3* 12.3*   CALCIUM 9.2 9.2 9.3   PROT 7.0  --  7.0   ALBUMIN 3.2* 3.3* 3.3*   BILITOT 0.6  --  0.7   ALKPHOS 90  --  77   AST 24  --  18   ALT 16  --  14   ANIONGAP 21* 20* 19*   EGFRNONAA 2.6* 2.5* 3.5*           Assessment/Plan:      * Seizure  -witnessed seizure activity at OSH, hx of seizure  -received TPA d/t concern for stroke. MRI negative for stroke  -back to baseline  -EEG in NICU did not show any seizure activity  -continue keppra 500mg BID      Type 2 diabetes mellitus with neurologic complication  -HgA1C 6.3  -Continue sliding scale insulin    Anemia associated with chronic renal failure  -h/h stable  - will be managed by nephrology and EPO administration     Tissue plasminogen activator (t-PA) administered at other facility within 24 hours prior to current admission  -no LVO seen on CTA multiphase. MRI negative for acute stroke. Etiology of patient's symptoms more likely due to seizure and not likely stroke.        ESRD (end stage renal disease) on dialysis  -HD on MWF, dialysis completed yesterday (5/12)  -nephrology following, patient having poor clearance  -vascular surgery consulted, per Nephrology access to be addressed as outpatient    Suicidal ideation  -SI 5/11afternoon, and was PEC'd  -No SI today  -Psych consulted, recommend rescinding PEC 5/12      Diarrhea  -patient was on flagyl empirically  -d/c flagyl 5/12 and send for stool studies    VTE Risk Mitigation         Ordered     heparin (porcine) injection 5,000 Units  Every 8 hours     Route:  Subcutaneous        05/10/17 1204     Medium Risk of VTE  Once      05/08/17 1141     Reason for No Pharmacological VTE Prophylaxis  Once      05/08/17 1141      Place sequential compression device  Until discontinued      05/08/17 5491          Mervat Parham MD  Department of Hospital Medicine   [facility]

## 2017-05-13 NOTE — PLAN OF CARE
Problem: Patient Care Overview  Goal: Plan of Care Review  Outcome: Ongoing (interventions implemented as appropriate)  Dialysis completed. Needles removed from right forearm fistula with pressure held to needle sites for 7 minutes each with hemostasis achieved. Gauze and tape to sites. Patient dialyzed for 2 hours with fluid removal of 400 ml.  Patient became tachycardia with increased fluid removal.  Ultrafiltration paused for 30 minutes and goal decreased.  Report called to floor to MARLIN Prado.

## 2017-05-13 NOTE — PROGRESS NOTES
Patient arrived from floor with report from MARLIN Prado.   Difficulty in needling right forearm fistula. Reneedled arterial portion of fistula x 2 with 15 gauge fistula needles.  Maintenance dialysis began per orders.

## 2017-05-13 NOTE — SUBJECTIVE & OBJECTIVE
Interval History: NAEON tolerated HD well    Review of Systems   Constitutional: Negative for activity change, appetite change, chills and fever.   HENT: Negative for trouble swallowing and voice change.    Eyes: Negative for photophobia and visual disturbance.   Respiratory: Negative for cough.    Cardiovascular: Negative for chest pain.   Gastrointestinal: Positive for diarrhea. Negative for constipation, nausea and vomiting.   Musculoskeletal: Negative for joint swelling.   Skin: Negative for color change, pallor, rash and wound.     Objective:     Vital Signs (Most Recent):  Temp: 98.8 °F (37.1 °C) (05/13/17 0000)  Pulse: 86 (05/13/17 0300)  Resp: 18 (05/13/17 0000)  BP: 121/63 (05/13/17 0000)  SpO2: 99 % (05/13/17 0000) Vital Signs (24h Range):  Temp:  [96.8 °F (36 °C)-98.8 °F (37.1 °C)] 98.8 °F (37.1 °C)  Pulse:  [] 86  Resp:  [18-20] 18  SpO2:  [96 %-99 %] 99 %  BP: ()/(53-70) 121/63     Weight: 74.3 kg (163 lb 12.8 oz)  Body mass index is 21.61 kg/(m^2).    Intake/Output Summary (Last 24 hours) at 05/13/17 0436  Last data filed at 05/12/17 1830   Gross per 24 hour   Intake             1220 ml   Output             1500 ml   Net             -280 ml      Physical Exam   Constitutional: He appears well-developed and well-nourished. No distress.   HENT:   Head: Normocephalic and atraumatic.   Right Ear: External ear normal.   Left Ear: External ear normal.   Mouth/Throat: Oropharynx is clear and moist.   Neck: Normal range of motion. Neck supple. No JVD present.   Cardiovascular: Normal rate, regular rhythm and normal heart sounds.  Exam reveals no gallop and no friction rub.    No murmur heard.  Pulmonary/Chest: Effort normal and breath sounds normal. No respiratory distress. He has no wheezes. He has no rales.   Abdominal: Soft. Bowel sounds are normal. He exhibits no distension. There is no tenderness.   Musculoskeletal: He exhibits no edema.   Neurological: He is alert.   Skin: Skin is warm and  dry. He is not diaphoretic.       Significant Labs:   A1C:   Recent Labs  Lab 05/09/17  0300   HGBA1C 6.3*     CBC:   Recent Labs  Lab 05/11/17  0610 05/12/17  0629   WBC 9.01 7.69   HGB 11.7* 11.4*   HCT 36.2* 36.6*   * 137*     CMP:   Recent Labs  Lab 05/11/17  0610 05/11/17  0844 05/12/17  0629   * 137 140   K 4.7 5.2* 4.2   CL 94* 102 98   CO2 9* 15* 23    121* 106   * 107* 66*   CREATININE 15.5* 16.3* 12.3*   CALCIUM 9.2 9.2 9.3   PROT 7.0  --  7.0   ALBUMIN 3.2* 3.3* 3.3*   BILITOT 0.6  --  0.7   ALKPHOS 90  --  77   AST 24  --  18   ALT 16  --  14   ANIONGAP 21* 20* 19*   EGFRNONAA 2.6* 2.5* 3.5*

## 2017-05-13 NOTE — PLAN OF CARE
Problem: Patient Care Overview  Goal: Plan of Care Review  Outcome: Ongoing (interventions implemented as appropriate)  Plan of care reviewed with patient

## 2017-05-13 NOTE — ASSESSMENT & PLAN NOTE
-HD on MWF, dialysis completed yesterday (5/12)  -nephrology following, patient having poor clearance  -vascular surgery consulted, per Nephrology access to be addressed as outpatient

## 2017-05-13 NOTE — PROGRESS NOTES
I have reviewed and concur with the fellow's hemodialysis prescription plan. I have personally interviewed and examined the patient at bedside during hemodialysis session.

## 2017-05-13 NOTE — ASSESSMENT & PLAN NOTE
-no LVO seen on CTA multiphase. MRI negative for acute stroke. Etiology of patient's symptoms more likely due to seizure and not likely stroke.

## 2017-05-14 PROBLEM — D69.6 THROMBOCYTOPENIA, UNSPECIFIED: Status: ACTIVE | Noted: 2017-01-01

## 2017-05-14 NOTE — PROGRESS NOTES
[facility] Hospital Medicine  Progress Note    Patient Name: Bernabe Greco Jr.  MRN: [id number]  Patient Class: IP- Inpatient   Admission Date: 5/8/2017  Length of Stay: 6 days  Attending Physician: Viviana Gutiérrez MD  Primary Care Provider: Provider [facility] Medicine Team: [facility] [place] [facility] MED 1 Smith Ruiz MD    Subjective:     Principal Problem:Seizure    HPI:  Patient is a 77 year old male with PMH of DM, HTN, HLD, prior stroke (no residual deficits), seizures who was transferred from [place] after receiving IV-tPA . Symptoms began at 8:00 am while with home health nurse.  He told her he was having a headache at the time of symptoms onset.When he arrived to [place] staff witnessed a seizure (EMS did not have description).  Wife said she noticed he was not moving left arm or both of his legs.  He also mumbled frequently and said this is exactly the same the last time he had a stroke.  CTA multiphase done and no LVO seen.         Per ED staff, patient had GTC seizure like activity and received 1 g of keppra. On my exam patient slightly confused and agitated, on low flow nasal cannula maintaining O2 sats. Not cooperative with exam but follows commands when daughter asks.      EEG with mild slowing on R. Will admit to [facility] for post tpa monitoring and obtain MRI brain for completion of stroke work up. Of note, patient is on chronic dialysis, missing today's treatment. Creatine 13 today . Nephrology consulted who agrees to dialyze tomorrow.         Hospital Course:  5/12/17: Transferred to hospital medicine. Psych consulted regarding SI, rec rescinding PEC. Patient to have vascular access issue addressed as outpatient per Nephrology. PT/Ot rec SNF    5/13: SAMMY pt tolerated HD well, will need vascular surg f/u for new AVG   -5/14: Delirium overnight, pulled out IV. AOx4 this AM. No diarrhea.      Interval History: NAEON tolerated HD well    Review of Systems   Constitutional:  Negative for activity change, appetite change, chills and fever.   HENT: Negative for trouble swallowing and voice change.    Eyes: Negative for photophobia and visual disturbance.   Respiratory: Negative for cough.    Cardiovascular: Negative for chest pain.   Gastrointestinal: Negative for constipation, diarrhea (resolved), nausea and vomiting.   Musculoskeletal: Negative for joint swelling.   Skin: Negative for color change, pallor, rash and wound.     Objective:     Vital Signs (Most Recent):  Temp: 98 °F (36.7 °C) (05/14/17 0710)  Pulse: 78 (05/14/17 0710)  Resp: 16 (05/14/17 0710)  BP: (!) 144/67 (05/14/17 0710)  SpO2: 100 % (05/14/17 0710) Vital Signs (24h Range):  Temp:  [97.7 °F (36.5 °C)-99.2 °F (37.3 °C)] 98 °F (36.7 °C)  Pulse:  [] 78  Resp:  [16-18] 16  SpO2:  [96 %-100 %] 100 %  BP: (100-144)/(51-71) 144/67     Weight: 74.3 kg (163 lb 12.8 oz)  Body mass index is 21.61 kg/(m^2).    Intake/Output Summary (Last 24 hours) at 05/14/17 0839  Last data filed at 05/14/17 0800   Gross per 24 hour   Intake              530 ml   Output              710 ml   Net             -180 ml      Physical Exam   Constitutional: He appears well-developed and well-nourished. No distress.   HENT:   Head: Normocephalic and atraumatic.   Right Ear: External ear normal.   Left Ear: External ear normal.   Mouth/Throat: Oropharynx is clear and moist.   Neck: Normal range of motion. Neck supple. No JVD present.   Cardiovascular: Normal rate, regular rhythm and normal heart sounds.  Exam reveals no gallop and no friction rub.    No murmur heard.  Pulmonary/Chest: Effort normal and breath sounds normal. No respiratory distress. He has no wheezes. He has no rales.   Abdominal: Soft. Bowel sounds are normal. He exhibits no distension. There is no tenderness.   Musculoskeletal: He exhibits no edema.   Neurological: He is alert.   Skin: Skin is warm and dry. He is not diaphoretic.   Psychiatric: He expresses no homicidal and no  suicidal ideation.       Significant Labs:   A1C:     Recent Labs  Lab 05/09/17  0300   HGBA1C 6.3*     CBC:     Recent Labs  Lab 05/13/17  0505 05/14/17  0439   WBC 7.30 7.63   HGB 11.0* 10.3*   HCT 33.9* 32.1*   * 108*     CMP:     Recent Labs  Lab 05/13/17  0505 05/14/17  0439    141   K 4.1 4.3    104   CO2 21* 23   GLU 99 100   BUN 65* 67*   CREATININE 11.1* 10.1*   CALCIUM 9.3 9.0   PROT 6.8 6.6   ALBUMIN 3.1* 3.0*   BILITOT 0.7 0.6   ALKPHOS 79 73   AST 15 16   ALT 10 12   ANIONGAP 16 14   EGFRNONAA 3.9* 4.4*           Assessment/Plan:      * Seizure  -witnessed seizure activity at OSH, hx of seizure  -received TPA d/t concern for stroke. MRI negative for stroke  -back to baseline, no neuro deficits  -EEG in NICU did not show any seizure activity  -continue keppra 500mg BID      Type 2 diabetes mellitus with neurologic complication  -HgA1C 6.3  -Continue sliding scale insulin    Anemia associated with chronic renal failure  -h/h stable  - will be managed by nephrology and EPO administration     Tissue plasminogen activator (t-PA) administered at other facility within 24 hours prior to current admission  -no LVO seen on CTA multiphase. MRI negative for acute stroke. Etiology of patient's symptoms more likely due to seizure and not likely stroke.        ESRD (end stage renal disease) on dialysis  -HD on MWF, dialysis completed yesterday (5/12)  -nephrology following, patient having poor clearance  -AVG as outpatient    Suicidal ideation  -SI 5/11afternoon, and was PEC'd  -No SI today  -Psych consulted, recommend rescinding PEC 5/12      Diarrhea  -patient was on flagyl empirically  -d/c flagyl 5/12 and send for stool studies. No diarrhea for two days    Thrombocytopenia, unspecified  -platelets 108, trending down  -T score 3  -platelets began to decrease prior to heparin exposure  -low suspicion for HIT  -repeat CBC and continue to monitor    VTE Risk Mitigation         Ordered     heparin  (porcine) injection 5,000 Units  Every 8 hours     Route:  Subcutaneous        05/10/17 1204     Medium Risk of VTE  Once      05/08/17 1141     Reason for No Pharmacological VTE Prophylaxis  Once      05/08/17 1141     Place sequential compression device  Until discontinued      05/08/17 1141          Smith Ruiz MD  Department of Hospital Medicine   [facility]

## 2017-05-14 NOTE — SUBJECTIVE & OBJECTIVE
Interval History: NAEON tolerated HD well    Review of Systems   Constitutional: Negative for activity change, appetite change, chills and fever.   HENT: Negative for trouble swallowing and voice change.    Eyes: Negative for photophobia and visual disturbance.   Respiratory: Negative for cough.    Cardiovascular: Negative for chest pain.   Gastrointestinal: Negative for constipation, diarrhea (resolved), nausea and vomiting.   Musculoskeletal: Negative for joint swelling.   Skin: Negative for color change, pallor, rash and wound.     Objective:     Vital Signs (Most Recent):  Temp: 98 °F (36.7 °C) (05/14/17 0710)  Pulse: 78 (05/14/17 0710)  Resp: 16 (05/14/17 0710)  BP: (!) 144/67 (05/14/17 0710)  SpO2: 100 % (05/14/17 0710) Vital Signs (24h Range):  Temp:  [97.7 °F (36.5 °C)-99.2 °F (37.3 °C)] 98 °F (36.7 °C)  Pulse:  [] 78  Resp:  [16-18] 16  SpO2:  [96 %-100 %] 100 %  BP: (100-144)/(51-71) 144/67     Weight: 74.3 kg (163 lb 12.8 oz)  Body mass index is 21.61 kg/(m^2).    Intake/Output Summary (Last 24 hours) at 05/14/17 0839  Last data filed at 05/14/17 0800   Gross per 24 hour   Intake              530 ml   Output              710 ml   Net             -180 ml      Physical Exam   Constitutional: He appears well-developed and well-nourished. No distress.   HENT:   Head: Normocephalic and atraumatic.   Right Ear: External ear normal.   Left Ear: External ear normal.   Mouth/Throat: Oropharynx is clear and moist.   Neck: Normal range of motion. Neck supple. No JVD present.   Cardiovascular: Normal rate, regular rhythm and normal heart sounds.  Exam reveals no gallop and no friction rub.    No murmur heard.  Pulmonary/Chest: Effort normal and breath sounds normal. No respiratory distress. He has no wheezes. He has no rales.   Abdominal: Soft. Bowel sounds are normal. He exhibits no distension. There is no tenderness.   Musculoskeletal: He exhibits no edema.   Neurological: He is alert.   Skin: Skin is warm  and dry. He is not diaphoretic.   Psychiatric: He expresses no homicidal and no suicidal ideation.       Significant Labs:   A1C:     Recent Labs  Lab 05/09/17  0300   HGBA1C 6.3*     CBC:     Recent Labs  Lab 05/13/17 0505 05/14/17  0439   WBC 7.30 7.63   HGB 11.0* 10.3*   HCT 33.9* 32.1*   * 108*     CMP:     Recent Labs  Lab 05/13/17 0505 05/14/17 0439    141   K 4.1 4.3    104   CO2 21* 23   GLU 99 100   BUN 65* 67*   CREATININE 11.1* 10.1*   CALCIUM 9.3 9.0   PROT 6.8 6.6   ALBUMIN 3.1* 3.0*   BILITOT 0.7 0.6   ALKPHOS 79 73   AST 15 16   ALT 10 12   ANIONGAP 16 14   EGFRNONAA 3.9* 4.4*

## 2017-05-14 NOTE — ASSESSMENT & PLAN NOTE
-witnessed seizure activity at OSH, hx of seizure  -received TPA d/t concern for stroke. MRI negative for stroke  -back to baseline, no neuro deficits  -EEG in NICU did not show any seizure activity  -continue keppra 500mg BID

## 2017-05-14 NOTE — ASSESSMENT & PLAN NOTE
-patient was on flagyl empirically  -d/c flagyl 5/12 and send for stool studies. No diarrhea for two days

## 2017-05-14 NOTE — ASSESSMENT & PLAN NOTE
-HD on MWF, dialysis completed yesterday (5/12)  -nephrology following, patient having poor clearance  -AVG as outpatient

## 2017-05-14 NOTE — ASSESSMENT & PLAN NOTE
-platelets 108, trending down  -T score 3  -platelets began to decrease prior to heparin exposure  -low suspicion for HIT  -repeat CBC and continue to monitor

## 2017-05-14 NOTE — PT/OT/SLP PROGRESS
Physical Therapy  Treatment    Bernabe Greco Jr.   MRN: [id number]   Admitting Diagnosis: Seizure    PT Received On: 17  PT Start Time: 1200     PT Stop Time: 1223    PT Total Time (min): 23 min       Billable Minutes:  Gait [id number] and Therapeutic Exercise 10    Treatment Type: Treatment  PT/PTA: PTA     PTA Visit Number: 1       General Precautions: Standard, fall  Orthopedic Precautions: N/A   Braces:           Subjective:  Communicated with RN prior to session.  I want to walk    Pain Ratin/10              Pain Rating Post-Intervention: 0/10    Objective:   Patient found with: telemetry    Functional Mobility:  Bed Mobility:   Supine to Sit: Stand by Assistance  Sit to Supine: Stand by Assistance    Transfers:  Sit <> Stand Assistance: Contact Guard Assistance, Stand By Assistance  Sit <> Stand Assistive Device: Rolling Walker    Gait:   Gait Distance: 135 ft with vcs for guidance and posture. pt with pathway deviation to R requiring Janice to navigate obstacles  Assistance 1: Contact Guard Assistance, Minimum assistance  Gait Assistive Device: Rolling walker  Gait Pattern: reciprocal  Gait Deviation(s): decreased shawn, decreased step length, decreased stride length    Therapeutic Activities and Exercises:   Discussed pt progress, safety and d/c   Patient performed therex X 15 reps seated  B LE AROM AP, LAQ, Hip Flexion, Hip Abd/Add   White board updated   PT educated on importance of OOB mobility for improving outcomes     AM-PAC 6 CLICK MOBILITY  How much help from another person does this patient currently need?   1 = Unable, Total/Dependent Assistance  2 = A lot, Maximum/Moderate Assistance  3 = A little, Minimum/Contact Guard/Supervision  4 = None, Modified [place]/Independent    Turning over in bed (including adjusting bedclothes, sheets and blankets)?: 4  Sitting down on and standing up from a chair with arms (e.g., wheelchair, bedside commode, etc.): 3  Moving from lying on back to sitting  on the side of the bed?: 3  Moving to and from a bed to a chair (including a wheelchair)?: 3  Need to walk in hospital room?: 3  Climbing 3-5 steps with a railing?: 3  Total Score: 19    AM-PAC Raw Score CMS G-Code Modifier Level of Impairment Assistance   6 % Total / Unable   7 - 9 CM 80 - 100% Maximal Assist   10 - 14 CL 60 - 80% Moderate Assist   15 - 19 CK 40 - 60% Moderate Assist   20 - 22 CJ 20 - 40% Minimal Assist   23 CI 1-20% SBA / CGA   24 CH 0% Independent/ Mod I     Patient left supine with all lines intact, call button in reach and nsg notified.    Assessment:  Bernabe Greco Jr. is a 77 y.o. male with a medical diagnosis of Seizure and presents with   continued deficits as listed below. Pt motivated and cooperative with treatment session. Pt Progressing with PT Intervention. Pt Progressing with increase gait distance. Pt would continue to benefit from skilled PT to address overall functional mobility and goals. Goals remain appropriate    Rehab identified problem list/impairments: Rehab identified problem list/impairments: weakness, impaired endurance, impaired self care skills, impaired functional mobilty, gait instability, impaired balance, decreased safety awareness, decreased lower extremity function, impaired coordination, impaired fine motor, decreased upper extremity function, decreased coordination, impaired cognition, visual deficits    Rehab potential is good.    Activity tolerance: Good    Discharge recommendations: Discharge Facility/Level Of Care Needs: nursing facility, skilled     Barriers to discharge: Barriers to Discharge: Decreased caregiver support    Equipment recommendations: Equipment Needed After Discharge: bedside commode, walker, rolling, wheelchair     GOALS:   Physical Therapy Goals        Problem: Physical Therapy Goal    Goal Priority Disciplines Outcome Goal Variances Interventions   Physical Therapy Goal     PT/OT, PT Ongoing (interventions implemented as  appropriate)     Description:  Goals to be met by: 17     Patient will increase functional independence with mobility by performin.Supine to sit with Minimal Assistance. MET  Revised: Supine to sit with Supervision.    2.Sit to supine with Minimal Assistance. MET  Revised: sit to supine with Supervision.    3.Sit to stand transfer with Contact Guard Assistance using Rolling Walker-met    4.Gait  x 50 feet with Contact Guard Assistance using Rolling Walker. -- MET    Revised: Gait x 150 feet with stand by assistance using rolling walker or least restrictive AD.     5. Dynamic sitting at edge of bed x 8 minutes with Contact Guard Assistance.-met    6.Able to tolerate exercise for 15-20 reps with supervision.    7.Patient and family able to teachback stroke & positioning education independently.                   PLAN:    Patient to be seen 5 x/week  to address the above listed problems via gait training, therapeutic activities, therapeutic exercises, neuromuscular re-education  Plan of Care expires: 17  Plan of Care reviewed with: patient         Олег BASSAM Queenie, PTA  2017

## 2017-05-14 NOTE — PLAN OF CARE
Problem: Physical Therapy Goal  Goal: Physical Therapy Goal  Goals to be met by: 17     Patient will increase functional independence with mobility by performin.Supine to sit with Minimal Assistance. MET  Revised: Supine to sit with Supervision.    2.Sit to supine with Minimal Assistance. MET  Revised: sit to supine with Supervision.    3.Sit to stand transfer with Contact Guard Assistance using Rolling Walker-met    4.Gait x 50 feet with Contact Guard Assistance using Rolling Walker. -- MET   Revised: Gait x 150 feet with stand by assistance using rolling walker or least restrictive AD.     5. Dynamic sitting at edge of bed x 8 minutes with Contact Guard Assistance.-met    6.Able to tolerate exercise for 15-20 reps with supervision.    7.Patient and family able to teachback stroke & positioning education independently.   Pt progressing towards goals. continue with PT POC.Goals remain appropriate.    Олег Jerome PTA  2017

## 2017-05-14 NOTE — PT/OT/SLP PROGRESS
"Occupational Therapy  Treatment    Bernabe Greco Jr.   MRN: [id number]   Admitting Diagnosis: Seizure    OT Date of Treatment: 17   OT Start Time: 152  OT Stop Time: 154  OT Total Time (min): 19 min    Billable Minutes:  Therapeutic Exercise 19    General Precautions: Standard, fall  Orthopedic Precautions: N/A  Braces: N/A    Do you have any cultural, spiritual, Restorationism conflicts, given your current situation?: Taoism    Subjective:  Communicated with RN prior to session.  "I hope someone comes to visit me today"  Pt appeared down regarding wife not visiting yet.    Pain Ratin/10              Pain Rating Post-Intervention: 0/10    Objective:        Functional Mobility:  Bed Mobility:  Rolling/Turning to Left: Stand by assistance  Scooting/Bridging: Stand by Assistance  Supine to Sit: Contact Guard Assistance (flat bed, used rail)  Sit to Supine: Supervision (flat bed, no rail)                        Balance:   Static Sit: GOOD-: Takes MODERATE challenges from all directions but inconsistently  Dynamic Sit: GOOD-: Maintains balance through MODERATE excursions of active trunk movement,     Therapeutic Activities and Exercises:  -Up to sit as above  -UE ther ex on eob: press-ups, press-outs, sh fl, tricep press x10 each  -Spoke with pt throughout session regarding his children and his own childhood.  He appeared slightly less down than at beginning of session.    AM-PAC 6 CLICK ADL   How much help from another person does this patient currently need?   1 = Unable, Total/Dependent Assistance  2 = A lot, Maximum/Moderate Assistance  3 = A little, Minimum/Contact Guard/Supervision  4 = None, Modified [place]/Independent    Putting on and taking off regular lower body clothing? : 3  Bathing (including washing, rinsing, drying)?: 3  Toileting, which includes using toilet, bedpan, or urinal? : 3  Putting on and taking off regular upper body clothing?: 3  Taking care of personal grooming such as brushing " teeth?: 3  Eating meals?: 3  Total Score: 18     AM-PAC Raw Score CMS G-Code Modifier Level of Impairment Assistance   6 % Total / Unable   7 - 9 CM 80 - 100% Maximal Assist   10 - 14 CL 60 - 80% Moderate Assist   15 - 19 CK 40 - 60% Moderate Assist   20 - 22 CJ 20 - 40% Minimal Assist   23 CI 1-20% SBA / CGA   24 CH 0% Independent/ Mod I     Patient left supine with all lines intact and call button in reach    ASSESSMENT:  Bernabe Greco Jr. is a 77 y.o. male with a medical diagnosis of Seizure.  Pt progressing well, meeting 2 goals today.  He is now at  A for supine<-> from flat bed.  Pt achieved fatigue from above ther ex.    Rehab identified problem list/impairments: Rehab identified problem list/impairments: weakness, impaired self care skills, impaired functional mobilty, gait instability, impaired balance, impaired coordination, impaired fine motor, impaired cognition    Rehab potential is good.    Activity tolerance: Good    Discharge recommendations: Discharge Facility/Level Of Care Needs: nursing facility, skilled     Barriers to discharge: Barriers to Discharge: Decreased caregiver support    Equipment recommendations:       GOALS:   Occupational Therapy Goals        Problem: Occupational Therapy Goal    Goal Priority Disciplines Outcome Interventions   Occupational Therapy Goal     OT, PT/OT Ongoing (interventions implemented as appropriate)    Description:  Goals to be met by: 5/16/17     Patient will increase functional independence with ADLs by performing:    UE Dressing with Minimal Assistance. GOAL MET 05/12/17  NEW GOAL: UE Dressing with SBA  LE Dressing with Moderate Assistance. GOAL MET 05/12/17  NEW GOAL: LE dressing with CGA  Grooming while standing with Stand-by Assistance.  Toileting from bedside commode with Moderate Assistance for hygiene and clothing management.   Rolling to Bilateral with Minimal Assistance. Met 5/14  Revised:  Mod indep rolling  Supine to sit with Minimal  Assistance. Met 5/14  Revised:  Supine<->sit with supervision  Stand pivot transfers with Stand-by Assistance.                  Plan:  Patient to be seen 5 x/week to address the above listed problems via self-care/home management, therapeutic activities, therapeutic exercises, neuromuscular re-education, cognitive retraining  Plan of Care expires: 06/08/17  Plan of Care reviewed with: patient         SKYLER Martínez  05/14/2017

## 2017-05-14 NOTE — PLAN OF CARE
Problem: Occupational Therapy Goal  Goal: Occupational Therapy Goal  Goals to be met by: 5/16/17     Patient will increase functional independence with ADLs by performing:    UE Dressing with Minimal Assistance. GOAL MET 05/12/17  NEW GOAL: UE Dressing with SBA  LE Dressing with Moderate Assistance. GOAL MET 05/12/17  NEW GOAL: LE dressing with CGA  Grooming while standing with Stand-by Assistance.  Toileting from bedside commode with Moderate Assistance for hygiene and clothing management.   Rolling to Bilateral with Minimal Assistance. Met 5/14  Revised: Mod indep rolling  Supine to sit with Minimal Assistance. Met 5/14  Revised: Supine<->sit with supervision  Stand pivot transfers with Stand-by Assistance.   Outcome: Ongoing (interventions implemented as appropriate)  2 goals met and advanced  SKYLER Galvin  5/14/2017  [phone]

## 2017-05-15 PROBLEM — N39.0 UTI (URINARY TRACT INFECTION): Status: ACTIVE | Noted: 2017-01-01

## 2017-05-15 NOTE — ASSESSMENT & PLAN NOTE
- Phos elevated at 6.9  - Continue Renvela to 1,600 mg PO TID with meals  -Corrected calcium WNL at 9.6

## 2017-05-15 NOTE — ASSESSMENT & PLAN NOTE
- Will provide dialysis for metabolic clearance and volume   - Seen and examined today during hemodialysis; tolerating treatment well without issues. Denied headaches, chest pain, abdominal pain, or muscle cramps   - Target ultrafiltration up to 1-2L as tolerated by patient  - Dialysate adjusted to current labs

## 2017-05-15 NOTE — SUBJECTIVE & OBJECTIVE
Subjective:     Interval History: NAEON. VS stable.  Pt feels well, is in a good mood, and looking forward to going home.      Current Facility-Administered Medications   Medication Dose Route Frequency Provider Last Rate Last Dose    0.9%  NaCl infusion   Intravenous PRN Seth Sandhu MD        0.9%  NaCl infusion   Intravenous PRN Duy Bravo MD        0.9%  NaCl infusion   Intravenous Once Duy Bravo  mL/hr at 05/15/17 1500      amlodipine tablet 10 mg  10 mg Oral Daily Pallavi Busch PA-C   10 mg at 05/15/17 0914    aspirin EC tablet 81 mg  81 mg Oral Daily Annie Busch PA-C   81 mg at 05/15/17 0914    atorvastatin tablet 40 mg  40 mg Oral Daily Bernadine Garces MD   40 mg at 05/15/17 0914    dextrose 50% injection 12.5 g  12.5 g Intravenous PRN Chana Abbott PA-C        diphenhydrAMINE capsule 25 mg  25 mg Oral Q6H PRN Fabian Espinoza MD   25 mg at 05/15/17 0205    glucagon (human recombinant) injection 1 mg  1 mg Intramuscular PRN Chana Abbott PA-C        heparin (porcine) injection 5,000 Units  5,000 Units Subcutaneous Q8H Annie Busch PA-C   5,000 Units at 05/15/17 1340    insulin aspart pen 1-10 Units  1-10 Units Subcutaneous QID (AC + HS) PRN Chadwick Cardona MD   2 Units at 05/14/17 1736    levetiracetam tablet 500 mg  500 mg Oral BID Viviana Gutiérrez MD   500 mg at 05/15/17 0914    ondansetron injection 4 mg  4 mg Intravenous Q6H PRN Bernadine Garces MD   4 mg at 05/08/17 1658    risperidone tablet 0.5 mg  0.5 mg Oral Q8H PRN Pallavi Busch PA-C   0.5 mg at 05/11/17 2206    sevelamer carbonate tablet 1,600 mg  1,600 mg Oral TID  Duy Bravo MD   1,600 mg at 05/15/17 1217    sodium chloride 0.9% flush 3 mL  3 mL Intravenous Q8H Bernadine Garces MD   3 mL at 05/15/17 1342       Review of Systems   Constitutional: Negative for chills and fever.        Pt affirmed sleeping well.   HENT:  Negative for hearing loss and sore throat.    Eyes: Negative for pain and visual disturbance.   Respiratory: Negative for cough, shortness of breath and wheezing.    Cardiovascular: Negative for chest pain and palpitations.   Gastrointestinal: Negative for abdominal pain, blood in stool, constipation, diarrhea, nausea and vomiting.        Last BM this morning.   Genitourinary: Positive for dysuria.   Musculoskeletal: Negative for myalgias and neck stiffness.   Neurological: Positive for weakness (Bilateral LE weakness - pt believes to be chronic in nature.). Negative for dizziness and headaches.     Objective:     Vital Signs (Most Recent):  Temp: 98 °F (36.7 °C) (05/15/17 1500)  Pulse: 83 (05/15/17 1615)  Resp: 16 (05/15/17 1615)  BP: 124/63 (05/15/17 1615)  SpO2: 98 % (05/15/17 1200) Vital Signs (24h Range):  Temp:  [97.7 °F (36.5 °C)-98.7 °F (37.1 °C)] 98 °F (36.7 °C)  Pulse:  [70-96] 83  Resp:  [16] 16  SpO2:  [98 %-99 %] 98 %  BP: (118-143)/(63-68) 124/63     Weight: 74.3 kg (163 lb 12.8 oz)  Body mass index is 21.61 kg/(m^2).    Physical Exam   Constitutional: He appears well-developed and well-nourished. No distress.   HENT:   Mouth/Throat: Oropharynx is clear and moist. No oropharyngeal exudate.   Eyes: EOM are normal. Pupils are equal, round, and reactive to light. Right eye exhibits no discharge. Left eye exhibits no discharge. No scleral icterus.   Appears to have bilateral cataracts   Neck: Normal range of motion. Neck supple. No thyromegaly present.   Cardiovascular: Normal rate and regular rhythm.    Murmur (Possible aortic stenosis) heard.  Pulmonary/Chest: Effort normal and breath sounds normal. No stridor. No respiratory distress. He has no wheezes. He has no rales.   Abdominal: Soft. Bowel sounds are normal. There is no tenderness. There is no guarding.   Musculoskeletal: Normal range of motion. He exhibits no edema or tenderness.   Lymphadenopathy:     He has no cervical adenopathy (no  supraclavicular lymphadenopathy).   Neurological: He is alert. Coordination abnormal.   Skin: Skin is warm and dry. He is not diaphoretic. No erythema. No pallor.   Two lumps under the skin along the thoracic spine - possible lipomas?   Psychiatric: He has a normal mood and affect. His behavior is normal.   Vitals reviewed.      NEUROLOGICAL EXAMINATION:     CRANIAL NERVES     CN III, IV, VI   Pupils are equal, round, and reactive to light.  Extraocular motions are normal.       Significant Labs: All pertinent lab results from the past 24 hours have been reviewed.    Significant Imaging: I have reviewed all pertinent imaging results/findings within the past 24 hours.

## 2017-05-15 NOTE — ASSESSMENT & PLAN NOTE
-platelets 131 (stabilizing)  -T score 3  -platelets began to decrease prior to heparin exposure  -low suspicion for HIT  -repeat CBC and continue to monitor

## 2017-05-15 NOTE — PROGRESS NOTES
[facility] Hospital Medicine  Progress Note    Patient Name: Bernabe Greco Jr.  MRN: [id number]  Patient Class: IP- Inpatient   Admission Date: 5/8/2017  Length of Stay: 7 days  Attending Physician: Viviana Gutiérrez MD  Primary Care Provider: Provider [facility] Medicine Team: [facility] HOSP MED 1 Javon Hodge MD    Subjective:     Principal Problem:Seizure    HPI:  Patient is a 77 year old male with PMH of DM, HTN, HLD, prior stroke (no residual deficits), seizures who was transferred from [place] after receiving IV-tPA . Symptoms began at 8:00 am while with home health nurse.  He told her he was having a headache at the time of symptoms onset.When he arrived to [place] staff witnessed a seizure (EMS did not have description).  Wife said she noticed he was not moving left arm or both of his legs.  He also mumbled frequently and said this is exactly the same the last time he had a stroke.  CTA multiphase done and no LVO seen.         Per ED staff, patient had GTC seizure like activity and received 1 g of keppra. On my exam patient slightly confused and agitated, on low flow nasal cannula maintaining O2 sats. Not cooperative with exam but follows commands when daughter asks.      EEG with mild slowing on R. Will admit to [facility] for post tpa monitoring and obtain MRI brain for completion of stroke work up. Of note, patient is on chronic dialysis, missing today's treatment. Creatine 13 today . Nephrology consulted who agrees to dialyze tomorrow.         Hospital Course:  5/12/17: Transferred to hospital medicine. Psych consulted regarding SI, rec rescinding PEC. Patient to have vascular access issue addressed as outpatient per Nephrology. PT/Ot rec SNF    5/13: SAMMY pt tolerated HD well, will need vascular surg f/u for new AVG   5/14: Delirium overnight, pulled out IV. AOx4 this AM. No diarrhea.  5/15: Pt noted itching over face and bilateral UE's.  Pt feels well, is in a good mood,  denies SI, and is looking forward to going home.      Interval History: NAEON, VS stable.  Pt noted itching over face and bilateral UE's.  Pt feels well, is in a good mood, and is looking forward to going home.    Review of Systems   Constitutional: Negative for chills.        Pt affirms sleep well.   HENT: Negative for hearing loss, sore throat and trouble swallowing.    Eyes: Negative for pain.   Respiratory: Negative for cough, shortness of breath and wheezing.    Cardiovascular: Negative for chest pain, palpitations and leg swelling.   Gastrointestinal: Negative for abdominal pain, constipation, diarrhea, nausea and vomiting.   Genitourinary: Positive for dysuria.   Musculoskeletal: Negative for myalgias and neck stiffness.   Neurological: Negative for dizziness and syncope.   Psychiatric/Behavioral: Negative for suicidal ideas.        Pt affirms being in a good mood.     Objective:     Vital Signs (Most Recent):  Temp: 97.9 °F (36.6 °C) (05/15/17 1737)  Pulse: 74 (05/15/17 1737)  Resp: 16 (05/15/17 1615)  BP: (!) 100/52 (05/15/17 1737)  SpO2: 98 % (05/15/17 1200) Vital Signs (24h Range):  Temp:  [97.7 °F (36.5 °C)-98.7 °F (37.1 °C)] 97.9 °F (36.6 °C)  Pulse:  [70-96] 74  Resp:  [16] 16  SpO2:  [98 %-99 %] 98 %  BP: (100-143)/(52-68) 100/52     Weight: 74.3 kg (163 lb 12.8 oz)  Body mass index is 21.61 kg/(m^2).    Intake/Output Summary (Last 24 hours) at 05/15/17 1747  Last data filed at 05/15/17 1700   Gross per 24 hour   Intake              836 ml   Output             2650 ml   Net            -1814 ml      Physical Exam   Constitutional: He appears well-developed. No distress.   HENT:   Mouth/Throat: Oropharynx is clear and moist. No oropharyngeal exudate.   Eyes: EOM are normal. Pupils are equal, round, and reactive to light. Right eye exhibits no discharge. Left eye exhibits no discharge. No scleral icterus.   Appears to have bilateral cataracts   Neck: Normal range of motion. Neck supple. No thyromegaly  present.   Cardiovascular: Normal rate and regular rhythm.    Murmur (Possible aortic stenosis) heard.  Audible thrill over A/V fistula in right forearm   Pulmonary/Chest: Effort normal and breath sounds normal. No stridor. No respiratory distress. He has no wheezes. He has no rales.   Abdominal: Soft. Bowel sounds are normal. There is no tenderness. There is no guarding.   Musculoskeletal: Normal range of motion. He exhibits no edema or tenderness.   Lymphadenopathy:     He has no cervical adenopathy (No superaclavicular lymphadenopathy).   Neurological: He is alert. Coordination normal.   Skin: Skin is warm and dry. He is not diaphoretic. No erythema. No pallor.   Psychiatric: He has a normal mood and affect. His behavior is normal.   Vitals reviewed.      Significant Labs: All pertinent labs within the past 24 hours have been reviewed.    Significant Imaging: I have reviewed all pertinent imaging results/findings within the past 24 hours.    Assessment/Plan:      * Seizure  -witnessed seizure activity at OSH, hx of seizure  -EEG in NICU did not show any seizure activity  -received TPA d/t concern for stroke. MRI negative for stroke  -back to baseline, no neuro deficits  -continue keppra 500mg BID  -[05/15/17] Neuro rec (Dr. Kacy Zheng) is Keppra 1000 mg BID as an outpatient and outpatient F/U with Neurology      ESRD (end stage renal disease) on dialysis  -HD on MWF, dialysis completed yesterday (5/15)  -nephrology following, patient having poor clearance  -AVG as outpatient  -Pt scheduled for a vein mapping on [05/16/17] pending medical clearance      Suicidal ideation  -SI 5/11afternoon, and was PEC'd  -Psych consulted, recommend rescinding PEC 5/12  -Pt denied SI today [05/15/17]    Diarrhea  -patient was on flagyl empirically  -d/c flagyl 5/12 and send for stool studies.  -[05/12/17] C. Diff EIA ordered - not yet collected    Thrombocytopenia, unspecified  -platelets 131 (stabilizing)  -T score 3  -platelets  began to decrease prior to heparin exposure  -low suspicion for HIT  -repeat CBC and continue to monitor    UTI (urinary tract infection)  -Pt complains of dysuria [05/15/17]  -Urinalysis ordered [05/15/17]  -F/U urinalysis      VTE Risk Mitigation         Ordered     heparin (porcine) injection 5,000 Units  Every 8 hours     Route:  Subcutaneous        05/10/17 1204     Medium Risk of VTE  Once      05/08/17 1141     Reason for No Pharmacological VTE Prophylaxis  Once      05/08/17 1141     Place sequential compression device  Until discontinued      05/08/17 1141        Disposition: Recommendation is SNF on D/C.      Javon Hodge MD  Department of Hospital Medicine   [facility]

## 2017-05-15 NOTE — PLAN OF CARE
Problem: Patient Care Overview  Goal: Plan of Care Review  Outcome: Ongoing (interventions implemented as appropriate)  Plan of care reviewed with patient. Patient remains free from injury. No acute events noted at this time. Plan for dialysis today. Will continue to monitor patient.

## 2017-05-15 NOTE — PHARMACY MED REC
"Admission Medication Reconciliation - Pharmacy Consult Note    The home medication history was taken by Mary Dumont Pharmacy Tech.  Based on information gathered and subsequent review by the clinical pharmacist, the items below may need attention.     You may go to "Admission" then "Reconcile Home Medications" tabs to review and/or act upon these items. Based on information gathered and subsequent review by the clinical pharmacist, the items below may need attention.    Potentially problematic discrepancies with current MAR  o Patient IS taking the following which was not ordered upon admit  o Hydralazine 50mg PO TID  o Patient IS NOT taking the following which was ordered upon admit  o Levetiracetam   o Amlodipine  o Risperidone    Rowena Lea, PharmD  [id number]        Patient's prior to admission medication regimen was as follows:  Medication Sig    aspirin (ECOTRIN) 81 MG EC tablet Take 81 mg by mouth once daily.    atorvastatin (LIPITOR) 40 MG tablet Take 40 mg by mouth once daily.    carboxymethylcellulose (REFRESH PLUS) 0.5 % Dpet Place 1 drop into both eyes 5 (five) times daily.    hydrALAZINE (APRESOLINE) 50 MG tablet Take 50 mg by mouth every 8 (eight) hours.    lorazepam (ATIVAN) 0.5 MG tablet Take 0.5 mg by mouth 2 (two) times daily as needed for Anxiety.    omeprazole (PRILOSEC) 40 MG capsule Take 40 mg by mouth once daily.    sevelamer carbonate (RENVELA) 800 mg Tab Take 800 mg by mouth 3 (three) times daily with meals.    vitamin renal formula, B-complex-vitamin c-folic acid, (NEPHROCAPS) 1 mg Cap Take 1 capsule by mouth once daily.    white petrolatum-mineral oil 56.8-42.5% (REFRESH LACRI-LUBE) 56.8-42.5 % Oint Apply a thin ribbon to bottom eye lid every night         Please add appropriate    SmartPhrase below:        "

## 2017-05-15 NOTE — PLAN OF CARE
Problem: SLP Goal  Goal: SLP Goal  Speech Language Pathology Goals  Goals expected to be met by 5/16  1. Pt will tolerate dental soft diet with thin liquids without overt s/s aspiration.  2. Pt will complete simple problem solving/reasoning tasks with 80% accy with min cues.  3. Pt will name 10 items per concrete cat with min cues to improve attn/thought organization.  4. Pt will complete further assessment of reading, writing and visual spatial skills.      Outcome: Ongoing (interventions implemented as appropriate)  Pt participated well w/ therapy tasks and is motivated to improve.  Cont ST per POC.     Alma Esparza CCC-SLP  5/15/2017

## 2017-05-15 NOTE — SUBJECTIVE & OBJECTIVE
Interval History: NAEON, VS stable.  Pt noted itching over face and bilateral UE's.  Pt feels well, is in a good mood, and is looking forward to going home.    Review of Systems   Constitutional: Negative for chills.        Pt affirms sleep well.   HENT: Negative for hearing loss, sore throat and trouble swallowing.    Eyes: Negative for pain.   Respiratory: Negative for cough, shortness of breath and wheezing.    Cardiovascular: Negative for chest pain, palpitations and leg swelling.   Gastrointestinal: Negative for abdominal pain, constipation, diarrhea, nausea and vomiting.   Genitourinary: Positive for dysuria.   Musculoskeletal: Negative for myalgias and neck stiffness.   Neurological: Negative for dizziness and syncope.   Psychiatric/Behavioral: Negative for suicidal ideas.        Pt affirms being in a good mood.     Objective:     Vital Signs (Most Recent):  Temp: 97.9 °F (36.6 °C) (05/15/17 1737)  Pulse: 74 (05/15/17 1737)  Resp: 16 (05/15/17 1615)  BP: (!) 100/52 (05/15/17 1737)  SpO2: 98 % (05/15/17 1200) Vital Signs (24h Range):  Temp:  [97.7 °F (36.5 °C)-98.7 °F (37.1 °C)] 97.9 °F (36.6 °C)  Pulse:  [70-96] 74  Resp:  [16] 16  SpO2:  [98 %-99 %] 98 %  BP: (100-143)/(52-68) 100/52     Weight: 74.3 kg (163 lb 12.8 oz)  Body mass index is 21.61 kg/(m^2).    Intake/Output Summary (Last 24 hours) at 05/15/17 1747  Last data filed at 05/15/17 1700   Gross per 24 hour   Intake              836 ml   Output             2650 ml   Net            -1814 ml      Physical Exam   Constitutional: He appears well-developed. No distress.   HENT:   Mouth/Throat: Oropharynx is clear and moist. No oropharyngeal exudate.   Eyes: EOM are normal. Pupils are equal, round, and reactive to light. Right eye exhibits no discharge. Left eye exhibits no discharge. No scleral icterus.   Appears to have bilateral cataracts   Neck: Normal range of motion. Neck supple. No thyromegaly present.   Cardiovascular: Normal rate and regular  rhythm.    Murmur (Possible aortic stenosis) heard.  Audible thrill over A/V fistula in right forearm   Pulmonary/Chest: Effort normal and breath sounds normal. No stridor. No respiratory distress. He has no wheezes. He has no rales.   Abdominal: Soft. Bowel sounds are normal. There is no tenderness. There is no guarding.   Musculoskeletal: Normal range of motion. He exhibits no edema or tenderness.   Lymphadenopathy:     He has no cervical adenopathy (No superaclavicular lymphadenopathy).   Neurological: He is alert. Coordination normal.   Skin: Skin is warm and dry. He is not diaphoretic. No erythema. No pallor.   Psychiatric: He has a normal mood and affect. His behavior is normal.   Vitals reviewed.      Significant Labs: All pertinent labs within the past 24 hours have been reviewed.    Significant Imaging: I have reviewed all pertinent imaging results/findings within the past 24 hours.

## 2017-05-15 NOTE — PROGRESS NOTES
Pt arrived via stretcher.  Placed on cardiac monitor and b/p check every 15 minutes.  Right upper arm Dialysis access prep with prevantic swab.  maintenace  Hemodialysis started per orders.

## 2017-05-15 NOTE — PROGRESS NOTES
[facility]  Nephrology  Progress Note    Patient Name: Bernabe Greco Jr.  MRN: [id number]  Admission Date: 5/8/2017  Hospital Length of Stay: 7 days  Attending Provider: Viviana Gutiérrez MD   Primary Care Physician: Provider Notinsystem  Principal Problem:Seizure    Subjective:     Interval History: NAEON. Patient evaluated during HD and no complaints.     Review of patient's allergies indicates:   Allergen Reactions    Penicillins Rash     Current Facility-Administered Medications   Medication Frequency    0.9%  NaCl infusion PRN    0.9%  NaCl infusion PRN    0.9%  NaCl infusion Once    amlodipine tablet 10 mg Daily    aspirin EC tablet 81 mg Daily    atorvastatin tablet 40 mg Daily    dextrose 50% injection 12.5 g PRN    diphenhydrAMINE capsule 25 mg Q6H PRN    glucagon (human recombinant) injection 1 mg PRN    heparin (porcine) injection 5,000 Units Q8H    insulin aspart pen 1-10 Units QID (AC + HS) PRN    levetiracetam tablet 500 mg BID    ondansetron injection 4 mg Q6H PRN    risperidone tablet 0.5 mg Q8H PRN    sevelamer carbonate tablet 1,600 mg TID WM    sodium chloride 0.9% flush 3 mL Q8H       Objective:     Vital Signs (Most Recent):  Temp: 98 °F (36.7 °C) (05/15/17 1500)  Pulse: 83 (05/15/17 1615)  Resp: 16 (05/15/17 1615)  BP: 124/63 (05/15/17 1615)  SpO2: 98 % (05/15/17 1200)  O2 Device (Oxygen Therapy): room air (05/15/17 0801) Vital Signs (24h Range):  Temp:  [97.7 °F (36.5 °C)-98.7 °F (37.1 °C)] 98 °F (36.7 °C)  Pulse:  [70-96] 83  Resp:  [16] 16  SpO2:  [98 %-99 %] 98 %  BP: (118-143)/(63-68) 124/63     Weight: 74.3 kg (163 lb 12.8 oz) (05/10/17 0605)  Body mass index is 21.61 kg/(m^2).  Body surface area is 1.96 meters squared.    I/O last 3 completed shifts:  In: 240 [P.O.:230; I.V.:10]  Out: 10 [Urine:10]    Physical Exam   Constitutional: He appears well-developed and well-nourished. No distress.   HENT:   Head: Normocephalic and atraumatic.   Right Ear:  External ear normal.   Left Ear: External ear normal.   Mouth/Throat: Oropharynx is clear and moist.   Neck: Normal range of motion. Neck supple. No JVD present.   Cardiovascular: Normal rate, regular rhythm and normal heart sounds.  Exam reveals no gallop and no friction rub.    No murmur heard.  Pulmonary/Chest: Effort normal and breath sounds normal. No respiratory distress. He has no wheezes. He has no rales.   Abdominal: Soft. Bowel sounds are normal. He exhibits no distension. There is no tenderness.   Musculoskeletal: He exhibits no edema.   Neurological: He is alert.   Skin: Skin is warm and dry. He is not diaphoretic.       Significant Labs:  CBC:   Recent Labs  Lab 05/15/17  0342   WBC 8.12   RBC 3.28*   HGB 9.7*   HCT 29.0*   *   MCV 88   MCH 29.6   MCHC 33.4     CMP:   Recent Labs  Lab 05/15/17  0342   *   CALCIUM 8.6*   ALBUMIN 2.8*   PROT 6.1      K 4.2   CO2 22*      BUN 86*   CREATININE 11.6*   ALKPHOS 76   ALT 12   AST 16   BILITOT 0.6        Significant Imaging:  Labs: Reviewed  X-Ray: Reviewed    Assessment/Plan:     ESRD (end stage renal disease) on dialysis  - Will provide dialysis for metabolic clearance and volume   - Seen and examined today during hemodialysis; tolerating treatment well without issues. Denied headaches, chest pain, abdominal pain, or muscle cramps   - Target ultrafiltration up to 1-2L as tolerated by patient  - Dialysate adjusted to current labs              Anemia associated with chronic renal failure  - H/H stable at 11.7.  Goal in ESRD of 10-11.  - Currently not at goal   - Will provide epoetin 4,000 units today on HD      Chronic kidney disease-mineral and bone disorder  - Phos elevated at 6.9  - Continue Renvela to 1,600 mg PO TID with meals  -Corrected calcium WNL at 9.6    Duy Bravo MD  Nephrology  [facility]

## 2017-05-15 NOTE — PLAN OF CARE
SW placed call to pt's daughter, Deanna Greco at [phone] to discuss discharge planning and SNF choices. Deanna provided the following choices 1. [facility] 2. [facility]. SNF referrals sent via [facility] per [facility].

## 2017-05-15 NOTE — NURSING TRANSFER
Nursing Transfer Note      5/15/2017     Transfer from dialysis to 910    Transfer via stretcher    Transfer with telemetry    Transported by transport tech    Medicines sent: n/a    Chart send with patient: no      Patient reassessed at: 5/15/17 1806    Upon arrival to floor: Patient assessed. Call bell in reach. Bed in lowest position. Will continue to monitor patient.

## 2017-05-15 NOTE — PT/OT/SLP PROGRESS
"Speech Language Pathology  Treatment    Bernabe Greco Jr.   MRN: [id number]   Admitting Diagnosis: Seizure    Diet recommendations: Solid Diet Level: Dental Soft  Liquid Diet Level: Thin HOB to 90 degrees, Small bites/sips, 1 bite/sip at a time and Meds whole 1 at a time    SLP Treatment Date: 05/15/17  Speech Start Time: 1056     Speech Stop Time: 1116     Speech Total (min): 20 min       TREATMENT BILLABLE MINUTES:  Speech Therapy Individual 10 and Seld Care/Home Management Training 10    Has the patient been evaluated by SLP for swallowing? : Yes  Keep patient NPO?: No   General Precautions: Standard, aspiration, fall, seizure          Subjective:  "I feel fine, like I could get up and run."  Pt stated when asked how he was feeling.    Pain Ratin/10              Pain Rating Post-Intervention: 0/10    Objective:    Pt completed word fluency tasks w/ recall of 9 items from a given category in two separate 60 sec timed trials given min-mod cues.  He completed reasoning tasks w/ abstract divergent naming w/ 100% acc given occasional cues x2.  He was able to recall conversation w/ SLP from previous session last week indep.  He stated that his spouse assists him w/ medicines, bills and appointments.  He was provided education re: compensatory strategies for memory including making lists, rehearsal and making routines.  He indicated good understanding.    FIM:                                 Assessment:  Bernabe Greco Jr. is a 77 y.o. male with a medical diagnosis of Seizure and presents with cognitive-linguistic impairment.    Discharge recommendations: Discharge Facility/Level Of Care Needs: other (see comments) (pending pt/ot recs and pt progress)     Goals:   SLP Goals        Problem: SLP Goal    Goal Priority Disciplines Outcome   SLP Goal     SLP Ongoing (interventions implemented as appropriate)   Description:  Speech Language Pathology Goals  Goals expected to be met by   1. Pt will tolerate dental soft diet " with thin liquids without overt s/s aspiration.  2. Pt will complete simple problem solving/reasoning tasks with 80% accy with min cues.  3. Pt will name 10 items per concrete cat with min cues to improve attn/thought organization.  4. Pt will complete further assessment of reading, writing and visual spatial skills.                    Plan:   Patient to be seen Therapy Frequency: 3 x/week   Plan of Care expires: 06/08/17  Plan of Care reviewed with: patient  SLP Follow-up?: Yes  SLP - Next Visit Date: 05/11/17           Alma Esparza CCC-SLP  05/15/2017

## 2017-05-15 NOTE — PLAN OF CARE
Problem: Patient Care Overview  Goal: Plan of Care Review  Outcome: Ongoing (interventions implemented as appropriate)  2 hr hd completed, net fluid [id number], target goal achieved, pt tolerated well. Report given to Amanda ISAAC.

## 2017-05-15 NOTE — ASSESSMENT & PLAN NOTE
-patient was on flagyl empirically  -d/c flagyl 5/12 and send for stool studies.  -[05/12/17] C. Diff EIA ordered - not yet collected

## 2017-05-15 NOTE — SUBJECTIVE & OBJECTIVE
Interval History: NAEON. Patient evaluated during HD and no complaints.     Review of patient's allergies indicates:   Allergen Reactions    Penicillins Rash     Current Facility-Administered Medications   Medication Frequency    0.9%  NaCl infusion PRN    0.9%  NaCl infusion PRN    0.9%  NaCl infusion Once    amlodipine tablet 10 mg Daily    aspirin EC tablet 81 mg Daily    atorvastatin tablet 40 mg Daily    dextrose 50% injection 12.5 g PRN    diphenhydrAMINE capsule 25 mg Q6H PRN    glucagon (human recombinant) injection 1 mg PRN    heparin (porcine) injection 5,000 Units Q8H    insulin aspart pen 1-10 Units QID (AC + HS) PRN    levetiracetam tablet 500 mg BID    ondansetron injection 4 mg Q6H PRN    risperidone tablet 0.5 mg Q8H PRN    sevelamer carbonate tablet 1,600 mg TID WM    sodium chloride 0.9% flush 3 mL Q8H       Objective:     Vital Signs (Most Recent):  Temp: 98 °F (36.7 °C) (05/15/17 1500)  Pulse: 83 (05/15/17 1615)  Resp: 16 (05/15/17 1615)  BP: 124/63 (05/15/17 1615)  SpO2: 98 % (05/15/17 1200)  O2 Device (Oxygen Therapy): room air (05/15/17 0801) Vital Signs (24h Range):  Temp:  [97.7 °F (36.5 °C)-98.7 °F (37.1 °C)] 98 °F (36.7 °C)  Pulse:  [70-96] 83  Resp:  [16] 16  SpO2:  [98 %-99 %] 98 %  BP: (118-143)/(63-68) 124/63     Weight: 74.3 kg (163 lb 12.8 oz) (05/10/17 0605)  Body mass index is 21.61 kg/(m^2).  Body surface area is 1.96 meters squared.    I/O last 3 completed shifts:  In: 240 [P.O.:230; I.V.:10]  Out: 10 [Urine:10]    Physical Exam   Constitutional: He appears well-developed and well-nourished. No distress.   HENT:   Head: Normocephalic and atraumatic.   Right Ear: External ear normal.   Left Ear: External ear normal.   Mouth/Throat: Oropharynx is clear and moist.   Neck: Normal range of motion. Neck supple. No JVD present.   Cardiovascular: Normal rate, regular rhythm and normal heart sounds.  Exam reveals no gallop and no friction rub.    No murmur  heard.  Pulmonary/Chest: Effort normal and breath sounds normal. No respiratory distress. He has no wheezes. He has no rales.   Abdominal: Soft. Bowel sounds are normal. He exhibits no distension. There is no tenderness.   Musculoskeletal: He exhibits no edema.   Neurological: He is alert.   Skin: Skin is warm and dry. He is not diaphoretic.       Significant Labs:  CBC:   Recent Labs  Lab 05/15/17  0342   WBC 8.12   RBC 3.28*   HGB 9.7*   HCT 29.0*   *   MCV 88   MCH 29.6   MCHC 33.4     CMP:   Recent Labs  Lab 05/15/17  0342   *   CALCIUM 8.6*   ALBUMIN 2.8*   PROT 6.1      K 4.2   CO2 22*      BUN 86*   CREATININE 11.6*   ALKPHOS 76   ALT 12   AST 16   BILITOT 0.6        Significant Imaging:  Labs: Reviewed  X-Ray: Reviewed

## 2017-05-15 NOTE — ASSESSMENT & PLAN NOTE
-SI 5/11afternoon, and was PEC'd  -Psych consulted, recommend rescinding PEC 5/12  -Pt denied SI today [05/15/17]

## 2017-05-15 NOTE — ASSESSMENT & PLAN NOTE
- H/H stable at 11.7.  Goal in ESRD of 10-11.  - Currently not at goal   - Will provide epoetin 4,000 units today on HD

## 2017-05-15 NOTE — ASSESSMENT & PLAN NOTE
-witnessed seizure activity at OSH, hx of seizure  -EEG in NICU did not show any seizure activity  -received TPA d/t concern for stroke. MRI negative for stroke  -back to baseline, no neuro deficits  -continue keppra 500mg BID  -[05/15/17] Neuro rec (Dr. Kacy Zheng) is Keppra 1000 mg BID as an outpatient and outpatient F/U with Neurology

## 2017-05-15 NOTE — NURSING TRANSFER
Nursing Transfer Note      5/15/2017     Transfer 910 to dialysis    Transfer via stretcher    Transfer with telelmetry    Transported by [employer]    Medicines sent: n/a    Chart send with patient: no    Notified: family at bedside

## 2017-05-15 NOTE — ASSESSMENT & PLAN NOTE
-HD on MWF, dialysis completed yesterday (5/15)  -nephrology following, patient having poor clearance  -AVG as outpatient  -Pt scheduled for a vein mapping on [05/16/17] pending medical clearance

## 2017-05-16 PROBLEM — N39.0 UTI (URINARY TRACT INFECTION): Status: ACTIVE | Noted: 2017-01-01

## 2017-05-16 NOTE — ASSESSMENT & PLAN NOTE
"-Pt complains of dysuria [05/15/17]  -[05/15/17] Urine culture noted 47 WBC's and "many" bacteria  -Treating with Ciprofloxacin        "

## 2017-05-16 NOTE — PLAN OF CARE
05/16/17 1629   Discharge Reassessment   Assessment Type Discharge Planning Reassessment   Can the patient answer the patient profile reliably? Yes, cognitively intact   How does the patient rate their overall health at the present time? Fair   Describe the patient's ability to walk at the present time. Minor restrictions or changes   How often would a person be available to care for the patient? Whenever needed   Number of comorbid conditions (as recorded on the chart) Three   During the past month, has the patient often been bothered by feeling down, depressed or hopeless? No   During the past month, has the patient often been bothered by little interest or pleasure in doing things? No   Discharge plan remains the same: No   Provided patient/caregiver education on the expected discharge date and the discharge plan Yes   Discharge Plan A Skilled Nursing Facility   Discharge Plan B Home;Home Health   Change in patient condition or support system No   Explained to the the patient/caregiver why the discharge planned changed: Yes   Involved the patient/caregiver in establishing a new discharge plan: Yes

## 2017-05-16 NOTE — PLAN OF CARE
Problem: SLP Goal  Goal: SLP Goal  Speech Language Pathology Goals  Goals expected to be met by 5/16  1. Pt will tolerate dental soft diet with thin liquids without overt s/s aspiration. -Goal Met  2. Pt will complete simple problem solving/reasoning tasks with 80% accy with min cues. -Goal Met  3. Pt will name 10 items per concrete cat with min cues to improve attn/thought organization. -Goal Met  4. Pt will complete further assessment of reading, writing and visual spatial skills. -Goal Met     Outcome: Outcome(s) achieved Date Met:  05/16/17  Pt has met all goals and appears to be at cognitive-linguistic baseline.  Further Skilled Speech services are not indicated at this time.  ST to s/o.  Thank you.     Alma Esparza, CCC-SLP  5/16/2017

## 2017-05-16 NOTE — PLAN OF CARE
"Problem: Patient Care Overview  Goal: Plan of Care Review  Outcome: Ongoing (interventions implemented as appropriate)  Vss. AAOx4. BG= 163, no coverage needed. Pt displayed a moment of sadness/depression stating, "my wife doesn't love me anymore". Called vida(daughter) and the pt felt a bit relieved. Skin intact, no new skin breakdown during shift. Pt remained free of falls/injury/trauma throughout shift. No acute events overnight. Will continue to monitor and reassess.       "

## 2017-05-16 NOTE — PROGRESS NOTES
[facility] Hospital Medicine  Progress Note    Patient Name: Bernabe Greco Jr.  MRN: [id number]  Patient Class: IP- Inpatient   Admission Date: 5/8/2017  Length of Stay: 8 days  Attending Physician: Pb Concepcion MD  Primary Care Provider: Provider [facility] Medicine Team: [facility] HOSP MED 1 Javon Hodge MD    Subjective:     Principal Problem:Seizure    HPI:  Patient is a 77 year old male with PMH of DM, HTN, HLD, prior stroke (no residual deficits), seizures who was transferred from [place] after receiving IV-tPA . Symptoms began at 8:00 am while with home health nurse.  He told her he was having a headache at the time of symptoms onset.When he arrived to [facility] staff witnessed a seizure (EMS did not have description).  Wife said she noticed he was not moving left arm or both of his legs.  He also mumbled frequently and said this is exactly the same the last time he had a stroke.  CTA multiphase done and no LVO seen.         Per ED staff, patient had GTC seizure like activity and received 1 g of keppra. On my exam patient slightly confused and agitated, on low flow nasal cannula maintaining O2 sats. Not cooperative with exam but follows commands when daughter asks.      EEG with mild slowing on R. Will admit to NCC for post tpa monitoring and obtain MRI brain for completion of stroke work up. Of note, patient is on chronic dialysis, missing today's treatment. Creatine 13 today . Nephrology consulted who agrees to dialyze tomorrow.         Hospital Course:  5/12/17: Transferred to hospital medicine. Psych consulted regarding SI, rec rescinding PEC. Patient to have vascular access issue addressed as outpatient per Nephrology. PT/Ot rec SNF    5/13: SAMMY pt tolerated HD well, will need vascular surg f/u for new AVG   5/14: Delirium overnight, pulled out IV. AOx4 this AM. No diarrhea.  5/15: Pt noted itching over face and bilateral UE's.  Pt feels well, is in a good mood,  denies SI, and is looking forward to going home.  5/16: chris CHRISTIANSON no longer complaining of facial itching.  Pt feels well and is looking forward to going home.      Interval History: SAMMY pt no longer complaining of facial itching.  Pt feels well and is looking forward to going home.    Review of Systems   Constitutional: Negative for chills and fever.        Pt affirms sleeping well   HENT: Negative for sore throat and trouble swallowing.    Cardiovascular: Negative for chest pain and palpitations.   Gastrointestinal: Negative for abdominal pain, constipation, diarrhea, nausea and vomiting.        Last BM last night   Genitourinary: Positive for dysuria.   Musculoskeletal: Negative for myalgias and neck pain.   Neurological: Positive for dizziness and headaches. Negative for syncope.   Psychiatric/Behavioral:        Pt affirms being in a good mood     Objective:     Vital Signs (Most Recent):  Temp: 98.6 °F (37 °C) (05/16/17 1200)  Pulse: 81 (05/16/17 1200)  Resp: 16 (05/16/17 1200)  BP: 131/62 (05/16/17 1200)  SpO2: 97 % (05/16/17 1200) Vital Signs (24h Range):  Temp:  [97.9 °F (36.6 °C)-98.7 °F (37.1 °C)] 98.6 °F (37 °C)  Pulse:  [67-89] 81  Resp:  [16-17] 16  SpO2:  [96 %-100 %] 97 %  BP: (100-138)/(52-71) 131/62     Weight: 74.3 kg (163 lb 12.8 oz)  Body mass index is 21.61 kg/(m^2).    Intake/Output Summary (Last 24 hours) at 05/16/17 3975  Last data filed at 05/16/17 0513   Gross per 24 hour   Intake              120 ml   Output              200 ml   Net              -80 ml      Physical Exam   Constitutional: He is oriented to person, place, and time. He appears well-developed and well-nourished. No distress.   HENT:   Mouth/Throat: Oropharynx is clear and moist. No oropharyngeal exudate.   Eyes: Conjunctivae and EOM are normal. Pupils are equal, round, and reactive to light. Right eye exhibits no discharge. Left eye exhibits no discharge. No scleral icterus.   Neck: Normal range of motion. Neck supple. No  thyromegaly present.   Cardiovascular: Normal rate, regular rhythm and normal heart sounds.    No murmur heard.  Pulmonary/Chest: Effort normal and breath sounds normal. No stridor. No respiratory distress. He has no wheezes.   Abdominal: Soft. Bowel sounds are normal. There is no tenderness. There is no guarding.   Musculoskeletal: Normal range of motion. He exhibits no edema or tenderness.   Lymphadenopathy:     He has no cervical adenopathy (no supraclavicular lymphadenopathy).   Neurological: He is oriented to person, place, and time. Coordination normal.   Skin: Skin is warm and dry. He is not diaphoretic. No erythema. No pallor.   Psychiatric:   Good mood today   Vitals reviewed.      Significant Labs: All pertinent labs within the past 24 hours have been reviewed.    Significant Imaging: I have reviewed all pertinent imaging results/findings within the past 24 hours.    Assessment/Plan:      * Seizure  -witnessed seizure activity at OSH, hx of seizure  -EEG in NICU did not show any seizure activity  -received TPA d/t concern for stroke. MRI negative for stroke  -back to baseline, no neuro deficits  -continue keppra 500mg BID  -The alleged seizure was the patient's FIRST-TIME seizure.  Neuro's recommendation for a first-time seizure is that anti-seizure prophylaxis is not given, and that the patient be advised not to drive for a period of 6 months.  Of note, the patient has not driven in years and has no plans to do so, going forward.      ESRD (end stage renal disease) on dialysis  -HD on MWF, dialysis completed yesterday (5/15)  -nephrology following, patient having poor clearance  -AVG as outpatient  -Pt scheduled for a vein mapping on [05/16/17]       Suicidal ideation  -SI 5/11afternoon, and was PEC'd  -Psych consulted, recommend rescinding PEC 5/12  -Pt denied SI on [05/15/17]    Diarrhea  -patient was on flagyl empirically  -d/c flagyl 5/12 and send for stool studies.  -[05/12/17] C. Diff EIA ordered -  "not yet collected    UTI (urinary tract infection)  -Pt complains of dysuria [05/15/17]  -[05/15/17] Urine culture noted 47 WBC's and "many" bacteria  -Treating with Ciprofloxacin          VTE Risk Mitigation         Ordered     heparin (porcine) injection 5,000 Units  Every 8 hours     Route:  Subcutaneous        05/10/17 1204     Medium Risk of VTE  Once      05/08/17 1141     Reason for No Pharmacological VTE Prophylaxis  Once      05/08/17 1141     Place sequential compression device  Until discontinued      05/08/17 1141          Disposition: On D/C, patient will continue UTI treatment with remainder of 7 day Ciprofloxacin regimen and follow-up with Neurology.  Per Case Management (Mann chavarria), the patient is waiting on a decision for acceptance at [facility].    Javon Hodge MD  Department of Hospital Medicine   [facility]  "

## 2017-05-16 NOTE — SUBJECTIVE & OBJECTIVE
Interval History: NAEON, pt no longer complaining of facial itching.  Pt feels well and is looking forward to going home.    Review of Systems   Constitutional: Negative for chills and fever.        Pt affirms sleeping well   HENT: Negative for sore throat and trouble swallowing.    Cardiovascular: Negative for chest pain and palpitations.   Gastrointestinal: Negative for abdominal pain, constipation, diarrhea, nausea and vomiting.        Last BM last night   Genitourinary: Positive for dysuria.   Musculoskeletal: Negative for myalgias and neck pain.   Neurological: Positive for dizziness and headaches. Negative for syncope.   Psychiatric/Behavioral:        Pt affirms being in a good mood     Objective:     Vital Signs (Most Recent):  Temp: 98.6 °F (37 °C) (05/16/17 1200)  Pulse: 81 (05/16/17 1200)  Resp: 16 (05/16/17 1200)  BP: 131/62 (05/16/17 1200)  SpO2: 97 % (05/16/17 1200) Vital Signs (24h Range):  Temp:  [97.9 °F (36.6 °C)-98.7 °F (37.1 °C)] 98.6 °F (37 °C)  Pulse:  [67-89] 81  Resp:  [16-17] 16  SpO2:  [96 %-100 %] 97 %  BP: (100-138)/(52-71) 131/62     Weight: 74.3 kg (163 lb 12.8 oz)  Body mass index is 21.61 kg/(m^2).    Intake/Output Summary (Last 24 hours) at 05/16/17 1731  Last data filed at 05/16/17 0513   Gross per 24 hour   Intake              120 ml   Output              200 ml   Net              -80 ml      Physical Exam   Constitutional: He is oriented to person, place, and time. He appears well-developed and well-nourished. No distress.   HENT:   Mouth/Throat: Oropharynx is clear and moist. No oropharyngeal exudate.   Eyes: Conjunctivae and EOM are normal. Pupils are equal, round, and reactive to light. Right eye exhibits no discharge. Left eye exhibits no discharge. No scleral icterus.   Neck: Normal range of motion. Neck supple. No thyromegaly present.   Cardiovascular: Normal rate, regular rhythm and normal heart sounds.    No murmur heard.  Pulmonary/Chest: Effort normal and breath sounds  normal. No stridor. No respiratory distress. He has no wheezes.   Abdominal: Soft. Bowel sounds are normal. There is no tenderness. There is no guarding.   Musculoskeletal: Normal range of motion. He exhibits no edema or tenderness.   Lymphadenopathy:     He has no cervical adenopathy (no supraclavicular lymphadenopathy).   Neurological: He is oriented to person, place, and time. Coordination normal.   Skin: Skin is warm and dry. He is not diaphoretic. No erythema. No pallor.   Psychiatric:   Good mood today   Vitals reviewed.      Significant Labs: All pertinent labs within the past 24 hours have been reviewed.    Significant Imaging: I have reviewed all pertinent imaging results/findings within the past 24 hours.

## 2017-05-16 NOTE — PLAN OF CARE
Covering ZE was asked to follow-up on referrals sent the previous day. ZE placed call to [facility] ([phone]) and left a message requesting a return call. ZE then placed call to [facility] ([phone]) and spoke to Cristal who stated they could accept Pt and would initiate authorization request from [facility] today. Cristal stated she had called Pt's wife and she was coming the following day to see the facility.     Amelia Cross LCSW

## 2017-05-16 NOTE — ASSESSMENT & PLAN NOTE
-HD on MWF, dialysis completed yesterday (5/15)  -nephrology following, patient having poor clearance  -AVG as outpatient  -Pt scheduled for a vein mapping on [05/16/17]

## 2017-05-16 NOTE — ASSESSMENT & PLAN NOTE
-witnessed seizure activity at OSH, hx of seizure  -EEG in NICU did not show any seizure activity  -received TPA d/t concern for stroke. MRI negative for stroke  -back to baseline, no neuro deficits  -continue keppra 500mg BID  -The alleged seizure was the patient's FIRST-TIME seizure.  Neuro's recommendation for a first-time seizure is that anti-seizure prophylaxis is not given, and that the patient be advised not to drive for a period of 6 months.  Of note, the patient has not driven in years and has no plans to do so, going forward.

## 2017-05-16 NOTE — PT/OT/SLP PROGRESS
"Speech Language Pathology  Treatment  Discharge Summary    Bernabe Greco Jr.   MRN: [id number]   Admitting Diagnosis: Seizure    Diet recommendations: Solid Diet Level: Dental Soft  Liquid Diet Level: Thin Standard Aspiration precautions    SLP Treatment Date: 17  Speech Start Time: 1124     Speech Stop Time: 1134     Speech Total (min): 10 min       TREATMENT BILLABLE MINUTES:  Speech Therapy Individual 10    Has the patient been evaluated by SLP for swallowing? : Yes  Keep patient NPO?: No   General Precautions: Standard, aspiration, fall, seizure          Subjective:  "I know that staying in the bed makes you weak, but I can;t get up by myself."  Pt stated    Pain Ratin/10              Pain Rating Post-Intervention: 0/10    Objective:   Patient found with: peripheral IV    Pt completed reasoning tasks w/ convergent naming tasks w/ 100% acc given min cues.  He completed verbal problem-solving tasks for safety in the home and community w/ 100% acc.  He stated that he feels like he is at his baseline skills for critical thinking skills.  He reported no difficulty w/ meals.  Further Skilled Speech services are not indicated.  Education was provided to pt re: SLP discharge.  He indicated good understanding and agreed w/ recommendations.    FIM:                                 Assessment:  Bernabe Greco Jr. is a 77 y.o. male with a medical diagnosis of Seizure and presents with functional cognitive-linguistic skills and swallowing skills at this time.  Further Skilled Speech services are not indicated at this time.    Discharge recommendations: Discharge Facility/Level Of Care Needs: nursing facility, skilled     Goals:   SLP Goals     Not on file      Multidisciplinary Problems (Resolved)        Problem: SLP Goal    Goal Priority Disciplines Outcome   SLP Goal   (Resolved)     SLP Outcome(s) achieved   Description:  Speech Language Pathology Goals  Goals expected to be met by   1. Pt will tolerate dental soft " diet with thin liquids without overt s/s aspiration. -Goal Met  2. Pt will complete simple problem solving/reasoning tasks with 80% accy with min cues. -Goal Met  3. Pt will name 10 items per concrete cat with min cues to improve attn/thought organization. -Goal Met  4. Pt will complete further assessment of reading, writing and visual spatial skills. -Goal Met                    Plan:   Patient to be seen Therapy Frequency: 3 x/week   Plan of Care expires: 06/08/17  Plan of Care reviewed with: patient  SLP Follow-up?: No  SLP - Next Visit Date: 05/11/17           Alma Esparza CCC-SLP  05/16/2017

## 2017-05-16 NOTE — ASSESSMENT & PLAN NOTE
-SI 5/11afterсергейon, and was PEC'd  -Psych consulted, recommend rescinding PEC 5/12  -Pt denied SI on [05/15/17]

## 2017-05-16 NOTE — PROGRESS NOTES
[facility]  Adult Nutrition  Progress Note    SUMMARY     Recommendations    Recommendation/Intervention: renal diet+ novosource BID; as GFR improves adv to regular + novosource BID 2. Encourage good PO intake >75%, RD to monitor  Goals: PO intake >75%  Nutrition Goal Status: progressing towards goal  Communication of RD Recs: reviewed with RN    Reason for Assessment    Reason for Assessment: RD follow-up  Diagnosis: stroke/CVA  Relevent Medical History: HTN, HLD, DM   Interdisciplinary Rounds: did not attend     General Information Comments: HD 5/15/17 Pt states not as tired as I usually am. Po intake 50% Likes the soft diet and novosource    Nutrition Discharge Planning: PO intake >50% Regular diet w/ renal OS as needed    Nutrition Prescription Ordered    Current Diet Order: Dental soft w/ thin liquids    Nutrition/Diet History    Patient Reported Diet/Restrictions/Preferences: general  Typical Food/Fluid Intake: typical american diet PTA  Food Preferences: No  Anabaptism or  cultural preferances (Protestant)  Factors Affecting Nutritional Intake: chewing difficulties/inability to chew food (missing teeth)     Labs/Tests/Procedures/Meds      Pertinent Labs Reviewed: reviewed  Pertinent Labs Comments: BUN-63, Cr-10.2, CO2-22, Phos-6.2, GFR-5, Alb-3  Pertinent Medications Reviewed: reviewed  Pertinent Medications Comments: statin, heparin, Insulin,     Physical Findings    Overall Physical Appearance: lethargic  Oral/Mouth Cavity: WDL  Skin: intact    Anthropometrics    Height Method: Stated  Height (inches): 72.99 in  Weight Method: Bed Scale  Weight (kg): 74.3 kg  Ideal Body Weight (IBW), Male: 183.94 lb  % Ideal Body Weight, Male (lb): 89.05 lb  BMI (kg/m2): 21.62  BMI Grade: 18.5-24.9 - normal  Usual Body Weight (UBW), k kg  % Usual Body Weight: 88.74  % Weight Change: 20.5 kg (20% x 2 months)  Weight Loss: unintentional     Estimated/Assessed Needs    Weight Used For Calorie  Calculations: 74.3 kg (163 lb 12.8 oz)   Height (cm): 185.4 cm  Energy Need Method: [place]-St Jeor (x 1.25 (PAL) 1896kcal)   RMR ([place]-St. Jeor Equation): 1522.25  Weight Used For Protein Calculations: 74.3 kg (163 lb 12.8 oz)  Protein Requirements: 89-112g/day (1.2-1.5g/kg)  Fluid Need Method:  (1ml/kcal or per MD)    Assessment and Plan  Malnutrition     Inadequate energy intake  r/t Decreased appetite, poor po intake (50%)and significant weight loss   AEB:Weight loss 45 lb (20.5 kg) x 2 months and po intake 50%    Treatment Strategy:   1. Change diet order to renal diet, texture per SLP recommendation (dental soft)   2. Continue Novasource Renal OS BID 3. RD to monitor     Nutrition Diagnosis Status:   Continuous    Monitor and Evaluation    Food and Nutrient Intake: energy intake, food and beverage intake  Food and Nutrient Adminstration: diet order  Physical Activity and Function: nutrition-related ADLs and IADLs  Anthropometric Measurements: weight, weight change  Biochemical Data, Medical Tests and Procedures:  (all labs)  Nutrition-Focused Physical Findings: overall appearance  % Intake of Estimated Energy Needs: 50 - 75 %  % Meal Intake: 50%    Nutrition Risk    Level of Risk:  (f/u 1x week)    Nutrition Follow-Up    RD Follow-up?: Yes     Trenton Kwan RD

## 2017-05-17 PROBLEM — Z66 DNR (DO NOT RESUSCITATE): Status: ACTIVE | Noted: 2017-01-01

## 2017-05-17 NOTE — ASSESSMENT & PLAN NOTE
- Goal in ESRD of 10-11.5  - Currently not at goal   - Will increase epoetin to 7,000 units today on HD

## 2017-05-17 NOTE — PT/OT/SLP PROGRESS
Physical Therapy      Bernabe Greco Jr.  MRN: [id number]    Patient not seen today secondary to pt away at HD this am; when PT attempted.  PT unable to follow-up in afternoon.  Will follow-up at next scheduled visit.    Mary Houston, PT   05/17/2017

## 2017-05-17 NOTE — PROGRESS NOTES
Patient taken to dialysis via stretcher without night RN completing Ticket to Ride.  Night RN was assisting patient in restroom.  Aguilar Hurd

## 2017-05-17 NOTE — PLAN OF CARE
ZE spoke with Gali from [facility] and they are not taking pt b/c pt wants to go to [facility]. [facility] admission called ZE and insisted that [facility] is primary and [employer] is secondary. ZE called Mann ISAAC CM and confirmed that [employer] was primary b/c it is listed as such on facesheet. Admissions still insists that [facility] is primary.

## 2017-05-17 NOTE — PLAN OF CARE
Problem: Patient Care Overview  Goal: Plan of Care Review  Outcome: Ongoing (interventions implemented as appropriate)  3 hr hd completed, net fluid [id number], target goal achieved, pt tolerated well. Report given to Vannessa AMARAL RN.

## 2017-05-17 NOTE — PROGRESS NOTES
3 HR maintenance HD treatment initiated via Rt Fore arm AVF without difficulty, good a/v flows obtained.

## 2017-05-17 NOTE — ASSESSMENT & PLAN NOTE
- Phos elevated at 5.9  - Continue Renvela to 1,600 mg PO TID with meals  - Corrected calcium WNL

## 2017-05-17 NOTE — SUBJECTIVE & OBJECTIVE
Interval History: NAEON. Evaluated during HD and patient taking breakfast. Refers no complaints and is feeling fine. Wants to go home.     Review of patient's allergies indicates:   Allergen Reactions    Penicillins Rash     Current Facility-Administered Medications   Medication Frequency    0.9%  NaCl infusion PRN    0.9%  NaCl infusion PRN    0.9%  NaCl infusion PRN    0.9%  NaCl infusion Once    amlodipine tablet 10 mg Daily    aspirin EC tablet 81 mg Daily    atorvastatin tablet 40 mg Daily    cefTRIAXone (ROCEPHIN) 1 g in dextrose 5 % 50 mL IVPB Q24H    dextrose 50% injection 12.5 g PRN    diphenhydrAMINE capsule 25 mg Q6H PRN    GI cocktail (mylanta 30 mL, lidocaine 2 % viscous 10 mL, dicyclomine 10 mL) 50 mL Q8H PRN    glucagon (human recombinant) injection 1 mg PRN    heparin (porcine) injection 2,000 Units PRN    heparin (porcine) injection 5,000 Units Q8H    heparin (porcine) injection 500 Units PRN    insulin aspart pen 1-10 Units QID (AC + HS) PRN    ondansetron injection 4 mg Q6H PRN    risperidone tablet 0.5 mg Q8H PRN    sevelamer carbonate tablet 1,600 mg TID WM    sodium chloride 0.9% flush 3 mL Q8H       Objective:     Vital Signs (Most Recent):  Temp: 98.6 °F (37 °C) (05/17/17 0815)  Pulse: 79 (05/17/17 0346)  Resp: 16 (05/17/17 0346)  BP: 120/60 (05/17/17 0346)  SpO2: 97 % (05/17/17 0346)  O2 Device (Oxygen Therapy): room air (05/17/17 0346) Vital Signs (24h Range):  Temp:  [98.6 °F (37 °C)-99.4 °F (37.4 °C)] 98.6 °F (37 °C)  Pulse:  [76-91] 79  Resp:  [16] 16  SpO2:  [95 %-97 %] 97 %  BP: (117-141)/(60-63) 120/60     Weight: 90.1 kg (198 lb 11.2 oz) (05/17/17 0700)  Body mass index is 26.22 kg/(m^2).  Body surface area is 2.15 meters squared.    I/O last 3 completed shifts:  In: 363 [P.O.:360; I.V.:3]  Out: 200 [Urine:200]    Physical Exam   Constitutional: He appears well-developed and well-nourished. No distress.   HENT:   Head: Normocephalic and atraumatic.   Right Ear:  External ear normal.   Left Ear: External ear normal.   Mouth/Throat: Oropharynx is clear and moist.   Neck: Normal range of motion. Neck supple. No JVD present.   Cardiovascular: Normal rate, regular rhythm and normal heart sounds.  Exam reveals no gallop and no friction rub.    No murmur heard.  Pulmonary/Chest: Effort normal and breath sounds normal. No respiratory distress. He has no wheezes. He has no rales.   Abdominal: Soft. Bowel sounds are normal. He exhibits no distension. There is no tenderness.   Musculoskeletal: He exhibits no edema.   Neurological: He is alert.   Skin: Skin is warm and dry. He is not diaphoretic.   Access: Right radiocephalic AV fistula    Significant Labs:  CBC:   Recent Labs  Lab 05/17/17  0345   WBC 9.13   RBC 3.31*   HGB 9.4*   HCT 30.0*      MCV 91   MCH 28.4   MCHC 31.3*     CMP:   Recent Labs  Lab 05/17/17  0345   GLU 91   CALCIUM 9.2   ALBUMIN 2.9*   PROT 6.5      K 5.2*   CO2 20*      BUN 82*   CREATININE 11.8*   ALKPHOS 83   ALT 22   AST 25   BILITOT 0.5        Significant Imaging:  Labs: Reviewed  X-Ray: Reviewed

## 2017-05-18 NOTE — PLAN OF CARE
Problem: Physical Therapy Goal  Goal: Physical Therapy Goal  Goals to be met by: 5/26/17     Patient will increase functional independence with mobility by performing:    Supine to sit with Supervision.  sit to supine with Supervision.  Sit to stand transfer with supervision  Gait x 150 feet with stand by assistance using rolling walker or least restrictive AD.   Able to tolerate exercise for 15-20 reps with supervision.     Outcome: Ongoing (interventions implemented as appropriate)  Goals updated. Continue to progress with PT intervention.     Olesya Callaway, PT, DPT  5/18/2017  Pager: [phone]

## 2017-05-18 NOTE — ASSESSMENT & PLAN NOTE
"-Pt complains of dysuria [05/15/17]  -[05/15/17] Urine culture noted 47 WBC's and "many" bacteria  -Treating with Ciprofloxacin (day 3/7)        "

## 2017-05-18 NOTE — SUBJECTIVE & OBJECTIVE
Interval History: Nursing reported that at approximately [04:00], the pt became agitated and combative, insisted he wanted to leave AMA, and refused medications and labs.  He was subsequently placed on wrist restraints. Conversation with the patient confirmed these events and the patient adamantly asserted he wanted to return home.    Review of Systems   Reason unable to perform ROS: Pt was not willing to undergo ROS, insisting he wanted to return home.     Objective:     Vital Signs (Most Recent):  Temp: 98.2 °F (36.8 °C) (05/18/17 1232)  Pulse: 98 (05/18/17 1232)  Resp: 17 (05/18/17 1232)  BP: 123/63 (05/18/17 1232)  SpO2: 96 % (05/18/17 0053) Vital Signs (24h Range):  Temp:  [98.2 °F (36.8 °C)-99.2 °F (37.3 °C)] 98.2 °F (36.8 °C)  Pulse:  [88-98] 98  Resp:  [16-18] 17  SpO2:  [96 %-98 %] 96 %  BP: (123-166)/(60-74) 123/63     Weight: 90.1 kg (198 lb 11.2 oz)  Body mass index is 26.22 kg/(m^2).  No intake or output data in the 24 hours ending 05/18/17 1707   Physical Exam   Constitutional: He appears well-developed and well-nourished. He appears distressed (2/2 insistance that he be allowed to go home).   Eyes: Conjunctivae and EOM are normal. Right eye exhibits no discharge. Left eye exhibits no discharge. No scleral icterus.   Neck: Normal range of motion.   Cardiovascular: Normal rate, regular rhythm and normal heart sounds.    Pulmonary/Chest: Effort normal and breath sounds normal. No stridor. No respiratory distress.   Abdominal: Soft. Bowel sounds are normal. There is no tenderness. There is no guarding.   Musculoskeletal: Normal range of motion.   Neurological: He is alert.   Skin: Skin is warm and dry. He is not diaphoretic. No pallor.   Psychiatric:   Agitated   Vitals reviewed.      Significant Labs: All pertinent labs within the past 24 hours have been reviewed.    Significant Imaging: I have reviewed all pertinent imaging results/findings within the past 24 hours.

## 2017-05-18 NOTE — PROGRESS NOTES
[facility] Hospital Medicine  Progress Note    Patient Name: Bernabe Greco Jr.  MRN: [id number]  Patient Class: IP- Inpatient   Admission Date: 5/8/2017  Length of Stay: 10 days  Attending Physician: Pb Concepcion MD  Primary Care Provider: Provider [facility] Medicine Team: [facility] 1 Javon Hodge MD    Subjective:     Principal Problem:Seizure    HPI:  Patient is a 77 year old male with PMH of DM, HTN, HLD, prior stroke (no residual deficits), seizures who was transferred from [place] after receiving IV-tPA . Symptoms began at 8:00 am while with home health nurse.  He told her he was having a headache at the time of symptoms onset.When he arrived to [place] staff witnessed a seizure (EMS did not have description).  Wife said she noticed he was not moving left arm or both of his legs.  He also mumbled frequently and said this is exactly the same the last time he had a stroke.  CTA multiphase done and no LVO seen.         Per ED staff, patient had GTC seizure like activity and received 1 g of keppra. On my exam patient slightly confused and agitated, on low flow nasal cannula maintaining O2 sats. Not cooperative with exam but follows commands when daughter asks.      EEG with mild slowing on R. Will admit to [facility] for post tpa monitoring and obtain MRI brain for completion of stroke work up. Of note, patient is on chronic dialysis, missing today's treatment. Creatine 13 today . Nephrology consulted who agrees to dialyze tomorrow.         Hospital Course:  5/12/17: Transferred to hospital medicine. Psych consulted regarding SI, rec rescinding PEC. Patient to have vascular access issue addressed as outpatient per Nephrology. PT/Ot rec SNF    5/13: SAMMY pt tolerated HD well, will need vascular surg f/u for new AVG   5/14: Delirium overnight, pulled out IV. AOx4 this AM. No diarrhea.  5/15: Pt noted itching over face and bilateral UE's.  Pt feels well, is in a good mood,  denies SI, and is looking forward to going home.  5/16: SAMMY, pt no longer complaining of facial itching.  Pt feels well and is looking forward to going home.  5/17: CARIEON, vital signs stable.  5/18: Nursing reported that at approximately [04:00], the pt became agitated and combative, insisted he wanted to leave AMA, and refused medications and labs.  He was subsequently placed on wrist restraints. Conversation with the patient confirmed these events and the patient adamantly asserted he wanted to return home.      Interval History: Nursing reported that at approximately [04:00], the pt became agitated and combative, insisted he wanted to leave AMA, and refused medications and labs.  He was subsequently placed on wrist restraints. Conversation with the patient confirmed these events and the patient adamantly asserted he wanted to return home.    Review of Systems   Reason unable to perform ROS: Pt was not willing to undergo ROS, insisting he wanted to return home.     Objective:     Vital Signs (Most Recent):  Temp: 98.2 °F (36.8 °C) (05/18/17 1232)  Pulse: 98 (05/18/17 1232)  Resp: 17 (05/18/17 1232)  BP: 123/63 (05/18/17 1232)  SpO2: 96 % (05/18/17 0053) Vital Signs (24h Range):  Temp:  [98.2 °F (36.8 °C)-99.2 °F (37.3 °C)] 98.2 °F (36.8 °C)  Pulse:  [88-98] 98  Resp:  [16-18] 17  SpO2:  [96 %-98 %] 96 %  BP: (123-166)/(60-74) 123/63     Weight: 90.1 kg (198 lb 11.2 oz)  Body mass index is 26.22 kg/(m^2).  No intake or output data in the 24 hours ending 05/18/17 1707   Physical Exam   Constitutional: He appears well-developed and well-nourished. He appears distressed (2/2 insistance that he be allowed to go home).   Eyes: Conjunctivae and EOM are normal. Right eye exhibits no discharge. Left eye exhibits no discharge. No scleral icterus.   Neck: Normal range of motion.   Cardiovascular: Normal rate, regular rhythm and normal heart sounds.    Pulmonary/Chest: Effort normal and breath sounds normal. No stridor. No  "respiratory distress.   Abdominal: Soft. Bowel sounds are normal. There is no tenderness. There is no guarding.   Musculoskeletal: Normal range of motion.   Neurological: He is alert.   Skin: Skin is warm and dry. He is not diaphoretic. No pallor.   Psychiatric:   Agitated   Vitals reviewed.      Significant Labs: All pertinent labs within the past 24 hours have been reviewed.    Significant Imaging: I have reviewed all pertinent imaging results/findings within the past 24 hours.    Assessment/Plan:      * Seizure  -witnessed seizure activity at OSH, hx of seizure  -EEG in NICU did not show any seizure activity  -received TPA d/t concern for stroke. MRI negative for stroke  -back to baseline, no neuro deficits  -continue keppra 500mg BID  -The alleged seizure was the patient's FIRST-TIME seizure.  Neuro's recommendation for a first-time seizure is that anti-seizure prophylaxis is not given, and that the patient be advised not to drive for a period of 6 months.  Of note, the patient has not driven in years and has no plans to do so, going forward.      ESRD (end stage renal disease) on dialysis  -HD on MWF, dialysis completed yesterday (5/15)  -nephrology following, patient having poor clearance  -AVG as outpatient  -Pt scheduled for a vein mapping on [05/16/17]       Suicidal ideation  -SI 5/11afternoon, and was PEC'd  -Psych consulted, recommend rescinding PEC 5/12  -Pt denied SI on [05/15/17]    Diarrhea  -patient was on flagyl empirically  -d/c flagyl 5/12 and send for stool studies.      UTI (urinary tract infection)  -Pt complains of dysuria [05/15/17]  -[05/15/17] Urine culture noted 47 WBC's and "many" bacteria  -Treating with Ciprofloxacin (day 3/7)          VTE Risk Mitigation         Ordered     heparin (porcine) injection 5,000 Units  Every 8 hours     Route:  Subcutaneous        05/10/17 1204     Medium Risk of VTE  Once      05/08/17 1141     Reason for No Pharmacological VTE Prophylaxis  Once      " 05/08/17 1141     Place sequential compression device  Until discontinued      05/08/17 1141        Disposition: D/C'd patient to home.      Javon Hodge MD  Department of Hospital Medicine   [facility]

## 2017-05-18 NOTE — DISCHARGE SUMMARY
[facility] Hospital Medicine  Discharge Summary      Patient Name: Bernabe Greco Jr.  MRN: [id number]  Admission Date: 5/8/2017  Hospital Length of Stay: 10 days  Discharge Date and Time:  05/18/2017 5:21 PM  Attending Physician: Pb Concepcion MD   Discharging Provider: Javon Hodge MD  Primary Care Provider: Provider [facility] Medicine Team: [facility] MED 1 Javon Hodge MD    HPI:   Patient is a 77 year old male with PMH of DM, HTN, HLD, prior stroke (no residual deficits), seizures who was transferred from [place] after receiving IV-tPA . Symptoms began at 8:00 am while with home health nurse.  He told her he was having a headache at the time of symptoms onset.When he arrived to [place] staff witnessed a seizure (EMS did not have description).  Wife said she noticed he was not moving left arm or both of his legs.  He also mumbled frequently and said this is exactly the same the last time he had a stroke.  CTA multiphase done and no LVO seen.         Per ED staff, patient had GTC seizure like activity and received 1 g of keppra. On my exam patient slightly confused and agitated, on low flow nasal cannula maintaining O2 sats. Not cooperative with exam but follows commands when daughter asks.      EEG with mild slowing on R. Will admit to [facility] for post tpa monitoring and obtain MRI brain for completion of stroke work up. Of note, patient is on chronic dialysis, missing today's treatment. Creatine 13 today . Nephrology consulted who agrees to dialyze tomorrow.         Procedure(s) (LRB):  FISTULOGRAM (Right)      Indwelling Lines/Drains at time of discharge:   Lines/Drains/Airways     Central Venous Catheter Line                 Hemodialysis Catheter -- days         Percutaneous Central Line Insertion/Assessment - double lumen  01/16/15 right femoral 853 days          Drain                 Hemodialysis AV Fistula Right upper arm -- days              Hospital Course:    5/12/17: Transferred to hospital medicine. Psych consulted regarding SI, rec rescinding PEC. Patient to have vascular access issue addressed as outpatient per Nephrology. PT/Ot rec SNF    5/13: SAMMY, pt tolerated HD well, will need vascular surg f/u for new AVG   5/14: Delirium overnight, pulled out IV. AOx4 this AM. No diarrhea.  5/15: Pt noted itching over face and bilateral UE's.  Pt feels well, is in a good mood, denies SI, and is looking forward to going home.  5/16: KATERINON, pt no longer complaining of facial itching.  Pt feels well and is looking forward to going home.  5/17: SAMMY, vital signs stable.  5/18: Nursing reported that at approximately [04:00], the pt became agitated and combative, insisted he wanted to leave AMA, and refused medications and labs.  He was subsequently placed on wrist restraints. Conversation with the patient confirmed these events and the patient adamantly asserted he wanted to return home.       Consults:   Consults         Status Ordering Provider     Inpatient consult to Nephrology  Once     Provider:  (Not yet assigned)    Completed JOHYN GRANT     Inpatient consult to Physical Medicine Rehab  Once     Provider:  (Not yet assigned)    Completed ANDRE ROMAN     Inpatient consult to Psychiatry  Once     Provider:  (Not yet assigned)    Completed BO QUILES     Inpatient consult to Social Work/Case Management  Once     Provider:  (Not yet assigned)    Acknowledged ANDRE ROMAN     Inpatient consult to Vascular (Stroke) Neurology  Once     Provider:  (Not yet assigned)    Completed KASH DEGROOT     Inpatient consult to Vascular (Stroke) Neurology  Once     Provider:  (Not yet assigned)    Completed ANDRE ROMAN     IP consult to dietary  Once     Provider:  (Not yet assigned)    Completed ANDRE ROMAN          Significant Diagnostic Studies: Labs:   CMP   Recent Labs  Lab 05/17/17  0345 05/18/17  0901    140   K 5.2* 4.1     102   CO2 20* 27   GLU 91 128*   BUN 82* 53*   CREATININE 11.8* 9.6*   CALCIUM 9.2 9.4   PROT 6.5 7.1   ALBUMIN 2.9* 3.1*   BILITOT 0.5 0.6   ALKPHOS 83 76   AST 25 21   ALT 22 25   ANIONGAP 12 11   ESTGFRAFRICA 4.2* 5.4*   EGFRNONAA 3.6* 4.7*    and CBC   Recent Labs  Lab 05/17/17  0345 05/18/17  0901   WBC 9.13 8.22   HGB 9.4* 10.1*   HCT 30.0* 32.5*    198       Pending Diagnostic Studies:     None        Final Active Diagnoses:    Diagnosis Date Noted POA    PRINCIPAL PROBLEM:  Seizure [R56.9] 12/25/2014 Yes    DNR (do not resuscitate) [Z66] 05/17/2017 Yes    UTI (urinary tract infection) [N39.0] 05/16/2017 Yes    Thrombocytopenia, unspecified [D69.6] 05/14/2017 Yes    Malnutrition [E46] 05/12/2017 Yes    Diarrhea [R19.7] 05/12/2017 No    Suicidal ideation [R45.851] 05/11/2017 Not Applicable    ESRD (end stage renal disease) on dialysis [N18.6, Z99.2] 05/09/2017 Not Applicable    Chronic kidney disease-mineral and bone disorder [N18.9, E83.9, M89.9] 05/09/2017 Yes    Tissue plasminogen activator (t-PA) administered at other facility within 24 hours prior to current admission [Z92.82] 05/08/2017 Not Applicable    Altered mental status [R41.82] 05/08/2017 Yes    Anemia associated with chronic renal failure [D63.1] 01/22/2015 Yes    Type 2 diabetes mellitus with neurologic complication [E11.49] 06/12/2014 Yes    Essential hypertension [I10] 03/24/2014 Yes    Obesity [E66.9] 03/24/2014 Yes    Hyperlipidemia LDL goal <70 [E78.5] 03/24/2014 Yes      Problems Resolved During this Admission:    Diagnosis Date Noted Date Resolved POA          * Seizure  -witnessed seizure activity at OSH, hx of seizure  -EEG in NICU did not show any seizure activity  -received TPA d/t concern for stroke. MRI negative for stroke  -back to baseline, no neuro deficits  -continue keppra 500mg BID  -The alleged seizure was the patient's FIRST-TIME seizure. Neuro's recommendation for a first-time seizure is that  "anti-seizure prophylaxis is not given, and that the patient be advised not to drive for a period of 6 months. Of note, the patient has not driven in years and has no plans to do so, going forward.        ESRD (end stage renal disease) on dialysis  -HD on MWF, dialysis completed yesterday (5/15)  -nephrology following, patient having poor clearance  -AVG as outpatient  -Pt scheduled for a vein mapping on [05/16/17]         Suicidal ideation  -SI 5/11afternoon, and was PEC'd  -Psych consulted, recommend rescinding PEC 5/12  -Pt denied SI on [05/15/17]     Diarrhea  -patient was on flagyl empirically  -d/c flagyl 5/12 and send for stool studies.        UTI (urinary tract infection)  -Pt complains of dysuria [05/15/17]  -[05/15/17] Urine culture noted 47 WBC's and "many" bacteria  -Treating with Ciprofloxacin (day 3/7)      Discharged Condition: stable    Disposition: Hospice/Home    Follow Up:  Follow-up Information     Follow up with Darvin Mayers MD In 1 week.    Specialty:  Vascular Surgery    Why:  for new AVF/AVG creation; needs vein mapping    Contact information:    [address]  [facility] [id number]  [phone]          Follow up with [facility] On 5/25/2017.    Specialty:  [facility]    Why:  At 11:00 AM in [facility]    Contact information:    [address]  [facility] [id number] [phone]    Additional information:    [facility]        Patient Instructions:     Ambulatory Referral to Neurology   Referral Priority: Routine Referral Type: Consultation   Referral Reason: Specialty Services Required    Requested Specialty: Neurology    Number of Visits Requested: 1      Ambulatory referral to Outpatient Case Management   Referral Priority: Routine Referral Type: Consultation   Referral Reason: Specialty Services Required    Number of Visits Requested: 1  "     Call MD for:  temperature >100.4     Call MD for:  persistent nausea and vomiting or diarrhea     Call MD for:  severe uncontrolled pain     Call MD for:  redness, tenderness, or signs of infection (pain, swelling, redness, odor or green/yellow discharge around incision site)     Call MD for:  difficulty breathing or increased cough     Call MD for:  severe persistent headache     Call MD for:  worsening rash     Call MD for:  persistent dizziness, light-headedness, or visual disturbances     Call MD for:  increased confusion or weakness       Medications:  Reconciled Home Medications:   Current Discharge Medication List      START taking these medications    Details   ciprofloxacin HCl (CIPRO) 500 MG tablet Take 1 tablet (500 mg total) by mouth once daily. LAST DAY 5/22/17  Qty: 4 tablet, Refills: 0         CONTINUE these medications which have CHANGED    Details   sevelamer carbonate (RENVELA) 800 mg Tab Take 2 tablets (1,600 mg total) by mouth 3 (three) times daily with meals.         CONTINUE these medications which have NOT CHANGED    Details   aspirin (ECOTRIN) 81 MG EC tablet Take 81 mg by mouth once daily.      atorvastatin (LIPITOR) 40 MG tablet Take 40 mg by mouth once daily.      carboxymethylcellulose (REFRESH PLUS) 0.5 % Dpet Place 1 drop into both eyes 5 (five) times daily.      hydrALAZINE (APRESOLINE) 50 MG tablet Take 50 mg by mouth every 8 (eight) hours.      lorazepam (ATIVAN) 0.5 MG tablet Take 0.5 mg by mouth 2 (two) times daily as needed for Anxiety.      omeprazole (PRILOSEC) 40 MG capsule Take 40 mg by mouth once daily.      vitamin renal formula, B-complex-vitamin c-folic acid, (NEPHROCAPS) 1 mg Cap Take 1 capsule by mouth once daily.      white petrolatum-mineral oil 56.8-42.5% (REFRESH LACRI-LUBE) 56.8-42.5 % Oint Apply a thin ribbon to bottom eye lid every night           Time spent on the discharge of patient: 10 minutes    Javon Hodge MD  Department of Hospital Medicine  [facility]

## 2017-05-18 NOTE — PROGRESS NOTES
Pt came out of bed and room yelling loudly in hallway. Approached by charge nurse Javon and instructed to return to bed for safety. Pt became loud; aggressive and swung at nurse. Pt was escorted back to bed and orders received for soft wrist restraints. Restraints placed at 0400 with assistance of security. Awaiting orders from physician medication to assists with agitation.

## 2017-05-18 NOTE — PLAN OF CARE
Problem: Patient Care Overview  Goal: Plan of Care Review  Outcome: Ongoing (interventions implemented as appropriate)  Pt is AA&O x2. No signs of seizure noted during this shift. VSS. Denies pain. Some agitation noted. Pt expressing concerns about his living situation after he leaves here. Calls made to pts wife per pt's requests; no answers noted.  Medication given for agitation at bedtime. Pt resting between care. No falls or injuries noted. Camera noted.

## 2017-05-18 NOTE — PLAN OF CARE
05/18/17 1642   Final Note   Assessment Type Final Discharge Note   Discharge Disposition Home   Discharge planning education complete? Yes   What phone number can be called within the next 1-3 days to see how you are doing after discharge? [phone]   Hospital Follow Up  Appt(s) scheduled? Yes   Discharge plans and expectations educations in teach back method with documentation complete? Yes   Offered [facility] -- Bedside Delivery? n/a   Discharge/Hospital Encounter Summary to (non-[facility]) PCP n/a   Referral to Outpatient Case Management complete? Yes   Referral to / orders for Home Health Complete? n/a   30 day supply of medicines given at discharge, if documented non-compliance / non-adherence? n/a   Any social issues identified prior to discharge? No   Did you assess the readiness or willingness of the family or caregiver to support self management of care? Yes

## 2017-05-18 NOTE — PT/OT/SLP PROGRESS
Physical Therapy  Treatment/ Plan of Care Updated    Bernaeb Greco Jr.   MRN: [id number]   Admitting Diagnosis: Seizure    PT Received On: 17  PT Start Time: 1130     PT Stop Time: 1145    PT Total Time (min): 15 min       Billable Minutes:  Therapeutic Activity 15    Treatment Type: Treatment  PT/PTA: PT       General Precautions: Standard, fall, seizure  Orthopedic Precautions: N/A   Braces: N/A    Subjective:  Communicated with RN prior to session.  Pt agreeable to PT treatment.     Pain Ratin/10  Pain Rating Post-Intervention: 0/10    Objective:   Patient found seated on bedside couch with: peripheral IV    Functional Mobility:  Bed Mobility:   Supine to Sit:  (not assessed 2/2 pt sitting on bedside couch)    Transfers:  Sit <> Stand Assistance: Stand By Assistance (x 1 trial from bedside couch)  Sit <> Stand Assistive Device: No Assistive Device    Gait:   Gait Distance: 220 ft with no AD and CGA  Assistance 1: Contact Guard Assistance  Gait Assistive Device: No device  Gait Pattern: reciprocal  Gait Deviation(s): decreased shawn, decreased step length, decreased stride length  Pt able to navigate around objects in [facility] with CGA for safety.  Pt unable to recall location of room requiring verbal/tactile cues to return to room.    Balance:   Static Sit: FAIR+: Able to take MINIMAL challenges from all directions  Dynamic Sit: FAIR+: Maintains balance through MINIMAL excursions of active trunk motion  Static Stand: FAIR: Maintains without assist but unable to take challenges  Dynamic stand: FAIR: Needs CONTACT GUARD during gait     Therapeutic Activities and Exercises:  Pt performed functional ambulation in [facility] for endurance activity.  Pt with mild shortness of breath and decreased shawn at end of gait trial.  Pt performed seated R lateral scoot on couch x 5 trials with SBA.   Pt educated on ambulation with RW or rollator upon return to home to reduce risk of falls. Verbalized  understanding.      AM-PAC 6 CLICK MOBILITY  How much help from another person does this patient currently need?   1 = Unable, Total/Dependent Assistance  2 = A lot, Maximum/Moderate Assistance  3 = A little, Minimum/Contact Guard/Supervision  4 = None, Modified [place]/Independent    Turning over in bed (including adjusting bedclothes, sheets and blankets)?: 3  Sitting down on and standing up from a chair with arms (e.g., wheelchair, bedside commode, etc.): 3  Moving from lying on back to sitting on the side of the bed?: 3  Moving to and from a bed to a chair (including a wheelchair)?: 3  Need to walk in hospital room?: 3  Climbing 3-5 steps with a railing?: 3  Total Score: 18    AM-PAC Raw Score CMS G-Code Modifier Level of Impairment Assistance   6 % Total / Unable   7 - 9 CM 80 - 100% Maximal Assist   10 - 14 CL 60 - 80% Moderate Assist   15 - 19 CK 40 - 60% Moderate Assist   20 - 22 CJ 20 - 40% Minimal Assist   23 CI 1-20% SBA / CGA   24 CH 0% Independent/ Mod I     Patient left seated on bedside couch with all lines intact, call button in reach and RN notified.    Assessment:  Bernabe Greco Jr. is a 77 y.o. male with a medical diagnosis of Seizure and presents with below impairments.  He is progressing well with PT intervention and was able to ambulate without assistive device and CGA today.  He will continue to benefit from acute PT intervention to address below impairments.  Pt with improved functional mobility and plan of care updated to 3x/ week and HHPT services to assess and treat for safe mobility in home setting.  Pt will also benefit from 24/7 supervision upon return to home to reduce risk of falls.     Rehab identified problem list/impairments: Rehab identified problem list/impairments: impaired endurance, impaired balance, visual deficits, decreased safety awareness, impaired cognition    Rehab potential is good.    Activity tolerance: Good    Discharge recommendations: Discharge  Facility/Level Of Care Needs: home health PT (24/7 supervision)    Barriers to discharge: Barriers to Discharge: Decreased caregiver support    Equipment recommendations: Equipment Needed After Discharge:  (pt reports having needed equipment)     GOALS:   Physical Therapy Goals        Problem: Physical Therapy Goal    Goal Priority Disciplines Outcome Goal Variances Interventions   Physical Therapy Goal     PT/OT, PT Ongoing (interventions implemented as appropriate)     Description:  Goals to be met by: 5/26/17     Patient will increase functional independence with mobility by performing:    Supine to sit with Supervision.  sit to supine with Supervision.  Sit to stand transfer with supervision  Gait x 150 feet with stand by assistance using rolling walker or least restrictive AD.   Able to tolerate exercise for 15-20 reps with supervision.                      PLAN:    Patient to be seen 3 x/week  to address the above listed problems via gait training, therapeutic activities, therapeutic exercises, neuromuscular re-education  Plan of Care expires: 06/08/17  Plan of Care reviewed with: patient          Olesya Nilton PT, DPT  5/18/2017  Pager: [phone]

## 2017-05-18 NOTE — PROGRESS NOTES
[facility] Hospital Medicine  Progress Note    Patient Name: Bernabe Greco Jr.  MRN: [id number]  Patient Class: IP- Inpatient   Admission Date: 5/8/2017  Length of Stay: 9 days  Attending Physician: Pb Concepcion MD  Primary Care Provider: Provider [facility] Medicine Team: [facility] MED 1 Javon Hodge MD    Subjective:     Principal Problem:Seizure    HPI:  Patient is a 77 year old male with PMH of DM, HTN, HLD, prior stroke (no residual deficits), seizures who was transferred from [place] after receiving IV-tPA . Symptoms began at 8:00 am while with home health nurse.  He told her he was having a headache at the time of symptoms onset.When he arrived to [place] staff witnessed a seizure (EMS did not have description).  Wife said she noticed he was not moving left arm or both of his legs.  He also mumbled frequently and said this is exactly the same the last time he had a stroke.  CTA multiphase done and no LVO seen.         Per ED staff, patient had GTC seizure like activity and received 1 g of keppra. On my exam patient slightly confused and agitated, on low flow nasal cannula maintaining O2 sats. Not cooperative with exam but follows commands when daughter asks.      EEG with mild slowing on R. Will admit to [facility] for post tpa monitoring and obtain MRI brain for completion of stroke work up. Of note, patient is on chronic dialysis, missing today's treatment. Creatine 13 today . Nephrology consulted who agrees to dialyze tomorrow.         Hospital Course:  5/12/17: Transferred to hospital medicine. Psych consulted regarding SI, rec rescinding PEC. Patient to have vascular access issue addressed as outpatient per Nephrology. PT/Ot rec SNF    5/13: SAMMY pt tolerated HD well, will need vascular surg f/u for new AVG   5/14: Delirium overnight, pulled out IV. AOx4 this AM. No diarrhea.  5/15: Pt noted itching over face and bilateral UE's.  Pt feels well, is in a good mood,  denies SI, and is looking forward to going home.  5/16: NAEON, pt no longer complaining of facial itching.  Pt feels well and is looking forward to going home.  5/17: NAEON, vital signs stable.      Interval History: NAEON, vital signs stable.    Review of Systems   Unable to perform ROS: Other (The patient was not in his room at time of visit at (07:30) through approximately 12:00)     Objective:     Vital Signs (Most Recent):  Temp: (P) 97.7 °F (36.5 °C) (05/17/17 1700)  Pulse: (P) 97 (05/17/17 1700)  Resp: (P) 16 (05/17/17 1700)  BP: (P) 132/64 (05/17/17 1700)  SpO2: (P) 100 % (05/17/17 1700) Vital Signs (24h Range):  Temp:  [97.7 °F (36.5 °C)-99.4 °F (37.4 °C)] (P) 97.7 °F (36.5 °C)  Pulse:  [] (P) 97  Resp:  [16] (P) 16  SpO2:  [95 %-100 %] (P) 100 %  BP: (117-148)/(60-79) (P) 132/64     Weight: 90.1 kg (198 lb 11.2 oz)  Body mass index is 26.22 kg/(m^2).    Intake/Output Summary (Last 24 hours) at 05/17/17 2021  Last data filed at 05/17/17 1115   Gross per 24 hour   Intake              843 ml   Output             2600 ml   Net            -1757 ml      Physical Exam   Constitutional:   The patient was not in his room at time of visit at (07:30) through approximately 12:00       Significant Labs: All pertinent labs within the past 24 hours have been reviewed.    Significant Imaging: I have reviewed all pertinent imaging results/findings within the past 24 hours.    Assessment/Plan:      * Seizure  -witnessed seizure activity at OSH, hx of seizure  -EEG in NICU did not show any seizure activity  -received TPA d/t concern for stroke. MRI negative for stroke  -back to baseline, no neuro deficits  -continue keppra 500mg BID  -The alleged seizure was the patient's FIRST-TIME seizure.  Neuro's recommendation for a first-time seizure is that anti-seizure prophylaxis is not given, and that the patient be advised not to drive for a period of 6 months.  Of note, the patient has not driven in years and has no plans  "to do so, going forward.      ESRD (end stage renal disease) on dialysis  -HD on MWF, dialysis completed yesterday (5/15)  -nephrology following, patient having poor clearance  -AVG as outpatient  -Pt scheduled for a vein mapping on [05/16/17]       Suicidal ideation  -SI 5/11afternoon, and was PEC'd  -Psych consulted, recommend rescinding PEC 5/12  -Pt denied SI on [05/15/17]    Diarrhea  -patient was on flagyl empirically  -d/c flagyl 5/12 and send for stool studies.      UTI (urinary tract infection)  -Pt complains of dysuria [05/15/17]  -[05/15/17] Urine culture noted 47 WBC's and "many" bacteria  -Treating with Ciprofloxacin (day 2/7)          VTE Risk Mitigation         Ordered     heparin (porcine) injection 5,000 Units  Every 8 hours     Route:  Subcutaneous        05/10/17 1204     Medium Risk of VTE  Once      05/08/17 1141     Reason for No Pharmacological VTE Prophylaxis  Once      05/08/17 1141     Place sequential compression device  Until discontinued      05/08/17 1141        Disposition: D/C patient when placement is secured - currently waiting for placement at [place].    Javon Hodge MD  Department of Hospital Medicine   [facility]  "

## 2017-05-18 NOTE — PLAN OF CARE
[facility]    HOME HEALTH ORDERS  FACE TO FACE ENCOUNTER    Patient Name: Bernabe Greco Jr.  YOB: 1940    PCP: Paulette August   PCP Address: None  PCP Phone Number: None  PCP Fax: None    Encounter Date: 05/18/2017    Admit to Home Health    Diagnoses:  Active Hospital Problems    Diagnosis  POA    *Seizure [R56.9]  Yes    DNR (do not resuscitate) [Z66]  Yes    UTI (urinary tract infection) [N39.0]  Yes    Thrombocytopenia, unspecified [D69.6]  Yes    Malnutrition [E46]  Yes    Diarrhea [R19.7]  No    Suicidal ideation [R45.851]  Not Applicable    ESRD (end stage renal disease) on dialysis [N18.6, Z99.2]  Not Applicable    Chronic kidney disease-mineral and bone disorder [N18.9, E83.9, M89.9]  Yes             Tissue plasminogen activator (t-PA) administered at other facility within 24 hours prior to current admission [Z92.82]  Not Applicable    Altered mental status [R41.82]  Yes    Anemia associated with chronic renal failure [D63.1]  Yes    Type 2 diabetes mellitus with neurologic complication [E11.49]  Yes    Essential hypertension [I10]  Yes    Obesity [E66.9]  Yes    Hyperlipidemia LDL goal <70 [E78.5]  Yes      Resolved Hospital Problems    Diagnosis Date Resolved POA   No resolved problems to display.       No future appointments.  Follow-up Information     Follow up with Darvin Mayers MD In 1 week.    Specialty:  Vascular Surgery    Why:  for new AVF/AVG creation; needs vein mapping    Contact information:    [facility] [id number]  [phone]              I have seen and examined this patient face to face today. My clinical findings that support the need for the home health skilled services and home bound status are the following:  Weakness/numbness causing balance and gait disturbance due to Debility and weakness making it taxing to leave home.    Allergies:  Review of patient's allergies indicates:   Allergen Reactions     Penicillins Rash       Diet: renal diet    Activities: activity as tolerated    Nursing:   SN to complete comprehensive assessment including routine vital signs. Instruct on disease process and s/s of complications to report to MD. Review/verify medication list sent home with the patient at time of discharge  and instruct patient/caregiver as needed. Frequency may be adjusted depending on start of care date.    Notify MD if SBP > 160 or < 90; DBP > 90 or < 50; HR > 120 or < 50; Temp > 101      CONSULTS:    Physical Therapy to evaluate and treat. Evaluate for home safety and equipment needs; Establish/upgrade home exercise program. Perform / instruct on therapeutic exercises, gait training, transfer training, and Range of Motion.  Occupational Therapy to evaluate and treat. Evaluate home environment for safety and equipment needs. Perform/Instruct on transfers, ADL training, ROM, and therapeutic exercises.    MISCELLANEOUS CARE:  N/A    WOUND CARE ORDERS  n/a      Medications: Review discharge medications with patient and family and provide education.      Current Discharge Medication List      START taking these medications    Details   ciprofloxacin HCl (CIPRO) 500 MG tablet Take 1 tablet (500 mg total) by mouth once daily. LAST DAY 5/22/17  Qty: 5 tablet, Refills: 0         CONTINUE these medications which have CHANGED    Details   sevelamer carbonate (RENVELA) 800 mg Tab Take 2 tablets (1,600 mg total) by mouth 3 (three) times daily with meals.         CONTINUE these medications which have NOT CHANGED    Details   aspirin (ECOTRIN) 81 MG EC tablet Take 81 mg by mouth once daily.      atorvastatin (LIPITOR) 40 MG tablet Take 40 mg by mouth once daily.      carboxymethylcellulose (REFRESH PLUS) 0.5 % Dpet Place 1 drop into both eyes 5 (five) times daily.      hydrALAZINE (APRESOLINE) 50 MG tablet Take 50 mg by mouth every 8 (eight) hours.      lorazepam (ATIVAN) 0.5 MG tablet Take 0.5 mg by mouth 2 (two) times  daily as needed for Anxiety.      omeprazole (PRILOSEC) 40 MG capsule Take 40 mg by mouth once daily.      vitamin renal formula, B-complex-vitamin c-folic acid, (NEPHROCAPS) 1 mg Cap Take 1 capsule by mouth once daily.      white petrolatum-mineral oil 56.8-42.5% (REFRESH LACRI-LUBE) 56.8-42.5 % Oint Apply a thin ribbon to bottom eye lid every night             I certify that this patient is confined to his home and needs physical therapy and occupational therapy.

## 2017-05-18 NOTE — ASSESSMENT & PLAN NOTE
"-Pt complains of dysuria [05/15/17]  -[05/15/17] Urine culture noted 47 WBC's and "many" bacteria  -Treating with Ciprofloxacin (day 2/7)        "

## 2017-05-18 NOTE — PROGRESS NOTES
Attempted to give pt one time dose of respirdone to assists with agitation. Pt refused and spit it out on nurse. Pt continues to be verbally abusive and non-compliant

## 2017-05-18 NOTE — SUBJECTIVE & OBJECTIVE
Interval History: NAEON, vital signs stable.    Review of Systems   Unable to perform ROS: Other (The patient was not in his room at time of visit at (07:30) through approximately 12:00)     Objective:     Vital Signs (Most Recent):  Temp: (P) 97.7 °F (36.5 °C) (05/17/17 1700)  Pulse: (P) 97 (05/17/17 1700)  Resp: (P) 16 (05/17/17 1700)  BP: (P) 132/64 (05/17/17 1700)  SpO2: (P) 100 % (05/17/17 1700) Vital Signs (24h Range):  Temp:  [97.7 °F (36.5 °C)-99.4 °F (37.4 °C)] (P) 97.7 °F (36.5 °C)  Pulse:  [] (P) 97  Resp:  [16] (P) 16  SpO2:  [95 %-100 %] (P) 100 %  BP: (117-148)/(60-79) (P) 132/64     Weight: 90.1 kg (198 lb 11.2 oz)  Body mass index is 26.22 kg/(m^2).    Intake/Output Summary (Last 24 hours) at 05/17/17 2021  Last data filed at 05/17/17 1115   Gross per 24 hour   Intake              843 ml   Output             2600 ml   Net            -1757 ml      Physical Exam   Constitutional:   The patient was not in his room at time of visit at (07:30) through approximately 12:00       Significant Labs: All pertinent labs within the past 24 hours have been reviewed.    Significant Imaging: I have reviewed all pertinent imaging results/findings within the past 24 hours.

## 2017-05-18 NOTE — PROGRESS NOTES
Physician notified that pt refuses prophylaxis heparin; labs and vitals. Pt appears very agitated nd attempting to get out of bed. Attempts made to reorient pt. Pt continues to remain non-compliant. Pt to be assessed at bedside by physician.

## 2017-05-19 PROBLEM — T82.590A MALFUNCTION OF ARTERIOVENOUS DIALYSIS FISTULA: Status: ACTIVE | Noted: 2017-01-01

## 2017-05-19 PROBLEM — R19.7 DIARRHEA: Status: RESOLVED | Noted: 2017-01-01 | Resolved: 2017-01-01

## 2017-05-19 NOTE — ASSESSMENT & PLAN NOTE
- Phos elevated at 6.1  - Will increase Renvela to 2,400 mg PO TID with meals  - Corrected calcium WNL

## 2017-05-19 NOTE — ASSESSMENT & PLAN NOTE
- Goal in ESRD of 10-11.5  - Currently close to goal   - Will continue epoetin 7,000 units on HD days

## 2017-05-19 NOTE — PLAN OF CARE
Problem: Patient Care Overview  Goal: Plan of Care Review  Outcome: Ongoing (interventions implemented as appropriate)  AAOx4. VSS. Pt awaiting discharge; wife stated she could not make it on 5/18/17 so will be picking pt up on today. Skin intact, no new skin breakdown during shift. No acute events overnight. Will continue to monitor and reassess.    Problem: Fall Risk (Adult)  Goal: Identify Related Risk Factors and Signs and Symptoms  Related risk factors and signs and symptoms are identified upon initiation of Human Response Clinical Practice Guideline (CPG)   Outcome: Ongoing (interventions implemented as appropriate)  Pt remained free of falls/injury/trauma throughout shift. Call bell in-reach, bed in lowest position, bed wheels locked, fall risk band on and AVS camera on in room.

## 2017-05-19 NOTE — SUBJECTIVE & OBJECTIVE
Interval History:   Patient to be discharged to Home with home health    Review of Systems   Constitutional: Negative for chills and fever.   HENT: Negative for congestion.    Respiratory: Negative for shortness of breath.    Cardiovascular: Negative for chest pain and palpitations.   Gastrointestinal: Negative for abdominal pain and diarrhea.   Genitourinary: Negative for dysuria.   Neurological: Negative for headaches.     Objective:     Vital Signs (Most Recent):  Temp: 98.6 °F (37 °C) (05/19/17 0745)  Pulse: 80 (05/19/17 0745)  Resp: 16 (05/19/17 0745)  BP: 134/65 (05/19/17 0745)  SpO2: 95 % (05/19/17 0745) Vital Signs (24h Range):  Temp:  [98 °F (36.7 °C)-98.6 °F (37 °C)] 98.6 °F (37 °C)  Pulse:  [80-98] 80  Resp:  [16-17] 16  SpO2:  [95 %-100 %] 95 %  BP: (123-136)/(62-68) 134/65     Weight: 90.1 kg (198 lb 11.2 oz)  Body mass index is 26.22 kg/(m^2).    Intake/Output Summary (Last 24 hours) at 05/19/17 0828  Last data filed at 05/19/17 0527   Gross per 24 hour   Intake              120 ml   Output              150 ml   Net              -30 ml      Physical Exam   Constitutional: He is oriented to person, place, and time. He appears well-developed and well-nourished.   HENT:   Head: Normocephalic and atraumatic.   Eyes: EOM are normal. Pupils are equal, round, and reactive to light.   Neck: Normal range of motion. Neck supple. No JVD present.   Cardiovascular: Normal rate, regular rhythm and intact distal pulses.    No murmur heard.  Pulmonary/Chest: Effort normal and breath sounds normal.   Abdominal: Soft. Bowel sounds are normal.   Musculoskeletal: Normal range of motion.   Neurological: He is alert and oriented to person, place, and time. He has normal reflexes.   Vitals reviewed.      Significant Labs: All pertinent labs within the past 24 hours have been reviewed.    Significant Imaging: I have reviewed all pertinent imaging results/findings within the past 24 hours.

## 2017-05-19 NOTE — SUBJECTIVE & OBJECTIVE
Interval History: NAEON. Patient evaluated during HD and no complaints. Patient wants to go home.     Review of patient's allergies indicates:   Allergen Reactions    Penicillins Rash     Current Facility-Administered Medications   Medication Frequency    0.9%  NaCl infusion PRN    0.9%  NaCl infusion PRN    0.9%  NaCl infusion PRN    0.9%  NaCl infusion PRN    0.9%  NaCl infusion Once    amlodipine tablet 10 mg Daily    aspirin EC tablet 81 mg Daily    atorvastatin tablet 40 mg Daily    ciprofloxacin HCl tablet 500 mg Once    dextrose 50% injection 12.5 g PRN    diphenhydrAMINE capsule 25 mg Q6H PRN    epoetin lyssa injection 7,000 Units Every Mon, Wed, Fri    GI cocktail (mylanta 30 mL, lidocaine 2 % viscous 10 mL, dicyclomine 10 mL) 50 mL Q8H PRN    glucagon (human recombinant) injection 1 mg PRN    heparin (porcine) injection 2,000 Units PRN    heparin (porcine) injection 5,000 Units Q8H    heparin (porcine) injection 500 Units PRN    insulin aspart pen 1-10 Units QID (AC + HS) PRN    ondansetron injection 4 mg Q6H PRN    risperidone tablet 0.5 mg Q8H PRN    sevelamer carbonate tablet 1,600 mg TID WM    sodium chloride 0.9% flush 3 mL Q8H       Objective:     Vital Signs (Most Recent):  Temp: 98.6 °F (37 °C) (05/19/17 0745)  Pulse: 94 (05/19/17 1030)  Resp: 20 (05/19/17 0945)  BP: 135/64 (05/19/17 1030)  SpO2: 95 % (05/19/17 0745)  O2 Device (Oxygen Therapy): room air (05/19/17 0945) Vital Signs (24h Range):  Temp:  [98 °F (36.7 °C)-98.6 °F (37 °C)] 98.6 °F (37 °C)  Pulse:  [80-98] 94  Resp:  [16-20] 20  SpO2:  [95 %-100 %] 95 %  BP: (123-136)/(60-69) 135/64     Weight: 90.1 kg (198 lb 11.2 oz) (05/17/17 0700)  Body mass index is 26.22 kg/(m^2).  Body surface area is 2.15 meters squared.    I/O last 3 completed shifts:  In: 120 [P.O.:120]  Out: 150 [Urine:150]    Physical Exam   Constitutional: He appears well-developed and well-nourished. No distress.   HENT:   Head: Normocephalic and  atraumatic.   Right Ear: External ear normal.   Left Ear: External ear normal.   Mouth/Throat: Oropharynx is clear and moist.   Neck: Normal range of motion. Neck supple. No JVD present.   Cardiovascular: Normal rate, regular rhythm and normal heart sounds.  Exam reveals no gallop and no friction rub.    No murmur heard.  Pulmonary/Chest: Effort normal and breath sounds normal. No respiratory distress. He has no wheezes. He has no rales.   Abdominal: Soft. Bowel sounds are normal. He exhibits no distension. There is no tenderness.   Musculoskeletal: He exhibits no edema.   Neurological: He is alert.   Skin: Skin is warm and dry. He is not diaphoretic.       Significant Labs:  CBC:   Recent Labs  Lab 05/19/17  0338   WBC 8.27   RBC 3.27*   HGB 9.7*   HCT 29.8*      MCV 91   MCH 29.7   MCHC 32.6     CMP:   Recent Labs  Lab 05/19/17  0338   GLU 78   CALCIUM 9.0   ALBUMIN 3.0*   PROT 6.7      K 4.6   CO2 23      BUN 65*   CREATININE 10.8*   ALKPHOS 76   ALT 28   AST 24   BILITOT 0.5        Significant Imaging:  Labs: Reviewed  X-Ray: Reviewed

## 2017-05-19 NOTE — PROGRESS NOTES
[facility] Hospital Medicine  Progress Note    Patient Name: Bernabe Greco Jr.  MRN: [id number]  Patient Class: IP- Inpatient   Admission Date: 5/8/2017  Length of Stay: 11 days  Attending Physician: Pb Concepcion MD  Primary Care Provider: Provider [facility] Medicine Team: [facility] HOSP MED 1 Nguyen Glaser MD    Subjective:     Principal Problem:Seizure    HPI:  Patient is a 77 year old male with PMH of DM, HTN, HLD, prior stroke (no residual deficits), seizures who was transferred from [place] after receiving IV-tPA . Symptoms began at 8:00 am while with home health nurse.  He told her he was having a headache at the time of symptoms onset.When he arrived to [place] staff witnessed a seizure (EMS did not have description).  Wife said she noticed he was not moving left arm or both of his legs.  He also mumbled frequently and said this is exactly the same the last time he had a stroke.  CTA multiphase done and no LVO seen.         Per ED staff, patient had GTC seizure like activity and received 1 g of keppra. On my exam patient slightly confused and agitated, on low flow nasal cannula maintaining O2 sats. Not cooperative with exam but follows commands when daughter asks.      EEG with mild slowing on R. Will admit to [facility] for post tpa monitoring and obtain MRI brain for completion of stroke work up. Of note, patient is on chronic dialysis, missing today's treatment. Creatine 13 today . Nephrology consulted who agrees to dialyze tomorrow.         Hospital Course:  5/12/17: Transferred to hospital medicine. Psych consulted regarding SI, rec rescinding PEC. Patient to have vascular access issue addressed as outpatient per Nephrology. PT/Ot rec SNF    5/13: SAMMY pt tolerated HD well, will need vascular surg f/u for new AVG   5/14: Delirium overnight, pulled out IV. AOx4 this AM. No diarrhea.  5/15: Pt noted itching over face and bilateral UE's.  Pt feels well, is in a good mood,  denies SI, and is looking forward to going home.  5/16: NAEON, pt no longer complaining of facial itching.  Pt feels well and is looking forward to going home.  5/17: NAEON, vital signs stable.  5/18: Nursing reported that at approximately [04:00], the pt became agitated and combative, insisted he wanted to leave AMA, and refused medications and labs.  He was subsequently placed on wrist restraints. Conversation with the patient confirmed these events and the patient adamantly asserted he wanted to return home.      Interval History:   Patient to be discharged to Home with home health    Review of Systems   Constitutional: Negative for chills and fever.   HENT: Negative for congestion.    Respiratory: Negative for shortness of breath.    Cardiovascular: Negative for chest pain and palpitations.   Gastrointestinal: Negative for abdominal pain and diarrhea.   Genitourinary: Negative for dysuria.   Neurological: Negative for headaches.     Objective:     Vital Signs (Most Recent):  Temp: 98.6 °F (37 °C) (05/19/17 0745)  Pulse: 80 (05/19/17 0745)  Resp: 16 (05/19/17 0745)  BP: 134/65 (05/19/17 0745)  SpO2: 95 % (05/19/17 0745) Vital Signs (24h Range):  Temp:  [98 °F (36.7 °C)-98.6 °F (37 °C)] 98.6 °F (37 °C)  Pulse:  [80-98] 80  Resp:  [16-17] 16  SpO2:  [95 %-100 %] 95 %  BP: (123-136)/(62-68) 134/65     Weight: 90.1 kg (198 lb 11.2 oz)  Body mass index is 26.22 kg/(m^2).    Intake/Output Summary (Last 24 hours) at 05/19/17 0828  Last data filed at 05/19/17 0527   Gross per 24 hour   Intake              120 ml   Output              150 ml   Net              -30 ml      Physical Exam   Constitutional: He is oriented to person, place, and time. He appears well-developed and well-nourished.   HENT:   Head: Normocephalic and atraumatic.   Eyes: EOM are normal. Pupils are equal, round, and reactive to light.   Neck: Normal range of motion. Neck supple. No JVD present.   Cardiovascular: Normal rate, regular rhythm and intact  distal pulses.    No murmur heard.  Pulmonary/Chest: Effort normal and breath sounds normal.   Abdominal: Soft. Bowel sounds are normal.   Musculoskeletal: Normal range of motion.   Neurological: He is alert and oriented to person, place, and time. He has normal reflexes.   Vitals reviewed.      Significant Labs: All pertinent labs within the past 24 hours have been reviewed.    Significant Imaging: I have reviewed all pertinent imaging results/findings within the past 24 hours.    Assessment/Plan:      * Seizure  -witnessed seizure activity at OSH, hx of seizure  -EEG in NICU did not show any seizure activity  -received TPA d/t concern for stroke. MRI negative for stroke  -back to baseline, no neuro deficits  -continue keppra 500mg BID  -The alleged seizure was the patient's FIRST-TIME seizure.  Neuro's recommendation for a first-time seizure is that anti-seizure prophylaxis is not given, and that the patient be advised not to drive for a period of 6 months.  Of note, the patient has not driven in years and has no plans to do so, going forward.      Essential hypertension        Hyperlipidemia LDL goal <70        Type 2 diabetes mellitus with neurologic complication  -HgA1C 6.3  -Continue sliding scale insulin here    Anemia associated with chronic renal failure  -h/h stable  - will be managed by nephrology and EPO administration     Tissue plasminogen activator (t-PA) administered at other facility within 24 hours prior to current admission  -no LVO seen on CTA multiphase. MRI negative for acute stroke. Stroke r/o        ESRD (end stage renal disease) on dialysis  -HD on MWF, dialysis completed yesterday (5/15)  -nephrology following, patient having poor clearance  -AVG as outpatient  -Pt scheduled for a vein mapping on [05/16/17]       Altered mental status        Chronic kidney disease-mineral and bone disorder        Suicidal ideation  -SI 5/11afternoon, and was PEC'd  -Psych consulted, recommend rescinding PEC  "5/12  -Pt denied SI on [05/15/17]    Malnutrition        Thrombocytopenia, unspecified  -platelets 131 (stabilizing)  -T score 3  -platelets began to decrease prior to heparin exposure  -low suspicion for HIT  - resolved on discharge    UTI (urinary tract infection)  -Pt complains of dysuria [05/15/17]  -[05/15/17] Urine culture noted 47 WBC's and "many" bacteria  -Treating with abx (day 4/7), ceftriaxone here and 3 more days of cipro on discharge.          VTE Risk Mitigation         Ordered     heparin (porcine) injection 5,000 Units  Every 8 hours     Route:  Subcutaneous        05/10/17 1204     Medium Risk of VTE  Once      05/08/17 1141     Reason for No Pharmacological VTE Prophylaxis  Once      05/08/17 1141     Place sequential compression device  Until discontinued      05/08/17 1141          Nguyen Glaser MD  Department of Hospital Medicine   [facility]  "

## 2017-05-19 NOTE — PROGRESS NOTES
[facility]  Nephrology  Progress Note    Patient Name: Bernabe Greco Jr.  MRN: [id number]  Admission Date: 5/8/2017  Hospital Length of Stay: 11 days  Attending Provider: Pb Concepcion MD   Primary Care Physician: Provider [facility]  Principal Problem:Seizure    Subjective:     Interval History: NAEON. Patient evaluated during HD and no complaints. Patient wants to go home.     Review of patient's allergies indicates:   Allergen Reactions    Penicillins Rash     Current Facility-Administered Medications   Medication Frequency    0.9%  NaCl infusion PRN    0.9%  NaCl infusion PRN    0.9%  NaCl infusion PRN    0.9%  NaCl infusion PRN    0.9%  NaCl infusion Once    amlodipine tablet 10 mg Daily    aspirin EC tablet 81 mg Daily    atorvastatin tablet 40 mg Daily    ciprofloxacin HCl tablet 500 mg Once    dextrose 50% injection 12.5 g PRN    diphenhydrAMINE capsule 25 mg Q6H PRN    epoetin lyssa injection 7,000 Units Every Mon, Wed, Fri    GI cocktail (mylanta 30 mL, lidocaine 2 % viscous 10 mL, dicyclomine 10 mL) 50 mL Q8H PRN    glucagon (human recombinant) injection 1 mg PRN    heparin (porcine) injection 2,000 Units PRN    heparin (porcine) injection 5,000 Units Q8H    heparin (porcine) injection 500 Units PRN    insulin aspart pen 1-10 Units QID (AC + HS) PRN    ondansetron injection 4 mg Q6H PRN    risperidone tablet 0.5 mg Q8H PRN    sevelamer carbonate tablet 1,600 mg TID WM    sodium chloride 0.9% flush 3 mL Q8H       Objective:     Vital Signs (Most Recent):  Temp: 98.6 °F (37 °C) (05/19/17 0745)  Pulse: 94 (05/19/17 1030)  Resp: 20 (05/19/17 0945)  BP: 135/64 (05/19/17 1030)  SpO2: 95 % (05/19/17 0745)  O2 Device (Oxygen Therapy): room air (05/19/17 0945) Vital Signs (24h Range):  Temp:  [98 °F (36.7 °C)-98.6 °F (37 °C)] 98.6 °F (37 °C)  Pulse:  [80-98] 94  Resp:  [16-20] 20  SpO2:  [95 %-100 %] 95 %  BP: (123-136)/(60-69) 135/64     Weight: 90.1 kg (198 lb 11.2 oz)  (05/17/17 0700)  Body mass index is 26.22 kg/(m^2).  Body surface area is 2.15 meters squared.    I/O last 3 completed shifts:  In: 120 [P.O.:120]  Out: 150 [Urine:150]    Physical Exam   Constitutional: He appears well-developed and well-nourished. No distress.   HENT:   Head: Normocephalic and atraumatic.   Right Ear: External ear normal.   Left Ear: External ear normal.   Mouth/Throat: Oropharynx is clear and moist.   Neck: Normal range of motion. Neck supple. No JVD present.   Cardiovascular: Normal rate, regular rhythm and normal heart sounds.  Exam reveals no gallop and no friction rub.    No murmur heard.  Pulmonary/Chest: Effort normal and breath sounds normal. No respiratory distress. He has no wheezes. He has no rales.   Abdominal: Soft. Bowel sounds are normal. He exhibits no distension. There is no tenderness.   Musculoskeletal: He exhibits no edema.   Neurological: He is alert.   Skin: Skin is warm and dry. He is not diaphoretic.       Significant Labs:  CBC:   Recent Labs  Lab 05/19/17  0338   WBC 8.27   RBC 3.27*   HGB 9.7*   HCT 29.8*      MCV 91   MCH 29.7   MCHC 32.6     CMP:   Recent Labs  Lab 05/19/17  0338   GLU 78   CALCIUM 9.0   ALBUMIN 3.0*   PROT 6.7      K 4.6   CO2 23      BUN 65*   CREATININE 10.8*   ALKPHOS 76   ALT 28   AST 24   BILITOT 0.5        Significant Imaging:  Labs: Reviewed  X-Ray: Reviewed    Assessment/Plan:     ESRD (end stage renal disease) on dialysis  - Will provide dialysis for metabolic clearance and volume   - Seen and examined today during hemodialysis; tolerating treatment well without issues. Denied headaches, chest pain, abdominal pain, or muscle cramps   - Target ultrafiltration up to 1-2L as tolerated by patient  - Dialysate adjusted to current labs              Anemia associated with chronic renal failure  - Goal in ESRD of 10-11.5  - Currently close to goal   - Will continue epoetin 7,000 units on HD days       Chronic kidney disease-mineral  and bone disorder  - Phos elevated at 6.1  - Will increase Renvela to 2,400 mg PO TID with meals  - Corrected calcium WNL     Duy Bravo MD  Nephrology  [facility]

## 2017-05-19 NOTE — PROGRESS NOTES
Patient arrived VIA stretcher and was transferred to bed without complication.  AV Fistula to RFA was then prepped and cannulated with 15 gauge needles as per protocol.  Both lines aspirate and flush without resistance or infiltration.  Patient denies any pain.  Maintenance dialysis was then initiated.

## 2017-05-19 NOTE — PROGRESS NOTES
Hemodialysis is complete.  Blood returned.  AV Fistula to RFA was then de accessed and needles removed.  Hemostasis attained.  No bleed or hematoma.  Fistula has Bruit and Thrill.  HD timem = 180 min.  UF = 1000 ml.

## 2017-05-19 NOTE — ASSESSMENT & PLAN NOTE
-platelets 131 (stabilizing)  -T score 3  -platelets began to decrease prior to heparin exposure  -low suspicion for HIT  - resolved on discharge

## 2017-05-19 NOTE — DISCHARGE SUMMARY
[facility] Hospital Medicine  Discharge Summary      Patient Name: Bernabe Greco Jr.  MRN: [id number]  Admission Date: 5/8/2017  Hospital Length of Stay: 11 days  Discharge Date and Time:  05/19/2017 10:00 AM  Attending Physician: Pb Concepcion MD   Discharging Provider: Nguyen Glaser MD  Primary Care Provider: Provider [facility] Medicine Team: [facility] HOSP MED 1 Javon Hodge MD    HPI:   Patient is a 77 year old male with PMH of DM, HTN, HLD, prior stroke (no residual deficits), seizures who was transferred from [place] after receiving IV-tPA . Symptoms began at 8:00 am while with home health nurse.  He told her he was having a headache at the time of symptoms onset.When he arrived to [place] staff witnessed a seizure (EMS did not have description).  Wife said she noticed he was not moving left arm or both of his legs.  He also mumbled frequently and said this is exactly the same the last time he had a stroke.  CTA multiphase done and no LVO seen.         Per ED staff, patient had GTC seizure like activity and received 1 g of keppra. On my exam patient slightly confused and agitated, on low flow nasal cannula maintaining O2 sats. Not cooperative with exam but follows commands when daughter asks.      EEG with mild slowing on R. Will admit to [facility] for post tpa monitoring and obtain MRI brain for completion of stroke work up. Of note, patient is on chronic dialysis, missing today's treatment. Creatine 13 today . Nephrology consulted who agrees to dialyze tomorrow.         Procedure(s) (LRB):  FISTULOGRAM (Right)      Indwelling Lines/Drains at time of discharge:   Lines/Drains/Airways     Central Venous Catheter Line                 Hemodialysis Catheter -- days         Percutaneous Central Line Insertion/Assessment - double lumen  01/16/15 right femoral 853 days          Drain                 Hemodialysis AV Fistula Right upper arm -- days              Hospital Course:    5/12/17: Transferred to hospital medicine. Psych consulted regarding SI, rec rescinding PEC. Patient to have vascular access issue addressed as outpatient per Nephrology. PT/Ot rec SNF    5/13: SAMMY, pt tolerated HD well, will need vascular surg f/u for new AVG   5/14: Delirium overnight, pulled out IV. AOx4 this AM. No diarrhea.  5/15: Pt noted itching over face and bilateral UE's.  Pt feels well, is in a good mood, denies SI, and is looking forward to going home.  5/16: KATERINON, pt no longer complaining of facial itching.  Pt feels well and is looking forward to going home.  5/17: SAMMY, vital signs stable.  5/18: Nursing reported that at approximately [04:00], the pt became agitated and combative, insisted he wanted to leave AMA, and refused medications and labs.  He was subsequently placed on wrist restraints. Conversation with the patient confirmed these events and the patient adamantly asserted he wanted to return home.       Consults:   Consults         Status Ordering Provider     Inpatient consult to Nephrology  Once     Provider:  (Not yet assigned)    Completed JOHNY GRANT     Inpatient consult to Physical Medicine Rehab  Once     Provider:  (Not yet assigned)    Completed ANDRE ROMAN     Inpatient consult to Psychiatry  Once     Provider:  (Not yet assigned)    Completed BO QUILES     Inpatient consult to Social Work/Case Management  Once     Provider:  (Not yet assigned)    Acknowledged ANDRE ROMAN     Inpatient consult to Vascular (Stroke) Neurology  Once     Provider:  (Not yet assigned)    Completed KASH DEGROOT     Inpatient consult to Vascular (Stroke) Neurology  Once     Provider:  (Not yet assigned)    Completed ANDRE ROMAN     IP consult to dietary  Once     Provider:  (Not yet assigned)    Completed ANDRE ROMAN          Significant Diagnostic Studies: Labs:   CMP     Recent Labs  Lab 05/18/17  0901 05/19/17  0338    139   K 4.1 4.6     102   CO2 27 23   * 78   BUN 53* 65*   CREATININE 9.6* 10.8*   CALCIUM 9.4 9.0   PROT 7.1 6.7   ALBUMIN 3.1* 3.0*   BILITOT 0.6 0.5   ALKPHOS 76 76   AST 21 24   ALT 25 28   ANIONGAP 11 14   ESTGFRAFRICA 5.4* 4.7*   EGFRNONAA 4.7* 4.0*    and CBC     Recent Labs  Lab 05/18/17  0901 05/19/17  0338   WBC 8.22 8.27   HGB 10.1* 9.7*   HCT 32.5* 29.8*    235       Pending Diagnostic Studies:     None        Final Active Diagnoses:    Diagnosis Date Noted POA    PRINCIPAL PROBLEM:  Seizure [R56.9] 12/25/2014 Yes    DNR (do not resuscitate) [Z66] 05/17/2017 Yes    UTI (urinary tract infection) [N39.0] 05/16/2017 Yes    Thrombocytopenia, unspecified [D69.6] 05/14/2017 Yes    Malnutrition [E46] 05/12/2017 Yes    Diarrhea [R19.7] 05/12/2017 No    Suicidal ideation [R45.851] 05/11/2017 Not Applicable    ESRD (end stage renal disease) on dialysis [N18.6, Z99.2] 05/09/2017 Not Applicable    Chronic kidney disease-mineral and bone disorder [N18.9, E83.9, M89.9] 05/09/2017 Yes    Tissue plasminogen activator (t-PA) administered at other facility within 24 hours prior to current admission [Z92.82] 05/08/2017 Not Applicable    Altered mental status [R41.82] 05/08/2017 Yes    Anemia associated with chronic renal failure [D63.1] 01/22/2015 Yes    Type 2 diabetes mellitus with neurologic complication [E11.49] 06/12/2014 Yes    Essential hypertension [I10] 03/24/2014 Yes    Obesity [E66.9] 03/24/2014 Yes    Hyperlipidemia LDL goal <70 [E78.5] 03/24/2014 Yes      Problems Resolved During this Admission:    Diagnosis Date Noted Date Resolved POA          * Seizure  -witnessed seizure activity at OSH, hx of seizure  -EEG in NICU did not show any seizure activity  -received TPA d/t concern for stroke. MRI negative for stroke  -back to baseline, no neuro deficits  -continue keppra 500mg BID  -The alleged seizure was the patient's FIRST-TIME seizure. Neuro's recommendation for a first-time seizure is that  "anti-seizure prophylaxis is not given, and that the patient be advised not to drive for a period of 6 months. Of note, the patient has not driven in years and has no plans to do so, going forward.        ESRD (end stage renal disease) on dialysis  -HD on MWF, dialysis completed yesterday (5/15)  -nephrology following, patient having poor clearance  -AVG as outpatient  -Pt scheduled for a vein mapping on [05/16/17]         Suicidal ideation  -SI 5/11afternoon, and was PEC'd  -Psych consulted, recommend rescinding PEC 5/12  -Pt denied SI on [05/15/17]     Diarrhea  -patient was on flagyl empirically  -d/c flagyl 5/12 and send for stool studies.        UTI (urinary tract infection)  -Pt complains of dysuria [05/15/17]  -[05/15/17] Urine culture noted 47 WBC's and "many" bacteria  -Treating with abx (day 4/7), ceftriaxone here and 3 more days of cipro on discharge.      Discharged Condition: stable    Disposition: Hospice/Home    Follow Up:  Follow-up Information     Follow up with Darvin Mayers MD In 1 week.    Specialty:  Vascular Surgery    Why:  for new AVF/AVG creation; needs vein mapping    Contact information:    [address]  [facility] [address]  [phone]          Follow up with [facility] On 5/25/2017.    Specialty:  [facility]    Why:  At 11:00 AM in [facility]    Contact information:    [address]  [facility] [id number] [phone]    Additional information:    [facility]        Patient Instructions:     Ambulatory Referral to Neurology   Referral Priority: Routine Referral Type: Consultation   Referral Reason: Specialty Services Required    Requested Specialty: Neurology    Number of Visits Requested: 1      Ambulatory referral to Outpatient Case Management   Referral Priority: Routine Referral Type: Consultation   Referral Reason: Specialty " Services Required    Number of Visits Requested: 1      Call MD for:  temperature >100.4     Call MD for:  persistent nausea and vomiting or diarrhea     Call MD for:  severe uncontrolled pain     Call MD for:  redness, tenderness, or signs of infection (pain, swelling, redness, odor or green/yellow discharge around incision site)     Call MD for:  difficulty breathing or increased cough     Call MD for:  severe persistent headache     Call MD for:  worsening rash     Call MD for:  persistent dizziness, light-headedness, or visual disturbances     Call MD for:  increased confusion or weakness       Medications:  Reconciled Home Medications:   Current Discharge Medication List      START taking these medications    Details   ciprofloxacin HCl (CIPRO) 500 MG tablet Take 1 tablet (500 mg total) by mouth once daily. LAST DAY 5/22/17  Qty: 3 tablet, Refills: 0         CONTINUE these medications which have CHANGED    Details   sevelamer carbonate (RENVELA) 800 mg Tab Take 2 tablets (1,600 mg total) by mouth 3 (three) times daily with meals.         CONTINUE these medications which have NOT CHANGED    Details   aspirin (ECOTRIN) 81 MG EC tablet Take 81 mg by mouth once daily.      atorvastatin (LIPITOR) 40 MG tablet Take 40 mg by mouth once daily.      carboxymethylcellulose (REFRESH PLUS) 0.5 % Dpet Place 1 drop into both eyes 5 (five) times daily.      hydrALAZINE (APRESOLINE) 50 MG tablet Take 50 mg by mouth every 8 (eight) hours.      lorazepam (ATIVAN) 0.5 MG tablet Take 0.5 mg by mouth 2 (two) times daily as needed for Anxiety.      omeprazole (PRILOSEC) 40 MG capsule Take 40 mg by mouth once daily.      vitamin renal formula, B-complex-vitamin c-folic acid, (NEPHROCAPS) 1 mg Cap Take 1 capsule by mouth once daily.      white petrolatum-mineral oil 56.8-42.5% (REFRESH LACRI-LUBE) 56.8-42.5 % Oint Apply a thin ribbon to bottom eye lid every night           Time spent on the discharge of patient: 10 minutes    Nguyen  JAIME Glaser MD  Department of Hospital Medicine  [facility]

## 2017-05-19 NOTE — PT/OT/SLP PROGRESS
Occupational Therapy      Bernabe Greco Jr.  MRN: [id number]    Patient not seen today secondary to pt off the floor for HD. OT unable to return. Will follow-up next visit.    SKYLER Salmon  5/19/2017  Pager: [phone]

## 2017-05-19 NOTE — ASSESSMENT & PLAN NOTE
"-Pt complains of dysuria [05/15/17]  -[05/15/17] Urine culture noted 47 WBC's and "many" bacteria  -Treating with abx (day 4/7), ceftriaxone here and 3 more days of cipro on discharge.        "

## 2017-05-19 NOTE — NURSING
"AVS d/c instructions given, voices "daughter is coming pick me up", PIV to LFA, 20g cath c/ded with cath intact, gauze dressing applied. Sitting in [facility].   "

## 2017-05-24 NOTE — PT/OT/SLP DISCHARGE
Physical Therapy Discharge Summary    Bernabe Greco Jr.  MRN: [id number]   Seizure   Patient Discharged from acute Physical Therapy on 5/19/17.  Please refer to prior PT noted date on 5/18/17 for functional status.     Assessment:   Patient appropriate for care in another setting.  GOALS:    Physical Therapy Goals     Not on file          Multidisciplinary Problems (Resolved)        Problem: Physical Therapy Goal    Goal Priority Disciplines Outcome Goal Variances Interventions   Physical Therapy Goal   (Resolved)     PT/OT, PT Outcome(s) achieved     Description:  Goals to be met by: 5/26/17     Patient will increase functional independence with mobility by performing:    Supine to sit with Supervision.  sit to supine with Supervision.  Sit to stand transfer with supervision  Gait x 150 feet with stand by assistance using rolling walker or least restrictive AD.   Able to tolerate exercise for 15-20 reps with supervision.                          Reasons for Discontinuation of Therapy Services  Transfer to alternate level of care.      Plan:  Patient Discharged to: Home with Home Health Service.    Olesya Callaway, PT, DPT  5/24/2017  Pager: [phone]

## 2017-06-02 NOTE — PT/OT/SLP DISCHARGE
Occupational Therapy Discharge Summary    Bernabe Greco Jr.  MRN: [id number]   Seizure   Patient Discharged from acute Occupational Therapy on 5/19/17.  Please refer to prior OT note dated on 5/19/17 for functional status.     Assessment:   Goals partially met.  GOALS:    Occupational Therapy Goals     Not on file          Multidisciplinary Problems (Resolved)        Problem: Occupational Therapy Goal    Goal Priority Disciplines Outcome Interventions   Occupational Therapy Goal   (Resolved)     OT, PT/OT Outcome(s) achieved    Description:  Goals to be met by: 5/16/17     Patient will increase functional independence with ADLs by performing:    UE Dressing with Minimal Assistance. GOAL MET 05/12/17  NEW GOAL: UE Dressing with SBA  LE Dressing with Moderate Assistance. GOAL MET 05/12/17  NEW GOAL: LE dressing with CGA  Grooming while standing with Stand-by Assistance.  Toileting from bedside commode with Moderate Assistance for hygiene and clothing management.   Rolling to Bilateral with Minimal Assistance. Met 5/14  Revised:  Mod indep rolling  Supine to sit with Minimal Assistance. Met 5/14  Revised:  Supine<->sit with supervision  Stand pivot transfers with Stand-by Assistance.                      Reasons for Discontinuation of Therapy Services  Transfer to alternate level of care.      Plan:  Patient Discharged to: Palliative Care/Hospice at home.    SKYLER Salmon  6/2/2017  Pager: [phone]

## 2017-07-07 NOTE — PROGRESS NOTES
Thank you for the referral.  Patient has been assigned to [facility] for low risk screening for Outpatient Case Management.     Reason for referral: Low risk    Altered mental status  Essential hypertension  Type 2 diabetes mellitus with diabetic neuropathy, with long-term current use of insulin    Thank you,    Kiya Carlton, SSC

## 2017-07-11 NOTE — PROGRESS NOTES
Bernabe Purcellcharlette Johnson  07/11/2017    HPI:  Patient is a 77 y.o.  male with a PMHx of HTN, HLD, DM, Stroke, Seizures, ESRD (MWF) and Arthritis who presented 5/8/17 via transfer from OSH after witnessed seizure followed by paresis of his b/l LE and LUE. The patient was given tPA due to concern for stroke & transferred to [facility] [place], [place]. He underwent multiphasic CT of the head/neck which was negative for acute stroke or large vessel occclusion. The patient was admitted to the neurocritical care service for workup/management of his seizure disorder. Nephrology was consulted for HD via a RUE forarm AVF. Per nephrology notes the patient has been having poor clearance from his AVF and was scheduled to undergo a fistulogram on 5/9/17 for further evaluation, however, the patient experienced the above events and thus did not undergo his scheduled procedure. He did undergo a fistulogram yesterday demonstrating significant collaterals from midfistula.  Nephrology has been dialyzing patient during this hospital stay and note persistent issues with poor clearance for which vascular surgery has been consulted.     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     Past Medical History:   Diagnosis Date    Arthritis     Diabetes mellitus     DNR (do not resuscitate) 5/17/2017    Hyperlipidemia     Hypertension     Stroke     Syncope and collapse      Past Surgical History:   Procedure Laterality Date    HIP SURGERY      replacement right    JOINT REPLACEMENT      right hip      Family History   Problem Relation Age of Onset    Hypertension Mother      Social History     Social History    Marital status:      Spouse name: N/A    Number of children: N/A    Years of education: N/A     Occupational History    Not on file.     Social History Main Topics    Smoking status: Former Smoker     Packs/day: 0.25     Quit date: 1/1/1978    Smokeless  tobacco: Not on file    Alcohol use No    Drug use:      Frequency: 7.0 times per week     Types: Marijuana      Comment: states he smoked marijuana 2 weeks ago    Sexual activity: Not on file     Other Topics Concern    Not on file     Social History Narrative    No narrative on file     Current Outpatient Prescriptions on File Prior to Visit   Medication Sig    aspirin (ECOTRIN) 81 MG EC tablet Take 81 mg by mouth once daily.    atorvastatin (LIPITOR) 40 MG tablet Take 40 mg by mouth once daily.    carboxymethylcellulose (REFRESH PLUS) 0.5 % Dpet Place 1 drop into both eyes 5 (five) times daily.    ciprofloxacin HCl (CIPRO) 500 MG tablet Take 1 tablet (500 mg total) by mouth once daily. LAST DAY 5/22/17    hydrALAZINE (APRESOLINE) 50 MG tablet Take 50 mg by mouth every 8 (eight) hours.    lorazepam (ATIVAN) 0.5 MG tablet Take 0.5 mg by mouth 2 (two) times daily as needed for Anxiety.    omeprazole (PRILOSEC) 40 MG capsule Take 40 mg by mouth once daily.    sevelamer carbonate (RENVELA) 800 mg Tab Take 2 tablets (1,600 mg total) by mouth 3 (three) times daily with meals.    vitamin renal formula, B-complex-vitamin c-folic acid, (NEPHROCAPS) 1 mg Cap Take 1 capsule by mouth once daily.    white petrolatum-mineral oil 56.8-42.5% (REFRESH LACRI-LUBE) 56.8-42.5 % Oint Apply a thin ribbon to bottom eye lid every night     No current facility-administered medications on file prior to visit.        REVIEW OF SYSTEMS:  General: negative; ENT: negative; Allergy and Immunology: negative; Hematological and Lymphatic: Negative; Endocrine: negative; Respiratory: no cough, shortness of breath, or wheezing; Cardiovascular: no chest pain or dyspnea on exertion; Gastrointestinal: no abdominal pain/back, change in bowel habits, or bloody stools; Genito-Urinary: no dysuria, trouble voiding, or hematuria; Musculoskeletal: negative  Neurological: no TIA or stroke symptoms    PHYSICAL EXAM:                General  appearance:  Alert, well-appearing, and in no distress.  Oriented to person, place, and time.  Poor eyesite   Neurological:             Normal speech, no focal findings noted; CN II - XII grossly intact           Musculoskeletal: Digits/nail without cyanosis/clubbing.  Normal muscle strength/tone.                 Neck: Supple, no significant adenopathy; thyroid is not enlarged                  No carotid bruit can be auscultated                Chest:  Clear to auscultation, no wheezes, rales or rhonchi, symmetric air entry     No use of accessory muscles             Cardiac: Normal rate and regular rhythm, S1 and S2 normal; PMI non-displaced          Abdomen: Soft, nontender, nondistended, no masses or organomegaly     No rebound tenderness noted; bowel sounds normal     Pulsatile aortic mass is note palpable.     No groin adenopathy      Extremities:   2+ femoral pulses bilaterally     1+ right radial pulse     2+ left brachial pulse, 1+ left radial pulse     No pedal edema     No ulcerations    LAB RESULTS:  Lab Results   Component Value Date    K 4.6 05/19/2017    K 4.1 05/18/2017    K 5.2 (H) 05/17/2017    CREATININE 10.8 (H) 05/19/2017    CREATININE 9.6 (H) 05/18/2017    CREATININE 11.8 (H) 05/17/2017     Lab Results   Component Value Date    WBC 8.27 05/19/2017    WBC 8.22 05/18/2017    WBC 9.13 05/17/2017    HCT 29.8 (L) 05/19/2017    HCT 32.5 (L) 05/18/2017    HCT 30.0 (L) 05/17/2017     05/19/2017     05/18/2017     05/17/2017     Lab Results   Component Value Date    HGBA1C 6.3 (H) 05/09/2017    HGBA1C 5.7 12/24/2014    HGBA1C 5.3 12/07/2014     IMAGING:  ESRD on dialysis  pre-op examination.  Results:                    Right             Left  _____________________________________________________________                    Diameter [mm]     Diameter [mm]  Cephalic Vein:      Proximal Arm      3.9               4.8  Antecubital Fossa 2.4               4.8  Middle Forearm    2.3                3.5  Distal Forearm    -                 3.6  Wrist             16.4              2.5    Basilic Vein:       Axilla            8.4               8.1  Proximal Arm      6.2               7.2  Distal Arm        -                 4.5  Antecubital Fossa 4.1               4.9  Proximal Forearm  5.6               -  Middle Forearm    4.3               3.6    Brachial veins:in mm.   Proximal: Rt.-5.0    , Lt.- 6.5     Distal: Rt.- 4.7   , Lt.- 5.3.    Report Summary:  Impression:   Mapped and measured veins of the upper extremities. Cephalic veins measured with tourniquet applied. The right basilic vein branches at the distal bicep level and ACF level.  The left cephalic vein branches at the mid bicep.  The basilic bein branches at   the ACF and proximal forearm.      IMP/PLAN:  77 y.o. male with a history as noted above, notably a failing but functional R forearm AVF.  He has acceptable vein of the LUE (right handed) for AV graft placement, which is appropriate vs AVF given his age and array of comorbidities.  I spoke at length with the patient and his son regarding the various options for HD and they agreed with my recommendation for LUE av graft placement.  They understand the risks and benefits of the planned procedure and wish to proceed as soon as possible.      1) plan for LUE av graft placement 7/20/2017  2) continue use of RUE AVF   3) continue statin, angel luis Mayers MD  Vascular & Endovascular Surgery

## 2017-07-11 NOTE — LETTER
July 11, 2017      Seth Sandhu MD  [address] [facility] [address]           [facility] - Vascular Surgery  [address] [facility] [address]  [facility] [address]  Phone: [phone]  Fax: [phone]          Patient: Bernabe Greco Jr.   MR Number: [id number]   YOB: 1940   Date of Visit: 7/11/2017       Dear Dr. Seth Sandhu:    Thank you for referring Bernabe Greco to me for evaluation. Attached you will find relevant portions of my assessment and plan of care.    If you have questions, please do not hesitate to call me. I look forward to following Bernabe Greco along with you.    Sincerely,    Darvin Mayers MD    Enclosure  CC:  No Recipients    If you would like to receive this communication electronically, please contact [email or web address] or [phone] to request more information on [employer] access.    For providers and/or their staff who would like to refer a patient to [facility], please contact us through our one-stop-shop provider referral line, [facility], at [phone].    If you feel you have received this communication in error or would no longer like to receive these types of communications, please e-mail [email or web address]

## 2017-07-13 NOTE — PROGRESS NOTES
Attempt #:  1  This CSW attempted to reach patient/caregiver to provide resource and patient's wife reported she was not   available to participate in assessment today. Patient's spouse requested CSW call her again on Monday 7/17/17.

## 2017-07-17 NOTE — PROGRESS NOTES
Attempt #:  2  This CSW attempted to reach patient/caregiver to provide resource and left msg requesting a return call.   Letter with contact information was sent via US mail to patient/caregiver. Referral source notified.

## 2017-07-17 NOTE — LETTER
July 17, 2017    Bernabe Angus Johnson  [address] [address]             [facility] [address] [place] [address] Dear Mr. Greco:    I am writing from the Outpatient Complex Care Management Department at [facility].  I received a referral from [facility] to contact you or your caregiver regarding any needs you may have. I have attempted to contact you or your cargeiver by phone two times unsuccessfully.  Please contact the Outpatient Complex Care Management Department at [phone] if you would like to discuss your needs.      Sincerely,         [facility]

## 2017-07-25 PROBLEM — N18.6 ESRD (END STAGE RENAL DISEASE): Status: ACTIVE | Noted: 2017-01-01

## 2017-07-30 PROBLEM — R07.9 CHEST PAIN: Status: ACTIVE | Noted: 2017-01-01

## 2017-07-31 PROBLEM — N18.6 ESRD (END STAGE RENAL DISEASE) ON DIALYSIS: Status: ACTIVE | Noted: 2017-01-01

## 2017-07-31 PROBLEM — Z99.2 ESRD (END STAGE RENAL DISEASE) ON DIALYSIS: Status: ACTIVE | Noted: 2017-01-01

## 2017-07-31 NOTE — H&P
[facility] Hospital Medicine  History & Physical    Patient Name: Bernabe Greco Jr.  MRN: [id number]  Admission Date: 7/30/2017  Attending Physician: Eve Mathew MD   Primary Care Provider: Primary Doctor [facility] Medicine Team: [facility] MED C Ilia Santiago DO     Patient information was obtained from patient and ER records.     Subjective:     Principal Problem:Chest pain    Chief Complaint:   Chief Complaint   Patient presents with    Chest Pain     Chest pain x1.5 hours. 3 NTG SL given, some relief with ntg. EMS reports pt resistant to care.        HPI:     This is a 77 y.o. male with pertinent PMHx of  Moderate to severe aortic stenosisis ( IMANI = 0.93 cm2, peak velocity = 3.6 m/s, mean gradient = 32 mmHg per Echo in May), PAD s/p cath in 2014 with unsuccessful attempt to revascularize right SFA  , HTN, HLD, ESRD on HD MWF and stroke with residual visual deficit, cataracts  who presents to the ED with a chief complaint of sudden onset central chest pain that started last evening around 8:30 pm while lying in bed. Reports substernal chest pain, pressure radiating to his right shoulder associated with sob and chills. Notes pain was 9/10 at worst intensity.     Denies associated diaphoresis, N/V, dizziness, fever. Wife called EMS and received NTG by EMS with improvement of chest pain to 5-6 out of 10. Pain resolved in ER and remained pain free during my interview. Denies having similar pain in past and reports quit smoking in 1978. Denies focal weakness, numbness, change is speech, facial droop or confusion.             Past Medical History:   Diagnosis Date    Arthritis     Diabetes mellitus     DNR (do not resuscitate) 5/17/2017    Hyperlipidemia     Hypertension     Stroke     Syncope and collapse        Past Surgical History:   Procedure Laterality Date    AV FISTULA PLACEMENT Right     HIP SURGERY      replacement right    JOINT REPLACEMENT      right hip        Review of  patient's allergies indicates:   Allergen Reactions    Penicillins Rash       No current facility-administered medications on file prior to encounter.      Current Outpatient Prescriptions on File Prior to Encounter   Medication Sig    aspirin (ECOTRIN) 81 MG EC tablet Take 81 mg by mouth once daily.    atorvastatin (LIPITOR) 40 MG tablet Take 40 mg by mouth once daily.    carboxymethylcellulose (REFRESH PLUS) 0.5 % Dpet Place 1 drop into both eyes 5 (five) times daily.    ciprofloxacin HCl (CIPRO) 500 MG tablet Take 1 tablet (500 mg total) by mouth once daily. LAST DAY 5/22/17    hydrALAZINE (APRESOLINE) 50 MG tablet Take 50 mg by mouth every 8 (eight) hours.    lorazepam (ATIVAN) 0.5 MG tablet Take 0.5 mg by mouth 2 (two) times daily as needed for Anxiety.    omeprazole (PRILOSEC) 40 MG capsule Take 40 mg by mouth once daily.    sevelamer carbonate (RENVELA) 800 mg Tab Take 2 tablets (1,600 mg total) by mouth 3 (three) times daily with meals.    vitamin renal formula, B-complex-vitamin c-folic acid, (NEPHROCAPS) 1 mg Cap Take 1 capsule by mouth once daily.    white petrolatum-mineral oil 56.8-42.5% (REFRESH LACRI-LUBE) 56.8-42.5 % Oint Apply a thin ribbon to bottom eye lid every night     Family History     Problem Relation (Age of Onset)    Hypertension Mother        Social History Main Topics    Smoking status: Former Smoker     Packs/day: 0.25     Quit date: 1/1/1978    Smokeless tobacco: Never Used    Alcohol use No    Drug use: No    Sexual activity: Not Currently     Review of Systems   Constitutional: Negative for activity change, appetite change, chills, diaphoresis, fatigue, fever and unexpected weight change.   HENT: Negative for congestion, dental problem, drooling, ear discharge, ear pain, facial swelling, hearing loss, mouth sores, nosebleeds, postnasal drip, rhinorrhea, sinus pressure, sneezing, sore throat, tinnitus, trouble swallowing and voice change.    Eyes: Negative for  photophobia, pain, discharge, redness, itching and visual disturbance.   Respiratory: Positive for shortness of breath. Negative for apnea, cough, choking, chest tightness, wheezing and stridor.    Cardiovascular: Positive for chest pain. Negative for palpitations and leg swelling.   Gastrointestinal: Negative for abdominal distention, abdominal pain, anal bleeding, blood in stool, constipation, diarrhea, nausea, rectal pain and vomiting.   Endocrine: Negative for cold intolerance, heat intolerance, polydipsia, polyphagia and polyuria.   Genitourinary: Negative for decreased urine volume, difficulty urinating, discharge, dysuria, enuresis, flank pain, frequency, genital sores, hematuria, penile pain, penile swelling, scrotal swelling, testicular pain and urgency.   Musculoskeletal: Negative for arthralgias, back pain, gait problem, joint swelling, myalgias, neck pain and neck stiffness.   Skin: Negative for color change, pallor, rash and wound.   Allergic/Immunologic: Negative for environmental allergies, food allergies and immunocompromised state.   Neurological: Negative for dizziness, tremors, seizures, syncope, facial asymmetry, speech difficulty, weakness, light-headedness, numbness and headaches.   Hematological: Negative for adenopathy. Does not bruise/bleed easily.   Psychiatric/Behavioral: Negative for agitation, behavioral problems, confusion, decreased concentration, dysphoric mood, hallucinations, self-injury, sleep disturbance and suicidal ideas. The patient is not nervous/anxious and is not hyperactive.      Objective:     Vital Signs (Most Recent):  Temp: 96.8 °F (36 °C) (07/31/17 0800)  Pulse: 79 (07/31/17 0800)  Resp: 18 (07/31/17 0800)  BP: 98/65 (07/31/17 0800)  SpO2: 97 % (07/31/17 0800) Vital Signs (24h Range):  Temp:  [96.8 °F (36 °C)-98.9 °F (37.2 °C)] 96.8 °F (36 °C)  Pulse:  [71-94] 79  Resp:  [18] 18  SpO2:  [94 %-100 %] 97 %  BP: ()/(65-81) 98/65     Weight: 95.3 kg (210 lb 1.6  oz)  Body mass index is 30.15 kg/m².    Physical Exam   Constitutional: He is oriented to person, place, and time. He appears well-developed and well-nourished. No distress.   HENT:   Head: Normocephalic and atraumatic.   Right Ear: External ear normal.   Left Ear: External ear normal.   Nose: Nose normal.   Mouth/Throat: Oropharynx is clear and moist. No oropharyngeal exudate.   Eyes: Conjunctivae and EOM are normal. Right eye exhibits no discharge. Left eye exhibits no discharge. No scleral icterus.   Pupils not reactive due to BL cataract changes    Neck: Normal range of motion. Neck supple. No JVD present. No tracheal deviation present. No thyromegaly present.   Cardiovascular: Normal rate, regular rhythm and intact distal pulses.  Exam reveals no gallop and no friction rub.    Murmur (III/VI MAXIM most prominet over right upper stennal border ) heard.  AVF over R forearm with positive thrill   Pulmonary/Chest: Effort normal and breath sounds normal. No stridor. No respiratory distress. He has no wheezes. He has no rales. He exhibits no tenderness.   Abdominal: Soft. Bowel sounds are normal. He exhibits no distension and no mass. There is no tenderness. No hernia.   Musculoskeletal: Normal range of motion. He exhibits no edema, tenderness or deformity.   Lymphadenopathy:     He has no cervical adenopathy.   Neurological: He is alert and oriented to person, place, and time. He has normal reflexes. He displays normal reflexes. No cranial nerve deficit. He exhibits normal muscle tone. Coordination normal.   Skin: Skin is warm and dry. No rash noted. He is not diaphoretic. No erythema. No pallor.   Psychiatric: He has a normal mood and affect. His behavior is normal. Judgment and thought content normal.       Significant Labs:   Admission on 07/30/2017   Component Date Value Ref Range Status    WBC 07/30/2017 8.99  3.90 - 12.70 K/uL Final    RBC 07/30/2017 3.38* 4.60 - 6.20 M/uL Final    Hemoglobin 07/30/2017 9.4*  14.0 - 18.0 g/dL Final    Hematocrit 07/30/2017 30.3* 40.0 - 54.0 % Final    MCV 07/30/2017 90  82 - 98 fL Final    MCH 07/30/2017 27.8  27.0 - 31.0 pg Final    MCHC 07/30/2017 31.0* 32.0 - 36.0 g/dL Final    RDW 07/30/2017 15.8* 11.5 - 14.5 % Final    Platelets 07/30/2017 179  150 - 350 K/uL Final    MPV 07/30/2017 10.3  9.2 - 12.9 fL Final    Gran # 07/30/2017 6.4  1.8 - 7.7 K/uL Final    Lymph # 07/30/2017 1.7  1.0 - 4.8 K/uL Final    Mono # 07/30/2017 0.7  0.3 - 1.0 K/uL Final    Eos # 07/30/2017 0.2  0.0 - 0.5 K/uL Final    Baso # 07/30/2017 0.02  0.00 - 0.20 K/uL Final    Gran% 07/30/2017 71.2  38.0 - 73.0 % Final    Lymph% 07/30/2017 18.7  18.0 - 48.0 % Final    Mono% 07/30/2017 7.8  4.0 - 15.0 % Final    Eosinophil% 07/30/2017 2.0  0.0 - 8.0 % Final    Basophil% 07/30/2017 0.2  0.0 - 1.9 % Final    Differential Method 07/30/2017 Automated   Final    BNP 07/30/2017 377* 0 - 99 pg/mL Final    Sodium 07/30/2017 138  136 - 145 mmol/L Final    Potassium 07/30/2017 4.5  3.5 - 5.1 mmol/L Final    Chloride 07/30/2017 98  95 - 110 mmol/L Final    CO2 07/30/2017 23  23 - 29 mmol/L Final    Glucose 07/30/2017 188* 70 - 110 mg/dL Final    BUN, Bld 07/30/2017 84* 8 - 23 mg/dL Final    Creatinine 07/30/2017 13.8* 0.5 - 1.4 mg/dL Final    Calcium 07/30/2017 9.4  8.7 - 10.5 mg/dL Final    Total Protein 07/30/2017 7.1  6.0 - 8.4 g/dL Final    Albumin 07/30/2017 3.3* 3.5 - 5.2 g/dL Final    Total Bilirubin 07/30/2017 0.6  0.1 - 1.0 mg/dL Final    Alkaline Phosphatase 07/30/2017 104  55 - 135 U/L Final    AST 07/30/2017 7* 10 - 40 U/L Final    ALT 07/30/2017 8* 10 - 44 U/L Final    Anion Gap 07/30/2017 17* 8 - 16 mmol/L Final    eGFR if African American 07/30/2017 3.5* >60 mL/min/1.73 m^2 Final    eGFR if non African American 07/30/2017 3.0* >60 mL/min/1.73 m^2 Final    Troponin I 07/30/2017 0.102* 0.000 - 0.026 ng/mL Final    Prothrombin Time 07/30/2017 11.2  9.0 - 12.5 sec Final    INR  07/30/2017 1.1  0.8 - 1.2 Final    Troponin I 07/31/2017 0.113* 0.000 - 0.026 ng/mL Final    Sodium 07/31/2017 141  136 - 145 mmol/L Final    Potassium 07/31/2017 4.7  3.5 - 5.1 mmol/L Final    Chloride 07/31/2017 102  95 - 110 mmol/L Final    CO2 07/31/2017 20* 23 - 29 mmol/L Final    Glucose 07/31/2017 108  70 - 110 mg/dL Final    BUN, Bld 07/31/2017 88* 8 - 23 mg/dL Final    Creatinine 07/31/2017 13.8* 0.5 - 1.4 mg/dL Final    Calcium 07/31/2017 9.0  8.7 - 10.5 mg/dL Final    Anion Gap 07/31/2017 19* 8 - 16 mmol/L Final    eGFR if African American 07/31/2017 3.5* >60 mL/min/1.73 m^2 Final    eGFR if non African American 07/31/2017 3.0* >60 mL/min/1.73 m^2 Final    Magnesium 07/31/2017 1.9  1.6 - 2.6 mg/dL Final    Phosphorus 07/31/2017 9.1* 2.7 - 4.5 mg/dL Final    Troponin I 07/31/2017 0.118* 0.000 - 0.026 ng/mL Final    WBC 07/31/2017 8.44  3.90 - 12.70 K/uL Final    RBC 07/31/2017 3.32* 4.60 - 6.20 M/uL Final    Hemoglobin 07/31/2017 9.2* 14.0 - 18.0 g/dL Final    Hematocrit 07/31/2017 29.5* 40.0 - 54.0 % Final    MCV 07/31/2017 89  82 - 98 fL Final    MCH 07/31/2017 27.7  27.0 - 31.0 pg Final    MCHC 07/31/2017 31.2* 32.0 - 36.0 g/dL Final    RDW 07/31/2017 15.9* 11.5 - 14.5 % Final    Platelets 07/31/2017 195  150 - 350 K/uL Final    MPV 07/31/2017 10.2  9.2 - 12.9 fL Final    Gran # 07/31/2017 6.0  1.8 - 7.7 K/uL Final    Lymph # 07/31/2017 1.6  1.0 - 4.8 K/uL Final    Mono # 07/31/2017 0.6  0.3 - 1.0 K/uL Final    Eos # 07/31/2017 0.2  0.0 - 0.5 K/uL Final    Baso # 07/31/2017 0.04  0.00 - 0.20 K/uL Final    Gran% 07/31/2017 71.1  38.0 - 73.0 % Final    Lymph% 07/31/2017 19.1  18.0 - 48.0 % Final    Mono% 07/31/2017 7.0  4.0 - 15.0 % Final    Eosinophil% 07/31/2017 2.1  0.0 - 8.0 % Final    Basophil% 07/31/2017 0.5  0.0 - 1.9 % Final    Differential Method 07/31/2017 Automated   Final         Significant Imaging: I have reviewed and interpreted all pertinent imaging  results/findings within the past 24 hours.      CXR : Mild congestive change, correlation advised..      EKG : NSR @ 84 bpm, non specific ST/T changes     2D ECHO ( 05/09/17 ) :    CONCLUSIONS     1 - Normal left ventricular systolic function (EF 60-65%).     2 - Normal left ventricular diastolic function.     3 - Normal right ventricular systolic function .     4 - Moderate to severe aortic stenosis, IMANI = 0.93 cm2, peak velocity = 3.6 m/s, mean gradient = 32 mmHg.     5 - Low central venous pressure.     Assessment/Plan:        Active Diagnoses:    Diagnosis Date Noted POA    PRINCIPAL PROBLEM:  Chest pain [R07.9] 07/30/2017 Yes    ESRD (end stage renal disease) [N18.6] 07/25/2017 Yes    ESRD on hemodialysis [N18.6, Z99.2]  Not Applicable    Aortic valve disorders [I35.9] 01/11/2015 Yes    Essential hypertension [I10] 03/24/2014 Yes      Problems Resolved During this Admission:    Diagnosis Date Noted Date Resolved POA     # Chest pain   - chest pain at rest with associated dyspnea  - relieved with NTG SL given by EMS   - remained chest pain free   - EKG w/o acute ischemic pattern and similar to recent past   - troponin remained flat 0.1 >> 0.11 >> 0.12 ( 0.05 in past )   - history of severe AS, PAD, CVA, HTN, HLD, ESRD   - check EKG and troponin if chest pain recurs   - recent ECHO in May showed pEF with severe AS   - continue aspirin, statin   - NTG SL prn chest pain   - will start on low dose metoprolol 25 mg bid   - cardiology consult for further evaluation    # ESRD   - HD MWF  - reports making minimal urine daily   - continue sevelamer  - nephrology consult for HD     # h/o HTN, CVA, PAD, HLD  - stable without acute changes   - continue aspirin, statin, hydralazine     # h/o Cataracts with visual deficits   - no acute issue   - continue carboxymethylcellulose eye drops          VTE Risk Mitigation         Ordered     heparin (porcine) injection 5,000 Units  Every 8 hours     Route:  Subcutaneous         07/31/17 0116     Low Risk of VTE  Once      07/31/17 0132        Ilia Santiago DO  Department of Hospital Medicine   [facility]

## 2017-07-31 NOTE — PROGRESS NOTES
3hr HD treatment completed 4L of fluid removed pt tolerated well. Both needles of a RFA fistula removed and pressure held until hemostasis achieved dressing applied. Report given to MARLIN Samuel.

## 2017-07-31 NOTE — ED TRIAGE NOTES
Patient presents to ED via EMS with c/c of chest pain, SOB, and ALLEN. EMS reports that patient as lying on his bed and woke up with chest pain/pressure rated 8/10 with SOB. EMS reports that patient was found on his knees trying to get his breath, that he received 3 SL nitro enroute with relief of chest pain to 6/10. At present patient denies SOB but states that he has has been hurting in his chest and abdomen for ' some time now' . Denies abdominal pain at present.     Denies n/v, fevers, and chills.

## 2017-07-31 NOTE — PLAN OF CARE
Problem: Patient Care Overview  Goal: Plan of Care Review  Outcome: Ongoing (interventions implemented as appropriate)  Pt free of falls/trauma/injuries this shift. Pt ambulating with assistance. Pt scheduled for HD today. Plan of care reviewed with patient at bedside, no new questions at this time. Patient tolerating well.  Patient educated on s/s of heart failure. VSS. Pt voices no complaints of chest pain, SOB, or dizziness. No acute distress noted. Will continue to monitor.

## 2017-07-31 NOTE — CONSULTS
[facility]  Cardiology  Consult Note    Patient Name: Bernabe Greco Jr.  MRN: [id number]  Admission Date: 7/30/2017  Hospital Length of Stay: 0 days  Code Status: Full Code   Attending Provider: Eve Mathew MD   Consulting Provider: Kianna Wesley NP  Primary Care Physician: Primary Doctor No  Principal Problem: Aortic Stenosis    Primary Cardiologist: Dr. Bautista     Patient information was obtained from patient and past medical records.     Inpatient consult to Cardiology  Consult performed by: KIANNA WESLEY  Consult ordered by: MADALYN LOVELL        Subjective:     Chief Complaint:  Chest Pain     HPI:   This is a 77 y.o. male with pertinent PMHx of moderate to severe aortic stenosisis ( IMANI = 0.93 cm2, peak velocity = 3.6 m/s, mean gradient = 32 mmHg per Echo 5/09/2017), PAD s/p cath in 8/2014 with successful revascularization of right CFA and SFA with YANE x2 (Dr. Bautista), seizure disorder, HTN, HLD, ESRD on HD MWF (follows with Dr. Sandhu), CVA with residual visual deficit and cataracts who presented to the ED with a chief complaint of sudden onset central chest pain that started last evening around 8:30 pm while lying in bed. Reports substernal chest pain, pressure radiating to his right shoulder associated with sob and chills. Notes pain was 9/10 at worst intensity.  Denied associated diaphoresis, N/V, dizziness, fever. Wife called EMS and received NTG by EMS with improvement of chest pain to 5-6 out of 10. Pain resolved in ER and remained pain free during my interview. Denies having similar pain in past and reports quit smoking in 1978. Denies focal weakness, numbness, change is speech, facial droop or confusion. Troponins trending 0.102>0.113>0.118.     Pt denies SOB or ALLEN. He ambulates >120 ft to his brothers house and does not get SOB. He ambulates with walker s/t fear of falls and occasionally sits and to rest his legs. He has a hx of PAD and endorses claudication after  ambulating ~100ft on average. He also endorses 2 pillow (large pillows per pt) orthopnea and will sleep in his recliner at times. He denies syncope, palpitations, LE edema.     Past Medical History:   Diagnosis Date    Arthritis     Diabetes mellitus     DNR (do not resuscitate) 5/17/2017    Hyperlipidemia     Hypertension     Stroke     Syncope and collapse        Past Surgical History:   Procedure Laterality Date    AV FISTULA PLACEMENT Right     HIP SURGERY      replacement right    JOINT REPLACEMENT      right hip        Review of patient's allergies indicates:   Allergen Reactions    Penicillins Rash       No current facility-administered medications on file prior to encounter.      Current Outpatient Prescriptions on File Prior to Encounter   Medication Sig    aspirin (ECOTRIN) 81 MG EC tablet Take 81 mg by mouth once daily.    atorvastatin (LIPITOR) 40 MG tablet Take 40 mg by mouth once daily.    carboxymethylcellulose (REFRESH PLUS) 0.5 % Dpet Place 1 drop into both eyes 5 (five) times daily.    ciprofloxacin HCl (CIPRO) 500 MG tablet Take 1 tablet (500 mg total) by mouth once daily. LAST DAY 5/22/17    hydrALAZINE (APRESOLINE) 50 MG tablet Take 50 mg by mouth every 8 (eight) hours.    lorazepam (ATIVAN) 0.5 MG tablet Take 0.5 mg by mouth 2 (two) times daily as needed for Anxiety.    omeprazole (PRILOSEC) 40 MG capsule Take 40 mg by mouth once daily.    sevelamer carbonate (RENVELA) 800 mg Tab Take 2 tablets (1,600 mg total) by mouth 3 (three) times daily with meals.    vitamin renal formula, B-complex-vitamin c-folic acid, (NEPHROCAPS) 1 mg Cap Take 1 capsule by mouth once daily.    white petrolatum-mineral oil 56.8-42.5% (REFRESH LACRI-LUBE) 56.8-42.5 % Oint Apply a thin ribbon to bottom eye lid every night     Family History     Problem Relation (Age of Onset)    Hypertension Mother        Social History Main Topics    Smoking status: Former Smoker     Packs/day: 0.25     Quit date:  1/1/1978    Smokeless tobacco: Never Used    Alcohol use No    Drug use: No    Sexual activity: Not Currently     ROS  Objective:     Vital Signs (Most Recent):  Temp: 97.7 °F (36.5 °C) (07/31/17 1200)  Pulse: 78 (07/31/17 1200)  Resp: 18 (07/31/17 1200)  BP: 137/66 (07/31/17 1200)  SpO2: 96 % (07/31/17 1200) Vital Signs (24h Range):  Temp:  [96.8 °F (36 °C)-98.9 °F (37.2 °C)] 97.7 °F (36.5 °C)  Pulse:  [71-94] 78  Resp:  [18] 18  SpO2:  [94 %-100 %] 96 %  BP: ()/(65-81) 137/66     Weight: 95.3 kg (210 lb 1.6 oz)  Body mass index is 30.15 kg/m².    SpO2: 96 %  O2 Device (Oxygen Therapy): room air      Intake/Output Summary (Last 24 hours) at 07/31/17 1318  Last data filed at 07/31/17 0500   Gross per 24 hour   Intake                0 ml   Output              100 ml   Net             -100 ml       Lines/Drains/Airways     Central Venous Catheter Line                 Hemodialysis Catheter [id number] days          Drain                 Hemodialysis AV Fistula Right forearm [id number] days          Peripheral Intravenous Line                 Peripheral IV - Single Lumen 07/30/17 2216 Left Antecubital less than 1 day                Physical Exam    Significant Labs:   BMP:   Recent Labs  Lab 07/30/17 2216 07/31/17  0536   * 108    141   K 4.5 4.7   CL 98 102   CO2 23 20*   BUN 84* 88*   CREATININE 13.8* 13.8*   CALCIUM 9.4 9.0   MG  --  1.9    and CBC   Recent Labs  Lab 07/30/17 2216 07/31/17  0536   WBC 8.99 8.44   HGB 9.4* 9.2*   HCT 30.3* 29.5*    195       Significant Imaging: Echocardiogram:   2D echo with color flow doppler:   Results for orders placed or performed during the hospital encounter of 05/08/17   Echo doppler color flow   Result Value Ref Range    EF 60 55 - 65    Diastolic Dysfunction No     Aortic Valve Stenosis MODERATE TO SEVERE (A)     Mitral Valve Mobility NORMAL      Assessment:     Active Diagnoses:    Diagnosis  POA    Aortic valve disorders- Moderate As:  asymptomatic    he has undergone the following TAVR work-up:   ECHO (Date 5/9/2017): IMANI= 0.93 cm2, MG= 32 mmHg, Peak Nando= 3.6 m/s, EF= 65%.   LHC: needs  STS: 6.7%   Frailty: pending   CTA: pending  Yes    ESRD on hemodialysis -on HD M/W/F, HD scheduled for today  Not Applicable    Chest Pain- Proceed with CAD work-up with angiogram as scheduled tomorrow   Yes    PAD s/p YANE x2 SFA: Continue ASA       HLD: Stable on atorvastatin       Essential hypertension - Controlled on hydralazine     Yes      Problems Resolved During this Admission:    Diagnosis Date Noted Date Resolved POA     PLAN:    CAD Work-up with angiogram tomorrow 8/1/2017 per Dr. Silva   -Plavix load tonight, Continue ASA 81mg, NPO after midnight   Patient's ECHO does not qualify for TAVR. Recommend repeat ECHO if  Patient develops symptoms. If peak velocity >4 m/s and mean gradient >40 mmHg would consider TAVR, however, patient is asymptomatic at this time.      Thank you for your consult.     Julianna Wesley NP  Interventional Cardiology   [facility]

## 2017-07-31 NOTE — CONSULTS
"History and Physical  Nephrology       Admission Date: 7/30/2017    Team: Nephrology  Constance Daigle NP    Patient information was obtained from patient and ER records. Primary care Physician: Primary Doctor No    Chief Complaint: Chest pain      History of the Present Illness     77 y.o. male with significant past medical history of mild cognitive impairment, hypertension, hyperlipidemia, DMT2, CVA, mod/severeaortic stenosis and ESRD on HD x 3 years who presented to hospital complaining of acute sudden mid sternal chest pain associated with shortness of breath, palpitation, "real hot", and radiation to right arm. Took 4 baby ASA prior to EMS arrival. Symptoms resolved after nitro CL given en route to hospital.   Denies headaches, dizziness, change in vision,  orthopnea, PND, abdominal pain, nausea, vomiting, or focal extremities weakness/numbness. Elevated troponin compared to previous. Cardiology consult pending. Patient receives iHD MWF 3hr duration via right FA AVF at [facility] under direction of Dr. Romo. Minimal less than one cup residual. IDW 91.5 . Problems at outpatient HD with dialysis access as have collaterals and not poor clearance. Plan is for new access creation. Last HD Friday with 4 L removed and post weight 91.7 kg. Today, 95.3 kg.     Review of Systems  Constitutional: neg anorexia, neg weakness, neg fever  ENT: neg nasal congestion neg sore throat  Respiratory: neg cough pos shortness of breath  Cardiovascular: pos chest pain, palpitations  Musculoskeletal: neg swelling, neg pain  Neurological: neg seizures neg tremors  Endocrine: neg heat neg cold intolerance    Past Medical History: Patient has a past medical history of Arthritis; Diabetes mellitus; DNR (do not resuscitate) (5/17/2017); Hyperlipidemia; Hypertension; Stroke; and Syncope and collapse.    Past Surgical History: Patient has a past surgical history that includes Hip surgery; Joint replacement; and AV fistula placement " (Right).    Social History: Patient reports that he quit smoking about 39 years ago. He smoked 0.25 packs per day. He has never used smokeless tobacco. He reports that he does not drink alcohol or use drugs.    Family History: family history includes Hypertension in his mother.    Medications:    aspirin  81 mg Oral Daily    atorvastatin  40 mg Oral Daily    heparin (porcine)  5,000 Units Subcutaneous Q8H    hydrALAZINE  50 mg Oral Q8H    metoprolol tartrate  25 mg Oral BID    pantoprazole  40 mg Oral Daily    polyethylene glycol  17 g Oral Daily    sevelamer carbonate  1,600 mg Oral TID WM    vitamin renal formula (B-complex-vitamin c-folic acid)  1 capsule Oral Daily       Allergies: Patient is allergic to penicillins.    Physical Exam:     Vital Signs (Most Recent):  Temp: 96.8 °F (36 °C) (07/31/17 0800)  Pulse: 79 (07/31/17 0800)  Resp: 18 (07/31/17 0800)  BP: 98/65 (07/31/17 0800)  SpO2: 97 % (07/31/17 0800) Vital Signs Range (Last 24H):  Temp:  [96.8 °F (36 °C)-98.9 °F (37.2 °C)]   Pulse:  [71-94]   Resp:  [18]   BP: ()/(65-81)   SpO2:  [94 %-100 %]    Body mass index is 30.15 kg/m².     General: well developed, well nourished, appears stated age, neg acute distress   Lungs: normal respiratory effort, neg crackles, neg wheezes  Heart: regular rhythm, positive murmurs  Extremities: neg edema, neg joint swelling, pulses 2+ at ankles   Abdomen: Soft, non-tender; liver and spleen not palpable, bowel sounds active, neg aortic bruits  Mental status: Alert, oriented x 4, affect appropriate, poor historian    Labs and Diagnostic Results:       Recent Labs  Lab 07/30/17 2216 07/31/17  0536   WBC 8.99 8.44   HGB 9.4* 9.2*   HCT 30.3* 29.5*    195       Recent Labs  Lab 07/30/17 2216 07/31/17  0536    141   K 4.5 4.7   CL 98 102   CO2 23 20*   BUN 84* 88*   CREATININE 13.8* 13.8*   * 108   CALCIUM 9.4 9.0   MG  --  1.9   PHOS  --  9.1*       Recent Labs  Lab 07/30/17  0650   DREWPHOS  104   ALT 8*   AST 7*   ALBUMIN 3.3*   PROT 7.1   BILITOT 0.6   INR 1.1        Assessment/Plan:     Active Hospital Problems    Diagnosis  POA    *Chest pain [R07.9]  Yes    ESRD (end stage renal disease) [N18.6]  Yes    ESRD on hemodialysis [N18.6, Z99.2]  Not Applicable    Aortic valve disorders [I35.9]  Yes    Essential hypertension [I10]  Yes      Resolved Hospital Problems    Diagnosis Date Resolved POA   No resolved problems to display.       Problems addressed today  ESRD on IHD MWF  -See HPI. Patient receives iHD MWF 3hr duration via right FA AVF at [facility] under direction of Dr. Romo. Minimal less than one cup residual. IDW 91.5 kg.   -BP on low side at this time. Lungs are clear and extremities.   -Will provide dialysis for metabolic clearance and volume if okay with Cardiology and  Primary team.  -ultrafiltration  to 3.5-4L as tolerated. Run 3.5 hrs. Hold am antihypertensives.  - Dialysate adjusted to current labs     Right AF AVF Malfunction   - Have collaterals and poor clearance? Per outpt HD unit, no issues with UF and AV pressure.  May need new AVF/AVG creation. Will discuss with staff.     Anemia of Chronic Kidney Disease   - Not at goal. Will review dialysis follow sheet and resume LINK accordingly      Mineral Bone Disease in CKD   Lab Results   Component Value Date    CALCIUM 9.0 07/31/2017    CAION 1.42 01/10/2015    PHOS 9.1 (HH) 07/31/2017   -  renal diet. Increase sevelamer    Constance Daigle NP   Staff. Dr. Lee

## 2017-07-31 NOTE — ED PROVIDER NOTES
"Encounter Date: 7/30/2017    SCRIBE #1 NOTE: I, Jaedn Thomas, am scribing for, and in the presence of, Dr. Devi.       History     Chief Complaint   Patient presents with    Chest Pain     Chest pain x1.5 hours. 3 NTG SL given, some relief with ntg. EMS reports pt resistant to care.     Time seen by provider: 9:54 PM    This is a 77 y.o. male with pertinent PMHx of HTN, DM, HLD, MWF dialysis, and stroke, who presents to the ED with a chief complaint of sudden onset unknown severity central chest pain that started earlier tonight. Pt states that the pain onset suddenly out of the blue while at rest. Pt states he has had this pain before but can't remember when, spouse states he has been having this pain for the last couple of days on and off but tonight is a more severe episode. Pt is also unable to say how severe his pain is at this time and only states "I don't know". Pt denies any shortness of breath, nausea, vomiting, cough, fever, or chills. Pt also reports being on dialysis and states his last appointment was 2 days ago. Pt does not know of any heart problems but spouse states he has had heart problems though no stenting. There are no other associated symptoms or mitigating/aggravating factors reported at this time.           Review of patient's allergies indicates:   Allergen Reactions    Penicillins Rash     Past Medical History:   Diagnosis Date    Arthritis     Diabetes mellitus     DNR (do not resuscitate) 5/17/2017    Hyperlipidemia     Hypertension     Stroke     Syncope and collapse      Past Surgical History:   Procedure Laterality Date    AV FISTULA PLACEMENT Right     HIP SURGERY      replacement right    JOINT REPLACEMENT      right hip      Family History   Problem Relation Age of Onset    Hypertension Mother      Social History   Substance Use Topics    Smoking status: Former Smoker     Packs/day: 0.25     Quit date: 1/1/1978    Smokeless tobacco: Never Used    Alcohol use No "     Review of Systems   Constitutional: Negative for chills and fever.   HENT: Negative for congestion.    Eyes: Negative for pain.   Respiratory: Negative for cough and shortness of breath.    Cardiovascular: Positive for chest pain.   Gastrointestinal: Negative for abdominal pain, nausea and vomiting.   Genitourinary: Negative for difficulty urinating and dysuria.   Musculoskeletal: Negative for back pain and neck pain.   Skin: Negative for rash.   Neurological: Negative for weakness and headaches.       Physical Exam     Initial Vitals [07/30/17 2142]   BP Pulse Resp Temp SpO2   129/81 94 18 98.9 °F (37.2 °C) (!) 94 %      MAP       97         Physical Exam    Nursing note and vitals reviewed.  Constitutional: He appears well-developed and well-nourished. He is not diaphoretic. No distress.   HENT:   Head: Normocephalic and atraumatic.   Eyes: EOM are normal. Pupils are equal, round, and reactive to light.   Neck: Normal range of motion. Neck supple.   Cardiovascular: Normal rate and regular rhythm. Exam reveals no gallop and no friction rub.    Murmur heard.   Systolic murmur is present with a grade of 1/6   Pulmonary/Chest: Breath sounds normal. No respiratory distress. He has no wheezes. He has no rhonchi. He has no rales.   Abdominal: Soft. He exhibits no distension. There is no tenderness. There is no rebound and no guarding.   Musculoskeletal: Normal range of motion. He exhibits no edema (no leg swelling) or tenderness (no calf tenderness).   Neurological: He is alert and oriented to person, place, and time. He has normal strength. No cranial nerve deficit or sensory deficit.   Skin: Skin is warm and dry. No rash noted. No erythema.   Psychiatric: He has a normal mood and affect. His behavior is normal. Judgment and thought content normal.         ED Course   Procedures  Labs Reviewed   CBC W/ AUTO DIFFERENTIAL - Abnormal; Notable for the following:        Result Value    RBC 3.38 (*)     Hemoglobin 9.4 (*)      Hematocrit 30.3 (*)     MCHC 31.0 (*)     RDW 15.8 (*)     All other components within normal limits   B-TYPE NATRIURETIC PEPTIDE - Abnormal; Notable for the following:      (*)     All other components within normal limits   COMPREHENSIVE METABOLIC PANEL - Abnormal; Notable for the following:     Glucose 188 (*)     BUN, Bld 84 (*)     Creatinine 13.8 (*)     Albumin 3.3 (*)     AST 7 (*)     ALT 8 (*)     Anion Gap 17 (*)     eGFR if  3.5 (*)     eGFR if non  3.0 (*)     All other components within normal limits   TROPONIN I - Abnormal; Notable for the following:     Troponin I 0.102 (*)     All other components within normal limits   PROTIME-INR     EKG Readings: (Independently Interpreted)   NSR, LVH, Q waves anteriorly, new T wave inversions inferiorly and laterally.        X-Rays:   Independently Interpreted Readings:   Chest X-Ray: Mild CHF.      Medical Decision Making:   History:   Old Medical Records: I decided to obtain old medical records.  Initial Assessment:   My initial differential diagnoses include but are not limited to ACS, pneumonia, CHF, and esophagitis. This is a 77 y.o. male who presents with chest pain that has been coming and going for the last few days. Will preform cardiac workup and likely admit.   Independently Interpreted Test(s):   I have ordered and independently interpreted X-rays - see prior notes.  I have ordered and independently interpreted EKG Reading(s) - see prior notes  Clinical Tests:   Lab Tests: Ordered and Reviewed  Radiological Study: Ordered and Reviewed  Medical Tests: Ordered and Reviewed  Other:   I have discussed this case with another health care provider.            Scribe Attestation:   Scribe #1: I performed the above scribed service and the documentation accurately describes the services I performed. I attest to the accuracy of the note.    Attending Attestation:           Physician Attestation for Scribe:  Physician  Attestation Statement for Scribe #1: I, Dr. Devi, reviewed documentation, as scribed by Jdaen Thomas in my presence, and it is both accurate and complete.         Attending ED Notes:   11:46 pm  Pt's troponin is more elevated than usual but complicated due to recent dialysis. Will admit for rule out of MI.           ED Course     Clinical Impression:   The primary encounter diagnosis was ESRD (end stage renal disease) on dialysis. Diagnoses of Chest pain, Aortic valve disorders, Claudication in peripheral vascular disease, Type 2 diabetes mellitus with diabetic neuropathy, with long-term current use of insulin, Unstable angina pectoris, and ESRD (end stage renal disease) were also pertinent to this visit.    Disposition:   Disposition: Placed in Observation                        Ronn Devi III, MD  07/31/17 7342

## 2017-07-31 NOTE — PLAN OF CARE
07/31/17 1006   Discharge Assessment   Assessment Type Discharge Planning Assessment   Confirmed/corrected address and phone number on facesheet? Yes   Assessment information obtained from? Patient;Medical Record   Expected Length of Stay (days) 2   Prior to hospitilization cognitive status: Alert/Oriented   Prior to hospitalization functional status: Assistive Equipment;Needs Assistance   Current cognitive status: Alert/Oriented   Current Functional Status: Assistive Equipment;Needs Assistance   Arrived From home or self-care   Lives With spouse   Able to Return to Prior Arrangements yes   Is patient able to care for self after discharge? Yes  (with assistance)   Patient's perception of discharge disposition home or selfcare   Readmission Within The Last 30 Days no previous admission in last 30 days   Patient currently being followed by outpatient case management? No   Patient currently receives home health services? No   Does the patient currently use HME? Yes   Patient currently receives private duty nursing? No   Patient currently receives any other outside agency services? No   Equipment Currently Used at Home cane, straight;walker, rolling;rollator;wheelchair;bath bench   Do you have any problems affording any of your prescribed medications? No   Is the patient taking medications as prescribed? yes   Do you have any financial concerns preventing you from receiving the healthcare you need? No   Does the patient have transportation to healthcare appointments? Yes   Transportation Available family or friend will provide   On Dialysis? Yes   If yes, what is the name of the dialysis unit? [facility]  In [facility]   Does the patient receive outpatient dialysis? Yes   Does the patient receive services at the [facility]mad[facility] Clinic? No   Are there any open cases? No   Discharge Plan A Home with family   Discharge Plan B Home with family;Home Health   Patient/Family In Agreement With Plan yes   Placed in Obs for  CP. Lives with wife and requires assistance with his ADLs.  Has sitted 3 x per week to assist.  Plan is to return home. No new DC needs identified.

## 2017-07-31 NOTE — NURSING
Pt admitted to . Pt arrived to floor with telemtry from ED via stretcher per pt escort. VSS. NAD. No complaints at this time.  Plan of care initiated. CSU orders imitated. Bed in lowest position, SR up x2, call bell in reach. Will continue to monitor pt.

## 2017-07-31 NOTE — PROGRESS NOTES
Update:   B/p low in the 90's, will hold metoprolol and hydralazine for dialysis run this afternoon.  Planning 4L to be removed as he's been under dialyzed d/t issues with his fistula.  Will consult vascular surgery to eval while in house, since he last showed for procedure not being NPO.  Cards and Interventional Cardiology (TAVR) teams have been consulted to plan moving forward with combined tavr/ cad work up or just CAD work up for chest pain r/o for + troponin's / flat.

## 2017-07-31 NOTE — H&P
History & Physical  Cardiology Consults    SUBJECTIVE:   Chief Complaint/Reason for Admission: chest pain  Consult Reason: chest pain, moderate to severe AS  History of Present Illness:  77 y.o. male with pertinent PMHx of moderate to severe aortic stenosisis ( IMANI = 0.93 cm2, peak velocity = 3.6 m/s, mean gradient = 32 mmHg per Echo 5/09/2017), PAD s/p cath in 8/2014 with successful revascularization of right CFA and SFA with YANE x2 (Dr. Bautista), seizure disorder, HTN, HLD, ESRD on HD MWF (follows with Dr. Sandhu), CVA with residual visual deficit and cataracts who presented to the ED with a chief complaint of sudden onset central chest pain that started last evening around 8:30 pm while lying in bed. Reports substernal chest pain, pressure radiating to his right shoulder associated with sob and chills. Notes pain was 9/10 at worst intensity.  Denied associated diaphoresis, N/V, dizziness, fever. Wife called EMS and received NTG by EMS with improvement of chest pain to 5-6 out of 10. Pain resolved in ER and has remained pain free. Denies having similar pain in past and reports he quit smoking in 1978. Denies focal weakness, numbness, change is speech, facial droop or confusion. Troponins trending 0.102>0.113>0.118.     Pt denies SOB or ALLEN. He ambulates >120 ft to his brothers house and does not get SOB. He ambulates with walker s/t fear of falls and occasionally sits and to rest his legs. He has a hx of PAD and endorses claudication after ambulating ~100ft on average. Sleeps on 2 pillows (large pillows per pt) due to orthopnea and will sleep in his recliner at times. He denies syncope, palpitations, LE edema.      Past Medical History:   Diagnosis Date    Arthritis     Diabetes mellitus     DNR (do not resuscitate) 5/17/2017    Hyperlipidemia     Hypertension     Stroke     Syncope and collapse       Past Surgical History:   Procedure Laterality Date    AV FISTULA PLACEMENT Right     HIP SURGERY       replacement right    JOINT REPLACEMENT      right hip       No current facility-administered medications on file prior to encounter.      Current Outpatient Prescriptions on File Prior to Encounter   Medication Sig Dispense Refill    aspirin (ECOTRIN) 81 MG EC tablet Take 81 mg by mouth once daily.      atorvastatin (LIPITOR) 40 MG tablet Take 40 mg by mouth once daily.      carboxymethylcellulose (REFRESH PLUS) 0.5 % Dpet Place 1 drop into both eyes 5 (five) times daily.      ciprofloxacin HCl (CIPRO) 500 MG tablet Take 1 tablet (500 mg total) by mouth once daily. LAST DAY 5/22/17 3 tablet 0    hydrALAZINE (APRESOLINE) 50 MG tablet Take 50 mg by mouth every 8 (eight) hours.      lorazepam (ATIVAN) 0.5 MG tablet Take 0.5 mg by mouth 2 (two) times daily as needed for Anxiety.      omeprazole (PRILOSEC) 40 MG capsule Take 40 mg by mouth once daily.      sevelamer carbonate (RENVELA) 800 mg Tab Take 2 tablets (1,600 mg total) by mouth 3 (three) times daily with meals.      vitamin renal formula, B-complex-vitamin c-folic acid, (NEPHROCAPS) 1 mg Cap Take 1 capsule by mouth once daily.      white petrolatum-mineral oil 56.8-42.5% (REFRESH LACRI-LUBE) 56.8-42.5 % Oint Apply a thin ribbon to bottom eye lid every night        Review of patient's allergies indicates:   Allergen Reactions    Penicillins Rash       Family History   Problem Relation Age of Onset    Hypertension Mother        reports that he quit smoking about 39 years ago. He smoked 0.25 packs per day. He has never used smokeless tobacco. He reports that he does not drink alcohol or use drugs.     Review of Systems:   Constitutional: Denies fevers, weight loss, or weakness. Positive for chills  Eyes: Denies changes in vision.  ENT: Denies dysphagia, nasal discharge, ear pain or discharge.  Cardiovascular: positive for chest pain,  orthopnea, and claudication.  Respiratory:Positive for shortness of breath,  Denies cough, hemoptysis, or  wheezing.  GI: Denies nausea/vomiting, hematochezia, melena, abdominal pain, or changes in appetite.  : Denies dysuria, incontinence, or hematuria.  Musculoskeletal: Denies joint pain or myalgias.  Skin/breast: Denies rashes, lumps, lesions, or discharge.  Neurologic: Denies headache, dizziness, vertigo, or paresthesias.  Psychiatric: Denies changes in mood or hallucinations.  Endocrine: Denies polyuria, polydipsia, heat/cold intolerance.  Hematologic/Lymph: Denies lymphadenopathy, easy bruising or easy bleeding.  Allergic/Immunologic: Denies rash, rhinitis.     OBJECTIVE:     Vital Signs (Most Recent):  Temp: 97.7 °F (36.5 °C) (07/31/17 1750)  Pulse: 88 (07/31/17 1750)  Resp: 20 (07/31/17 1750)  BP: (!) 135/52 (07/31/17 1750)  SpO2: 96 % (07/31/17 1200) Vital Signs (24h Range):  Temp:  [96.8 °F (36 °C)-98.9 °F (37.2 °C)] 97.7 °F (36.5 °C)  Pulse:  [71-94] 88  Resp:  [18-20] 20  SpO2:  [94 %-100 %] 96 %  BP: ()/(40-81) 135/52     I & O (24h):    Intake/Output Summary (Last 24 hours) at 07/31/17 1838  Last data filed at 07/31/17 1750   Gross per 24 hour   Intake             1010 ml   Output             4750 ml   Net            -3740 ml        Physical Exam:  General: well developed, well nourished  HENT: Head:normocephalic, atraumatic. Ears:hearing grossly normal bilaterally. Nose: Nares normal. Septum midline. Mucosa normal. No drainage or sinus tenderness., no discharge. Throat: lips, mucosa, and tongue normal; teeth and gums normal and no throat erythema.  Eyes: conjunctivae/corneas clear.    Lungs:  clear to auscultation bilaterally and normal respiratory effort  Cardiovascular: Heart: regular rate and rhythm. 3/6 harsh systolic murmur. Chest Wall: no tenderness. Extremities: no cyanosis or edema, or clubbing. Pulses: 2+ and symmetric. AVF on Rt forearm with positive thrill.  Abdomen/Rectal: Abdomen: soft, non-tender non-distented; bowel sounds normal; no masses,  no organomegaly. Rectal: not  examined  Skin: Skin color, texture, turgor normal. No rashes or lesions  Neurologic: Normal strength and tone. No focal numbness or weakness  Psych/Behavioral:  Alert and oriented, appropriate affect.    Laboratory:  Recent Results (from the past 24 hour(s))   CBC auto differential    Collection Time: 07/30/17 10:16 PM   Result Value Ref Range    WBC 8.99 3.90 - 12.70 K/uL    RBC 3.38 (L) 4.60 - 6.20 M/uL    Hemoglobin 9.4 (L) 14.0 - 18.0 g/dL    Hematocrit 30.3 (L) 40.0 - 54.0 %    MCV 90 82 - 98 fL    MCH 27.8 27.0 - 31.0 pg    MCHC 31.0 (L) 32.0 - 36.0 g/dL    RDW 15.8 (H) 11.5 - 14.5 %    Platelets 179 150 - 350 K/uL    MPV 10.3 9.2 - 12.9 fL    Gran # 6.4 1.8 - 7.7 K/uL    Lymph # 1.7 1.0 - 4.8 K/uL    Mono # 0.7 0.3 - 1.0 K/uL    Eos # 0.2 0.0 - 0.5 K/uL    Baso # 0.02 0.00 - 0.20 K/uL    Gran% 71.2 38.0 - 73.0 %    Lymph% 18.7 18.0 - 48.0 %    Mono% 7.8 4.0 - 15.0 %    Eosinophil% 2.0 0.0 - 8.0 %    Basophil% 0.2 0.0 - 1.9 %    Differential Method Automated    Brain natriuretic peptide    Collection Time: 07/30/17 10:16 PM   Result Value Ref Range     (H) 0 - 99 pg/mL   Comprehensive metabolic panel    Collection Time: 07/30/17 10:16 PM   Result Value Ref Range    Sodium 138 136 - 145 mmol/L    Potassium 4.5 3.5 - 5.1 mmol/L    Chloride 98 95 - 110 mmol/L    CO2 23 23 - 29 mmol/L    Glucose 188 (H) 70 - 110 mg/dL    BUN, Bld 84 (H) 8 - 23 mg/dL    Creatinine 13.8 (H) 0.5 - 1.4 mg/dL    Calcium 9.4 8.7 - 10.5 mg/dL    Total Protein 7.1 6.0 - 8.4 g/dL    Albumin 3.3 (L) 3.5 - 5.2 g/dL    Total Bilirubin 0.6 0.1 - 1.0 mg/dL    Alkaline Phosphatase 104 55 - 135 U/L    AST 7 (L) 10 - 40 U/L    ALT 8 (L) 10 - 44 U/L    Anion Gap 17 (H) 8 - 16 mmol/L    eGFR if African American 3.5 (A) >60 mL/min/1.73 m^2    eGFR if non  3.0 (A) >60 mL/min/1.73 m^2   Troponin I    Collection Time: 07/30/17 10:16 PM   Result Value Ref Range    Troponin I 0.102 (H) 0.000 - 0.026 ng/mL   Protime-INR     Collection Time: 07/30/17 10:16 PM   Result Value Ref Range    Prothrombin Time 11.2 9.0 - 12.5 sec    INR 1.1 0.8 - 1.2   Troponin I    Collection Time: 07/31/17  2:12 AM   Result Value Ref Range    Troponin I 0.113 (H) 0.000 - 0.026 ng/mL   Basic metabolic panel    Collection Time: 07/31/17  5:36 AM   Result Value Ref Range    Sodium 141 136 - 145 mmol/L    Potassium 4.7 3.5 - 5.1 mmol/L    Chloride 102 95 - 110 mmol/L    CO2 20 (L) 23 - 29 mmol/L    Glucose 108 70 - 110 mg/dL    BUN, Bld 88 (H) 8 - 23 mg/dL    Creatinine 13.8 (H) 0.5 - 1.4 mg/dL    Calcium 9.0 8.7 - 10.5 mg/dL    Anion Gap 19 (H) 8 - 16 mmol/L    eGFR if African American 3.5 (A) >60 mL/min/1.73 m^2    eGFR if non  3.0 (A) >60 mL/min/1.73 m^2   Magnesium    Collection Time: 07/31/17  5:36 AM   Result Value Ref Range    Magnesium 1.9 1.6 - 2.6 mg/dL   Phosphorus    Collection Time: 07/31/17  5:36 AM   Result Value Ref Range    Phosphorus 9.1 (HH) 2.7 - 4.5 mg/dL   Troponin I    Collection Time: 07/31/17  5:36 AM   Result Value Ref Range    Troponin I 0.118 (H) 0.000 - 0.026 ng/mL   CBC auto differential    Collection Time: 07/31/17  5:36 AM   Result Value Ref Range    WBC 8.44 3.90 - 12.70 K/uL    RBC 3.32 (L) 4.60 - 6.20 M/uL    Hemoglobin 9.2 (L) 14.0 - 18.0 g/dL    Hematocrit 29.5 (L) 40.0 - 54.0 %    MCV 89 82 - 98 fL    MCH 27.7 27.0 - 31.0 pg    MCHC 31.2 (L) 32.0 - 36.0 g/dL    RDW 15.9 (H) 11.5 - 14.5 %    Platelets 195 150 - 350 K/uL    MPV 10.2 9.2 - 12.9 fL    Gran # 6.0 1.8 - 7.7 K/uL    Lymph # 1.6 1.0 - 4.8 K/uL    Mono # 0.6 0.3 - 1.0 K/uL    Eos # 0.2 0.0 - 0.5 K/uL    Baso # 0.04 0.00 - 0.20 K/uL    Gran% 71.1 38.0 - 73.0 %    Lymph% 19.1 18.0 - 48.0 %    Mono% 7.0 4.0 - 15.0 %    Eosinophil% 2.1 0.0 - 8.0 %    Basophil% 0.5 0.0 - 1.9 %    Differential Method Automated    POCT glucose    Collection Time: 07/31/17 10:29 AM   Result Value Ref Range    POCT Glucose 90 70 - 110 mg/dL   POCT glucose    Collection Time:  07/31/17 12:42 PM   Result Value Ref Range    POCT Glucose 149 (H) 70 - 110 mg/dL       Diagnostic Results:      ASSESSMENT/PLAN:     Present on Admission:   Chest pain   Aortic valve disorders   ESRD (end stage renal disease)   Essential hypertension      Chest pain of undetermined etiology  -possibly cardiac in setting elevated troponin vs GI etiology vs 2/2 to hypervolemia as pt was underdialyzed  -currently asymptomatic  -recommend ischemic workup with adenosine stress test  -agree with interventional cardiology consult. F/up recs    Moderate-severe aortic stenosis  -currently asymptomatic  -agree with interventional cardiology consult.      Case discussed and seen with Dr. Price.  Diana Garcia PGY-2  Internal Medicine

## 2017-08-01 PROBLEM — I21.4 NSTEMI (NON-ST ELEVATED MYOCARDIAL INFARCTION): Status: ACTIVE | Noted: 2017-01-01

## 2017-08-01 NOTE — PROGRESS NOTES
POST CATH NOTE    Procedure:  Coronary Angiogram and PCI of LCx/OM1  Referring MD:  Dr. Sandhu  Indication:  Severe Aortic stenosis, Pre-TAVR   Access:  RCFA (6 Fr)    Findings:  Severe stenosis of LCx/OM1   Please see formal cath report for full details of the procedure.    Intervention:  Successful PCI of LCx/OM1 with 2.5 x 12 mm YANE stent, lesion reduced to 0%, RAMU III flow distally.  Patient tolerated the procedure well, no complications    Access site Closure:  Perclose was used to establish hemostasis at the access site.    Post Cath Exam:  Vitals:    08/01/17 1000   BP:    Pulse: (!) 116   Resp:    Temp:      No unusual pain, hematoma, or thrill at vascular access site.  Distal pulse present without signs of ischemia.    Recommendation:  -continue dual antiplatelet therapy daily.  -high-potency statin, ACE-I and BB therapy if BP tolerates  -vascular risk factor control  -Proceed with TAVR work up    Signed:  Rafia Vazquez MD  Interventional Cardiology Fellow, PGY-9  8/1/2017 11:47 AM

## 2017-08-01 NOTE — HPI
Mr. Greco is a 77 y.o. male with pertinent PMHx of moderate to severe aortic stenosisis ( IMANI = 0.93 cm2, peak velocity = 3.6 m/s, mean gradient = 32 mmHg per Echo in May), PAD s/p cath in 2014 with unsuccessful attempt to revascularize right SFA  , HTN, HLD, ESRD on HD MWF and stroke with residual visual deficit, and cataracts who presented to the ED with a chief complaint of sudden onset central chest pain that started last evening around 8:30 pm while lying in bed. Reports substernal chest pain, pressure radiating to his right shoulder associated with sob and chills. Notes pain was 9/10 at worst intensity.    Denies associated diaphoresis, N/V, dizziness, fever. Wife called EMS and received NTG by EMS with improvement of chest pain to 5-6 out of 10. Pain resolved in ER and remained pain free during initial interview. Denies having similar pain in past and reports quit smoking in 1978. Denies focal weakness, numbness, change is speech, facial droop or confusion.    The patient was admitted to the Hospital Medicine Service for further evaluation and management.

## 2017-08-01 NOTE — PLAN OF CARE
Problem: Patient Care Overview  Goal: Plan of Care Review  Outcome: Ongoing (interventions implemented as appropriate)  Pt had no significant events overnight. Pt remained free of falls throughout the shift. Pt encourage to turn every 2hrs to prevent pressure ulcers from being formed. Pt going for a LHC in the morning.

## 2017-08-01 NOTE — PROGRESS NOTES
"Pt refused AM labs this morning. Pt started cursing  out, stating "I'm not coming back to this fucking place anymore". Will notify team.   "

## 2017-08-01 NOTE — PROGRESS NOTES
Pt's groin site has no hematoma, but is oozing blood. Dr. Flores at bedside, pressure applied, dressing changed. Will continue to monitor.

## 2017-08-01 NOTE — ASSESSMENT & PLAN NOTE
-HD MWF via right arm AVF  -is scheduled for redo AVF on Thursday, will need to hold off  -consider temporary access if unable to access current AVF  -Nephrology following, appreciate recommendations  -consider discharge in AM after HD treatment

## 2017-08-01 NOTE — PROGRESS NOTES
[facility] Will continue to monitor.

## 2017-08-01 NOTE — ASSESSMENT & PLAN NOTE
-severe AS noted on echo  -evaluated by TAVR team, not a candidate at this time  -if becomes symptomatic, repeat echo, and re-evaluate  -see above

## 2017-08-01 NOTE — PROGRESS NOTES
[facility] Hospital Medicine  Progress Note    Patient Name: Bernabe Greco Jr.  MRN: [id number]  Patient Class: OP- Outpatient Recovery   Admission Date: 7/30/2017  Length of Stay: 0 days  Attending Physician: Eve Mathew MD  Primary Care Provider: Primary Doctor [facility] Medicine Team: [facility] HAILEY Kennedy NP    Subjective:     Principal Problem:NSTEMI (non-ST elevated myocardial infarction)    HPI:  Mr. Greco is a 77 y.o. male with pertinent PMHx of moderate to severe aortic stenosisis ( IMANI = 0.93 cm2, peak velocity = 3.6 m/s, mean gradient = 32 mmHg per Echo in May), PAD s/p cath in 2014 with unsuccessful attempt to revascularize right SFA  , HTN, HLD, ESRD on HD MWF and stroke with residual visual deficit, and cataracts who presented to the ED with a chief complaint of sudden onset central chest pain that started last evening around 8:30 pm while lying in bed. Reports substernal chest pain, pressure radiating to his right shoulder associated with sob and chills. Notes pain was 9/10 at worst intensity.    Denies associated diaphoresis, N/V, dizziness, fever. Wife called EMS and received NTG by EMS with improvement of chest pain to 5-6 out of 10. Pain resolved in ER and remained pain free during initial interview. Denies having similar pain in past and reports quit smoking in 1978. Denies focal weakness, numbness, change is speech, facial droop or confusion.    The patient was admitted to the Hospital Medicine Service for further evaluation and management.      Hospital Course:  Mr. rGeco was admitted 7/31 with complaints of chest pain resolved with SL NTG. He was evaluated by Interventional Cardiology and underwent LHC on 8/1 with successful PCI of LCx/OM1 with YANE.  He was also evaluated by TAVR team, will proceed with CT TAVR and CTS consult in AM.  We will continue to monitor and dialyze before discharge tomorrow.     Interval History: Resting in bed, post heart cath  and manual pressure to right groin site. Has no complaints or needs. Denies chest pain, palpitations, or shortness of breath. Is concerned he has a vascular surgery appointment on thursday for a new AVF creation; discussed with he and wife need to have non interrupted ASA/plavix therapy due to YANE placement.    Review of Systems   Constitutional: Negative for activity change, appetite change, chills, diaphoresis, fatigue, fever and unexpected weight change.   Respiratory: Negative for cough, chest tightness and shortness of breath.    Cardiovascular: Negative for chest pain, palpitations and leg swelling.   Gastrointestinal: Negative for abdominal distention, constipation, diarrhea, nausea and vomiting.   Musculoskeletal: Positive for arthralgias (right hip). Negative for myalgias.   Neurological: Negative for dizziness, weakness, light-headedness and headaches.     Objective:     Vital Signs (Most Recent):  Temp: 98.5 °F (36.9 °C) (08/01/17 1600)  Pulse: 102 (08/01/17 1600)  Resp: 18 (08/01/17 1600)  BP: 125/64 (08/01/17 1600)  SpO2: 95 % (08/01/17 1600) Vital Signs (24h Range):  Temp:  [97.7 °F (36.5 °C)-99 °F (37.2 °C)] 98.5 °F (36.9 °C)  Pulse:  [] 102  Resp:  [18-20] 18  SpO2:  [94 %-98 %] 95 %  BP: (113-172)/(40-94) 125/64     Weight: 95.3 kg (210 lb 1.6 oz)  Body mass index is 30.15 kg/m².    Intake/Output Summary (Last 24 hours) at 08/01/17 1637  Last data filed at 08/01/17 1600   Gross per 24 hour   Intake             1130 ml   Output             4875 ml   Net            -3745 ml      Physical Exam   Constitutional: He is oriented to person, place, and time. He appears well-developed and well-nourished. No distress.   Eyes: Pupils are equal, round, and reactive to light.   Neck: Normal range of motion. Neck supple. No JVD present.   Cardiovascular: Normal rate, regular rhythm and intact distal pulses.    Murmur (harsh systolic) heard.  Pulmonary/Chest: Effort normal and breath sounds normal. No  respiratory distress. He has no wheezes. He has no rales. He exhibits no tenderness.   Abdominal: Soft. Bowel sounds are normal. He exhibits no distension.   Musculoskeletal: Normal range of motion. He exhibits no edema.   Neurological: He is alert and oriented to person, place, and time.   Skin: Skin is warm and dry. Capillary refill takes less than 2 seconds. He is not diaphoretic.     Significant Labs:   CBC:   Recent Labs  Lab 07/30/17 2216 07/31/17 0536 08/01/17  0430   WBC 8.99 8.44 8.46   HGB 9.4* 9.2* 9.9*   HCT 30.3* 29.5* 31.5*    195 205     CMP:   Recent Labs  Lab 07/30/17 2216 07/31/17 0536 08/01/17  0430    141 140   K 4.5 4.7 4.8   CL 98 102 100   CO2 23 20* 24   * 108 113*   BUN 84* 88* 66*   CREATININE 13.8* 13.8* 11.9*   CALCIUM 9.4 9.0 9.8   PROT 7.1  --   --    ALBUMIN 3.3*  --   --    BILITOT 0.6  --   --    ALKPHOS 104  --   --    AST 7*  --   --    ALT 8*  --   --    ANIONGAP 17* 19* 16   EGFRNONAA 3.0* 3.0* 3.6*     Cardiac Markers:   Recent Labs  Lab 07/30/17 2216   *     Troponin:   Recent Labs  Lab 07/30/17 2216 07/31/17 0212 07/31/17  0536   TROPONINI 0.102* 0.113* 0.118*     All pertinent labs within the past 24 hours have been reviewed.    Significant Imaging: I have reviewed all pertinent imaging results/findings within the past 24 hours.    Assessment/Plan:      * NSTEMI (non-ST elevated myocardial infarction)    -s/p [facility] 8/1, successful PCI of LCx/OM1 with YANE  -Interventional Cardiology following  -continue ASA/brilenta, carvedilol  -add low dose lisinopril  -will need outpt cardiology follow up  -SL NTG and EKG PRN chest pain  -continue tele monitoring  -consider discharge in AM after HD treatment        Aortic valve disorders    -severe AS noted on echo  -evaluated by TAVR team, now post [facility] will obtain TAVR CT for candidacy  -CTS to eval in AM as well  -see above         ESRD on hemodialysis    -HD MWF via right arm AVF  -is scheduled for redo  AVF on Thursday, will need to hold off  -consider temporary access if unable to access current AVF  -Nephrology following, appreciate recommendations  -consider discharge in AM after HD treatment         Anemia associated with chronic renal failure    -CBC stable, monitor         Essential hypertension    -BP improving, continue carvedilol and low dose lisinopril, uptitrate as needed        Hyperlipidemia LDL goal <70    -increase to high intensity statin        Malfunction of arteriovenous dialysis fistula    -currently a working AVF, however has pain with access and treatments  -outpt appointment Thursday, will call to reschedule  -needs uninterrupted ASA and brilinta due to YANE placement   -consider temporary access        Type 2 diabetes mellitus with diabetic neuropathy, with long-term current use of insulin    -HgA1C pending  -SSI low dose in house  -cardiac diet with ADA restrictions          VTE Risk Mitigation         Ordered     heparin (porcine) injection 5,000 Units  Every 8 hours     Route:  Subcutaneous        07/31/17 0116     Low Risk of VTE  Once      07/31/17 0132        Rosa Maria Kennedy NP  Department of Hospital Medicine   [facility]  Pager [phone]  [facility] [id number]

## 2017-08-01 NOTE — CONSULTS
"Cardiac Rehab     Bernabe Greco Jr.   [id number]   8/1/2017       Activity taught: Yes    Cardiac Rehab Phase Taught: Phase 1 & 2    Risk Factors-Modifiable: diabetes, nutrition, hyperlipidemia, hypertension, exercise    Risk Factors-Non modifiable: age, gender, race    Teaching Method: Verbal, Written, Living with Heart Disease Booklet    Understanding: Yes    Response: Verbalized understanding and questions answered. He goes to dialysis MWF at [facility]. He would like to go to cardiac rehab before he goes to dialysis.     Comments: S/P coronary stent. Discussed cardiac rehab and risk factor modification. Team to refer patient to [facility] Cardiac Rehab Phase II. Educational materials were used in the process and given to the patient. They included "Your Guide to Living with Heart Disease", Phase Two Cardiac Rehabilitation information along with a sample Mediterranean diet.The patient expressed understanding of the teaching and expressed desire to take a role in modifying the risk factors when they return home.    Santa Malcolm RN  Cardiac Rehab Nurse    "

## 2017-08-01 NOTE — SUBJECTIVE & OBJECTIVE
Interval History: Resting in bed, post heart cath and manual pressure to right groin site. Has no complaints or needs. Denies chest pain, palpitations, or shortness of breath. Is concerned he has a vascular surgery appointment on thursday for a new AVF creation; discussed with he and wife need to have non interrupted ASA/plavix therapy due to YANE placement.    Review of Systems   Constitutional: Negative for activity change, appetite change, chills, diaphoresis, fatigue, fever and unexpected weight change.   Respiratory: Negative for cough, chest tightness and shortness of breath.    Cardiovascular: Negative for chest pain, palpitations and leg swelling.   Gastrointestinal: Negative for abdominal distention, constipation, diarrhea, nausea and vomiting.   Musculoskeletal: Positive for arthralgias (right hip). Negative for myalgias.   Neurological: Negative for dizziness, weakness, light-headedness and headaches.     Objective:     Vital Signs (Most Recent):  Temp: 98.5 °F (36.9 °C) (08/01/17 1600)  Pulse: 102 (08/01/17 1600)  Resp: 18 (08/01/17 1600)  BP: 125/64 (08/01/17 1600)  SpO2: 95 % (08/01/17 1600) Vital Signs (24h Range):  Temp:  [97.7 °F (36.5 °C)-99 °F (37.2 °C)] 98.5 °F (36.9 °C)  Pulse:  [] 102  Resp:  [18-20] 18  SpO2:  [94 %-98 %] 95 %  BP: (113-172)/(40-94) 125/64     Weight: 95.3 kg (210 lb 1.6 oz)  Body mass index is 30.15 kg/m².    Intake/Output Summary (Last 24 hours) at 08/01/17 1637  Last data filed at 08/01/17 1600   Gross per 24 hour   Intake             1130 ml   Output             4875 ml   Net            -3745 ml      Physical Exam   Constitutional: He is oriented to person, place, and time. He appears well-developed and well-nourished. No distress.   Eyes: Pupils are equal, round, and reactive to light.   Neck: Normal range of motion. Neck supple. No JVD present.   Cardiovascular: Normal rate, regular rhythm and intact distal pulses.    Murmur (harsh systolic) heard.  Pulmonary/Chest:  Effort normal and breath sounds normal. No respiratory distress. He has no wheezes. He has no rales. He exhibits no tenderness.   Abdominal: Soft. Bowel sounds are normal. He exhibits no distension.   Musculoskeletal: Normal range of motion. He exhibits no edema.   Neurological: He is alert and oriented to person, place, and time.   Skin: Skin is warm and dry. Capillary refill takes less than 2 seconds. He is not diaphoretic.     Significant Labs:   CBC:   Recent Labs  Lab 07/30/17 2216 07/31/17 0536 08/01/17  0430   WBC 8.99 8.44 8.46   HGB 9.4* 9.2* 9.9*   HCT 30.3* 29.5* 31.5*    195 205     CMP:   Recent Labs  Lab 07/30/17 2216 07/31/17 0536 08/01/17  0430    141 140   K 4.5 4.7 4.8   CL 98 102 100   CO2 23 20* 24   * 108 113*   BUN 84* 88* 66*   CREATININE 13.8* 13.8* 11.9*   CALCIUM 9.4 9.0 9.8   PROT 7.1  --   --    ALBUMIN 3.3*  --   --    BILITOT 0.6  --   --    ALKPHOS 104  --   --    AST 7*  --   --    ALT 8*  --   --    ANIONGAP 17* 19* 16   EGFRNONAA 3.0* 3.0* 3.6*     Cardiac Markers:   Recent Labs  Lab 07/30/17 2216   *     Troponin:   Recent Labs  Lab 07/30/17 2216 07/31/17 0212 07/31/17  0536   TROPONINI 0.102* 0.113* 0.118*     All pertinent labs within the past 24 hours have been reviewed.    Significant Imaging: I have reviewed all pertinent imaging results/findings within the past 24 hours.

## 2017-08-01 NOTE — ASSESSMENT & PLAN NOTE
-currently a working AVF, however has pain with access and treatments  -outpt appointment Thursday, will call to reschedule  -needs uninterrupted ASA and brilinta due to YANE placement   -consider temporary access

## 2017-08-01 NOTE — PROGRESS NOTES
Patient from cath lab via stretcher with cath lab escort. Patient in no distress. Patient resting in bed. Tele resumed. Right groin dressing dry, and intact, with no hematoma noted. Some drainage, marked on dressing. BLE pulse +2 with brisk capillary refill.  Patient voices no complains of numbness or tingling.  RLE immobilized.  Patient and family verbalizes understanding of post cath care.  Post cath orders implemented as ordered.  Will continue to monitor.

## 2017-08-01 NOTE — ASSESSMENT & PLAN NOTE
-s/p [facility] 8/1, successful PCI of LCx/OM1 with YANE  -Interventional Cardiology following  -continue ASA/brilenta, carvedilol  -add low dose lisinopril  -will need outpt cardiology follow up  -SL NTG and EKG PRN chest pain  -continue tele monitoring  -consider discharge in AM after HD treatment

## 2017-08-01 NOTE — HOSPITAL COURSE
Mr. Greco was admitted 7/31 with complaints of chest pain that resolved with SL NTG. He was evaluated by Interventional Cardiology and underwent LHC on 8/1 with successful PCI of LCx/OM1 with YANE.  After repeat echo, findings indicated need to proceed with TAVR evaluation,  TAVR CT, PFTs, frailty, and 6 minute walk test completed, will follow up with TAVR team as outpatient. Nephrology folloed and coordinated inpatient HD treatments. Dr. Mayers with vascular surgery notified of patients NSTEMI and YANE placement, will cancel new AVF formation procedure for 8/3. Outpatient follow up with priority care clinic arranged, TAVR team will call patient to schedule. Discharged with appropriate hard Rx's and transportation arranged via [facility].

## 2017-08-02 PROBLEM — I35.0 AORTIC STENOSIS, SEVERE: Status: ACTIVE | Noted: 2017-01-01

## 2017-08-02 PROBLEM — I20.0 UNSTABLE ANGINA PECTORIS: Status: ACTIVE | Noted: 2017-01-01

## 2017-08-02 NOTE — ASSESSMENT & PLAN NOTE
-s/p [facility] 8/1, successful PCI of LCx/OM1 with YANE  -Interventional Cardiology following  -continue ASA/brilenta, carvedilol  -continue lisinopril, will up titrate   -will need outpt cardiology follow up  -SL NTG and EKG PRN chest pain  -continue tele monitoring  -consider discharge in AM after TAVR workup complete

## 2017-08-02 NOTE — ASSESSMENT & PLAN NOTE
Able to undergo HD via RUE AVF. Although slowly failing and will need intervention soon. Was scheduled for LUE AVG placement however, given the NSTEMI and PCA, we will hold off on AVG placement and follow along during his hospital course.

## 2017-08-02 NOTE — ASSESSMENT & PLAN NOTE
-currently a working AVF, however has pain with access and extended length treatments due to poor flow  -Vascular surgery following as outpt  -needs uninterrupted ASA and brilinta due to YANE placement   -consider temporary access if needed

## 2017-08-02 NOTE — PROGRESS NOTES
[facility] H[facility] NOTE    Bernabe Greco Jr. is a 77 y.o. male currently on hemodialysis for removal of uremic toxins and volume.    Pt was seen during hemodialysis today, dialysis flowsheet, labs, vitals were reviewed and d/w staff.      Labs have been reviewed and the dialysate bath has been adjusted.    There are no symptoms of chest pain, dyspnea, nausea or vomiting.  UF rate was limited by his BP    Exam  Vitals noted  Alert and awake  Respiration unlabored  Lungs no crackles  CV S1S2+    Labs:        Recent Labs  Lab 07/31/17  0536 08/01/17  0430 08/02/17  0430    140 134*   K 4.7 4.8 5.5*    100 96   CO2 20* 24 21*   BUN 88* 66* 87*   CREATININE 13.8* 11.9* 13.9*   CALCIUM 9.0 9.8 9.5   PHOS 9.1* 7.1* 8.7*         Recent Labs  Lab 07/31/17  0536 08/01/17  0430 08/02/17  0430   WBC 8.44 8.46 7.99   HGB 9.2* 9.9* 9.3*   HCT 29.5* 31.5* 29.8*    205 199       Assessment/Plan:    ESRD   Hyperkalemia  Metabolic acidosis  Anemia of ESRD  Hyperphosphatemia    HD today for removal of uremic toxins and volume.  Dialysate bath adjusted per labs  UF as tolerated by BP   Needs strict low phos diet and compliance with phos binders

## 2017-08-02 NOTE — HPI
77M with ESRD on HD (Right forearm AVF) and significant CAD/AVS who was scheduled for AVG placement tomorrow due to slowly failing RUE AVF. Patient presented to ED with Angina and found to have NSTEMI, now s/p PCA of LCx and OM1. We were asked to evaluate the RUE AVF and treat.

## 2017-08-02 NOTE — PLAN OF CARE
Patient expected to discharge home today, will call [facility] wheelchair transport to set up transportation on discharge, patient has a contract with [facility] wheelchair transport, will follow.

## 2017-08-02 NOTE — CONSULTS
[facility]  Cardiothoracic Surgery  Consult Note    Patient Name: Bernabe Greco Jr.  MRN: [id number]  Admission Date: 7/30/2017  Attending Physician: Eve Mathew MD   Primary Care Provider: Primary Doctor No    Patient information was obtained from patient.     Subjective:     Chief Complaint/Reason for Admission: TAVR workup     History of Present Illness:  Patient is a 78 yo M with severe AS (Echo 5/9/17 - IMANI 0.93 cm2, peak velocity 3.6 m/s, mean gradient 32) who is undergoing TAVR workup.     Pt has PMH of PVD - s/p cath/revascularization of R CFA and SFA with YANE x 2 - ESRD on HD MWF, multiple CVA with residual visual deficits, HTN, HLD, and seizure disorder. Pt presented on 8/1 with sudden onset CP - found to have NSTEMI. Now s/p LHC with PCI to LCX - OM1.    Functionally, patient still works but ambulates with a walker intermittently for instability. Denies ALLEN but reports claudication after about 100 ft. Pt reports orthopnea and needs 2 pillows when sleeping.    STS score - 6.7% mortality.          No current facility-administered medications on file prior to encounter.      Current Outpatient Prescriptions on File Prior to Encounter   Medication Sig    aspirin (ECOTRIN) 81 MG EC tablet Take 81 mg by mouth once daily.    atorvastatin (LIPITOR) 40 MG tablet Take 40 mg by mouth once daily.    carboxymethylcellulose (REFRESH PLUS) 0.5 % Dpet Place 1 drop into both eyes 5 (five) times daily.    ciprofloxacin HCl (CIPRO) 500 MG tablet Take 1 tablet (500 mg total) by mouth once daily. LAST DAY 5/22/17    hydrALAZINE (APRESOLINE) 50 MG tablet Take 50 mg by mouth every 8 (eight) hours.    lorazepam (ATIVAN) 0.5 MG tablet Take 0.5 mg by mouth 2 (two) times daily as needed for Anxiety.    omeprazole (PRILOSEC) 40 MG capsule Take 40 mg by mouth once daily.    sevelamer carbonate (RENVELA) 800 mg Tab Take 2 tablets (1,600 mg total) by mouth 3 (three) times daily with meals.    vitamin renal  formula, B-complex-vitamin c-folic acid, (NEPHROCAPS) 1 mg Cap Take 1 capsule by mouth once daily.    white petrolatum-mineral oil 56.8-42.5% (REFRESH LACRI-LUBE) 56.8-42.5 % Oint Apply a thin ribbon to bottom eye lid every night       Review of patient's allergies indicates:   Allergen Reactions    Penicillins Rash       Past Medical History:   Diagnosis Date    Arthritis     Diabetes mellitus     DNR (do not resuscitate) 5/17/2017    Hyperlipidemia     Hypertension     Stroke     Syncope and collapse      Past Surgical History:   Procedure Laterality Date    AV FISTULA PLACEMENT Right     HIP SURGERY      replacement right    JOINT REPLACEMENT      right hip      Family History     Problem Relation (Age of Onset)    Hypertension Mother        Social History Main Topics    Smoking status: Former Smoker     Packs/day: 0.25     Quit date: 1/1/1978    Smokeless tobacco: Never Used    Alcohol use No    Drug use: No    Sexual activity: Not Currently     Review of Systems   A 10-point ROS was negative, except as outlined in the HPI.     Objective:     Vital Signs (Most Recent):  Temp: 97.7 °F (36.5 °C) (08/02/17 1100)  Pulse: 86 (08/02/17 1600)  Resp: 16 (08/02/17 1100)  BP: (!) 98/44 (08/02/17 1600)  SpO2: 98 % (08/02/17 1100) Vital Signs (24h Range):  Temp:  [97.7 °F (36.5 °C)-98.7 °F (37.1 °C)] 97.7 °F (36.5 °C)  Pulse:  [70-90] 86  Resp:  [16-20] 16  SpO2:  [95 %-98 %] 98 %  BP: ()/(44-82) 98/44     Weight: 91.8 kg (202 lb 6.1 oz)  Body mass index is 29.04 kg/m².    Physical Exam   Constitutional: He is oriented to person, place, and time. He appears well-developed and well-nourished. No distress.   HENT:   Head: Normocephalic and atraumatic.   Eyes: Conjunctivae and EOM are normal.   Neck: Normal range of motion. Neck supple.   Cardiovascular: Normal rate and regular rhythm.    Murmur (systolic) heard.  Pulmonary/Chest: Effort normal and breath sounds normal.   Pectus excavatum    Abdominal:  Soft. He exhibits no distension. There is no tenderness.   Neurological: He is alert and oriented to person, place, and time.   Skin: Skin is warm and dry.       Significant Labs:  CBC:   Recent Labs  Lab 08/03/17 0420   WBC 7.00   RBC 3.31*   HGB 9.3*   HCT 29.3*      MCV 89   MCH 28.1   MCHC 31.7*     BMP:   Recent Labs  Lab 08/03/17 0420   *      K 4.9      CO2 20*   BUN 55*   CREATININE 10.9*   CALCIUM 9.7   MG 1.9     Coagulation:   Recent Labs  Lab 08/01/17 0429   LABPROT 11.1   INR 1.0   APTT 26.5     Cardiac markers:   Recent Labs  Lab 07/31/17 0536   TROPONINI 0.118*       Significant Diagnostics:  LHC and echo reviewed.    Assessment/Plan:     Active Diagnoses:    Diagnosis Date Noted POA    PRINCIPAL PROBLEM:  NSTEMI (non-ST elevated myocardial infarction) [I21.4] 07/30/2017 Yes    Aortic stenosis, severe [I35.0] 08/02/2017 Yes    Unstable angina pectoris [I20.0]  Unknown    ESRD on hemodialysis [N18.6, Z99.2]  Not Applicable    Malfunction of arteriovenous dialysis fistula [T82.590A] 05/19/2017 Yes    Anemia associated with chronic renal failure [D63.1] 01/22/2015 Yes    Aortic valve disorders [I35.9] 01/11/2015 Yes    Type 2 diabetes mellitus with diabetic neuropathy, with long-term current use of insulin [E11.40, Z79.4] 06/12/2014 Not Applicable    Essential hypertension [I10] 03/24/2014 Yes    Hyperlipidemia LDL goal <70 [E78.5] 03/24/2014 Yes      Problems Resolved During this Admission:    Diagnosis Date Noted Date Resolved POA     VTE Risk Mitigation         Ordered     heparin (porcine) injection 5,000 Units  Every 8 hours     Route:  Subcutaneous        07/31/17 0116     Low Risk of VTE  Once      07/31/17 0132          Pt is a 76 yo M who presented with severe AS and multiple comorbidities, including ESRD; now undergoing TAVR workup.     - Given patient's comorbid conditions, functional status, and STS risk assessment, he would be a high risk candidate for  SAVR.   - Recommend continuing TAVR workup.        Donny Nickerson MD  General Surgery  [facility]

## 2017-08-02 NOTE — NURSING TRANSFER
Nursing Transfer Note      8/2/2017     Transfer To: HD    Transfer with cardiac monitoring    Transported by escort    Chart send with patient: No    Notified: spouse

## 2017-08-02 NOTE — ASSESSMENT & PLAN NOTE
-severe AS noted on echo  -evaluated by TAVR team, will proceed with TAVR studies   -TAVR CT complete   -PFTS, 6 minute walk test, and frailty pending  -see above

## 2017-08-02 NOTE — PROGRESS NOTES
[facility] Hospital Medicine  Progress Note    Patient Name: Bernabe Greco Jr.  MRN: [id number]  Patient Class: IP- Inpatient   Admission Date: 7/30/2017  Length of Stay: 0 days  Attending Physician: Eve Mathew MD  Primary Care Provider: Primary Doctor [facility] Medicine Team: [facility] HAILEY Kennedy NP    Subjective:     Principal Problem:NSTEMI (non-ST elevated myocardial infarction)    HPI:  Mr. Greco is a 77 y.o. male with pertinent PMHx of moderate to severe aortic stenosisis ( IMANI = 0.93 cm2, peak velocity = 3.6 m/s, mean gradient = 32 mmHg per Echo in May), PAD s/p cath in 2014 with unsuccessful attempt to revascularize right SFA  , HTN, HLD, ESRD on HD MWF and stroke with residual visual deficit, and cataracts who presented to the ED with a chief complaint of sudden onset central chest pain that started last evening around 8:30 pm while lying in bed. Reports substernal chest pain, pressure radiating to his right shoulder associated with sob and chills. Notes pain was 9/10 at worst intensity.    Denies associated diaphoresis, N/V, dizziness, fever. Wife called EMS and received NTG by EMS with improvement of chest pain to 5-6 out of 10. Pain resolved in ER and remained pain free during initial interview. Denies having similar pain in past and reports quit smoking in 1978. Denies focal weakness, numbness, change is speech, facial droop or confusion.    The patient was admitted to the Hospital Medicine Service for further evaluation and management.      Hospital Course:  Mr. Greco was admitted 7/31 with complaints of chest pain that resolved with SL NTG. He was evaluated by Interventional Cardiology and underwent LHC on 8/1 with successful PCI of LCx/OM1 with YANE.  He was also evaluated by TAVR team, after repeat echo, findings indicate need to proceed with TAVR evaluation,  TAVR CT, PFTs, frailty, and 6 minute walk test pending completion. Nephrology following and  coordinating HD treatments. Dr. Mayers with vascular surgery notified of patients NSTEMI and YANE placement, will cancel new AVF formation procedure for tomorrow.    Interval History: Up in recliner, denies any complaints or needs at this time. He denies chest pain, palpitations, or shortness of breath. Denies any acute events or distress overnight. Discussed need to postpone new AVF formation at this time, understands we will proceed with TAVR workup.     Review of Systems   Constitutional: Negative for activity change, appetite change, chills, diaphoresis, fatigue, fever and unexpected weight change.   Respiratory: Negative for cough, chest tightness and shortness of breath.    Cardiovascular: Negative for chest pain, palpitations and leg swelling.   Gastrointestinal: Negative for abdominal distention, constipation, diarrhea, nausea and vomiting.   Musculoskeletal: Positive for arthralgias (right hip). Negative for myalgias.   Neurological: Negative for dizziness, weakness, light-headedness and headaches.     Objective:     Vital Signs (Most Recent):  Temp: 97.7 °F (36.5 °C) (08/02/17 1100)  Pulse: 85 (08/02/17 1135)  Resp: 16 (08/02/17 1100)  BP: (!) 140/65 (08/02/17 1100)  SpO2: 98 % (08/02/17 1100) Vital Signs (24h Range):  Temp:  [97.7 °F (36.5 °C)-98.7 °F (37.1 °C)] 97.7 °F (36.5 °C)  Pulse:  [] 85  Resp:  [16-20] 16  SpO2:  [95 %-98 %] 98 %  BP: (117-148)/(58-75) 140/65     Weight: 91.8 kg (202 lb 6.1 oz)  Body mass index is 29.04 kg/m².    Intake/Output Summary (Last 24 hours) at 08/02/17 1329  Last data filed at 08/02/17 0400   Gross per 24 hour   Intake              240 ml   Output               85 ml   Net              155 ml      Physical Exam   Constitutional: He is oriented to person, place, and time. He appears well-developed and well-nourished. No distress.   Eyes: Pupils are equal, round, and reactive to light.   Neck: Normal range of motion. Neck supple. No JVD present.   Cardiovascular:  Normal rate, regular rhythm and intact distal pulses.    Murmur (harsh systolic) heard.  Pulmonary/Chest: Effort normal and breath sounds normal. No respiratory distress. He has no wheezes. He has no rales. He exhibits no tenderness.   Abdominal: Soft. Bowel sounds are normal. He exhibits no distension.   Musculoskeletal: Normal range of motion. He exhibits no edema.   Neurological: He is alert and oriented to person, place, and time.   Skin: Skin is warm and dry. Capillary refill takes less than 2 seconds. He is not diaphoretic.     Significant Labs:   CBC:   Recent Labs  Lab 08/01/17 0430 08/02/17 0430   WBC 8.46 7.99   HGB 9.9* 9.3*   HCT 31.5* 29.8*    199     CMP:   Recent Labs  Lab 08/01/17 0430 08/02/17 0430    134*   K 4.8 5.5*    96   CO2 24 21*   * 103   BUN 66* 87*   CREATININE 11.9* 13.9*   CALCIUM 9.8 9.5   PROT  --  6.8   ALBUMIN  --  3.1*   BILITOT  --  0.9   ALKPHOS  --  87   AST  --  10   ALT  --  7*   ANIONGAP 16 17*   EGFRNONAA 3.6* 3.0*     All pertinent labs within the past 24 hours have been reviewed.    Significant Imaging: I have reviewed all pertinent imaging results/findings within the past 24 hours.    Assessment/Plan:      * NSTEMI (non-ST elevated myocardial infarction)    -s/p [facility] 8/1, successful PCI of LCx/OM1 with YANE  -Interventional Cardiology following  -continue ASA/brilenta, carvedilol  -continue lisinopril, will up titrate   -will need outpt cardiology follow up  -SL NTG and EKG PRN chest pain  -continue tele monitoring  -consider discharge in AM after TAVR workup complete        Aortic valve disorders    -severe AS noted on echo  -evaluated by TAVR team, will proceed with TAVR studies   -TAVR CT complete   -PFTS, 6 minute walk test, and frailty pending  -see above         ESRD on hemodialysis    -HD MWF via right arm AVF >>> undergoing HD this afternoon  -discussed need to abort new AVF creation at this time verbally discussed with   Talib  -consider temporary access if unable to dialyze with current AVF  -Nephrology following, appreciate recommendations        Anemia associated with chronic renal failure    -CBC stable, monitor         Essential hypertension    -BP improving, continue carvedilol and lisinopril >>> will uptitrate        Hyperlipidemia LDL goal <70    -continue high intensity statin        Malfunction of arteriovenous dialysis fistula    -currently a working AVF, however has pain with access and extended length treatments due to poor flow  -Vascular surgery following as outpt  -needs uninterrupted ASA and brilinta due to YANE placement   -consider temporary access if needed        Type 2 diabetes mellitus with diabetic neuropathy, with long-term current use of insulin    -HgA1C 6.7  -SSI low dose in house  -cardiac diet with ADA restrictions        Aortic stenosis, severe    -see above          VTE Risk Mitigation         Ordered     heparin (porcine) injection 5,000 Units  Every 8 hours     Route:  Subcutaneous        07/31/17 0116     Low Risk of VTE  Once      07/31/17 0132        Rosa Maria Kennedy NP  Department of Hospital Medicine   [facility]  Pager [phone]  [facility] [id number]

## 2017-08-02 NOTE — PROGRESS NOTES
TAVR Summary     Bernabe Greco Jr. is a 77 y.o. male referred by Dr Bautista for evaluation of severe AS (NYHA Class III sx).    He has undergone the following TAVR work-up:   · ECHO (Date 8/1/2017): IMANI= 0.6 cm2, MG= 43 mmHg, Peak Nando= 4.53 m/s, EF= 43%.   · LHC (Date 8/1/2017): PCI (YANE) to LCx/OM1  · STS: 6.55%   · Frailty: 3/4  · Iliacs are > 5.11 on L and > 6.23 on R   · LVOT area by CTA is 6.31 cm2, distance 32.5mm x 25.8mm and Avg Diameter is 28.3mm per Dr Silva  · PFTs: Completed 8/3/2017  · Pt is high risk due to co morbidities, functional status, and STS score per CTS      He is a 29mm Malgorzata S3 candidate via L TF access.    Julianna Wesley NP  Interventional Cardiology

## 2017-08-02 NOTE — PROGRESS NOTES
"Progress Note  Interventional Cardiology  [facility]      Hospital Day: 3    Subjective:  Interval History: Pt seen and evaluated and has no new complaints. No further chest pain since admission. He is s/p PCI to LCx-OM1 by Dr. Silva yesterday.  so we have switched from plavix to brillinta. Repeat ECHO 8/1 with decreased LVEF to 43% and qualifying gradients (IMANI 0.6 cm2, peak velocity 4.53 m/s, mean gradient 43 mmHg) and will proceed with remainder of TAVR work-up while inpatient. Nephrology following, HD schedule is M/W/F.      Scheduled Meds:   sodium chloride 0.9%   Intravenous Once    artificial tears  1 drop Both Eyes QID    aspirin  81 mg Oral Daily    atorvastatin  80 mg Oral Daily    carvedilol  6.25 mg Oral BID    heparin (porcine)  5,000 Units Subcutaneous Q8H    INV lisinopril  2.5 mg Oral Daily    pantoprazole  40 mg Oral Daily    polyethylene glycol  17 g Oral Daily    sevelamer carbonate  1,600 mg Oral TID WM    ticagrelor  90 mg Oral BID    vitamin renal formula (B-complex-vitamin c-folic acid)  1 capsule Oral Daily       Continuous Infusions:       PRN Meds:sodium chloride 0.9%, acetaminophen, albuterol-ipratropium 2.5mg-0.5mg/3mL, dextrose 50%, dextrose 50%, diphenhydrAMINE, glucagon (human recombinant), glucose, glucose, hydrocodone-acetaminophen 5-325mg, insulin aspart, lorazepam, nitroGLYCERIN, ondansetron    Objective:  Vital signs in last 24 hours:   Vitals:    08/02/17 0700   BP:    Pulse: 74   Resp:    Temp:        Intake/Output last shift:    Intake/Output Summary (Last 24 hours) at 08/02/17 0804  Last data filed at 08/02/17 0400   Gross per 24 hour   Intake              240 ml   Output               85 ml   Net              155 ml         Physical Exam:  BP (!) 148/67 (BP Location: Left arm, Patient Position: Lying, BP Method: Automatic)   Pulse 83   Temp 98.2 °F (36.8 °C) (Oral)   Resp 18   Ht 5' 10" (1.778 m)   Wt 91.8 kg (202 lb 6.1 oz)   SpO2 95%   BMI 29.04 " kg/m²     General Appearance:    Alert, cooperative, no distress, appears stated age   Head:    Normocephalic, without obvious abnormality, atraumatic   Eyes:    PERRL, conjunctiva/corneas clear, EOM's intact, fundi     benign, both eyes        Throat:   Lips, mucosa, and tongue normal; teeth and gums normal   Neck:   Supple, symmetrical, trachea midline, no adenopathy;        thyroid:  No enlargement/tenderness/nodules; no carotid    bruit or JVD   Back:     Symmetric, no curvature, ROM normal, no CVA tenderness   Lungs:     Clear to auscultation bilaterally, respirations unlabored   Chest wall:    No tenderness or deformity   Heart:    Regular rate and rhythm, S1 and S2 normal, no murmur, rub   or gallop   Abdomen:     Soft, non-tender, bowel sounds active all four quadrants,     no masses, no organomegaly, R groin site c/d/i   Extremities:   Extremities normal, atraumatic, no cyanosis or edema   Pulses:   2+ and symmetric all extremities   Skin:   Skin color, texture, turgor normal, no rashes or lesions     Lab Review  Lab Results   Component Value Date     (L) 08/02/2017    K 5.5 (H) 08/02/2017    CL 96 08/02/2017    CO2 21 (L) 08/02/2017    BUN 87 (H) 08/02/2017    CREATININE 13.9 (H) 08/02/2017    CALCIUM 9.5 08/02/2017     Lab Results   Component Value Date    WBC 7.99 08/02/2017    HGB 9.3 (L) 08/02/2017    HCT 29.8 (L) 08/02/2017    MCV 89 08/02/2017     08/02/2017        Assessment:Patient Active Problem List    Diagnosis Date Noted    Aortic stenosis, severe:    he has undergone the following TAVR work-up:   ECHO (Date 8/1/2017): IMANI= 0.6 cm2, MG= 43 mmHg, Peak Nando= 4.53 m/s, EF= 43%.   LHC (Date 8/1/2017): PCI (YANE) to LCx/OM1  STS: 6.55%   Frailty: pending   Iliacs are > 5.11 on L and > 6.23 on R   LVOT area by CTA is 6.31 cm2, distance 32.5mm x 25.8mm and Avg Diameter is 28.3mm  per Dr Silva  PFTs: Ordered, pending  CTS consulted     He is a 29mm Malgorzata S3 candidate via L TF access.    08/02/2017    ESRD (end stage renal disease) on dialysis- M/W/F schedule, nephrology following  07/31/2017    NSTEMI (non-ST elevated myocardial infarction) - s/p PCI to Lcx/OM1- Continue brillinta, ASA 07/30/2017    Malfunction of arteriovenous dialysis fistula- Vascular consulted  05/19/2017    Acute ischemic left MCA stroke- residual vision impairment  05/09/2017    Type 2 diabetes mellitus with diabetic neuropathy, with long-term current use of insulin 06/12/2014    Claudication in peripheral vascular disease- s/p YANE x2 SFA by Dr. Bautista 03/24/2014    Essential hypertension- Controlled on Hydralazine  03/24/2014    Obesity 03/24/2014    Hyperlipidemia LDL goal <70- Stable on atorvastatin  03/24/2014    Tobacco abuse, in remission 03/24/2014       Plan:  Successful PCI to LCx/OM1, Continue ASA 81mg daily, Brillinta BID ()     Repeat ECHO: LVEF 43% and qualifying gradients (IMANI 0.6 cm2, peak velocity 4.53 m/s, mean gradient 43 mmHg) - proceed with remainder of TAVR work-up.   -CTS consult   -Frailty   -PFTs      Julianna Wesley, WAYNE  Interventional Cardiology

## 2017-08-02 NOTE — PLAN OF CARE
Problem: Patient Care Overview  Goal: Plan of Care Review  Outcome: Revised  Plan of care discussed with patient. Patient is free of fall/trauma/injury. Denies CP, SOB, or pain/discomfort.Scheduled for HD today. All questions addressed. Will continue to monitor

## 2017-08-02 NOTE — PROGRESS NOTES
Report received from MARLIN Gaming. Pt arrived from floor AAOx4. Maintenance dialysis initiated via RFA fistula without difficulty.

## 2017-08-02 NOTE — SUBJECTIVE & OBJECTIVE
Interval History: Up in recliner, denies any complaints or needs at this time. He denies chest pain, palpitations, or shortness of breath. Denies any acute events or distress overnight. Discussed need to postpone new AVF formation at this time, understands we will proceed with TAVR workup.     Review of Systems   Constitutional: Negative for activity change, appetite change, chills, diaphoresis, fatigue, fever and unexpected weight change.   Respiratory: Negative for cough, chest tightness and shortness of breath.    Cardiovascular: Negative for chest pain, palpitations and leg swelling.   Gastrointestinal: Negative for abdominal distention, constipation, diarrhea, nausea and vomiting.   Musculoskeletal: Positive for arthralgias (right hip). Negative for myalgias.   Neurological: Negative for dizziness, weakness, light-headedness and headaches.     Objective:     Vital Signs (Most Recent):  Temp: 97.7 °F (36.5 °C) (08/02/17 1100)  Pulse: 85 (08/02/17 1135)  Resp: 16 (08/02/17 1100)  BP: (!) 140/65 (08/02/17 1100)  SpO2: 98 % (08/02/17 1100) Vital Signs (24h Range):  Temp:  [97.7 °F (36.5 °C)-98.7 °F (37.1 °C)] 97.7 °F (36.5 °C)  Pulse:  [] 85  Resp:  [16-20] 16  SpO2:  [95 %-98 %] 98 %  BP: (117-148)/(58-75) 140/65     Weight: 91.8 kg (202 lb 6.1 oz)  Body mass index is 29.04 kg/m².    Intake/Output Summary (Last 24 hours) at 08/02/17 1329  Last data filed at 08/02/17 0400   Gross per 24 hour   Intake              240 ml   Output               85 ml   Net              155 ml      Physical Exam   Constitutional: He is oriented to person, place, and time. He appears well-developed and well-nourished. No distress.   Eyes: Pupils are equal, round, and reactive to light.   Neck: Normal range of motion. Neck supple. No JVD present.   Cardiovascular: Normal rate, regular rhythm and intact distal pulses.    Murmur (harsh systolic) heard.  Pulmonary/Chest: Effort normal and breath sounds normal. No respiratory  distress. He has no wheezes. He has no rales. He exhibits no tenderness.   Abdominal: Soft. Bowel sounds are normal. He exhibits no distension.   Musculoskeletal: Normal range of motion. He exhibits no edema.   Neurological: He is alert and oriented to person, place, and time.   Skin: Skin is warm and dry. Capillary refill takes less than 2 seconds. He is not diaphoretic.     Significant Labs:   CBC:   Recent Labs  Lab 08/01/17 0430 08/02/17 0430   WBC 8.46 7.99   HGB 9.9* 9.3*   HCT 31.5* 29.8*    199     CMP:   Recent Labs  Lab 08/01/17 0430 08/02/17 0430    134*   K 4.8 5.5*    96   CO2 24 21*   * 103   BUN 66* 87*   CREATININE 11.9* 13.9*   CALCIUM 9.8 9.5   PROT  --  6.8   ALBUMIN  --  3.1*   BILITOT  --  0.9   ALKPHOS  --  87   AST  --  10   ALT  --  7*   ANIONGAP 16 17*   EGFRNONAA 3.6* 3.0*     All pertinent labs within the past 24 hours have been reviewed.    Significant Imaging: I have reviewed all pertinent imaging results/findings within the past 24 hours.

## 2017-08-02 NOTE — SUBJECTIVE & OBJECTIVE
Prescriptions Prior to Admission   Medication Sig Dispense Refill Last Dose    aspirin (ECOTRIN) 81 MG EC tablet Take 81 mg by mouth once daily.   7/30/2017    atorvastatin (LIPITOR) 40 MG tablet Take 40 mg by mouth once daily.   7/30/2017    carboxymethylcellulose (REFRESH PLUS) 0.5 % Dpet Place 1 drop into both eyes 5 (five) times daily.   7/30/2017    ciprofloxacin HCl (CIPRO) 500 MG tablet Take 1 tablet (500 mg total) by mouth once daily. LAST DAY 5/22/17 3 tablet 0 7/30/2017    hydrALAZINE (APRESOLINE) 50 MG tablet Take 50 mg by mouth every 8 (eight) hours.   7/30/2017    lorazepam (ATIVAN) 0.5 MG tablet Take 0.5 mg by mouth 2 (two) times daily as needed for Anxiety.   7/30/2017    omeprazole (PRILOSEC) 40 MG capsule Take 40 mg by mouth once daily.   7/30/2017    sevelamer carbonate (RENVELA) 800 mg Tab Take 2 tablets (1,600 mg total) by mouth 3 (three) times daily with meals.   7/30/2017    vitamin renal formula, B-complex-vitamin c-folic acid, (NEPHROCAPS) 1 mg Cap Take 1 capsule by mouth once daily.   7/30/2017    white petrolatum-mineral oil 56.8-42.5% (REFRESH LACRI-LUBE) 56.8-42.5 % Oint Apply a thin ribbon to bottom eye lid every night   7/30/2017       Review of patient's allergies indicates:   Allergen Reactions    Penicillins Rash       Past Medical History:   Diagnosis Date    Arthritis     Diabetes mellitus     DNR (do not resuscitate) 5/17/2017    Hyperlipidemia     Hypertension     Stroke     Syncope and collapse      Past Surgical History:   Procedure Laterality Date    AV FISTULA PLACEMENT Right     HIP SURGERY      replacement right    JOINT REPLACEMENT      right hip      Family History     Problem Relation (Age of Onset)    Hypertension Mother        Social History Main Topics    Smoking status: Former Smoker     Packs/day: 0.25     Quit date: 1/1/1978    Smokeless tobacco: Never Used    Alcohol use No    Drug use: No    Sexual activity: Not Currently     Review of  Systems   Constitutional: Negative.    HENT: Positive for rhinorrhea and sinus pressure.    Eyes: Negative.    Respiratory: Negative.    Cardiovascular: Positive for chest pain.   Gastrointestinal: Negative.    Endocrine: Negative.    Genitourinary: Negative.    Musculoskeletal: Negative.    Allergic/Immunologic: Negative.    Neurological: Negative.    Hematological: Negative.      Objective:     Vital Signs (Most Recent):  Temp: 97.7 °F (36.5 °C) (08/02/17 1100)  Pulse: 85 (08/02/17 1135)  Resp: 16 (08/02/17 1100)  BP: (!) 140/65 (08/02/17 1100)  SpO2: 98 % (08/02/17 1100) Vital Signs (24h Range):  Temp:  [97.7 °F (36.5 °C)-98.7 °F (37.1 °C)] 97.7 °F (36.5 °C)  Pulse:  [] 85  Resp:  [16-20] 16  SpO2:  [95 %-98 %] 98 %  BP: (117-148)/(58-75) 140/65     Weight: 91.8 kg (202 lb 6.1 oz)  Body mass index is 29.04 kg/m².    Physical Exam   Constitutional: He is oriented to person, place, and time. He appears well-developed and well-nourished.   HENT:   Head: Normocephalic and atraumatic.   Eyes: EOM are normal. Pupils are equal, round, and reactive to light.   Neck: Normal range of motion. Neck supple.   Cardiovascular: Normal rate and regular rhythm.    Murmur heard.  R forearm AVF with moderate aneurysmal degeneration. Weak thrill with pulsatility. 2+ right radial pulse, 1+ left radial pulse.   Pulmonary/Chest: Effort normal and breath sounds normal. No respiratory distress.   Abdominal: Soft. Bowel sounds are normal. He exhibits no distension.   Musculoskeletal: Normal range of motion.   Neurological: He is alert and oriented to person, place, and time.   Skin: Skin is warm.   Nursing note and vitals reviewed.      Significant Labs:  BMP:   Recent Labs  Lab 08/02/17  0430      *   K 5.5*   CL 96   CO2 21*   BUN 87*   CREATININE 13.9*   CALCIUM 9.5   MG 1.9     Cardiac markers: No results for input(s): CKMB, CPKMB, TROPONINT, TROPONINI, MYOGLOBIN in the last 48 hours.  CBC:   Recent Labs  Lab  08/02/17  0430   WBC 7.99   RBC 3.34*   HGB 9.3*   HCT 29.8*      MCV 89   MCH 27.8   MCHC 31.2*       Significant Diagnostics:  I have reviewed all pertinent imaging results/findings within the past 24 hours.  outapatient Vein mapping reviewed.

## 2017-08-02 NOTE — CONSULTS
[facility]  Vascular Surgery  Consult Note    Consults  Subjective:     Chief Complaint/Reason for Admission: NSTEMI    History of Present Illness: 77M with ESRD on HD (Right forearm AVF) and significant CAD/AVS who was scheduled for AVG placement tomorrow due to slowly failing RUE AVF. Patient presented to ED with Angina and found to have NSTEMI, now s/p PCA of LCx and OM1. We were asked to evaluate the RUE AVF and treat.     Prescriptions Prior to Admission   Medication Sig Dispense Refill Last Dose    aspirin (ECOTRIN) 81 MG EC tablet Take 81 mg by mouth once daily.   7/30/2017    atorvastatin (LIPITOR) 40 MG tablet Take 40 mg by mouth once daily.   7/30/2017    carboxymethylcellulose (REFRESH PLUS) 0.5 % Dpet Place 1 drop into both eyes 5 (five) times daily.   7/30/2017    ciprofloxacin HCl (CIPRO) 500 MG tablet Take 1 tablet (500 mg total) by mouth once daily. LAST DAY 5/22/17 3 tablet 0 7/30/2017    hydrALAZINE (APRESOLINE) 50 MG tablet Take 50 mg by mouth every 8 (eight) hours.   7/30/2017    lorazepam (ATIVAN) 0.5 MG tablet Take 0.5 mg by mouth 2 (two) times daily as needed for Anxiety.   7/30/2017    omeprazole (PRILOSEC) 40 MG capsule Take 40 mg by mouth once daily.   7/30/2017    sevelamer carbonate (RENVELA) 800 mg Tab Take 2 tablets (1,600 mg total) by mouth 3 (three) times daily with meals.   7/30/2017    vitamin renal formula, B-complex-vitamin c-folic acid, (NEPHROCAPS) 1 mg Cap Take 1 capsule by mouth once daily.   7/30/2017    white petrolatum-mineral oil 56.8-42.5% (REFRESH LACRI-LUBE) 56.8-42.5 % Oint Apply a thin ribbon to bottom eye lid every night   7/30/2017       Review of patient's allergies indicates:   Allergen Reactions    Penicillins Rash       Past Medical History:   Diagnosis Date    Arthritis     Diabetes mellitus     DNR (do not resuscitate) 5/17/2017    Hyperlipidemia     Hypertension     Stroke     Syncope and collapse      Past Surgical  History:   Procedure Laterality Date    AV FISTULA PLACEMENT Right     HIP SURGERY      replacement right    JOINT REPLACEMENT      right hip      Family History     Problem Relation (Age of Onset)    Hypertension Mother        Social History Main Topics    Smoking status: Former Smoker     Packs/day: 0.25     Quit date: 1/1/1978    Smokeless tobacco: Never Used    Alcohol use No    Drug use: No    Sexual activity: Not Currently     Review of Systems   Constitutional: Negative.    HENT: Positive for rhinorrhea and sinus pressure.    Eyes: Negative.    Respiratory: Negative.    Cardiovascular: Positive for chest pain.   Gastrointestinal: Negative.    Endocrine: Negative.    Genitourinary: Negative.    Musculoskeletal: Negative.    Allergic/Immunologic: Negative.    Neurological: Negative.    Hematological: Negative.      Objective:     Vital Signs (Most Recent):  Temp: 97.7 °F (36.5 °C) (08/02/17 1100)  Pulse: 85 (08/02/17 1135)  Resp: 16 (08/02/17 1100)  BP: (!) 140/65 (08/02/17 1100)  SpO2: 98 % (08/02/17 1100) Vital Signs (24h Range):  Temp:  [97.7 °F (36.5 °C)-98.7 °F (37.1 °C)] 97.7 °F (36.5 °C)  Pulse:  [] 85  Resp:  [16-20] 16  SpO2:  [95 %-98 %] 98 %  BP: (117-148)/(58-75) 140/65     Weight: 91.8 kg (202 lb 6.1 oz)  Body mass index is 29.04 kg/m².    Physical Exam   Constitutional: He is oriented to person, place, and time. He appears well-developed and well-nourished.   HENT:   Head: Normocephalic and atraumatic.   Eyes: EOM are normal. Pupils are equal, round, and reactive to light.   Neck: Normal range of motion. Neck supple.   Cardiovascular: Normal rate and regular rhythm.    Murmur heard.  R forearm AVF with moderate aneurysmal degeneration. Weak thrill with pulsatility. 2+ right radial pulse, 1+ left radial pulse.   Pulmonary/Chest: Effort normal and breath sounds normal. No respiratory distress.   Abdominal: Soft. Bowel sounds are normal. He exhibits no distension.   Musculoskeletal:  Normal range of motion.   Neurological: He is alert and oriented to person, place, and time.   Skin: Skin is warm.   Nursing note and vitals reviewed.      Significant Labs:  BMP:   Recent Labs  Lab 08/02/17  0430      *   K 5.5*   CL 96   CO2 21*   BUN 87*   CREATININE 13.9*   CALCIUM 9.5   MG 1.9     Cardiac markers: No results for input(s): CKMB, CPKMB, TROPONINT, TROPONINI, MYOGLOBIN in the last 48 hours.  CBC:   Recent Labs  Lab 08/02/17  0430   WBC 7.99   RBC 3.34*   HGB 9.3*   HCT 29.8*      MCV 89   MCH 27.8   MCHC 31.2*       Significant Diagnostics:  I have reviewed all pertinent imaging results/findings within the past 24 hours.  outapatient Vein mapping reviewed.     Assessment/Plan:     ESRD on hemodialysis    Able to undergo HD via RUE AVF. Although slowly failing and will need intervention soon. Was scheduled for LUE AVG placement however, given the NSTEMI and PCA, we will hold off on AVG placement and follow along during his hospital course.             Thank you for your consult. We will con't to follow along during his hospitalization.    Javy Hancock MD  Vascular Surgery  [facility]

## 2017-08-02 NOTE — PLAN OF CARE
Problem: Patient Care Overview  Goal: Plan of Care Review  Outcome: Ongoing (interventions implemented as appropriate)  Plan of care discussed with pt.  Post [facility] site:Right groin site assessed bleeding or hematoma during shift. Pulses +2 bilaterally. CTS c/s for TAVR w/u.  RN placed 18G PIV. CTA obtained for possible candidacy. Possible d/c after Hemodialysis 8/2.  Blood sugar controlled-no coverage needed. Pt reoriented to place at times during shift. Bed alarm placed on to  Prevent injury. Encourage to use nearby calllight for assistance. VSS & NADN. Pt denies CP, SOB, or pain/discomfort at this time.Pt free of injuries this shift.  All questions addressed.  Will continue to monitor.

## 2017-08-02 NOTE — PROGRESS NOTES
3hr HD treatment completed 2L of fluid removed due to low bp pt aymptomatic. Both needles of a RFA fistula removed and pressure held until hemostasis achieved dressing applied. Report given to MARLIN Gaming.

## 2017-08-02 NOTE — ASSESSMENT & PLAN NOTE
-HD MWF via right arm AVF >>> undergoing HD this afternoon  -discussed need to abort new AVF creation at this time verbally discussed with Dr. Mayers  -consider temporary access if unable to dialyze with current AVF  -Nephrology following, appreciate recommendations

## 2017-08-03 PROBLEM — I21.4 NSTEMI (NON-ST ELEVATED MYOCARDIAL INFARCTION): Status: RESOLVED | Noted: 2017-01-01 | Resolved: 2017-01-01

## 2017-08-03 PROBLEM — R07.9 CHEST PAIN: Status: RESOLVED | Noted: 2017-01-01 | Resolved: 2017-01-01

## 2017-08-03 PROBLEM — R07.9 CHEST PAIN: Status: ACTIVE | Noted: 2017-01-01

## 2017-08-03 PROBLEM — I20.0 UNSTABLE ANGINA PECTORIS: Status: RESOLVED | Noted: 2017-01-01 | Resolved: 2017-01-01

## 2017-08-03 NOTE — NURSING
Patient still being short with staff and refusing all of his medications for the morning. He is refusing to let a number of nurses touch or care for him. Will continue to monitor.

## 2017-08-03 NOTE — ASSESSMENT & PLAN NOTE
-HD MWF via right arm AVF  -discussed need to abort new AVF creation at this time verbally discussed with Dr. Mayers  -consider temporary access if unable to dialyze with current AVF  -Nephrology followed, appreciate recommendations

## 2017-08-03 NOTE — ASSESSMENT & PLAN NOTE
-s/p [facility] 8/1, successful PCI of LCx/OM1 with YANE  -denies chest pain  -Interventional Cardiology followed, will see as outpt for further TAVR needs  -continue ASA/brilenta, carvedilol, and lisinopril  -TAVR workup complete

## 2017-08-03 NOTE — NURSING
Patient is ready for discharge. Patient stable alert and oriented.Tele, and  IVs removed. No complaints of pain. Discussed discharge plan. Reviewed medications and side effects, appointments, and answered questions with patient. RX given to patient. Awaiting transportation set up by [facility] at 3pm.

## 2017-08-03 NOTE — NURSING TRANSFER
Nursing Transfer Note      8/3/2017     Transfer To: PFT    Transfer via wheelchair    Transfer with cardiac monitoring    Transported by escort    Chart send with patient: No

## 2017-08-03 NOTE — ASSESSMENT & PLAN NOTE
-severe AS noted on echo  -evaluated by TAVR team, will follow as outpt  -TAVR studies complete  -see above

## 2017-08-03 NOTE — NURSING
Dr. Contreras called for this patient that was threatening to leave the facility and not take his medications. Dr. Palmer called to room as well. Patient refused to take any medications from any of the nurses. He was using profanities and physically threatening the nurses.     Dr. Palmer was able to get the patient to take his medications, called his wife to inform her of what was going on with the patient. She stated that sometimes this is normal for the patient that he becomes agitated and confused. Will continue to monitor. Calming environment reinforced for the patient.

## 2017-08-03 NOTE — NURSING
In order to not agitate the patient any further, his blood sugar was not obtained and he refused midnight vitals. He remains on telemetry will continue to monitor.

## 2017-08-03 NOTE — PLAN OF CARE
Patient ready for discharge, arranged transport through [facility] wheelchair van service, will follow.

## 2017-08-03 NOTE — NURSING TRANSFER
Nursing Transfer Note      8/2/2017      Transfer From: HD    Transfer via stretcher    Transfer with cardiac monitoring    Transported by escort    Chart send with patient: Yes    Notified: spouse

## 2017-08-03 NOTE — NURSING
Patient is refusing to take his PM medications stting that the nurses and staff are coming in too often stating that he is refusing to take his medications including Brillinta. Patient was told that with his stent placement he needed to take the Brillinta or the stents that were placed could close back up causing another heart attack, he stated and I quote that he wishes he would have another heart attack so he could die. MD paged and informed, will continue to monitor.

## 2017-08-03 NOTE — DISCHARGE SUMMARY
[facility] Hospital Medicine  Discharge Summary      Patient Name: Bernabe Greco Jr.  MRN: [id number]  Admission Date: 7/30/2017  Hospital Length of Stay: 1 days  Discharge Date and Time:  08/03/2017 1:08 PM  Attending Physician: Eve Mathew MD   Discharging Provider: Rosa Maria Kennedy NP  Primary Care Provider: Primary Doctor [facility] Medicine Team: [facility] MED  Rosa Maria Kennedy NP    HPI:   Mr. Greco is a 77 y.o. male with pertinent PMHx of moderate to severe aortic stenosisis ( IMANI = 0.93 cm2, peak velocity = 3.6 m/s, mean gradient = 32 mmHg per Echo in May), PAD s/p cath in 2014 with unsuccessful attempt to revascularize right SFA  , HTN, HLD, ESRD on HD MWF and stroke with residual visual deficit, and cataracts who presented to the ED with a chief complaint of sudden onset central chest pain that started last evening around 8:30 pm while lying in bed. Reports substernal chest pain, pressure radiating to his right shoulder associated with sob and chills. Notes pain was 9/10 at worst intensity.    Denies associated diaphoresis, N/V, dizziness, fever. Wife called EMS and received NTG by EMS with improvement of chest pain to 5-6 out of 10. Pain resolved in ER and remained pain free during initial interview. Denies having similar pain in past and reports quit smoking in 1978. Denies focal weakness, numbness, change is speech, facial droop or confusion.    The patient was admitted to the Hospital Medicine Service for further evaluation and management.      Procedure(s) (LRB):  HEART CATH-LEFT (Left)      Indwelling Lines/Drains at time of discharge:   Lines/Drains/Airways     Central Venous Catheter Line                 Hemodialysis Catheter [id number] days          Drain                 Hemodialysis AV Fistula Right forearm [id number] days              Hospital Course:   Mr. Greco was admitted 7/31 with complaints of chest pain that resolved with SL NTG. He was evaluated by Interventional  Cardiology and underwent LHC on 8/1 with successful PCI of LCx/OM1 with YANE.  After repeat echo, findings indicated need to proceed with TAVR evaluation,  TAVR CT, PFTs, frailty, and 6 minute walk test completed, will follow up with TAVR team as outpatient. Nephrology folloed and coordinated inpatient HD treatments. Dr. Mayers with vascular surgery notified of patients NSTEMI and YANE placement, will cancel new AVF formation procedure for 8/3. Outpatient follow up with priority care clinic arranged, TAVR team will call patient to schedule. Discharged with appropriate hard Rx's and transportation arranged via [employer].      Consults:   Consults         Status Ordering Provider     Inpatient consult to Cardiology  Once     Provider:  (Not yet assigned)    Completed MADALYN LOVELL     Inpatient consult to Cardiothoracic Surgery  Once     Provider:  (Not yet assigned)    Completed KIANNA CRONIN     Inpatient consult to Nephrology  Once     Provider:  (Not yet assigned)    Completed TALITA MCDONALD        Review of Systems   Constitutional: Negative for activity change, appetite change, chills, diaphoresis, fatigue, fever and unexpected weight change.   Respiratory: Negative for cough, chest tightness and shortness of breath.    Cardiovascular: Negative for chest pain, palpitations and leg swelling.   Gastrointestinal: Negative for abdominal distention, constipation, diarrhea, nausea and vomiting.   Musculoskeletal: Positive for arthralgias (right hip). Negative for myalgias.   Neurological: Negative for dizziness, weakness, light-headedness and headaches.     Significant Diagnostic Studies: Labs:   CMP   Recent Labs  Lab 08/02/17  0430 08/03/17  0420   * 138   K 5.5* 4.9   CL 96 101   CO2 21* 20*    156*   BUN 87* 55*   CREATININE 13.9* 10.9*   CALCIUM 9.5 9.7   PROT 6.8  --    ALBUMIN 3.1*  --    BILITOT 0.9  --    ALKPHOS 87  --    AST 10  --    ALT 7*  --    ANIONGAP 17* 17*   ESTGFRAFRICA 3.4*  4.6*   EGFRNONAA 3.0* 4.0*   , CBC   Recent Labs  Lab 08/02/17  0430 08/03/17  0420   WBC 7.99 7.00   HGB 9.3* 9.3*   HCT 29.8* 29.3*    164   , Lipid Panel   Lab Results   Component Value Date    CHOL 92 (L) 05/08/2017    HDL 39 (L) 05/08/2017    LDLCALC 38.4 (L) 05/08/2017    TRIG 73 05/08/2017    CHOLHDL 42.4 05/08/2017   , Troponin   Recent Labs  Lab 07/31/17  0536   TROPONINI 0.118*   , A1C:   Recent Labs  Lab 05/09/17  0300 08/01/17  1722   HGBA1C 6.3* 6.7*    and All labs within the past 24 hours have been reviewed    Pending Diagnostic Studies:     None        Physical Exam   Constitutional: He is oriented to person, place, and time. He appears well-developed and well-nourished. No distress.   Eyes: Pupils are equal, round, and reactive to light.   Neck: Normal range of motion. Neck supple. No JVD present.   Cardiovascular: Normal rate, regular rhythm and intact distal pulses.    Murmur (harsh systolic) heard.  Pulmonary/Chest: Effort normal and breath sounds normal. No respiratory distress. He has no wheezes. He has no rales. He exhibits no tenderness.   Abdominal: Soft. Bowel sounds are normal. He exhibits no distension.   Musculoskeletal: Normal range of motion. He exhibits no edema.   Neurological: He is alert and oriented to person, place, and time.   Skin: Skin is warm and dry. Capillary refill takes less than 2 seconds. He is not diaphoretic.     Final Active Diagnoses:    Diagnosis Date Noted POA    Aortic valve disorders [I35.9] 01/11/2015 Yes    ESRD on hemodialysis [N18.6, Z99.2]  Not Applicable    Anemia associated with chronic renal failure [D63.1] 01/22/2015 Yes    Essential hypertension [I10] 03/24/2014 Yes    Hyperlipidemia LDL goal <70 [E78.5] 03/24/2014 Yes    Malfunction of arteriovenous dialysis fistula [T82.590A] 05/19/2017 Yes    Type 2 diabetes mellitus with diabetic neuropathy, with long-term current use of insulin [E11.40, Z79.4] 06/12/2014 Not Applicable    Dependence  on renal dialysis [Z99.2]  Not Applicable    Aortic stenosis, severe [I35.0] 08/02/2017 Yes      Problems Resolved During this Admission:    Diagnosis Date Noted Date Resolved POA    PRINCIPAL PROBLEM:  NSTEMI (non-ST elevated myocardial infarction) [I21.4] 07/30/2017 08/03/2017 Yes    Chest pain [R07.9] 08/03/2017 08/03/2017 Yes    Unstable angina pectoris [I20.0] 08/02/2017 08/03/2017 Yes      * NSTEMI (non-ST elevated myocardial infarction)-resolved as of 8/3/2017    -s/p [facility] 8/1, successful PCI of LCx/OM1 with YANE  -denies chest pain  -Interventional Cardiology followed, will see as outpt for further TAVR needs  -continue ASA/brilenta, carvedilol, and lisinopril  -TAVR workup complete        Aortic valve disorders    -severe AS noted on echo  -evaluated by TAVR team, will follow as outpt  -TAVR studies complete  -see above         ESRD on hemodialysis    -HD MWF via right arm AVF  -discussed need to abort new AVF creation at this time verbally discussed with Dr. Mayers  -consider temporary access if unable to dialyze with current AVF  -Nephrology followed, appreciate recommendations        Anemia associated with chronic renal failure    -CBC stable, monitor         Essential hypertension    -BP WNL, continue carvedilol and lisinopril         Hyperlipidemia LDL goal <70    -continue high intensity statin        Malfunction of arteriovenous dialysis fistula    -currently a working AVF, however has pain with access and extended length treatments due to poor flow  -Vascular surgery following as outpt  -needs uninterrupted ASA and brilinta due to YANE placement   -consider temporary access if needed        Type 2 diabetes mellitus with diabetic neuropathy, with long-term current use of insulin    -HgA1C 6.7  -resume home regimen  -cardiac/renal diet with ADA diet encoraged        Aortic stenosis, severe    -see above          Discharged Condition: good    Disposition: Home or Self Care    Follow Up:  Follow-up  Information     Hernan Mathew MD.    Specialties:  Cardiology, INTERVENTIONAL CARDIOLOGY  Why:  Dr Mathew and the TAVR team will contact you about follow up appointments.  Contact information:  [address]  [facility] [address] [phone]                 Patient Instructions:     Diet general   Order Specific Question Answer Comments   Fluid restriction: Fluid - 1500mL    Additional restrictions: Cardiac (Low Na/Chol)    Additional restrictions: Renal    Additional restrictions: Diabetic 1800      Activity as tolerated     Call MD for:  temperature >100.4     Call MD for:  persistent nausea and vomiting or diarrhea     Call MD for:  severe uncontrolled pain     Call MD for:  redness, tenderness, or signs of infection (pain, swelling, redness, odor or green/yellow discharge around incision site)     Call MD for:  difficulty breathing or increased cough     Call MD for:  severe persistent headache     Call MD for:  persistent dizziness, light-headedness, or visual disturbances     Call MD for:  increased confusion or weakness       Medications:  Reconciled Home Medications:   Current Discharge Medication List      START taking these medications    Details   carvedilol (COREG) 6.25 MG tablet Take 1 tablet (6.25 mg total) by mouth 2 (two) times daily.  Qty: 60 tablet, Refills: 11      ticagrelor (BRILINTA) 90 mg tablet Take 1 tablet (90 mg total) by mouth 2 (two) times daily.  Qty: 60 tablet, Refills: 11         CONTINUE these medications which have CHANGED    Details   aspirin (ECOTRIN) 81 MG EC tablet Take 1 tablet (81 mg total) by mouth once daily.  Qty: 30 tablet, Refills: 11      atorvastatin (LIPITOR) 80 MG tablet Take 1 tablet (80 mg total) by mouth once daily.  Qty: 30 tablet, Refills: 11         CONTINUE these medications which have NOT CHANGED    Details   carboxymethylcellulose (REFRESH PLUS) 0.5 % Dpet Place 1 drop into both eyes 5 (five) times daily.      lorazepam (ATIVAN) 0.5 MG tablet Take  0.5 mg by mouth 2 (two) times daily as needed for Anxiety.      omeprazole (PRILOSEC) 40 MG capsule Take 40 mg by mouth once daily.      sevelamer carbonate (RENVELA) 800 mg Tab Take 2 tablets (1,600 mg total) by mouth 3 (three) times daily with meals.      vitamin renal formula, B-complex-vitamin c-folic acid, (NEPHROCAPS) 1 mg Cap Take 1 capsule by mouth once daily.      white petrolatum-mineral oil 56.8-42.5% (REFRESH LACRI-LUBE) 56.8-42.5 % Oint Apply a thin ribbon to bottom eye lid every night         STOP taking these medications       ciprofloxacin HCl (CIPRO) 500 MG tablet Comments:   Reason for Stopping:         hydrALAZINE (APRESOLINE) 50 MG tablet Comments:   Reason for Stopping:             Time spent on the discharge of patient: 36 minutes      Rosa Maria Kennedy NP  Department of Hospital Medicine  [facility]  Pager [phone]  [facility] [id number]

## 2017-08-03 NOTE — PLAN OF CARE
Problem: Patient Care Overview  Goal: Plan of Care Review  Outcome: Ongoing (interventions implemented as appropriate)  Spoke with the patient regarding the plan of care. Noted that the patient became agitated at medication administration time last night. He was aggressive and combative and using profanities. Spouse was called and she stated that he gets like that sometimes. Continued fall prevention and teaching and protection of skin integrity, will continue to monitor.

## 2017-08-03 NOTE — NURSING TRANSFER
Nursing Transfer Note      8/3/2017     Transfer From: PFT    Transfer via wheelchair    Transfer with cardiac monitoring    Transported by tech    Chart send with patient: No    }

## 2017-08-04 NOTE — PROGRESS NOTES
TAVR Summary      Bernabe Greco Jr. is a 77 y.o. male referred by Dr Bautista for evaluation of severe AS (NYHA Class III sx).     He has undergone the following TAVR work-up:   · ECHO (Date 8/1/2017): IMANI= 0.6 cm2, MG= 43 mmHg, Peak Nando= 4.53 m/s, EF= 43%.   · LHC (Date 8/1/2017): PCI (YANE) to LCx/OM1  · STS: 6.55%   · Frailty: 3/4  · Iliacs are > 5.11 on L and > 6.23 on R   · LVOT area by CTA is 6.31 cm2, distance 32.5mm x 25.8mm and Avg Diameter is 28.3mm per Dr Silva  · PFTs: FEV1 is 82% predicted, FEV1/FVC is 103% predicted, DLCO is 80% predicted   · Pt is high risk due to co morbidities, functional status, and STS score per CTS      He is a 29mm Malgorzata S3 candidate via L TF access.     Julianna Wesley, WAYNE  Interventional Cardiology

## 2017-08-04 NOTE — PROCEDURES
Bernabe Greco Jr. is a 77 y.o.  male patient, who presents for a 6 minute walk test ordered by Hernan Mathew MD.  The diagnosis is Aortic Valve Disorder.  The patient's BMI is 28.9 kg/m2.  Predicted distance (lower limit of normal) is 283.55 meters.      Test Results:    The test was completed without stopping.  The total time walked was 360 seconds.  During walking, the patient reported:  Dyspnea. The patient used no assistive devices during testing.     08/03/2017---------Distance: 182.88 meters (600 feet)     O2 Sat % Supplemental Oxygen Heart Rate Blood Pressure Rashad Scale   Pre-exercise  (Resting) 100 % Room Air 85 bpm 134/68 mmHg 1   During Exercise 96 % Room Air 167 bpm 147/68 mmHg 3   Post-exercise  (Recovery) 100 % Room Air  99 bpm       Recovery Time: 68 seconds    Performing nurse/tech: ZOYA Johnson      PREVIOUS STUDY:   The patient has not had a previous study.      CLINICAL INTERPRETATION:  Six minute walk distance is 182.88 meters (600 feet) with moderate dyspnea.  During exercise, there was desaturation while breathing room air.  Blood pressure remained stable and Heart rate increased significantly with walking.  This may represent a tachycardic response to exercise.  The patient did not report non-pulmonary symptoms during exercise.  Significant exercise impairment is likely due to cardiovascular causes and subjective symptoms.  No previous study performed.  Based upon age and body mass index, exercise capacity is less than predicted.

## 2017-08-08 PROBLEM — R07.9 CHEST PAIN: Status: ACTIVE | Noted: 2017-01-01

## 2017-08-08 NOTE — PLAN OF CARE
Problem: Patient Care Overview  Goal: Plan of Care Review  Outcome: Ongoing (interventions implemented as appropriate)  Report received from MARLIN Caldwell. Patient s/p LHC. R altafin cdi, soft. Post procedure protocol discussed with patient. Call light in reach. Family at bedside. Will monitor.

## 2017-08-08 NOTE — CONSULTS
Interventional    Patient seen in the ED with the fellow. He has severe aortic stenosis and underwent TAVR workup last week. He Underwent LCx/OMB PCI 1 week ago with BMS. Has been compliant to medical Rx. Developed chest pain this morning, he felt like an elephant on his chest. He also had diagonal disease that was decided to be treated medically.    He has new T wave inversion in the inferolateral leads.    Will proceed to cornary angiography to exclude in-stent thrombosis    Vince Silva MD  Interventional Cardiology  Structural/Valvular heart disease  [phone]

## 2017-08-08 NOTE — CONSULTS
"Cardiology Initial Consult Note    8/8/2017    Reason for Consult: Chest pain    SUBJECTIVE  Bernabe Greco Jr. is a 77 y.o. male with a history significant for severe AS (IAMNI 0.6, V 4.5 m/s, MG 43), CAD with recent PCI to OM1 (for ACS, troponin was 0.1 and flat) and residual D1 70% stenosis not intervened upon, HTN, HLD, anemia, ESRD on HD MWF, CVA, PAD, who presents with "a weight on his chest" starting at rest.  He said this started 10-11AM this morning, constant, 10/10 on presentation and relieved with nitro.  He had associated shortness of breath.  He has never had this pain in the past.  He reports compliance with his medications.  He is not very active because he is worried about falls.   ?  Review of Systems   Constitution: Negative for chills, fever  HENT: Negative.    Eyes: Negative.    Cardiovascular: As per HPI.   Respiratory: Positive for shortness of breath, cough.    Endocrine: Negative.    Hematologic/Lymphatic: Negative.    Skin: Negative for color change and rash.   Musculoskeletal: Negative.    Gastrointestinal: Negative for abdominal pain, change in bowel habit, constipation  Genitourinary: Negative.    Neurological: Positive for poor balance  Psychiatric/Behavioral: Negative for altered mental status.     OBJECTIVE  Current Facility-Administered Medications   Medication Dose Route Frequency Provider Last Rate Last Dose    aspirin tablet 325 mg  325 mg Oral ED 1 Time Kriss Bedolla MD        aspirin tablet 325 mg  325 mg Oral ED 1 Time Wily Apodaca MD        nitroGLYCERIN SL tablet 0.4 mg  0.4 mg Sublingual Q5 Min PRN Kriss Bedolla MD         Current Outpatient Prescriptions   Medication Sig Dispense Refill    aspirin (ECOTRIN) 81 MG EC tablet Take 1 tablet (81 mg total) by mouth once daily. 30 tablet 11    atorvastatin (LIPITOR) 80 MG tablet Take 1 tablet (80 mg total) by mouth once daily. 30 tablet 11    carboxymethylcellulose (REFRESH PLUS) 0.5 % Dpet Place 1 drop into both " eyes 5 (five) times daily.      carvedilol (COREG) 6.25 MG tablet Take 1 tablet (6.25 mg total) by mouth 2 (two) times daily. 60 tablet 11    lorazepam (ATIVAN) 0.5 MG tablet Take 0.5 mg by mouth 2 (two) times daily as needed for Anxiety.      omeprazole (PRILOSEC) 40 MG capsule Take 40 mg by mouth once daily.      sevelamer carbonate (RENVELA) 800 mg Tab Take 2 tablets (1,600 mg total) by mouth 3 (three) times daily with meals.      ticagrelor (BRILINTA) 90 mg tablet Take 1 tablet (90 mg total) by mouth 2 (two) times daily. 60 tablet 11    vitamin renal formula, B-complex-vitamin c-folic acid, (NEPHROCAPS) 1 mg Cap Take 1 capsule by mouth once daily.      white petrolatum-mineral oil 56.8-42.5% (REFRESH LACRI-LUBE) 56.8-42.5 % Oint Apply a thin ribbon to bottom eye lid every night         Past Medical History:   Diagnosis Date    Arthritis     Diabetes mellitus     DNR (do not resuscitate) 5/17/2017    Hyperlipidemia     Hypertension     Stroke     Syncope and collapse        Past Surgical History:   Procedure Laterality Date    AV FISTULA PLACEMENT Right     HIP SURGERY      replacement right    JOINT REPLACEMENT      right hip        Review of patient's allergies indicates:   Allergen Reactions    Penicillins Rash       Family History   Problem Relation Age of Onset    Hypertension Mother        Social History     Social History    Marital status:      Spouse name: N/A    Number of children: N/A    Years of education: N/A     Social History Main Topics    Smoking status: Former Smoker     Packs/day: 0.25     Quit date: 1/1/1978    Smokeless tobacco: Never Used    Alcohol use No    Drug use: No    Sexual activity: Not Currently     Other Topics Concern    Not on file     Social History Narrative    No narrative on file       Physical Exam  Vitals: /76   Pulse 77   Temp 98.4 °F (36.9 °C) (Oral)   Resp 18   Wt 82.1 kg (181 lb)   SpO2 98%   BMI 25.97 kg/m²   Gen:  NAD  Head/Eyes/Ears/Nose: NCAT, MMM   Neck: No JVD, +carotid bruits bilaterally  Lung: CTAB, eupneic   Heart: Regular rate and rhythm, harsh III/VI crescendo-decrescendo murmur radiating to carotids  Abdomen: Soft, NT/ND, NABS  Extremities: No LE edema bilaterally, warm, right fistula with palpable thrill  Skin: Normal color and turgor  Neuro: AAOx3      Recent Labs  Lab 08/02/17 0430 08/03/17  0420   * 138   K 5.5* 4.9   CO2 21* 20*   CL 96 101   BUN 87* 55*   CREATININE 13.9* 10.9*    156*   CALCIUM 9.5 9.7   ALT 7*  --    AST 10  --    ALKPHOS 87  --    BILITOT 0.9  --    PROT 6.8  --    ALBUMIN 3.1*  --          Recent Labs  Lab 08/02/17 0430 08/03/17  0420 08/08/17  1359   WBC 7.99 7.00 7.83  7.83   HGB 9.3* 9.3* 8.8*  8.8*   HCT 29.8* 29.3* 28.2*  28.2*    164 157  157   MCV 89 89 87  87     No results for input(s): APTT, INR, PTT in the last 168 hours.  No results for input(s): CPK, CPKMB, TROPONINI, MB in the last 168 hours.     Results  TTE 8/1/17  1 - Mildly to moderately depressed left ventricular systolic function (EF 40-45%).     2 - Concentric remodeling.     3 - Normal right ventricular systolic function .     4 - Biatrial enlargement.     5 - Severe aortic stenosis, IMANI = 0.6 cm2, peak velocity = 4.53 m/s, mean gradient = 43 mmHg.     6 - Trivial to mild aortic regurgitation.     7 - Trivial pericardial effusion.     8 - The estimated PA systolic pressure is greater than 12 mmHg.     [facility] 8/1/17  Patient has a right dominant coronary artery.        The coronary vessels have luminal irregularities.        - Left Main Coronary Artery:             The LM has luminal irregularities. There is RAMU 3 flow.     - Left Anterior Descending Artery:             The LAD has luminal irregularities. There is RAMU 3 flow.     - D1:             The D1 has a 70% stenosis. There is RAMU 3 flow.     - Left Circumflex Artery:             The LCX has luminal irregularities. There is RAMU 3  flow.     - Right Coronary Artery:             The RCA has luminal irregularities. There is RAMU 3 flow.     - OM1:             The ostial OM1 has a 80% stenosis. There is RAMU 3 flow.     - Common Femoral Artery:             The right CFA is normal.     - Femoral Artery:             The femoral artery is normal.    Intervention          Ostial OM1:              The lesion was successfully intervened. Post-stenosis of 0% and post-RAMU 3 flow. The vessel was accessed natively.  The following items were used: 2.5X08 TREK Rx Balloon and Stent Resolute Rx 2.50x12 (YANE).    EKG   Sinus rhythm, 1st degree AV block, inferolateral TWI  ?  ASSESSMENT AND PLAN  77 y.o. male with severe AS (IMANI 0.6, V 4.5 m/s, MG 43), CAD with recent PCI to OM1 and residual D1 70% stenosis not intervened upon, HTN, HLD, anemia, who presents with typical unstable angina and EKG changes suggestive of ischemia.  His troponin is 0.26, however with severe AS and ESRD, this is not unexpected, though this is above where he has been.  Would trend this out to see if this is a new MI, however would prefer medical management.  I spoke to Dr. Silva, his D1 not intervened upon during recent cath because of concern for compromise of the LAD.  Still having some mild chest pain, will give another dose of nitro now, possible angio today.  He ate breakfast this AM.     Recommendations:   - Admit to medicine, prefer team C/J  - Trend troponin, initial 0.26  - Metoprolol 25mg once now, goal HR 50-60s, uptitrate and give bid dosing to achieve this goal  - Nitro PRN for chest pain, may need to add Imdur for long-term control  - Needs to be chest pain free  - Continue aspirin, ticagrelor (patient reports compliance at home)  - Dr. Silva is going to see the patient, possible angiogram today - If not would start heparin bolus + gtt for medical therapy.     Discussed with Cardiology Attending: Dr. Dee Garcia MD  Cardiology Fellow

## 2017-08-08 NOTE — ED PROVIDER NOTES
"Encounter Date: 8/8/2017       History     Chief Complaint   Patient presents with    Chest Pain     Coming from home per [facility] for chest pain that he can not rate. states "it just hurts." Had cardiac stent placed last week after "light heart attack." EMS gave  mg and Nitro 2 sprays.      Bernabe Greco Jr. is a 77 y.o. male  has a past medical history of Arthritis; Diabetes mellitus; DNR (do not resuscitate); Hyperlipidemia; Hypertension; Stroke; and Syncope and collapse.     Patient had stent placed in [facility] between 10-11am he felt "like a mountain is on my chest."  States the pain is in the middle of his chest with pain that radiates to his right arm.  He took two sublingual pills of Nitro and said it made the pain better.  Denies eating anything spicy or greasy, denies F/C/N/V.          Review of patient's allergies indicates:   Allergen Reactions    Penicillins Rash     Past Medical History:   Diagnosis Date    Arthritis     Diabetes mellitus     DNR (do not resuscitate) 5/17/2017    Hyperlipidemia     Hypertension     Stroke     Syncope and collapse      Past Surgical History:   Procedure Laterality Date    AV FISTULA PLACEMENT Right     HIP SURGERY      replacement right    JOINT REPLACEMENT      right hip      Family History   Problem Relation Age of Onset    Hypertension Mother      Social History   Substance Use Topics    Smoking status: Former Smoker     Packs/day: 0.25     Quit date: 1/1/1978    Smokeless tobacco: Never Used    Alcohol use No     Review of Systems   Constitutional: Negative for chills and fever.   HENT: Negative for congestion, rhinorrhea and sore throat.    Eyes: Negative for pain and visual disturbance.   Respiratory: Positive for chest tightness (  ) and shortness of breath. Negative for cough, choking, wheezing and stridor.    Cardiovascular: Positive for chest pain. Negative for palpitations and leg swelling.   Gastrointestinal: Negative for abdominal " distention, abdominal pain, blood in stool, constipation, diarrhea, nausea and vomiting.   Genitourinary: Positive for enuresis. Negative for hematuria.        On Dialysis   Musculoskeletal: Negative for arthralgias and gait problem.   Skin: Negative for rash and wound.   Neurological: Negative for weakness and headaches.   Psychiatric/Behavioral: Negative for confusion and self-injury.       Physical Exam     Initial Vitals [08/08/17 1315]   BP Pulse Resp Temp SpO2   114/76 77 18 98.4 °F (36.9 °C) 98 %      MAP       88.67         Physical Exam    Constitutional: He appears well-developed and well-nourished. He appears distressed.   HENT:   Head: Normocephalic and atraumatic.   Eyes: EOM are normal. Pupils are equal, round, and reactive to light.   Neck: Neck supple. No tracheal deviation present.   Cardiovascular: Normal rate, regular rhythm and intact distal pulses.   Murmur heard.  Decreshendo murmur heard   Pulmonary/Chest: Breath sounds normal. No stridor. No respiratory distress. He has no wheezes. He has no rhonchi. He has no rales. He exhibits no tenderness.   Abdominal: Soft. Bowel sounds are normal. He exhibits no distension. There is no tenderness.   Neurological: He is alert and oriented to person, place, and time. He has normal strength.   Skin: Skin is warm and dry. Capillary refill takes less than 2 seconds. No rash noted. No erythema.   Psychiatric: He has a normal mood and affect. Thought content normal.         ED Course   Critical Care  Date/Time: 8/9/2017 9:42 AM  Performed by: ZEKE GOMEZ  Authorized by: KAMI RUST   Total critical care time (exclusive of procedural time) : 45 minutes  Critical care time was exclusive of separately billable procedures and treating other patients and teaching time.  Critical care was necessary to treat or prevent imminent or life-threatening deterioration of the following conditions: cardiac failure.  Critical care was time spent personally by me on  the following activities: discussions with consultants, development of treatment plan with patient or surrogate, evaluation of patient's response to treatment, examination of patient, obtaining history from patient or surrogate, ordering and performing treatments and interventions, ordering and review of laboratory studies, ordering and review of radiographic studies, review of old charts, pulse oximetry and re-evaluation of patient's condition.        Labs Reviewed   CBC W/ AUTO DIFFERENTIAL - Abnormal; Notable for the following:        Result Value    RBC 3.23 (*)     Hemoglobin 8.8 (*)     Hematocrit 28.2 (*)     MCHC 31.2 (*)     RDW 16.1 (*)     All other components within normal limits   COMPREHENSIVE METABOLIC PANEL - Abnormal; Notable for the following:     Glucose 113 (*)     BUN, Bld 65 (*)     Creatinine 12.4 (*)     Albumin 3.2 (*)     eGFR if  4.0 (*)     eGFR if non  3.4 (*)     All other components within normal limits   TROPONIN I - Abnormal; Notable for the following:     Troponin I 0.266 (*)     All other components within normal limits   B-TYPE NATRIURETIC PEPTIDE - Abnormal; Notable for the following:      (*)     All other components within normal limits   CBC W/ AUTO DIFFERENTIAL - Abnormal; Notable for the following:     RBC 3.23 (*)     Hemoglobin 8.8 (*)     Hematocrit 28.2 (*)     MCHC 31.2 (*)     RDW 16.1 (*)     All other components within normal limits   COMPREHENSIVE METABOLIC PANEL - Abnormal; Notable for the following:     Glucose 113 (*)     BUN, Bld 65 (*)     Creatinine 12.4 (*)     Albumin 3.2 (*)     eGFR if  4.0 (*)     eGFR if non  3.4 (*)     All other components within normal limits   TROPONIN I - Abnormal; Notable for the following:     Troponin I 0.266 (*)     All other components within normal limits   B-TYPE NATRIURETIC PEPTIDE - Abnormal; Notable for the following:      (*)     All other  components within normal limits   APTT   PROTIME-INR   PROTIME-INR    Narrative:     ADD-ON PT-INR #[id number]; APTT #[id number] PER ZEKE GOMEZ MD   15:50  08/08/2017    APTT    Narrative:     ADD-ON PT-INR #[id number]; APTT #[id number] PER ZEKE GOMEZ MD   15:50  08/08/2017      EKG Readings: (Independently Interpreted)   Initial Reading: No STEMI. Rhythm: Normal Sinus Rhythm.   t wave inversions inferior and lateral leads                APC / Resident Notes:   -Hx of recent stent placement, Chest pain, abnormal EKG, elevated Troponin  -Admited to Internal medicine.  Consulted Cardiology    Wily Apodaca, PGY1         Attending Attestation:   Physician Attestation Statement for Resident:  As the supervising MD   Physician Attestation Statement: I have personally seen and examined this patient.   I agree with the above history. -: 78 yo m, h/o severe aortic stenosis, s/p admit last week for NSTEMI, s/p cath with stent to RCA [place], d/c;d yesterday, returning for severe CP since this am, improvement with NTG given to him by EMS.  Took ASA already.  On exam, comfortable, HD stable.  EKG with new t wave inversions in inferior and lateral leads.  Concern for stent stenosis or new ischemia (had 70% LAD lesions) or could be related to severe AS.  Cardiology consulted and recommend admission   As the supervising MD I agree with the above PE.    As the supervising MD I agree with the above treatment, course, plan, and disposition.  I have reviewed and agree with the residents interpretation of the following: lab data, x-rays and EKG.  I have reviewed the following: old records at this facility.                    ED Course     Patient taken to cath lab emergently by cardiology team    Clinical Impression:   The primary encounter diagnosis was Chest pain. Diagnoses of Chronic kidney disease, stage V, Essential hypertension, ESRD (end stage renal disease), Aortic stenosis, severe, and ESRD on hemodialysis were also  pertinent to this visit.                           Mary Medina MD  08/09/17 0955

## 2017-08-08 NOTE — PROGRESS NOTES
POST CATH NOTE    Procedure:  [facility]  Referring MD:  Dr. Price  Indication:  Unstable angina   Access:  R CFA    Findings:  Patent OM2 stent, D1 stenosis unchanged from previous    Intervention:  None    Patient tolerated the procedure well, no complications    Post Cath Exam:  Vitals:    08/08/17 1515   BP: 116/65   Pulse: 74   Resp:    Temp:      No unusual pain, hematoma or thrill at vascular access site.  Distal pulse present without signs of ischemia.    Recommendation:  -continue dual antiplatelet therapy daily uninterrupted for at least one year  -high-potency statin,  -vascular risk factor control  -Anti-anginal therapy with beta blockade, nitro on d/c  -Okay to d/c after post-cath monitoring, CP likely from severe AS  -f/u with TAVR team as outpatient

## 2017-08-09 PROBLEM — R07.9 CHEST PAIN: Status: RESOLVED | Noted: 2017-01-01 | Resolved: 2017-01-01

## 2017-08-09 PROBLEM — Z99.2 ESRD (END STAGE RENAL DISEASE) ON DIALYSIS: Status: ACTIVE | Noted: 2017-01-01

## 2017-08-09 PROBLEM — N18.6 ESRD (END STAGE RENAL DISEASE) ON DIALYSIS: Status: ACTIVE | Noted: 2017-01-01

## 2017-08-09 NOTE — PLAN OF CARE
5:06 PM. On call ZE paged, pt being discharged and in the need of transpiration. Pts transport was not set up on pending earlier today. ZE set up transport for pt to return to his home address with RHONDA Emerson speaking with Tori. Ride expected to be here by 6-6:30pm.      MIKE Singh LMSW  [id number]

## 2017-08-09 NOTE — PROGRESS NOTES
3hr HD treatment completed 1.5L of fluid removed. Epoetin given as ordered. Both needles of a RFA fistula removed and pressure held until hemostasis achieved dressing applied. Report given to MARLIN Pond.

## 2017-08-09 NOTE — PLAN OF CARE
Pt states adequate pain control. Call bell within reach and enc pt to call when in need. Safety and fall prec in place. Pt states no needs at current. Will monitor.

## 2017-08-09 NOTE — PLAN OF CARE
Problem: Fall Risk (Adult)  Goal: Absence of Falls  Patient will demonstrate the desired outcomes by discharge/transition of care.   Outcome: Ongoing (interventions implemented as appropriate)  Safety issues reviewed with patient and family

## 2017-08-09 NOTE — NURSING
Dr. Medina and Priyanka contacted re discharge. Dr. Palmer notified we need orders, he will be here [facility]SA to review and decide. Family unable to wait. Daughter and wife have gone home.

## 2017-08-09 NOTE — PLAN OF CARE
08/09/17 1029   Readmission Questionnaire   At the time of your discharge, did someone talk to you about what your health problems were? Yes   At the time of discharge, did someone talk to you about what to watch out for regarding worsening of your health problem? Yes   At the time of discharge, did someone talk to you about what to do if you experienced worsening of your health problem? Yes   At the time of discharge, did someone talk to you about which medication to take when you left the hospital and which ones to stop taking? Yes   At the time of discharge, did someone talk to you about when and where to follow up with a doctor after you left the hospital? Yes   How often do you need to have someone help you when you read instructions, pamphlets, or other written material from your doctor or pharmacy? Sometimes   Do you have problems taking your medications as prescribed? Yes   Do you have any problems affording any of  your prescribed medications? No   Do you have problems obtaining/receiving your medications? No   Does the patient have transportation to healthcare appointments? Yes   Lives With spouse   Living Arrangements house   Does the patient have family/friends to help with healtcare needs after discharge? yes   Does your caregiver provide all the help you need? Yes   Are you currently feeling confused? No   Are you currently having problems thinking? No   Are you currently having memory problems? No   Have you felt down, depressed, or hopeless? 0   Have you felt little interest or pleasure in doing things? 0   In the last 7 days, my sleep quality was: poor

## 2017-08-09 NOTE — PLAN OF CARE
Johana with social work paged for wheelchair van to bring pt to home [place]. PT returned from dialysis. Vs stable. Will monitor.

## 2017-08-09 NOTE — H&P
"[facility] Medicine  History & Physical    Patient Name: Bernabe Greco Jr.  MRN: [id number]  Admission Date: 8/8/2017  Attending Physician: Eve Mathew MD   Primary Care Provider: Primary Doctor [facility] Medicine Team: [facility]P MED J Ilia Santiago DO     Patient information was obtained from patient and ER records.     Subjective:     Principal Problem:Chest pain    Chief Complaint:   Chief Complaint   Patient presents with    Chest Pain     Coming from home per [place] for chest pain that he can not rate. states "it just hurts." Had cardiac stent placed last week after "light heart attack." EMS gave  mg and Nitro 2 sprays.         HPI:     77 y.o. male with severe AS (IMANI 0.6, V 4.5 m/s, MG 43), CAD with recent PCI to OM1 with YANE on 08/01/17  and residual D1 70% stenosis not intervened upon, HTN, HLD, anemia, PAD s/p cath in 2014 with unsuccessful attempt to revascularize right SFA  , HTN, HLD, ESRD on HD MWF and stroke with residual visual deficit, and cataracts who presented to ER earlier today  with "a weight on his chest" starting at rest.  He said this started 10-11AM this morning while having breakfast, constant, 10/10 on presentation and relieved with nitro. He described it as a "boulder" sitting on his chest.  He had associated shortness of breath.    He reports compliance with his medications.  He is not very active because he is worried about falls.      EKG in ER showed ischemic changes with TWI along inferolateral leads. Elevated troponin 0.266 >>>0.266 ( ESRD patient ).  Patient was taken to cath lab emergently to exclude in-stent thrombosis. Patient underwent emergent LHC w/o complication.  Cath showed non obstructive CAD with patent OM1 stent, unchanged small D1 osteal stenosis ( better treated medically )     Currently chest pain free.      Procedure(s) (LRB):  HEART CATH-LEFT (Left)  who presents with typical unstable angina and EKG changes suggestive " "of ischemia     Recent Hospital Course ( 07/30 - 08/03 ) :   "Mr. Greco was admitted 7/31 with complaints of chest pain that resolved with SL NTG. He was evaluated by Interventional Cardiology and underwent LHC on 8/1 with successful PCI of LCx/OM1 with YANE.  After repeat echo, findings indicated need to proceed with TAVR evaluation,  TAVR CT, PFTs, frailty, and 6 minute walk test completed, will follow up with TAVR team as outpatient. Nephrology folloed and coordinated inpatient HD treatments. Dr. Mayers with vascular surgery notified of patients NSTEMI and YANE placement, will cancel new AVF formation procedure for 8/3. Outpatient follow up with priority care clinic arranged, TAVR team will call patient to schedule"    Past Medical History:   Diagnosis Date    Arthritis     Diabetes mellitus     DNR (do not resuscitate) 5/17/2017    Hyperlipidemia     Hypertension     Stroke     Syncope and collapse        Past Surgical History:   Procedure Laterality Date    AV FISTULA PLACEMENT Right     HIP SURGERY      replacement right    JOINT REPLACEMENT      right hip        Review of patient's allergies indicates:   Allergen Reactions    Penicillins Rash       No current facility-administered medications on file prior to encounter.      Current Outpatient Prescriptions on File Prior to Encounter   Medication Sig    aspirin (ECOTRIN) 81 MG EC tablet Take 1 tablet (81 mg total) by mouth once daily.    atorvastatin (LIPITOR) 80 MG tablet Take 1 tablet (80 mg total) by mouth once daily.    carboxymethylcellulose (REFRESH PLUS) 0.5 % Dpet Place 1 drop into both eyes 5 (five) times daily.    carvedilol (COREG) 6.25 MG tablet Take 1 tablet (6.25 mg total) by mouth 2 (two) times daily.    lorazepam (ATIVAN) 0.5 MG tablet Take 0.5 mg by mouth 2 (two) times daily as needed for Anxiety.    omeprazole (PRILOSEC) 40 MG capsule Take 40 mg by mouth once daily.    sevelamer carbonate (RENVELA) 800 mg Tab Take 2 tablets " (1,600 mg total) by mouth 3 (three) times daily with meals.    ticagrelor (BRILINTA) 90 mg tablet Take 1 tablet (90 mg total) by mouth 2 (two) times daily.    vitamin renal formula, B-complex-vitamin c-folic acid, (NEPHROCAPS) 1 mg Cap Take 1 capsule by mouth once daily.    white petrolatum-mineral oil 56.8-42.5% (REFRESH LACRI-LUBE) 56.8-42.5 % Oint Apply a thin ribbon to bottom eye lid every night     Family History     Problem Relation (Age of Onset)    Hypertension Mother        Social History Main Topics    Smoking status: Former Smoker     Packs/day: 0.25     Quit date: 1/1/1978    Smokeless tobacco: Never Used    Alcohol use No    Drug use: No    Sexual activity: Not Currently     Review of Systems   Constitutional: Negative for activity change, appetite change, chills, diaphoresis, fatigue, fever and unexpected weight change.   HENT: Negative for congestion, dental problem, drooling, ear discharge, ear pain, facial swelling, hearing loss, mouth sores, nosebleeds, postnasal drip, rhinorrhea, sinus pressure, sneezing, sore throat, tinnitus, trouble swallowing and voice change.    Eyes: Negative for photophobia, pain, discharge, redness, itching and visual disturbance.   Respiratory: Positive for shortness of breath. Negative for apnea, cough, choking, chest tightness, wheezing and stridor.    Cardiovascular: Positive for chest pain. Negative for palpitations and leg swelling.        Chest pressure    Gastrointestinal: Negative for abdominal distention, abdominal pain, anal bleeding, blood in stool, constipation, diarrhea, nausea, rectal pain and vomiting.   Endocrine: Negative for cold intolerance, heat intolerance, polydipsia, polyphagia and polyuria.   Genitourinary: Negative for decreased urine volume, difficulty urinating, discharge, dysuria, enuresis, flank pain, frequency, genital sores, hematuria, penile pain, penile swelling, scrotal swelling, testicular pain and urgency.   Musculoskeletal:  Negative for arthralgias, back pain, gait problem, joint swelling, myalgias, neck pain and neck stiffness.   Skin: Negative for color change, pallor, rash and wound.   Allergic/Immunologic: Negative for environmental allergies, food allergies and immunocompromised state.   Neurological: Negative for dizziness, tremors, seizures, syncope, facial asymmetry, speech difficulty, weakness, light-headedness, numbness and headaches.   Hematological: Negative for adenopathy. Does not bruise/bleed easily.   Psychiatric/Behavioral: Negative for agitation, behavioral problems, confusion, decreased concentration, dysphoric mood, hallucinations, self-injury, sleep disturbance and suicidal ideas. The patient is not nervous/anxious and is not hyperactive.      Objective:     Vital Signs (Most Recent):  Temp: 98.3 °F (36.8 °C) (08/08/17 2015)  Pulse: 80 (08/08/17 2015)  Resp: 18 (08/08/17 2015)  BP: 113/68 (08/08/17 2015)  SpO2: 96 % (08/08/17 2015) Vital Signs (24h Range):  Temp:  [98 °F (36.7 °C)-98.7 °F (37.1 °C)] 98.3 °F (36.8 °C)  Pulse:  [72-83] 80  Resp:  [18] 18  SpO2:  [96 %-100 %] 96 %  BP: (113-148)/(65-76) 113/68     Weight: 82.1 kg (181 lb)  Body mass index is 25.97 kg/m².    Physical Exam   Constitutional: He is oriented to person, place, and time. He appears well-developed and well-nourished. No distress.   HENT:   Head: Normocephalic and atraumatic.   Right Ear: External ear normal.   Left Ear: External ear normal.   Nose: Nose normal.   Mouth/Throat: Oropharynx is clear and moist. No oropharyngeal exudate.   Eyes: Conjunctivae and EOM are normal. Pupils are equal, round, and reactive to light. Right eye exhibits no discharge. Left eye exhibits no discharge. No scleral icterus.   Neck: Normal range of motion. Neck supple. No JVD present. No tracheal deviation present. No thyromegaly present.   Cardiovascular: Normal rate, regular rhythm and intact distal pulses.  Exam reveals no gallop and no friction rub.    Murmur  (Harsh holosystolic murmur over RSB and apex ) heard.  AVF over R forearm with positive thrills.     Catheter insertion site at R Femoral artery without hematoma or bleeding. Site over R groin  C/D/I ( small gauge dressing in place )    Pulmonary/Chest: Effort normal and breath sounds normal. No stridor. No respiratory distress. He has no wheezes. He has no rales. He exhibits no tenderness.   Abdominal: Soft. Bowel sounds are normal. He exhibits no distension and no mass. There is no tenderness. No hernia.   Musculoskeletal: Normal range of motion. He exhibits no edema, tenderness or deformity.   Lymphadenopathy:     He has no cervical adenopathy.   Neurological: He is alert and oriented to person, place, and time. He has normal reflexes. He displays normal reflexes. No cranial nerve deficit. He exhibits normal muscle tone. Coordination normal.   Skin: Skin is warm and dry. No rash noted. He is not diaphoretic. No erythema. No pallor.   Psychiatric: He has a normal mood and affect. His behavior is normal. Judgment and thought content normal.       Significant Labs:  Admission on 08/08/2017   Component Date Value Ref Range Status    WBC 08/08/2017 7.83  3.90 - 12.70 K/uL Final    RBC 08/08/2017 3.23* 4.60 - 6.20 M/uL Final    Hemoglobin 08/08/2017 8.8* 14.0 - 18.0 g/dL Final    Hematocrit 08/08/2017 28.2* 40.0 - 54.0 % Final    MCV 08/08/2017 87  82 - 98 fL Final    MCH 08/08/2017 27.2  27.0 - 31.0 pg Final    MCHC 08/08/2017 31.2* 32.0 - 36.0 g/dL Final    RDW 08/08/2017 16.1* 11.5 - 14.5 % Final    Platelets 08/08/2017 157  150 - 350 K/uL Final    MPV 08/08/2017 11.6  9.2 - 12.9 fL Final    Gran # 08/08/2017 5.2  1.8 - 7.7 K/uL Final    Lymph # 08/08/2017 1.8  1.0 - 4.8 K/uL Final    Mono # 08/08/2017 0.6  0.3 - 1.0 K/uL Final    Eos # 08/08/2017 0.2  0.0 - 0.5 K/uL Final    Baso # 08/08/2017 0.02  0.00 - 0.20 K/uL Final    Gran% 08/08/2017 66.0  38.0 - 73.0 % Final    Lymph% 08/08/2017 23.1  18.0  - 48.0 % Final    Mono% 08/08/2017 7.9  4.0 - 15.0 % Final    Eosinophil% 08/08/2017 2.4  0.0 - 8.0 % Final    Basophil% 08/08/2017 0.3  0.0 - 1.9 % Final    Differential Method 08/08/2017 Automated   Final    Sodium 08/08/2017 138  136 - 145 mmol/L Final    Potassium 08/08/2017 4.8  3.5 - 5.1 mmol/L Final    Chloride 08/08/2017 97  95 - 110 mmol/L Final    CO2 08/08/2017 25  23 - 29 mmol/L Final    Glucose 08/08/2017 113* 70 - 110 mg/dL Final    BUN, Bld 08/08/2017 65* 8 - 23 mg/dL Final    Creatinine 08/08/2017 12.4* 0.5 - 1.4 mg/dL Final    Calcium 08/08/2017 9.3  8.7 - 10.5 mg/dL Final    Total Protein 08/08/2017 7.3  6.0 - 8.4 g/dL Final    Albumin 08/08/2017 3.2* 3.5 - 5.2 g/dL Final    Total Bilirubin 08/08/2017 0.6  0.1 - 1.0 mg/dL Final    Alkaline Phosphatase 08/08/2017 102  55 - 135 U/L Final    AST 08/08/2017 12  10 - 40 U/L Final    ALT 08/08/2017 10  10 - 44 U/L Final    Anion Gap 08/08/2017 16  8 - 16 mmol/L Final    eGFR if African American 08/08/2017 4.0* >60 mL/min/1.73 m^2 Final    eGFR if non African American 08/08/2017 3.4* >60 mL/min/1.73 m^2 Final    Troponin I 08/08/2017 0.266* 0.000 - 0.026 ng/mL Final    BNP 08/08/2017 476* 0 - 99 pg/mL Final    WBC 08/08/2017 7.83  3.90 - 12.70 K/uL Final    RBC 08/08/2017 3.23* 4.60 - 6.20 M/uL Final    Hemoglobin 08/08/2017 8.8* 14.0 - 18.0 g/dL Final    Hematocrit 08/08/2017 28.2* 40.0 - 54.0 % Final    MCV 08/08/2017 87  82 - 98 fL Final    MCH 08/08/2017 27.2  27.0 - 31.0 pg Final    MCHC 08/08/2017 31.2* 32.0 - 36.0 g/dL Final    RDW 08/08/2017 16.1* 11.5 - 14.5 % Final    Platelets 08/08/2017 157  150 - 350 K/uL Final    MPV 08/08/2017 11.6  9.2 - 12.9 fL Final    Gran # 08/08/2017 5.2  1.8 - 7.7 K/uL Final    Lymph # 08/08/2017 1.8  1.0 - 4.8 K/uL Final    Mono # 08/08/2017 0.6  0.3 - 1.0 K/uL Final    Eos # 08/08/2017 0.2  0.0 - 0.5 K/uL Final    Baso # 08/08/2017 0.02  0.00 - 0.20 K/uL Final    Gran%  08/08/2017 66.0  38.0 - 73.0 % Final    Lymph% 08/08/2017 23.1  18.0 - 48.0 % Final    Mono% 08/08/2017 7.9  4.0 - 15.0 % Final    Eosinophil% 08/08/2017 2.4  0.0 - 8.0 % Final    Basophil% 08/08/2017 0.3  0.0 - 1.9 % Final    Differential Method 08/08/2017 Automated   Final    Sodium 08/08/2017 138  136 - 145 mmol/L Final    Potassium 08/08/2017 4.8  3.5 - 5.1 mmol/L Final    Chloride 08/08/2017 97  95 - 110 mmol/L Final    CO2 08/08/2017 25  23 - 29 mmol/L Final    Glucose 08/08/2017 113* 70 - 110 mg/dL Final    BUN, Bld 08/08/2017 65* 8 - 23 mg/dL Final    Creatinine 08/08/2017 12.4* 0.5 - 1.4 mg/dL Final    Calcium 08/08/2017 9.3  8.7 - 10.5 mg/dL Final    Total Protein 08/08/2017 7.3  6.0 - 8.4 g/dL Final    Albumin 08/08/2017 3.2* 3.5 - 5.2 g/dL Final    Total Bilirubin 08/08/2017 0.6  0.1 - 1.0 mg/dL Final    Alkaline Phosphatase 08/08/2017 102  55 - 135 U/L Final    AST 08/08/2017 12  10 - 40 U/L Final    ALT 08/08/2017 10  10 - 44 U/L Final    Anion Gap 08/08/2017 16  8 - 16 mmol/L Final    eGFR if African American 08/08/2017 4.0* >60 mL/min/1.73 m^2 Final    eGFR if non African American 08/08/2017 3.4* >60 mL/min/1.73 m^2 Final    Troponin I 08/08/2017 0.266* 0.000 - 0.026 ng/mL Final    BNP 08/08/2017 476* 0 - 99 pg/mL Final    Coronary Stenosis 08/08/2017 >= 50%*  Final    Prothrombin Time 08/08/2017 11.0  9.0 - 12.5 sec Final    INR 08/08/2017 1.0  0.8 - 1.2 Final    aPTT 08/08/2017 24.0  21.0 - 32.0 sec Final    POCT Glucose 08/08/2017 92  70 - 110 mg/dL Final         Significant Imaging: I have reviewed and interpreted all pertinent imaging results/findings within the past 24 hours.     CXR : Overall grossly stable chronic findings, no acute cardiopulmonary process versus minimal chronic congestive change.    EKG : NSR @ 72 bpm, TWI along inferolateral leads ( II, III, avF, V5-V6 )    [facility] ( 08/08/17 ) :    Non-obstructive CAD.    Patent OMB stent.    Small D1 ostial  stenosis better treated medically.    Severe symptomatic aortic stenosis.        2D ECHO ( 08/01/17 ) :    1 - Mildly to moderately depressed left ventricular systolic function (EF 40-45%).     2 - Concentric remodeling.     3 - Normal right ventricular systolic function .     4 - Biatrial enlargement.     5 - Severe aortic stenosis, IMANI = 0.6 cm2, peak velocity = 4.53 m/s, mean gradient = 43 mmHg.     6 - Trivial to mild aortic regurgitation.     7 - Trivial pericardial effusion.     8 - The estimated PA systolic pressure is greater than 12 mmHg.     LHC ( 08/01/17 ) :    Two vessel coronary artery disease.    Successful PCI to OM1 with YANE .    Codominant circulation.    Severe aortic stenosis.        Assessment/Plan:     Active Diagnoses:    Diagnosis Date Noted POA    PRINCIPAL PROBLEM:  Chest pain [R07.9] 08/08/2017 Yes    Aortic stenosis, severe [I35.0] 08/02/2017 Yes    ESRD on hemodialysis [N18.6, Z99.2]  Not Applicable    Anemia associated with chronic renal failure [D63.1] 01/22/2015 Yes    Hyperlipidemia LDL goal <70 [E78.5] 03/24/2014 Yes      Problems Resolved During this Admission:    Diagnosis Date Noted Date Resolved POA     # Unstable angina   - S/P emergent LHC showed nonobstructive CAD with patent  OM1 stent, unchanged small D1 osteal stenosis ( better treated medically )   - Chest pain likely due to symptomatic severe Aortic stenosis   - remained chest pain free after LHC   - continue dual antiplatelet therapy daily uninterrupted for at least one year  -high-potency statin,  -vascular risk factor control  -Anti-anginal therapy with beta blockade, nitro on d/c  -Okay to d/c in morning after post-cath monitoring, CP likely from severe AS  -f/u with TAVR team as outpatient    Aortic valve disorders     -severe AS noted on echo  -evaluated by TAVR team during recent admission last week, will follow as outpt  -TAVR studies complete  -see above      ESRD on hemodialysis     -HD MWF via right arm  AVF  -consider temporary access if unable to dialyze with current AVF  - Patient reports he had HD yesterday Monday w/o issue   - Nephrology consult for scheduled HD tomorrow morning prior to D/C        Anemia associated with chronic renal failure     -CBC stable, monitor        Essential hypertension     -BP WNL, continue metoprolol   - Will add imdur and titrate as BP allows        Hyperlipidemia LDL goal <70     -continue high intensity statin            VTE Risk Mitigation         Ordered     heparin (porcine) injection 5,000 Units  Every 8 hours     Route:  Subcutaneous        08/08/17 2046     Medium Risk of VTE  Once      08/08/17 2046          Ilia Santiago DO  Department of Hospital Medicine   [facility]

## 2017-08-09 NOTE — PLAN OF CARE
08/09/17 1030   Discharge Assessment   Assessment Type Discharge Planning Assessment   Confirmed/corrected address and phone number on facesheet? No   Assessment information obtained from? Medical Record   Expected Length of Stay (days) 2   Prior to hospitilization cognitive status: Alert/Oriented   Prior to hospitalization functional status: Assistive Equipment;Needs Assistance   Current cognitive status: Alert/Oriented   Current Functional Status: Assistive Equipment;Needs Assistance   Arrived From home or self-care   Lives With spouse   Able to Return to Prior Arrangements yes   Is patient able to care for self after discharge? No   Does the patient have family/friends to help with healtcare needs after discharge? yes   How many people do you have in your home that can help with your care after discharge? 1   Patient's perception of discharge disposition home or selfcare   Readmission Within The Last 30 Days previous discharge plan unsuccessful   Patient currently being followed by outpatient case management? No   Patient currently receives home health services? No   Does the patient currently use HME? Yes   Patient currently receives private duty nursing? No   Patient currently receives any other outside agency services? No   Equipment Currently Used at Home cane, straight;walker, rolling;wheelchair;bath bench   Do you have any problems affording any of your prescribed medications? No   Is the patient taking medications as prescribed? yes   Do you have any financial concerns preventing you from receiving the healthcare you need? No   Does the patient have transportation to healthcare appointments? Yes   Transportation Available family or friend will provide;ambulance;van, wheelchair accessible   On Dialysis? Yes   If yes, what is the name of the dialysis unit? [facility] in [place]   Does the patient receive outpatient dialysis? Yes   Does the patient receive services at the Coumadin Clinic? No    Are there any open cases? No   Discharge Plan A Home with family   Patient/Family In Agreement With Plan yes   Readmitted with [facility] [place]. Known to this CM from admit last week.  Lives with wife HD MWF. Has sitter 3 x per week to assist in ADLs. Plan is to return home. No new DC needs anticipated.

## 2017-08-09 NOTE — CONSULTS
"REASON FOR CONSULT: ESRD manament    HISTORY OF PRESENT ILLNESS: 77 y.o. male with a history of  has a past medical history of Arthritis; Diabetes mellitus; DNR (do not resuscitate) (5/17/2017); Hyperlipidemia; Hypertension; Stroke; and Syncope and collapse. presents for had concerns including Chest Pain (Coming from home per [place] for chest pain that he can not rate. states "it just hurts." Had cardiac stent placed last week after "light heart attack." EMS gave  mg and Nitro 2 sprays. ). on 8/8/2017. His is on maintenance IHD (MWF) at [facility]. He currently denies any chest pain.      ROS:  Review of Systems - General ROS: negative for - chills, fatigue, fever, night sweats, weight gain or weight loss  ENT ROS: positive for - epistaxis, headaches, hearing change, nasal congestion, nasal discharge, nasal polyps, oral lesions, sinus pain, sneezing, sore throat, tinnitus and vertigo  Hematological and Lymphatic ROS: negative for - bleeding problems, blood clots, bruising, fatigue, jaundice, night sweats or swollen lymph nodes  Endocrine ROS: negative for - breast changes, galactorrhea, hair pattern changes, hot flashes, mood swings, palpitations, polydipsia/polyuria, skin changes or temperature intolerance  Respiratory ROS: no cough, shortness of breath, or wheezing  negative for - hemoptysis, pleuritic pain, sputum changes or stridor  Cardiovascular ROS: no chest pain or dyspnea on exertion  negative for - edema, irregular heartbeat, loss of consciousness, orthopnea, palpitations, paroxysmal nocturnal dyspnea or shortness of breath  Gastrointestinal ROS: no abdominal pain, change in bowel habits, or black or bloody stools  negative for - abdominal pain, appetite loss, change in bowel habits, change in stools, gas/bloating, heartburn, nausea/vomiting, stool incontinence or swallowing difficulty/pain  Genito-Urinary ROS: no dysuria, trouble voiding, or hematuria  Musculoskeletal ROS: negative for - " joint pain, joint stiffness, joint swelling, muscle pain or muscular weakness  Neurological ROS: no TIA or stroke symptoms  negative for - confusion, headaches, impaired coordination/balance, memory loss or visual changes  Dermatological ROS: negative for acne, dry skin, eczema, hair changes, lumps, mole changes and nail changes    PAST MEDICAL HISTORY:  Past Medical History:   Diagnosis Date    Arthritis     Diabetes mellitus     DNR (do not resuscitate) 5/17/2017    Hyperlipidemia     Hypertension     Stroke     Syncope and collapse        PAST SURGICAL HISTORY:  Past Surgical History:   Procedure Laterality Date    AV FISTULA PLACEMENT Right     HIP SURGERY      replacement right    JOINT REPLACEMENT      right hip        FAMILY HISTORY:   Family History   Problem Relation Age of Onset    Hypertension Mother        SOCIAL HISTORY:  Social History     Social History    Marital status:      Spouse name: N/A    Number of children: N/A    Years of education: N/A     Occupational History    Not on file.     Social History Main Topics    Smoking status: Former Smoker     Packs/day: 0.25     Quit date: 1/1/1978    Smokeless tobacco: Never Used    Alcohol use No    Drug use: No    Sexual activity: Not Currently     Other Topics Concern    Not on file     Social History Narrative    No narrative on file       ALLERGIES:  Review of patient's allergies indicates:   Allergen Reactions    Penicillins Rash       MEDICATIONS:Scheduled Meds:   sodium chloride 0.9%   Intravenous Once    aspirin  81 mg Oral Daily    atorvastatin  80 mg Oral Daily    heparin (porcine)  5,000 Units Subcutaneous Q8H    isosorbide mononitrate  30 mg Oral Daily    metoprolol tartrate  25 mg Oral BID    pantoprazole  40 mg Oral Daily    sevelamer carbonate  1,600 mg Oral TID WM    ticagrelor  90 mg Oral BID    vitamin renal formula (B-complex-vitamin c-folic acid)  1 capsule Oral Daily     Continuous Infusions:  "  PRN Meds:.sodium chloride 0.9%, acetaminophen, albuterol-ipratropium 2.5mg-0.5mg/3mL, artificial tears, dextrose 50%, dextrose 50%, glucagon (human recombinant), glucose, glucose, hydrocodone-acetaminophen 5-325mg, lorazepam, nitroGLYCERIN, ondansetron, senna-docusate 8.6-50 mg    PHYSICAL EXAM:  /61 (BP Location: Left arm, Patient Position: Lying, BP Method: Automatic)   Pulse 78   Temp 98.3 °F (36.8 °C) (Oral)   Resp 18   Ht 5' 10" (1.778 m)   Wt 82.1 kg (181 lb)   SpO2 100%   BMI 25.97 kg/m²   General appearance: alert, appears older than stated age and cooperative  Head: Normocephalic, without obvious abnormality, atraumatic  Eyes: conjunctivae/corneas clear. PERRL, EOM's intact. Fundi benign.  Nose: Nares normal. Septum midline. Mucosa normal. No drainage or sinus tenderness.  Throat: lips, mucosa, and tongue normal; teeth and gums normal  Neck: no adenopathy, no carotid bruit, no JVD, supple, symmetrical, trachea midline and thyroid not enlarged, symmetric, no tenderness/mass/nodules  Lungs: clear to auscultation bilaterally  Heart: regular rate and rhythm, S1, S2 normal, no murmur, click, rub or gallop  Abdomen: soft, non-tender; bowel sounds normal; no masses,  no organomegaly  Extremities: extremities normal, atraumatic, no cyanosis or edema  Pulses: 2+ and symmetric  Skin: Skin color, texture, turgor normal. No rashes or lesions  Neurologic: Grossly normal  Right RC AVF ; GOOD THRILL, Bruit.    INPUT/OUTPUT  I/O last 3 completed shifts:  In: 180 [P.O.:180]  Out: 275 [Urine:275]  -----------------------------------  I/O this shift:  In: 440 [P.O.:440]  Out: 100 [Urine:100]      LABS:  Recent Results (from the past 24 hour(s))   CBC auto differential    Collection Time: 08/08/17  1:59 PM   Result Value Ref Range    WBC 7.83 3.90 - 12.70 K/uL    RBC 3.23 (L) 4.60 - 6.20 M/uL    Hemoglobin 8.8 (L) 14.0 - 18.0 g/dL    Hematocrit 28.2 (L) 40.0 - 54.0 %    MCV 87 82 - 98 fL    MCH 27.2 27.0 - 31.0 " pg    MCHC 31.2 (L) 32.0 - 36.0 g/dL    RDW 16.1 (H) 11.5 - 14.5 %    Platelets 157 150 - 350 K/uL    MPV 11.6 9.2 - 12.9 fL    Gran # 5.2 1.8 - 7.7 K/uL    Lymph # 1.8 1.0 - 4.8 K/uL    Mono # 0.6 0.3 - 1.0 K/uL    Eos # 0.2 0.0 - 0.5 K/uL    Baso # 0.02 0.00 - 0.20 K/uL    Gran% 66.0 38.0 - 73.0 %    Lymph% 23.1 18.0 - 48.0 %    Mono% 7.9 4.0 - 15.0 %    Eosinophil% 2.4 0.0 - 8.0 %    Basophil% 0.3 0.0 - 1.9 %    Differential Method Automated    Comprehensive metabolic panel    Collection Time: 08/08/17  1:59 PM   Result Value Ref Range    Sodium 138 136 - 145 mmol/L    Potassium 4.8 3.5 - 5.1 mmol/L    Chloride 97 95 - 110 mmol/L    CO2 25 23 - 29 mmol/L    Glucose 113 (H) 70 - 110 mg/dL    BUN, Bld 65 (H) 8 - 23 mg/dL    Creatinine 12.4 (H) 0.5 - 1.4 mg/dL    Calcium 9.3 8.7 - 10.5 mg/dL    Total Protein 7.3 6.0 - 8.4 g/dL    Albumin 3.2 (L) 3.5 - 5.2 g/dL    Total Bilirubin 0.6 0.1 - 1.0 mg/dL    Alkaline Phosphatase 102 55 - 135 U/L    AST 12 10 - 40 U/L    ALT 10 10 - 44 U/L    Anion Gap 16 8 - 16 mmol/L    eGFR if African American 4.0 (A) >60 mL/min/1.73 m^2    eGFR if non  3.4 (A) >60 mL/min/1.73 m^2   Troponin I #1    Collection Time: 08/08/17  1:59 PM   Result Value Ref Range    Troponin I 0.266 (H) 0.000 - 0.026 ng/mL   B-Type natriuretic peptide (BNP)    Collection Time: 08/08/17  1:59 PM   Result Value Ref Range     (H) 0 - 99 pg/mL   CBC auto differential    Collection Time: 08/08/17  1:59 PM   Result Value Ref Range    WBC 7.83 3.90 - 12.70 K/uL    RBC 3.23 (L) 4.60 - 6.20 M/uL    Hemoglobin 8.8 (L) 14.0 - 18.0 g/dL    Hematocrit 28.2 (L) 40.0 - 54.0 %    MCV 87 82 - 98 fL    MCH 27.2 27.0 - 31.0 pg    MCHC 31.2 (L) 32.0 - 36.0 g/dL    RDW 16.1 (H) 11.5 - 14.5 %    Platelets 157 150 - 350 K/uL    MPV 11.6 9.2 - 12.9 fL    Gran # 5.2 1.8 - 7.7 K/uL    Lymph # 1.8 1.0 - 4.8 K/uL    Mono # 0.6 0.3 - 1.0 K/uL    Eos # 0.2 0.0 - 0.5 K/uL    Baso # 0.02 0.00 - 0.20 K/uL    Gran% 66.0  38.0 - 73.0 %    Lymph% 23.1 18.0 - 48.0 %    Mono% 7.9 4.0 - 15.0 %    Eosinophil% 2.4 0.0 - 8.0 %    Basophil% 0.3 0.0 - 1.9 %    Differential Method Automated    Comprehensive metabolic panel    Collection Time: 08/08/17  1:59 PM   Result Value Ref Range    Sodium 138 136 - 145 mmol/L    Potassium 4.8 3.5 - 5.1 mmol/L    Chloride 97 95 - 110 mmol/L    CO2 25 23 - 29 mmol/L    Glucose 113 (H) 70 - 110 mg/dL    BUN, Bld 65 (H) 8 - 23 mg/dL    Creatinine 12.4 (H) 0.5 - 1.4 mg/dL    Calcium 9.3 8.7 - 10.5 mg/dL    Total Protein 7.3 6.0 - 8.4 g/dL    Albumin 3.2 (L) 3.5 - 5.2 g/dL    Total Bilirubin 0.6 0.1 - 1.0 mg/dL    Alkaline Phosphatase 102 55 - 135 U/L    AST 12 10 - 40 U/L    ALT 10 10 - 44 U/L    Anion Gap 16 8 - 16 mmol/L    eGFR if African American 4.0 (A) >60 mL/min/1.73 m^2    eGFR if non  3.4 (A) >60 mL/min/1.73 m^2   Troponin I #1    Collection Time: 08/08/17  1:59 PM   Result Value Ref Range    Troponin I 0.266 (H) 0.000 - 0.026 ng/mL   B-Type natriuretic peptide (BNP)    Collection Time: 08/08/17  1:59 PM   Result Value Ref Range     (H) 0 - 99 pg/mL   Protime-INR    Collection Time: 08/08/17  1:59 PM   Result Value Ref Range    Prothrombin Time 11.0 9.0 - 12.5 sec    INR 1.0 0.8 - 1.2   APTT    Collection Time: 08/08/17  1:59 PM   Result Value Ref Range    aPTT 24.0 21.0 - 32.0 sec   Cath lab procedure    Collection Time: 08/08/17  3:45 PM   Result Value Ref Range    Coronary Stenosis >= 50% (A)    POCT glucose    Collection Time: 08/08/17  4:57 PM   Result Value Ref Range    POCT Glucose 92 70 - 110 mg/dL   Comprehensive Metabolic Panel (CMP)    Collection Time: 08/09/17  5:19 AM   Result Value Ref Range    Sodium 138 136 - 145 mmol/L    Potassium 5.1 3.5 - 5.1 mmol/L    Chloride 98 95 - 110 mmol/L    CO2 20 (L) 23 - 29 mmol/L    Glucose 88 70 - 110 mg/dL    BUN, Bld 78 (H) 8 - 23 mg/dL    Creatinine 13.8 (H) 0.5 - 1.4 mg/dL    Calcium 9.0 8.7 - 10.5 mg/dL    Total Protein 6.8  6.0 - 8.4 g/dL    Albumin 3.0 (L) 3.5 - 5.2 g/dL    Total Bilirubin 0.7 0.1 - 1.0 mg/dL    Alkaline Phosphatase 87 55 - 135 U/L    AST 12 10 - 40 U/L    ALT 9 (L) 10 - 44 U/L    Anion Gap 20 (H) 8 - 16 mmol/L    eGFR if African American 3.5 (A) >60 mL/min/1.73 m^2    eGFR if non  3.0 (A) >60 mL/min/1.73 m^2   Basic Metabolic Panel (BMP)    Collection Time: 08/09/17  5:19 AM   Result Value Ref Range    Sodium 138 136 - 145 mmol/L    Potassium 5.1 3.5 - 5.1 mmol/L    Chloride 98 95 - 110 mmol/L    CO2 20 (L) 23 - 29 mmol/L    Glucose 88 70 - 110 mg/dL    BUN, Bld 78 (H) 8 - 23 mg/dL    Creatinine 13.8 (H) 0.5 - 1.4 mg/dL    Calcium 9.0 8.7 - 10.5 mg/dL    Anion Gap 20 (H) 8 - 16 mmol/L    eGFR if African American 3.5 (A) >60 mL/min/1.73 m^2    eGFR if non  3.0 (A) >60 mL/min/1.73 m^2   Magnesium    Collection Time: 08/09/17  5:19 AM   Result Value Ref Range    Magnesium 1.8 1.6 - 2.6 mg/dL   Phosphorus    Collection Time: 08/09/17  5:19 AM   Result Value Ref Range    Phosphorus 8.6 (H) 2.7 - 4.5 mg/dL   CBC with Automated Differential    Collection Time: 08/09/17  5:19 AM   Result Value Ref Range    WBC 8.55 3.90 - 12.70 K/uL    RBC 3.17 (L) 4.60 - 6.20 M/uL    Hemoglobin 8.8 (L) 14.0 - 18.0 g/dL    Hematocrit 27.7 (L) 40.0 - 54.0 %    MCV 87 82 - 98 fL    MCH 27.8 27.0 - 31.0 pg    MCHC 31.8 (L) 32.0 - 36.0 g/dL    RDW 15.9 (H) 11.5 - 14.5 %    Platelets 170 150 - 350 K/uL    MPV 11.8 9.2 - 12.9 fL    Gran # 6.1 1.8 - 7.7 K/uL    Lymph # 1.6 1.0 - 4.8 K/uL    Mono # 0.6 0.3 - 1.0 K/uL    Eos # 0.2 0.0 - 0.5 K/uL    Baso # 0.02 0.00 - 0.20 K/uL    Gran% 71.8 38.0 - 73.0 %    Lymph% 18.9 18.0 - 48.0 %    Mono% 6.7 4.0 - 15.0 %    Eosinophil% 2.0 0.0 - 8.0 %    Basophil% 0.2 0.0 - 1.9 %    Differential Method Automated    POCT glucose    Collection Time: 08/09/17  7:51 AM   Result Value Ref Range    POCT Glucose 90 70 - 110 mg/dL         ASSESSMENT:  Patient Active Problem List    Diagnosis    Claudication in peripheral vascular disease    Essential hypertension    Obesity    Hyperlipidemia LDL goal <70    Tobacco abuse, in remission    Type 2 diabetes mellitus with diabetic neuropathy, with long-term current use of insulin    Anemia    Orthostatic hypotension    Weakness    ARF (acute renal failure)    Metabolic acidosis    Shock    Seizure    Status epilepticus    KIRK (acute kidney injury)    Acute respiratory failure    Encephalopathy    Aortic valve disorders    Ethylene glycol poisoning    Acidosis    Acute kidney failure, unspecified    Anemia associated with chronic renal failure    Delirium due to conditions classified elsewhere    Chronic kidney disease, stage V    Thrombocytopenia    CKD (chronic kidney disease) stage 3, GFR 30-59 ml/min    Tissue plasminogen activator (t-PA) administered at other facility within 24 hours prior to current admission    ESRD (end stage renal disease) on dialysis    Altered mental status    Chronic kidney disease-mineral and bone disorder    Acute ischemic left MCA stroke    Suicidal ideation    Malnutrition    Thrombocytopenia, unspecified    UTI (urinary tract infection)    DNR (do not resuscitate)    Malfunction of arteriovenous dialysis fistula    ESRD on hemodialysis    ESRD (end stage renal disease)    Aortic stenosis, severe    Dependence on renal dialysis    Chest pain       PLAN:  1. ESRD; His is on maintenance IHD (MWF), HD later today.  2. Anemia; Epogen today.  3. Chest pain; No evidence ischemia , continue DAPI.

## 2017-08-09 NOTE — DISCHARGE SUMMARY
"[facility] Hospital Medicine  Discharge Summary      Patient Name: Bernabe Greco Jr.  MRN: [id number]  Admission Date: 8/8/2017  Hospital Length of Stay: 1 days  Discharge Date and Time:  08/09/2017 5:08 PM  Attending Physician: Eve Mathew MD   Discharging Provider: Shreya Peterson NP  Primary Care Provider: Primary Doctor [facility] Medicine Team: [facility] MED  Shreya Peterson NP    HPI: 77 y.o. male with severe AS (IMANI 0.6, V 4.5 m/s, MG 43), CAD with recent PCI to OM1 with YANE on 08/01/17  and residual D1 70% stenosis not intervened upon, HTN, HLD, anemia, PAD s/p cath in 2014 with unsuccessful attempt to revascularize right SFA  , HTN, HLD, ESRD on HD MWF and stroke with residual visual deficit, and cataracts who presented to ER earlier today  with "a weight on his chest" starting at rest.  He said this started 10-11AM this morning while having breakfast, constant, 10/10 on presentation and relieved with nitro. He described it as a "boulder" sitting on his chest.  He had associated shortness of breath.    He reports compliance with his medications.  He is not very active because he is worried about falls.       EKG in ER showed ischemic changes with TWI along inferolateral leads. Elevated troponin 0.266 >>>0.266 ( ESRD patient ).  Patient was taken to cath lab emergently to exclude in-stent thrombosis. Patient underwent emergent LHC w/o complication.  Cath showed non obstructive CAD with patent OM1 stent, unchanged small D1 osteal stenosis ( better treated medically )      Currently chest pain free.       Procedure(s) (LRB):  HEART CATH-LEFT (Left)  who presents with typical unstable angina and EKG changes suggestive of ischemia      Recent Hospital Course ( 07/30 - 08/03 ) :   "Mr. Greco was admitted 7/31 with complaints of chest pain that resolved with SL NTG. He was evaluated by Interventional Cardiology and underwent LHC on 8/1 with successful PCI of LCx/OM1 with YANE. " " After repeat echo, findings indicated need to proceed with TAVR evaluation,  TAVR CT, PFTs, frailty, and 6 minute walk test completed, will follow up with TAVR team as outpatient. Nephrology folloed and coordinated inpatient HD treatments. Dr. Mayers with vascular surgery notified of patients NSTEMI and YANE placement, will cancel new AVF formation procedure for 8/3. Outpatient follow up with priority care clinic arranged, TAVR team will call patient to schedule"    Procedure(s) (LRB):  HEART CATH-LEFT (N/A)      Indwelling Lines/Drains at time of discharge:   Lines/Drains/Airways     Central Venous Catheter Line                 Hemodialysis Catheter [id number] days          Drain                 Hemodialysis AV Fistula Right forearm [id number] days              Hospital Course:  Mr. Greco presented to ER with c/o chest pain as he described "a weight on his chest" at rest.  Constant, 10/10 and relieved with nitro. EKG in ER showed ischemic changes with TWI along inferolateral leads. Elevated troponin 0.266 >>>0.266 ( ESRD patient ).  Patient was taken to cath lab emergently to exclude in-stent thrombosis. Patient underwent emergent LHC w/o complication.  Cath showed non obstructive CAD with patent OM1 stent, unchanged small D1 osteal stenosis ( better treated medically ), he arrived late to the floor and he requires a wheelchair van for transportation.  He stayed overnight to have dialysis post [facility] and to have transportation arranged.  Mr. Greco's fistula needs revising, which may have precipitated him to not have full and successful dialysis sessions coupled with his severe AS.  He has a lesion that's not able to be fixed at this time.  Will add imdur 30mg for antianginal therapy and to resume his work up for TAVR as an outpatient.  Fistula repair is on hold while he's post YANE, per Dr. Mayers.      Consults:   Consults         Status Ordering Provider     Inpatient consult to Cardiology  Once     Provider:  (Not yet " assigned)    Completed CHERYL LORA     Inpatient consult to Nephrology  Once     Provider:  (Not yet assigned)    Completed TALITA MCDONALD      Review of Systems   Constitutional: Negative for activity change, appetite change, chills, diaphoresis, fatigue, fever and unexpected weight change.   HENT: Negative for congestion, dental problem, ear discharge, sinus pressure, sore throat, tinnitus, trouble swallowing and voice change.    Respiratory: Negative for shortness of breath. Negative for apnea, cough, choking, chest tightness, wheezing and stridor.    Cardiovascular: Negative for chest pain. Negative for palpitations and leg swelling.    Gastrointestinal: Negative for abdominal distention, abdominal pain, constipation, diarrhea, nausea, and vomiting.   Endocrine: Negative for cold intolerance, heat intolerance, polydipsia, polyphagia and polyuria.   Genitourinary: Negative for decreased urine volume, difficulty urinating, hematuria, and urgency.   Musculoskeletal: Negative for arthralgias, back pain, gait problem, joint swelling, myalgias, neck pain and neck stiffness.   Skin: Negative for color change, pallor, rash and wound.   Neurological: Negative for dizziness, tremors, seizures, syncope, weakness, light-headedness, numbness and headaches.   Psychiatric/Behavioral: Negative for agitation, behavioral problems, confusion, decreased concentration, dysphoric mood, sleep disturbance. The patient is not nervous/anxious and is not hyperactive.      Physical Exam   Constitutional: He is oriented to person, place, and time. He appears well-developed and well-nourished. No distress.   HENT:   Head: Normocephalic and atraumatic.   Mouth/Throat: Oropharynx is clear and moist. No oropharyngeal exudate.   Eyes: Conjunctivae and EOM are normal. Right eye exhibits no discharge. Left eye exhibits no discharge. No scleral icterus.   Neck: Normal range of motion. Neck supple. No JVD present. No tracheal deviation present. No  thyromegaly present.   Cardiovascular: Normal rate, regular rhythm and intact distal pulses.  Exam reveals no gallop and no friction rub.  Murmur (Harsh holosystolic murmur over RSB and apex ) heard. AVF over R forearm with positive thrills. R groin without hematoma or bleeding.   Pulmonary/Chest: Effort normal and breath sounds normal. No stridor. No respiratory distress. He has no wheezes. He has no rales. He exhibits no tenderness.   Abdominal: Soft. Bowel sounds are normal. He exhibits no distension and no mass. There is no tenderness. No hernia.   Musculoskeletal: Normal range of motion. He exhibits no edema, tenderness or deformity.   Lymphadenopathy:   He has no cervical adenopathy.   Neurological: He is alert and oriented to person, place, and time. He has normal reflexes. He displays normal reflexes. No cranial nerve deficit. He exhibits normal muscle tone. Coordination normal.   Skin: Skin is warm and dry. No rash noted. He is not diaphoretic. No erythema. No pallor.   Psychiatric: He has a normal mood and affect. His behavior is normal. Judgment and thought content normal.        Significant Diagnostic Studies: Labs:   BMP:   Recent Labs  Lab 08/08/17  1359 08/09/17  0519   *  113* 88  88     138 138  138   K 4.8  4.8 5.1  5.1   CL 97  97 98  98   CO2 25  25 20*  20*   BUN 65*  65* 78*  78*   CREATININE 12.4*  12.4* 13.8*  13.8*   CALCIUM 9.3  9.3 9.0  9.0   MG  --  1.8       Pending Diagnostic Studies:     None        Final Active Diagnoses:    Diagnosis Date Noted POA    Aortic stenosis, severe [I35.0] 08/02/2017 Yes    ESRD on hemodialysis [N18.6, Z99.2]  Not Applicable    Anemia associated with chronic renal failure [D63.1] 01/22/2015 Yes    Hyperlipidemia LDL goal <70 [E78.5] 03/24/2014 Yes      Problems Resolved During this Admission:    Diagnosis Date Noted Date Resolved POA    PRINCIPAL PROBLEM:  Chest pain [R07.9] 08/08/2017 08/09/2017 Yes      Discharged  Condition: good    Disposition: Home or Self Care    Follow Up:  Follow-up Information     Hernan Mathew MD.    Specialties:  Cardiology, INTERVENTIONAL CARDIOLOGY  Why:  as previously scheduled  Contact information:  [address]  [facility] [address] [phone]                 Patient Instructions:     Diet general   Order Specific Question Answer Comments   Na restriction, if any: 2gNa    Fluid restriction: Fluid - 1500mL    Additional restrictions: Cardiac (Low Na/Chol)    Additional restrictions: Renal      Activity as tolerated     Call MD for:  temperature >100.4     Call MD for:  persistent nausea and vomiting or diarrhea     Call MD for:  severe uncontrolled pain     Call MD for:  redness, tenderness, or signs of infection (pain, swelling, redness, odor or green/yellow discharge around incision site)     Call MD for:  difficulty breathing or increased cough     Call MD for:  severe persistent headache     Call MD for:  persistent dizziness, light-headedness, or visual disturbances     Call MD for:  worsening rash     Call MD for:  increased confusion or weakness       Medications:  Reconciled Home Medications:   Current Discharge Medication List      START taking these medications    Details   isosorbide mononitrate (IMDUR) 30 MG 24 hr tablet Take 1 tablet (30 mg total) by mouth once daily.  Qty: 90 tablet, Refills: 0         CONTINUE these medications which have NOT CHANGED    Details   aspirin (ECOTRIN) 81 MG EC tablet Take 1 tablet (81 mg total) by mouth once daily.  Qty: 30 tablet, Refills: 11      atorvastatin (LIPITOR) 80 MG tablet Take 1 tablet (80 mg total) by mouth once daily.  Qty: 30 tablet, Refills: 11      carboxymethylcellulose (REFRESH PLUS) 0.5 % Dpet Place 1 drop into both eyes 5 (five) times daily.      carvedilol (COREG) 6.25 MG tablet Take 1 tablet (6.25 mg total) by mouth 2 (two) times daily.  Qty: 60 tablet, Refills: 11      lorazepam (ATIVAN) 0.5 MG tablet Take 0.5 mg by  mouth 2 (two) times daily as needed for Anxiety.      omeprazole (PRILOSEC) 40 MG capsule Take 40 mg by mouth once daily.      sevelamer carbonate (RENVELA) 800 mg Tab Take 2 tablets (1,600 mg total) by mouth 3 (three) times daily with meals.      ticagrelor (BRILINTA) 90 mg tablet Take 1 tablet (90 mg total) by mouth 2 (two) times daily.  Qty: 60 tablet, Refills: 11      vitamin renal formula, B-complex-vitamin c-folic acid, (NEPHROCAPS) 1 mg Cap Take 1 capsule by mouth once daily.      white petrolatum-mineral oil 56.8-42.5% (REFRESH LACRI-LUBE) 56.8-42.5 % Oint Apply a thin ribbon to bottom eye lid every night           Time spent on the discharge of patient: 45 minutes         Shreya Peterson NP  Department of Hospital Medicine  [facility]  Spectra:  [phone]  Pager: [phone]

## 2017-08-10 NOTE — NURSING
Attempting to contact  since 8pm through the .  Have not been able to get a call back.  I found a number for [facility] transport [phone] and called them.  They report they will be here in 20 minutes.

## 2017-08-10 NOTE — NURSING
Discharge instructions and medlist given and patient verbalized understanding.  Off unit via wheelchair with medical transport for transportation home.  Wife notified via phone that he is leaving at this time.

## 2017-08-10 NOTE — PLAN OF CARE
08/10/17 0722   Final Note   Assessment Type Final Discharge Note   Discharge Disposition Home   Hospital Follow Up  Appt(s) scheduled? Yes

## 2017-08-29 PROBLEM — R07.9 CHEST PAIN: Status: ACTIVE | Noted: 2017-01-01

## 2017-08-29 PROBLEM — I25.10 NONOBSTRUCTIVE ATHEROSCLEROSIS OF CORONARY ARTERY: Status: ACTIVE | Noted: 2017-01-01

## 2017-08-29 NOTE — SUBJECTIVE & OBJECTIVE
Past Medical History:   Diagnosis Date    Arthritis     Diabetes mellitus     DNR (do not resuscitate) 5/17/2017    Hyperlipidemia     Hypertension     Stroke     Syncope and collapse      Past Surgical History:   Procedure Laterality Date    AV FISTULA PLACEMENT Right     HIP SURGERY      replacement right    JOINT REPLACEMENT      right hip        Review of patient's allergies indicates:   Allergen Reactions    Penicillins Rash     No current facility-administered medications on file prior to encounter.      Current Outpatient Prescriptions on File Prior to Encounter   Medication Sig    aspirin (ECOTRIN) 81 MG EC tablet Take 1 tablet (81 mg total) by mouth once daily.    atorvastatin (LIPITOR) 80 MG tablet Take 1 tablet (80 mg total) by mouth once daily.    carvedilol (COREG) 6.25 MG tablet Take 1 tablet (6.25 mg total) by mouth 2 (two) times daily.    isosorbide mononitrate (IMDUR) 30 MG 24 hr tablet Take 1 tablet (30 mg total) by mouth once daily.    sevelamer carbonate (RENVELA) 800 mg Tab Take 2 tablets (1,600 mg total) by mouth 3 (three) times daily with meals.    ticagrelor (BRILINTA) 90 mg tablet Take 1 tablet (90 mg total) by mouth 2 (two) times daily.    carboxymethylcellulose (REFRESH PLUS) 0.5 % Dpet Place 1 drop into both eyes 5 (five) times daily.    lorazepam (ATIVAN) 0.5 MG tablet Take 0.5 mg by mouth 2 (two) times daily as needed for Anxiety.    omeprazole (PRILOSEC) 40 MG capsule Take 40 mg by mouth once daily.    vitamin renal formula, B-complex-vitamin c-folic acid, (NEPHROCAPS) 1 mg Cap Take 1 capsule by mouth once daily.    white petrolatum-mineral oil 56.8-42.5% (REFRESH LACRI-LUBE) 56.8-42.5 % Oint Apply a thin ribbon to bottom eye lid every night     Family History     Problem Relation (Age of Onset)    Hypertension Mother        Social History Main Topics    Smoking status: Former Smoker     Packs/day: 0.25     Quit date: 1/1/1978    Smokeless tobacco: Never Used     Alcohol use No    Drug use: No    Sexual activity: Not Currently     Review of Systems   Constitutional: Negative for chills, fatigue and fever.   HENT: Negative for congestion, rhinorrhea, sinus pressure, sneezing and sore throat.    Eyes: Negative.    Respiratory: Positive for chest tightness and shortness of breath. Negative for wheezing.    Cardiovascular: Positive for chest pain and palpitations. Negative for leg swelling.   Gastrointestinal: Negative for abdominal distention, abdominal pain, constipation, diarrhea, nausea and vomiting.   Endocrine: Negative.    Genitourinary: Positive for decreased urine volume. Negative for dysuria, enuresis, flank pain, frequency, hematuria, penile swelling and scrotal swelling.   Musculoskeletal: Negative for arthralgias and myalgias.   Skin: Negative.    Neurological: Negative for dizziness, syncope, weakness, light-headedness and headaches.   Hematological: Negative.    Psychiatric/Behavioral: Negative.      Objective:     Vital Signs (Most Recent):  Temp: 98.4 °F (36.9 °C) (08/29/17 0900)  Pulse: 76 (08/29/17 1400)  Resp: 16 (08/29/17 1141)  BP: (!) 140/79 (08/29/17 1141)  SpO2: 95 % (08/29/17 1141) Vital Signs (24h Range):  Temp:  [98.2 °F (36.8 °C)-98.4 °F (36.9 °C)] 98.4 °F (36.9 °C)  Pulse:  [67-81] 76  Resp:  [12-18] 16  SpO2:  [95 %-100 %] 95 %  BP: (127-147)/(59-83) 140/79     Weight: 80.2 kg (176 lb 12.9 oz)  Body mass index is 25.37 kg/m².    Physical Exam   Nursing note and vitals reviewed.  Constitutional: He is oriented to person, place, and time. He appears well-developed and well-nourished. No distress.   Eyes: Pupils are equal, round, and reactive to light.   Neck: Normal range of motion. Neck supple. No JVD present.   Cardiovascular: Normal rate, regular rhythm and intact distal pulses.    Murmur (harsh systolic) heard.  Pulmonary/Chest: Effort normal and breath sounds normal. No respiratory distress. He has no wheezes. He has no rales. He exhibits  no tenderness.   Abdominal: Soft. Bowel sounds are normal. He exhibits no distension.   Musculoskeletal: Normal range of motion. He exhibits no edema.   Neurological: He is alert and oriented to person, place, and time.   Skin: Skin is warm and dry. Capillary refill takes less than 2 seconds. He is not diaphoretic.      Significant Labs:   CBC:   Recent Labs  Lab 08/29/17  0349   WBC 8.94   HGB 8.2*   HCT 25.9*        CMP:   Recent Labs  Lab 08/29/17  0349      K 4.7   CL 97   CO2 26   *   BUN 79*   CREATININE 13.7*   CALCIUM 8.8   PROT 6.7   ALBUMIN 2.8*   BILITOT 0.6   ALKPHOS 91   AST 12   ALT 15   ANIONGAP 17*   EGFRNONAA 3.0*     Troponin:   Recent Labs  Lab 08/29/17  0349 08/29/17  0639 08/29/17  1147   TROPONINI 0.053* 0.086* 0.106*     All pertinent labs within the past 24 hours have been reviewed.    Significant Imaging: I have reviewed all pertinent imaging results/findings within the past 24 hours.

## 2017-08-29 NOTE — CONSULTS
[facility]  Cardiology  Consult Note    Patient Name: Bernabe Greco Jr.  MRN: [id number]  Admission Date: 8/29/2017  Hospital Length of Stay: 0 days  Code Status: Full Code   Attending Provider: Eve Mathew MD   Consulting Provider: Fady Ross MD  Primary Care Physician: Primary Doctor No  Principal Problem:NSTEMI (non-ST elevated myocardial infarction)    Patient information was obtained from patient, spouse/SO and ER records.     Inpatient consult to Cardiology  Consult performed by: FADY ROSS  Consult ordered by: PAIGE KRAUS        Subjective:     Chief Complaint:  Chest pain      HPI:   This is Mr. Bernabe Greco Jr., 77 year old male severe AS (IMANI 0.6, V 4.5 m/s, MG 43), CAD with recent PCI to OM1 with YANE on 08/01/17  and residual D1 70% stenosis not intervened upon, HTN, HLD, anemia, PAD s/p cath in 2014 with unsuccessful attempt to revascularize right SFA  , HTN, HLD, ESRD on HD Sat,T,T and stroke with residual visual deficit. He presented to ED from [place] with symptoms of pressure and chest tightness, that started midnight prior his presentation to ED. His chest pain was retrotarsally, described as heaviness and pressure, did not radiate anywhere, and was associated with SOB (NYHA IV), which is different from baseline (NYHA III). Denies loss of consciousness, change in his medication, or palpitation.    Past Medical History:   Diagnosis Date    Arthritis     Diabetes mellitus     DNR (do not resuscitate) 5/17/2017    Hyperlipidemia     Hypertension     Stroke     Syncope and collapse        Past Surgical History:   Procedure Laterality Date    AV FISTULA PLACEMENT Right     HIP SURGERY      replacement right    JOINT REPLACEMENT      right hip        Review of patient's allergies indicates:   Allergen Reactions    Penicillins Rash       No current facility-administered medications on file prior to encounter.      Current Outpatient  Prescriptions on File Prior to Encounter   Medication Sig    aspirin (ECOTRIN) 81 MG EC tablet Take 1 tablet (81 mg total) by mouth once daily.    atorvastatin (LIPITOR) 80 MG tablet Take 1 tablet (80 mg total) by mouth once daily.    carvedilol (COREG) 6.25 MG tablet Take 1 tablet (6.25 mg total) by mouth 2 (two) times daily.    isosorbide mononitrate (IMDUR) 30 MG 24 hr tablet Take 1 tablet (30 mg total) by mouth once daily.    sevelamer carbonate (RENVELA) 800 mg Tab Take 2 tablets (1,600 mg total) by mouth 3 (three) times daily with meals.    ticagrelor (BRILINTA) 90 mg tablet Take 1 tablet (90 mg total) by mouth 2 (two) times daily.    carboxymethylcellulose (REFRESH PLUS) 0.5 % Dpet Place 1 drop into both eyes 5 (five) times daily.    lorazepam (ATIVAN) 0.5 MG tablet Take 0.5 mg by mouth 2 (two) times daily as needed for Anxiety.    omeprazole (PRILOSEC) 40 MG capsule Take 40 mg by mouth once daily.    vitamin renal formula, B-complex-vitamin c-folic acid, (NEPHROCAPS) 1 mg Cap Take 1 capsule by mouth once daily.    white petrolatum-mineral oil 56.8-42.5% (REFRESH LACRI-LUBE) 56.8-42.5 % Oint Apply a thin ribbon to bottom eye lid every night     Family History     Problem Relation (Age of Onset)    Hypertension Mother        Social History Main Topics    Smoking status: Former Smoker     Packs/day: 0.25     Quit date: 1/1/1978    Smokeless tobacco: Never Used    Alcohol use No    Drug use: No    Sexual activity: Not Currently     Review of Systems   Constitution: Negative for chills and decreased appetite.   HENT: Negative for congestion and headaches.    Cardiovascular: Positive for chest pain. Negative for irregular heartbeat and leg swelling.   Respiratory: Positive for shortness of breath. Negative for cough.    Endocrine: Negative for cold intolerance and heat intolerance.   Skin: Negative for rash.   Musculoskeletal: Negative for arthritis and back pain.   Gastrointestinal: Negative for  abdominal pain and constipation.   Neurological: Negative for dizziness.   Psychiatric/Behavioral: Negative for altered mental status.     Objective:     Vital Signs (Most Recent):  Temp: 98.4 °F (36.9 °C) (08/29/17 0900)  Pulse: 71 (08/29/17 1100)  Resp: 12 (08/29/17 0750)  BP: (!) 147/83 (08/29/17 0750)  SpO2: 96 % (08/29/17 0750) Vital Signs (24h Range):  Temp:  [98.2 °F (36.8 °C)-98.4 °F (36.9 °C)] 98.4 °F (36.9 °C)  Pulse:  [67-81] 71  Resp:  [12-18] 12  SpO2:  [96 %-100 %] 96 %  BP: (127-147)/(59-83) 147/83     Weight: 80.2 kg (176 lb 12.9 oz)  Body mass index is 25.37 kg/m².    SpO2: 96 %  O2 Device (Oxygen Therapy): nasal cannula    No intake or output data in the 24 hours ending 08/29/17 1141    Lines/Drains/Airways     Central Venous Catheter Line                 Hemodialysis Catheter 84534 days          Drain                 Hemodialysis AV Fistula Right forearm 77823 days          Peripheral Intravenous Line                 Peripheral IV - Single Lumen 08/29/17 0300 Left Forearm less than 1 day                Physical Exam   Constitutional: He is oriented to person, place, and time. He appears well-developed and well-nourished. No distress.   HENT:   Head: Normocephalic.   Left Ear: External ear normal.   Eyes: Pupils are equal, round, and reactive to light. Right eye exhibits no discharge. Left eye exhibits no discharge.   Neck: Normal range of motion. No JVD present. No thyromegaly present.   Cardiovascular: Normal rate and regular rhythm.  Exam reveals no friction rub.    No murmur heard.  Pulmonary/Chest: Effort normal and breath sounds normal. No respiratory distress. He has no wheezes. He has no rales.   Abdominal: Soft. Bowel sounds are normal. He exhibits no distension. There is no tenderness.   Musculoskeletal: Normal range of motion. He exhibits no edema.   Neurological: He is alert and oriented to person, place, and time. No cranial nerve deficit.   Skin:   Right AV fistula       CBC:   Recent  Labs  Lab 08/29/17  0349   WBC 8.94   RBC 2.97*   HGB 8.2*   HCT 25.9*      MCV 87   MCH 27.6   MCHC 31.7*     BMP:   Recent Labs  Lab 08/29/17  0349   *      K 4.7   CL 97   CO2 26   BUN 79*   CREATININE 13.7*   CALCIUM 8.8     Coagulation: No results for input(s): INR, APTT in the last 168 hours.    Invalid input(s): PT  Microbiology Results (last 7 days)     ** No results found for the last 168 hours. **            Assessment and Plan:     * NSTEMI (non-ST elevated myocardial infarction)    - Known to have CAD with recent PCI to OM1 with YANE on 08/01/17  and residual D1 70% stenosis not intervened upon  - Presentation with chest pain, and troponin of 0.053 > .086, with no significant ECG changes  - RAMU score for NSTEMI is 5   - Current home medication is Coreg 6.25 mg BID, and Imdur 30 mg   - We recommend increase Coreg to 12.5 mg BID, and continue same dose of Imdur to 30 mg (not to impact Prelaod).   - Will have an appointment with Dr. Mora in the next 10 days.             VTE Risk Mitigation         Ordered     Low Risk of VTE  Once      08/29/17 0687          Thank you for your consult. I will sign off. Please contact us if you have any additional questions.    Fredi Gallo MD  Cardiology   [facility]

## 2017-08-29 NOTE — PROVIDER PROGRESS NOTES - EMERGENCY DEPT.
Encounter Date: 8/29/2017    ED Physician Progress Notes       SCRIBE NOTE: I, Bam James, am scribing for, and in the presence of,  Smith Haynes MD.  Physician Statement: ISmith MD, personally performed the services described in this documentation as scribed by Bam James in my presence, and it is both accurate and complete.      EKG - STEMI Decision  Initial Reading: No STEMI present.

## 2017-08-29 NOTE — SUBJECTIVE & OBJECTIVE
Past Medical History:   Diagnosis Date    Arthritis     Diabetes mellitus     DNR (do not resuscitate) 5/17/2017    Hyperlipidemia     Hypertension     Stroke     Syncope and collapse        Past Surgical History:   Procedure Laterality Date    AV FISTULA PLACEMENT Right     HIP SURGERY      replacement right    JOINT REPLACEMENT      right hip        Review of patient's allergies indicates:   Allergen Reactions    Penicillins Rash     Current Facility-Administered Medications   Medication Frequency    acetaminophen tablet 650 mg Q8H PRN    aspirin EC tablet 81 mg Daily    atorvastatin tablet 80 mg Daily    carvedilol tablet 6.25 mg BID    citalopram tablet 20 mg Daily    isosorbide mononitrate 24 hr tablet 30 mg Daily    lorazepam tablet 0.5 mg BID PRN    mirtazapine tablet 15 mg QHS    ondansetron injection 4 mg Q6H PRN    pantoprazole EC tablet 40 mg Daily    polyethylene glycol packet 17 g Daily PRN    sevelamer carbonate tablet 1,600 mg TID WM    sodium chloride 0.9% flush 3 mL Q8H    ticagrelor tablet 90 mg BID    vitamin renal formula (B-complex-vitamin c-folic acid) 1 mg per capsule 1 capsule Daily     Family History     Problem Relation (Age of Onset)    Hypertension Mother        Social History Main Topics    Smoking status: Former Smoker     Packs/day: 0.25     Quit date: 1/1/1978    Smokeless tobacco: Never Used    Alcohol use No    Drug use: No    Sexual activity: Not Currently     Review of Systems   Constitutional: Negative for chills, fatigue and fever.   HENT: Negative.    Eyes: Negative.    Respiratory: Positive for chest tightness and shortness of breath. Negative for wheezing.    Cardiovascular: Positive for chest pain and palpitations. Negative for leg swelling.   Gastrointestinal: Negative for abdominal distention, abdominal pain, constipation, diarrhea, nausea and vomiting.   Endocrine: Negative.    Genitourinary: Positive for decreased urine volume.    Musculoskeletal: Negative.    Skin: Negative.    Neurological: Negative.    Hematological: Negative.    Psychiatric/Behavioral: Negative.      Objective:     Vital Signs (Most Recent):  Temp: 98.2 °F (36.8 °C) (08/29/17 0502)  Pulse: 79 (08/29/17 0750)  Resp: 12 (08/29/17 0750)  BP: (!) 147/83 (08/29/17 0750)  SpO2: 96 % (08/29/17 0750)  O2 Device (Oxygen Therapy): nasal cannula (08/29/17 0502) Vital Signs (24h Range):  Temp:  [98.2 °F (36.8 °C)-98.3 °F (36.8 °C)] 98.2 °F (36.8 °C)  Pulse:  [67-81] 79  Resp:  [12-18] 12  SpO2:  [96 %-100 %] 96 %  BP: (127-147)/(59-83) 147/83     Weight: 80.2 kg (176 lb 12.9 oz) (08/29/17 0645)  Body mass index is 25.37 kg/m².  Body surface area is 1.99 meters squared.    No intake/output data recorded.    Physical Exam   Constitutional: He is oriented to person, place, and time. He appears well-developed and well-nourished.   Teary due to news brother who lives next door passed away    Cardiovascular: Normal rate and regular rhythm.    Murmur heard.  Pulmonary/Chest: Effort normal and breath sounds normal.   Abdominal: Soft. Bowel sounds are normal. He exhibits no distension.   Musculoskeletal: Normal range of motion.   Neurological: He is alert and oriented to person, place, and time.   Skin: Skin is warm and dry.   RFA AVF good bruit and thrill   Psychiatric: He has a normal mood and affect.       Significant Labs:  All labs within the past 24 hours have been reviewed.    Significant Imaging:  Labs: Reviewed

## 2017-08-29 NOTE — ED PROVIDER NOTES
Encounter Date: 8/29/2017    SCRIBE #1 NOTE: I, Bam James, am scribing for, and in the presence of,  Smith Haynes MD. I have scribed the entire note.       History     Chief Complaint   Patient presents with    Chest Pain     Pt brought by EMS with chest pain x 1 hour, describes as pressure on the center of his chest.      Time seen by provider: 3:39 AM    This is a 77 y.o. Male with PMH of HTN, DM, HLD, Stroke, ESRD, severe aortic valve stenosis and AV fistula placement who presents for evaluation of non radiating mid sternal chest pain for the last hour, described as a pressure.  Denies any change in SOB, nausea, vomiting, pain on deep breathing. Pain somewhat relieved with Nitro. Of note pt has upcoming Aortic valve replacement scheduled in September.  No further complaints or concerns at this time.       The history is provided by the patient, medical records and a relative.     Review of patient's allergies indicates:   Allergen Reactions    Penicillins Rash     Past Medical History:   Diagnosis Date    Arthritis     Diabetes mellitus     DNR (do not resuscitate) 5/17/2017    Hyperlipidemia     Hypertension     Stroke     Syncope and collapse      Past Surgical History:   Procedure Laterality Date    AV FISTULA PLACEMENT Right     HIP SURGERY      replacement right    JOINT REPLACEMENT      right hip      Family History   Problem Relation Age of Onset    Hypertension Mother      Social History   Substance Use Topics    Smoking status: Former Smoker     Packs/day: 0.25     Quit date: 1/1/1978    Smokeless tobacco: Never Used    Alcohol use No     Review of Systems   Constitutional: Negative for fever.   HENT: Negative for sore throat.    Respiratory: Negative for shortness of breath.    Cardiovascular: Positive for chest pain.   Gastrointestinal: Negative for nausea and vomiting.   Genitourinary: Negative for dysuria.   Musculoskeletal: Negative for back pain.   Skin: Negative for rash.    Neurological: Negative for weakness.   Hematological: Does not bruise/bleed easily.     All systems were reviewed/examined and were negative except as noted in the HPI.      Physical Exam     Initial Vitals [08/29/17 0321]   BP Pulse Resp Temp SpO2   138/71 81 18 98.3 °F (36.8 °C) 100 %      MAP       93.33         Physical Exam    Nursing note and vitals reviewed.  Constitutional: No distress.   HENT:   Head: Normocephalic and atraumatic.   Mouth/Throat: Oropharynx is clear and moist.   Eyes: Conjunctivae are normal.   Neck: Neck supple.   Cardiovascular: Normal rate, regular rhythm and intact distal pulses.   Murmur (systolic) heard.  Pulmonary/Chest: Breath sounds normal. No respiratory distress.   Abdominal: Soft. There is no tenderness.   Musculoskeletal: Normal range of motion. He exhibits no edema.   Neurological: He is alert and oriented to person, place, and time. He has normal strength.   Skin: Skin is warm and dry.   Psychiatric: Thought content normal.         ED Course   Procedures  Labs Reviewed   CBC W/ AUTO DIFFERENTIAL - Abnormal; Notable for the following:        Result Value    RBC 2.97 (*)     Hemoglobin 8.2 (*)     Hematocrit 25.9 (*)     MCHC 31.7 (*)     RDW 16.1 (*)     Gran% 77.5 (*)     Lymph% 12.6 (*)     All other components within normal limits   COMPREHENSIVE METABOLIC PANEL - Abnormal; Notable for the following:     Glucose 139 (*)     BUN, Bld 79 (*)     Creatinine 13.7 (*)     Albumin 2.8 (*)     Anion Gap 17 (*)     eGFR if  3.5 (*)     eGFR if non  3.0 (*)     All other components within normal limits   TROPONIN I - Abnormal; Notable for the following:     Troponin I 0.053 (*)     All other components within normal limits   B-TYPE NATRIURETIC PEPTIDE - Abnormal; Notable for the following:      (*)     All other components within normal limits     EKG Readings: (Independently Interpreted)   NSR with 1st degree AV block. Rate 80. No ST  elevation. .        X-Rays:   Independently Interpreted Readings:   Chest X-Ray: No acute cardiopulmonary process is noted     Medical Decision Making:   History:   Old Medical Records: I decided to obtain old medical records.  Initial Assessment:   Medical Decision Making:  This is an emergent evaluation of a patient presenting to the ED.  Nursing notes were reviewed.  I personally reviewed, read, and interpreted the ECG and any monitoring strips.  I reviewed radiology images personally along with interpretations.  I personally reviewed and interpreted the laboratory results.  Communicated with another physician regarding patient's care:  Ricardo Haynes MD, KAVON  Independently Interpreted Test(s):   I have ordered and independently interpreted X-rays - see prior notes.  I have ordered and independently interpreted EKG Reading(s) - see prior notes  Clinical Tests:   Lab Tests: Ordered and Reviewed  Radiological Study: Ordered and Reviewed  Medical Tests: Ordered and Reviewed  ED Management:  Labs reviewed. BNP mildly elevated consistent with prior. Troponin mildly elevated but improved compared to prior 3 weeks ago. Chemistry demonstrates known renal failure but no critical electrolyte abnormalities. CBC shows no leukocytosis and stable anemia. Xray shows no acute cardiopulmonary process. Discussed case with cardiology who recommends observation on co management team. Discussed with case management, he meets observation criteria. Also discussed with hospital medicine, will accept. Pt given morphine and GI cocktail in ED and has had complete resolution of sx. Will be placed in observation in improved condition.   Other:   I have discussed this case with another health care provider.            Scribe Attestation:   Scribe #1: I performed the above scribed service and the documentation accurately describes the services I performed. I attest to the accuracy of the note.    Attending Attestation:           Physician  Attestation for Scribe:  Physician Attestation Statement for Scribe #1: I, Smith Haynes MD, reviewed documentation, as scribed by Bam James in my presence, and it is both accurate and complete.                 ED Course     Clinical Impression:   The encounter diagnosis was Chest pain.            Admit medicine serious      Ricardo Haynes MD, KAVON, CPE, FACEP  Department of Emergency Medicine  , [employer]                 Smith Haynes MD  08/30/17 8162

## 2017-08-29 NOTE — PLAN OF CARE
Problem: Patient Care Overview  Goal: Plan of Care Review  Outcome: Ongoing (interventions implemented as appropriate)  Pt free of any falls, trauma or injury within this shift. Will continue to monitor pt for any residing CP, but pt had denied any CP since Morphine and GI cocktail were given in the ED. Pt will go for dialysis today and then be d/c when done. Pt denies any CP, SOB, headaches, nausea or dizziness. Plan of care reviewed with pt and pt verbalizes understanding. Pt's VSS and will continue to monitor.

## 2017-08-29 NOTE — PLAN OF CARE
08/29/17 0952   Discharge Assessment   Assessment Type Discharge Planning Assessment   Confirmed/corrected address and phone number on facesheet? No   Assessment information obtained from? Medical Record   Expected Length of Stay (days) 1   Communicated expected length of stay with patient/caregiver no   Prior to hospitilization cognitive status: Alert/Oriented   Prior to hospitalization functional status: Assistive Equipment;Needs Assistance   Current Functional Status: Assistive Equipment;Needs Assistance   Lives With spouse   Able to Return to Prior Arrangements yes   Is patient able to care for self after discharge? No   Patient's perception of discharge disposition home or selfcare   Readmission Within The Last 30 Days previous discharge plan unsuccessful   Patient currently being followed by outpatient case management? No   Patient currently receives any other outside agency services? No   Do you have any problems affording any of your prescribed medications? No   Is the patient taking medications as prescribed? yes   Does the patient have transportation home? No   Dialysis Name and Scheduled days [facility] in [place]  M [facility]   Does the patient receive services at the [facility]? No   Discharge Plan A Home with family   Placed in Obs for CP. Known to this CM from admit last week. Lives with wife. HD MWF at [facility]. Has sitter 3 x per week to assist with ADLs. Plan is to return home. No new DC needs anticipated.

## 2017-08-29 NOTE — ASSESSMENT & PLAN NOTE
-Nephrology consulted, appreciate recommendations and assistance  -HD this afternoon, then discharge home with resumption of home HD schedule   -HD x 3 yrs TTS 4 hrs [facility]

## 2017-08-29 NOTE — ASSESSMENT & PLAN NOTE
- Known to have CAD with recent PCI to OM1 with YANE on 08/01/17  and residual D1 70% stenosis not intervened upon  - Presentation with chest pain, and troponin of 0.053 > .086, with no significant ECG changes  - RAMU score for NSTEMI is 5   - Current home medication is Coreg 6.25 mg BID, and Imdur 30 mg   - We recommend increase Coreg to 12.5 mg BID, and continue same dose of Imdur to 30 mg (not to impact Prelaod).   - Will have an appointment with Dr. Mora in the next 10 days.

## 2017-08-29 NOTE — ASSESSMENT & PLAN NOTE
-Phos level pending. Borderline highcorrected Ca level 9.9.   -Avoid any Ca based supplements or Ca containing binders. Continue Renvela. Renal diet. Albumin low-add norvasource.

## 2017-08-29 NOTE — H&P
[facility] Hospital Medicine  History & Physical    Patient Name: Bernabe Greco Jr.  MRN: [id number]  Admission Date: 8/29/2017  Attending Physician: Eve Mathew MD   Primary Care Provider: Primary Doctor [facility] Medicine Team: [facility] HAILEY Kennedy NP     Patient information was obtained from patient, spouse/SO, past medical records and ER records.     Subjective:     Principal Problem:Chest pain    Chief Complaint:   Chief Complaint   Patient presents with    Chest Pain     Pt brought by EMS with chest pain x 1 hour, describes as pressure on the center of his chest.         HPI: Mr. Greco is a 77 y.o. male with pertinent PMHx of moderate to severe aortic stenosisis ( IMANI = 0.93 cm2, peak velocity = 3.6 m/s, mean gradient = 32 mmHg per Echo in May), PAD s/p cath in 2014 with unsuccessful attempt to revascularize right SFA  , HTN, HLD, ESRD on HD MWF and stroke with residual visual deficit, and cataracts who presented to the ED for evaluation of non-radiating mid sternal chest pain for the last hour, described as a pressure.  His chest pain was retrosternally, described as heaviness and pressure, did not radiate anywhere, and was associated with SOB (NYHA IV), which is different from baseline (NYHA III).  Denies loss of consciousness, change in his medication, or palpitation. Denies associated diaphoresis, N/V, dizziness, fever. Denies focal weakness, numbness, change is speech, facial droop or confusion.  He was recently admitted 7/31 with complaints of chest pain that resolved with SL NTG. He was evaluated by Interventional Cardiology and underwent LHC on 8/1 with successful PCI of LCx/OM1 with YANE.  After repeat echo, findings indicated need to proceed with TAVR evaluation, TAVR CT, PFTs, frailty, and 6 minute walk test completed, TAVR currently scheduled for 9/8. Nephrology followed and coordinated inpatient HD treatments. Dr. Mayers with vascular surgery notified of  patients NSTEMI and YANE placement, new AVF formation procedure for 8/3 was canceled.   Also admitted 08/08 for similar complaints of  chest pressure , EKG in ER showed ischemic changes with TWI along inferolateral leads. Elevated troponin 0.266 >>>0.266 ( ESRD patient ). Patient was taken to cath lab emergently to exclude in-stent thrombosis. Cath showed non obstructive CAD with patent OM1 stent, unchanged small D1 osteal stenosis (being  treated medically ), underwent HD treatment inpatient and was then discharged home.     Past Medical History:   Diagnosis Date    Arthritis     Diabetes mellitus     DNR (do not resuscitate) 5/17/2017    Hyperlipidemia     Hypertension     Stroke     Syncope and collapse      Past Surgical History:   Procedure Laterality Date    AV FISTULA PLACEMENT Right     HIP SURGERY      replacement right    JOINT REPLACEMENT      right hip        Review of patient's allergies indicates:   Allergen Reactions    Penicillins Rash     No current facility-administered medications on file prior to encounter.      Current Outpatient Prescriptions on File Prior to Encounter   Medication Sig    aspirin (ECOTRIN) 81 MG EC tablet Take 1 tablet (81 mg total) by mouth once daily.    atorvastatin (LIPITOR) 80 MG tablet Take 1 tablet (80 mg total) by mouth once daily.    carvedilol (COREG) 6.25 MG tablet Take 1 tablet (6.25 mg total) by mouth 2 (two) times daily.    isosorbide mononitrate (IMDUR) 30 MG 24 hr tablet Take 1 tablet (30 mg total) by mouth once daily.    sevelamer carbonate (RENVELA) 800 mg Tab Take 2 tablets (1,600 mg total) by mouth 3 (three) times daily with meals.    ticagrelor (BRILINTA) 90 mg tablet Take 1 tablet (90 mg total) by mouth 2 (two) times daily.    carboxymethylcellulose (REFRESH PLUS) 0.5 % Dpet Place 1 drop into both eyes 5 (five) times daily.    lorazepam (ATIVAN) 0.5 MG tablet Take 0.5 mg by mouth 2 (two) times daily as needed for Anxiety.    omeprazole  (PRILOSEC) 40 MG capsule Take 40 mg by mouth once daily.    vitamin renal formula, B-complex-vitamin c-folic acid, (NEPHROCAPS) 1 mg Cap Take 1 capsule by mouth once daily.    white petrolatum-mineral oil 56.8-42.5% (REFRESH LACRI-LUBE) 56.8-42.5 % Oint Apply a thin ribbon to bottom eye lid every night     Family History     Problem Relation (Age of Onset)    Hypertension Mother        Social History Main Topics    Smoking status: Former Smoker     Packs/day: 0.25     Quit date: 1/1/1978    Smokeless tobacco: Never Used    Alcohol use No    Drug use: No    Sexual activity: Not Currently     Review of Systems   Constitutional: Negative for chills, fatigue and fever.   HENT: Negative for congestion, rhinorrhea, sinus pressure, sneezing and sore throat.    Eyes: Negative.    Respiratory: Positive for chest tightness and shortness of breath. Negative for wheezing.    Cardiovascular: Positive for chest pain and palpitations. Negative for leg swelling.   Gastrointestinal: Negative for abdominal distention, abdominal pain, constipation, diarrhea, nausea and vomiting.   Endocrine: Negative.    Genitourinary: Positive for decreased urine volume. Negative for dysuria, enuresis, flank pain, frequency, hematuria, penile swelling and scrotal swelling.   Musculoskeletal: Negative for arthralgias and myalgias.   Skin: Negative.    Neurological: Negative for dizziness, syncope, weakness, light-headedness and headaches.   Hematological: Negative.    Psychiatric/Behavioral: Negative.      Objective:     Vital Signs (Most Recent):  Temp: 98.4 °F (36.9 °C) (08/29/17 0900)  Pulse: 76 (08/29/17 1400)  Resp: 16 (08/29/17 1141)  BP: (!) 140/79 (08/29/17 1141)  SpO2: 95 % (08/29/17 1141) Vital Signs (24h Range):  Temp:  [98.2 °F (36.8 °C)-98.4 °F (36.9 °C)] 98.4 °F (36.9 °C)  Pulse:  [67-81] 76  Resp:  [12-18] 16  SpO2:  [95 %-100 %] 95 %  BP: (127-147)/(59-83) 140/79     Weight: 80.2 kg (176 lb 12.9 oz)  Body mass index is 25.37  kg/m².    Physical Exam   Nursing note and vitals reviewed.  Constitutional: He is oriented to person, place, and time. He appears well-developed and well-nourished. No distress.   Eyes: Pupils are equal, round, and reactive to light.   Neck: Normal range of motion. Neck supple. No JVD present.   Cardiovascular: Normal rate, regular rhythm and intact distal pulses.    Murmur (harsh systolic) heard.  Pulmonary/Chest: Effort normal and breath sounds normal. No respiratory distress. He has no wheezes. He has no rales. He exhibits no tenderness.   Abdominal: Soft. Bowel sounds are normal. He exhibits no distension.   Musculoskeletal: Normal range of motion. He exhibits no edema.   Neurological: He is alert and oriented to person, place, and time.   Skin: Skin is warm and dry. Capillary refill takes less than 2 seconds. He is not diaphoretic.      Significant Labs:   CBC:   Recent Labs  Lab 08/29/17  0349   WBC 8.94   HGB 8.2*   HCT 25.9*        CMP:   Recent Labs  Lab 08/29/17  0349      K 4.7   CL 97   CO2 26   *   BUN 79*   CREATININE 13.7*   CALCIUM 8.8   PROT 6.7   ALBUMIN 2.8*   BILITOT 0.6   ALKPHOS 91   AST 12   ALT 15   ANIONGAP 17*   EGFRNONAA 3.0*     Troponin:   Recent Labs  Lab 08/29/17  0349 08/29/17  0639 08/29/17  1147   TROPONINI 0.053* 0.086* 0.106*     All pertinent labs within the past 24 hours have been reviewed.    Significant Imaging: I have reviewed all pertinent imaging results/findings within the past 24 hours.    Assessment/Plan:     * Chest pain    -known to have CAD with recent PCI to OM1 with YANE on 08/01/17 and residual D1 70% stenosis not intervened upon, repeat [facility] 08/08 with same findings and no ISR.  -presentation with chest pain, and troponin of 0.053 > .086, with no significant ECG changes  -[facility] Managed Cardiology consulted, appreciate recommendations  -increase Coreg to 12.5 mg BID, and continue same dose of Imdur to 30 mg (not to impact Prelaod)  -continue tele  monitoring  -SL NTG and EKG PRN chest pain  -after HD ok to DC home per [facility] Cardiology  -follow up with IVC as outpt as previously scheduled        Nonobstructive atherosclerosis of coronary artery    -see above for detailed plans  -continue to medically manage  -continue asa, statin, coreg, ISMN, and brilinta  -outpt follow as scheduled        ESRD on hemodialysis    -Nephrology consulted, appreciate recommendations and assistance  -HD this afternoon, then discharge home with resumption of home HD schedule   -HD x 3 yrs TTS 4 hrs [facility] Dr Romo         Aortic valve disorders    -plans for TAVR 9/8  -follow up with IVC cardiology as scheduled        Anemia associated with chronic renal failure    -CBC stable, monitor in house  -continue epogen          Essential hypertension    -BP elevated on arrival and with news of brothers sudden passing today  -resume home meds, increase coreg, monitor for response        Hyperlipidemia LDL goal <70    -continue statin        Malfunction of arteriovenous dialysis fistula    -outpt follow up with Vascular surgery, needs new AVF, unable at this time due to recent NSTEMI requiring DAPT   -current fistula working at this time, however at slower flow rates  -will need temporary access if unable to continue treatments with current AVF site        Type 2 diabetes mellitus with diabetic neuropathy, without long-term current use of insulin    -last HgA1c 6.7, sugars appears diet controlled at home  -no home regimen  -cardiac/renal/ADA diet in house          VTE Risk Mitigation         Ordered     Low Risk of VTE  Once      08/29/17 0623        Rosa Maria Kennedy NP  Department of Hospital Medicine   [facility]  Pager [phone]  [facility] [id number]

## 2017-08-29 NOTE — ED TRIAGE NOTES
Pt brought o ED via [facility] from [facility]. Pt has been having chest pressure that he states startesd tonight. Pt describes as pressure and rates as 8 out of 10. Pt was 5 X NTG SL in route and 324 ASA in route without relief of symptoms.

## 2017-08-29 NOTE — PROGRESS NOTES
Patient transferred to CSU. Patient arrived to floor via stretcher with RN, no evidence of distress. No complaints of chest pain when asked. Pt blind in both eyes-up with assist x 2. Patient placed on tele and visi. Vital signs obtained. Patient voices no complaints at this time. Plan of care initiated with patient. Bed in lowest position, locked, SR up x2, call bell in reach. Will continue to monitor patient. MD Fredi notified of patient arrival to floor.

## 2017-08-29 NOTE — HPI
"76 yo AA male with significant history for hypertension, hyperlipidemia, CAD sp recent NSTEMI/YANE to Lcx/OM1 also found to have D1 70% non intervene tx medically on 8/1/17, Systolic heart failure (EF 43% 9/1) PAD, severe aortic stenosis with TVAR work up in progress, and ESRD on HD who presented to hospital via EMS for non radiating mid sternal chest "pressure" that started at 9 pm last night associated with palpitation, shortness of breath and diaphoresis. Patient did not have home nitro. Laying down did not help and by 12 am chest pressure worsen 8/10 then wife called 911. Symptoms improved with nitro SL en route to hospital and completed resolved with morphine and GI cocktail in ED. Elevated troponin down from previous admit 0.058>0.086. BMP up from previous. EKG NSR first degree AVB with NSTWA. This 830 pm patient got news that brother next door just passed away and patient currently have mid sternal chest pressure 8/10. Nurse is going to give patient nitro now. Wife states symptoms are similar to MI patient had earlier this month. Nephrology consult for HD. HD TTS 4 hrs duration via RFA AVF at [facility] under care of Dr. Romo. EDW 82 kg. He is under is EDW. Reports UF 3-4 in between HD days. Very minimal urine.     "

## 2017-08-29 NOTE — ASSESSMENT & PLAN NOTE
-see above for detailed plans  -continue to medically manage  -continue asa, statin, coreg, ISMN, and brilinta  -outpt follow as scheduled

## 2017-08-29 NOTE — PLAN OF CARE
Problem: Hemodialysis (Adult)  Intervention: Protect/Monitor Dialysis Access Site  Right upper arm cath flow dwelled for 1 hr.  The start HD as per md orders

## 2017-08-29 NOTE — HPI
This is Mr. Bernabe Greco Jr., 77 year old male severe AS (IMANI 0.6, V 4.5 m/s, MG 43), CAD with recent PCI to OM1 with YANE on 08/01/17  and residual D1 70% stenosis not intervened upon, HTN, HLD, anemia, PAD s/p cath in 2014 with unsuccessful attempt to revascularize right SFA  , HTN, HLD, ESRD on HD Sat,T,T and stroke with residual visual deficit. He presented to ED from [place] with symptoms of pressure and chest tightness, that started midnight prior his presentation to ED. His chest pain was retrotarsally, described as heaviness and pressure, did not radiate anywhere, and was associated with SOB (NYHA IV), which is different from baseline (NYHA III). Denies loss of consciousness, change in his medication, or palpitation.

## 2017-08-29 NOTE — ASSESSMENT & PLAN NOTE
-outpt follow up with Vascular surgery, needs new AVF, unable at this time due to recent NSTEMI requiring DAPT   -current fistula working at this time, however at slower flow rates  -will need temporary access if unable to continue treatments with current AVF site

## 2017-08-29 NOTE — HOSPITAL COURSE
Mr. Greco was admitted 08/29 for complaints of chest pressure retrosternally with elevated flat troponins, he was seen by [facility] Card[facility] who recommended increasing coreg and leaving ISMN at current dose, follow up as outpt with IVC as scheduled for planned TAVR procedure. Nephrology consulted and he will undergo HD today, then will be discharged home.

## 2017-08-29 NOTE — ASSESSMENT & PLAN NOTE
-last HgA1c 6.7, sugars appears diet controlled at home  -no home regimen  -cardiac/renal/ADA diet in house

## 2017-08-29 NOTE — HPI
Mr. Greco is a 77 y.o. male with pertinent PMHx of moderate to severe aortic stenosisis ( IMANI = 0.93 cm2, peak velocity = 3.6 m/s, mean gradient = 32 mmHg per Echo in May), PAD s/p cath in 2014 with unsuccessful attempt to revascularize right SFA  , HTN, HLD, ESRD on HD MWF and stroke with residual visual deficit, and cataracts who presented to the ED for evaluation of non-radiating mid sternal chest pain for the last hour, described as a pressure.  His chest pain was retrosternally, described as heaviness and pressure, did not radiate anywhere, and was associated with SOB (NYHA IV), which is different from baseline (NYHA III).  Denies loss of consciousness, change in his medication, or palpitation. Denies associated diaphoresis, N/V, dizziness, fever. Denies focal weakness, numbness, change is speech, facial droop or confusion.  He was recently admitted 7/31 with complaints of chest pain that resolved with SL NTG. He was evaluated by Interventional Cardiology and underwent LHC on 8/1 with successful PCI of LCx/OM1 with YANE.  After repeat echo, findings indicated need to proceed with TAVR evaluation, TAVR CT, PFTs, frailty, and 6 minute walk test completed, TAVR currently scheduled for 9/8. Nephrology followed and coordinated inpatient HD treatments. Dr. Mayers with vascular surgery notified of patients NSTEMI and YANE placement, new AVF formation procedure for 8/3 was canceled.   Also admitted 08/08 for similar complaints of  chest pressure , EKG in ER showed ischemic changes with TWI along inferolateral leads. Elevated troponin 0.266 >>>0.266 ( ESRD patient ). Patient was taken to cath lab emergently to exclude in-stent thrombosis. Cath showed non obstructive CAD with patent OM1 stent, unchanged small D1 osteal stenosis (being  treated medically ), underwent HD treatment inpatient and was then discharged home.

## 2017-08-29 NOTE — ASSESSMENT & PLAN NOTE
-HD x 3 yrs TTS 4 hrs [facility] Dr Romo . EDW 82 kg. Last HD Sat 8/26  -No signs of volume overload. Electrolytes and acid/base stable. BP stable.   -UF 1-1.5 and adjust EDW. Dialysate to be adjusted to current labs.   -Hold off HD until okay with Cardiology.

## 2017-08-29 NOTE — ASSESSMENT & PLAN NOTE
-known to have CAD with recent PCI to OM1 with YANE on 08/01/17 and residual D1 70% stenosis not intervened upon, repeat LHC 08/08 with same findings and no ISR.  -presentation with chest pain, and troponin of 0.053 > .086, with no significant ECG changes  -Co Managed Cardiology consulted, appreciate recommendations  -increase Coreg to 12.5 mg BID, and continue same dose of Imdur to 30 mg (not to impact Prelaod)  -continue tele monitoring  -SL NTG and EKG PRN chest pain  -after HD ok to DC home per [facility] Cardiology  -follow up with IVC as outpt as previously scheduled

## 2017-08-29 NOTE — ASSESSMENT & PLAN NOTE
-BP elevated on arrival and with news of brothers sudden passing today  -resume home meds, increase coreg, monitor for response

## 2017-08-29 NOTE — ED NOTES
Patient identifiers for Bernabe Greco Jr. checked and correct.  LOC: Patient is awake, alert, and aware of environment with an appropriate affect. Patient is oriented x 3 and speaking appropriately.  APPEARANCE: Patient resting comfortably and in no acute distress. Patient is clean and well groomed, patient's clothing is properly fastened.  SKIN: The skin is warm and dry. Patient has normal skin turgor and moist mucus membrances. Skin is intact; no bruising or breakdown noted.  MUSKULOSKELETAL: Patient is moving all extremities well, no obvious deformities noted. Pulses intact.   RESPIRATORY: Airway is open and patent. Respirations are spontaneous and non-labored with normal effort and rate.  CARDIAC: Patient has a normal rate and rhythm. No peripheral edema noted. Capillary refill < 3 seconds.  ABDOMEN: No distention noted. Bowel sounds active in all 4 quadrants. Soft and non-tender upon palpation.  NEUROLOGICAL: PERRL. Facial expression is symmetrical. Hand grasps are equal bilaterally. Normal sensation in all extremities when touched with finger.  Allergies reported:   Review of patient's allergies indicates:   Allergen Reactions    Penicillins Rash

## 2017-08-29 NOTE — SUBJECTIVE & OBJECTIVE
Past Medical History:   Diagnosis Date    Arthritis     Diabetes mellitus     DNR (do not resuscitate) 5/17/2017    Hyperlipidemia     Hypertension     Stroke     Syncope and collapse        Past Surgical History:   Procedure Laterality Date    AV FISTULA PLACEMENT Right     HIP SURGERY      replacement right    JOINT REPLACEMENT      right hip        Review of patient's allergies indicates:   Allergen Reactions    Penicillins Rash       No current facility-administered medications on file prior to encounter.      Current Outpatient Prescriptions on File Prior to Encounter   Medication Sig    aspirin (ECOTRIN) 81 MG EC tablet Take 1 tablet (81 mg total) by mouth once daily.    atorvastatin (LIPITOR) 80 MG tablet Take 1 tablet (80 mg total) by mouth once daily.    carvedilol (COREG) 6.25 MG tablet Take 1 tablet (6.25 mg total) by mouth 2 (two) times daily.    isosorbide mononitrate (IMDUR) 30 MG 24 hr tablet Take 1 tablet (30 mg total) by mouth once daily.    sevelamer carbonate (RENVELA) 800 mg Tab Take 2 tablets (1,600 mg total) by mouth 3 (three) times daily with meals.    ticagrelor (BRILINTA) 90 mg tablet Take 1 tablet (90 mg total) by mouth 2 (two) times daily.    carboxymethylcellulose (REFRESH PLUS) 0.5 % Dpet Place 1 drop into both eyes 5 (five) times daily.    lorazepam (ATIVAN) 0.5 MG tablet Take 0.5 mg by mouth 2 (two) times daily as needed for Anxiety.    omeprazole (PRILOSEC) 40 MG capsule Take 40 mg by mouth once daily.    vitamin renal formula, B-complex-vitamin c-folic acid, (NEPHROCAPS) 1 mg Cap Take 1 capsule by mouth once daily.    white petrolatum-mineral oil 56.8-42.5% (REFRESH LACRI-LUBE) 56.8-42.5 % Oint Apply a thin ribbon to bottom eye lid every night     Family History     Problem Relation (Age of Onset)    Hypertension Mother        Social History Main Topics    Smoking status: Former Smoker     Packs/day: 0.25     Quit date: 1/1/1978    Smokeless tobacco: Never  Used    Alcohol use No    Drug use: No    Sexual activity: Not Currently     Review of Systems   Constitution: Negative for chills and decreased appetite.   HENT: Negative for congestion and headaches.    Cardiovascular: Positive for chest pain. Negative for irregular heartbeat and leg swelling.   Respiratory: Positive for shortness of breath. Negative for cough.    Endocrine: Negative for cold intolerance and heat intolerance.   Skin: Negative for rash.   Musculoskeletal: Negative for arthritis and back pain.   Gastrointestinal: Negative for abdominal pain and constipation.   Neurological: Negative for dizziness.   Psychiatric/Behavioral: Negative for altered mental status.     Objective:     Vital Signs (Most Recent):  Temp: 98.4 °F (36.9 °C) (08/29/17 0900)  Pulse: 71 (08/29/17 1100)  Resp: 12 (08/29/17 0750)  BP: (!) 147/83 (08/29/17 0750)  SpO2: 96 % (08/29/17 0750) Vital Signs (24h Range):  Temp:  [98.2 °F (36.8 °C)-98.4 °F (36.9 °C)] 98.4 °F (36.9 °C)  Pulse:  [67-81] 71  Resp:  [12-18] 12  SpO2:  [96 %-100 %] 96 %  BP: (127-147)/(59-83) 147/83     Weight: 80.2 kg (176 lb 12.9 oz)  Body mass index is 25.37 kg/m².    SpO2: 96 %  O2 Device (Oxygen Therapy): nasal cannula    No intake or output data in the 24 hours ending 08/29/17 1141    Lines/Drains/Airways     Central Venous Catheter Line                 Hemodialysis Catheter [id number] days          Drain                 Hemodialysis AV Fistula Right forearm 35070 days          Peripheral Intravenous Line                 Peripheral IV - Single Lumen 08/29/17 0300 Left Forearm less than 1 day                Physical Exam   Constitutional: He is oriented to person, place, and time. He appears well-developed and well-nourished. No distress.   HENT:   Head: Normocephalic.   Left Ear: External ear normal.   Eyes: Pupils are equal, round, and reactive to light. Right eye exhibits no discharge. Left eye exhibits no discharge.   Neck: Normal range of motion. No JVD  present. No thyromegaly present.   Cardiovascular: Normal rate and regular rhythm.  Exam reveals no friction rub.    No murmur heard.  Pulmonary/Chest: Effort normal and breath sounds normal. No respiratory distress. He has no wheezes. He has no rales.   Abdominal: Soft. Bowel sounds are normal. He exhibits no distension. There is no tenderness.   Musculoskeletal: Normal range of motion. He exhibits no edema.   Neurological: He is alert and oriented to person, place, and time. No cranial nerve deficit.   Skin:   Right AV fistula       CBC:   Recent Labs  Lab 08/29/17  0349   WBC 8.94   RBC 2.97*   HGB 8.2*   HCT 25.9*      MCV 87   MCH 27.6   MCHC 31.7*     BMP:   Recent Labs  Lab 08/29/17  0349   *      K 4.7   CL 97   CO2 26   BUN 79*   CREATININE 13.7*   CALCIUM 8.8     Coagulation: No results for input(s): INR, APTT in the last 168 hours.    Invalid input(s): PT  Microbiology Results (last 7 days)     ** No results found for the last 168 hours. **

## 2017-08-30 PROBLEM — I25.10 CAD (CORONARY ARTERY DISEASE): Status: ACTIVE | Noted: 2017-01-01

## 2017-08-30 NOTE — HOSPITAL COURSE
8/29: patient was managed with increase coreg dose to 125 mg BID, and maintain his Imdur dose and treat as Type II demand ischemia. Later on at evening, patient developed left gaze and to exclude stroke, patient was sent to CT head, during the CT head, patient developed seizure like activity and was transfered to Medical ICU.   8/30: patient intubated and low dose of Levophed, and on fentanyl 200 mcg for sedation.  8/31: Interventional cardiology recommended to hold heparin given his most likely etiology of NSTEMI is type II demand ischemia.

## 2017-08-30 NOTE — H&P
[facility]  Critical Care Medicine  History & Physical    Patient Name: Bernabe Greco Jr.  MRN: [id number]  Admission Date: 8/29/2017  Hospital Length of Stay: 0 days  Code Status: Full Code  Attending Physician: Francine Saravia MD   Primary Care Provider: Primary Doctor No   Principal Problem: Chest pain    Subjective:     HPI:   Mr. Greco is a 77 y.o. M with severe aortic stenosis (IMANI = 0.6 cm2, PV = 4.53 m/s, MG = 43), CHF (EF 43%) on ECHO Aug '17, PAD with Rt SFA , stroke, with residual visual defecit, ESRD on HD T/Th/Sat who initially presented to the ER with chest pain and shortness of breath on exertion (NYHA IV) worse than before. He was evaluated by Cardiology in the ER, believed to have NSTEMI secondary to demand ischemia and recommended to increase coreg dose from 6.25mg BID to 12.5mg BID and be admitted to cardiology Team C/J.     After admission, patient had HD session after which complained of 5-7/10 chest pain which resolved with Nitro SL x3, had HR in 120s for which he received lopressor 5mg x2 and Temp of 102.1. When he got to his room at the time (309), patient was found to have his head gazing to the left. Due to concern for focal neuro defecits, patient had a STAT CT head (negative for ICH & ischemic stroke). On his way to/from the CT, he had recurrent episodes of seizures. Was stepped up to CCU bed for closer monitoring. However, when he came up to the ICU, he was confused, unresponsive, not following commands and was intubated for airway protection. He was transferred to CM-ICU team for further escalation of care.     In the recent past, pt presented with chest pain on 7/31 and underwent PCI of LCx/OM1 with YANE on 8/1/17 & discharged on aspirin and brillinta. He has been evaluated for a TAVR and scheduled to receive it on 9/8/17. Pt was also admitted on 8/8 for chest pain, ischemic changes on ECG (TWI in inferolateral leads), and concern for in-stent thrombosis however [place]  showed non obstructive CAD with patent OM1 stent, was optimized medically and sent home.    Past Medical History:   Diagnosis Date    Arthritis     Diabetes mellitus     DNR (do not resuscitate) 5/17/2017    Hyperlipidemia     Hypertension     Stroke     Syncope and collapse        Past Surgical History:   Procedure Laterality Date    AV FISTULA PLACEMENT Right     HIP SURGERY      replacement right    JOINT REPLACEMENT      right hip        Review of patient's allergies indicates:   Allergen Reactions    Penicillins Rash       Family History     Problem Relation (Age of Onset)    Hypertension Mother        Social History Main Topics    Smoking status: Former Smoker     Packs/day: 0.25     Quit date: 1/1/1978    Smokeless tobacco: Never Used    Alcohol use No    Drug use: No    Sexual activity: Not Currently      Review of Systems   Unable to perform ROS: Intubated     Objective:     Vital Signs (Most Recent):  Temp: (!) 102.1 °F (38.9 °C) (08/29/17 1933)  Pulse: 96 (08/29/17 2300)  Resp: (!) 21 (08/29/17 2300)  BP: 107/60 (08/29/17 2300)  SpO2: 100 % (08/29/17 2300) Vital Signs (24h Range):  Temp:  [98.2 °F (36.8 °C)-102.1 °F (38.9 °C)] 102.1 °F (38.9 °C)  Pulse:  [] 96  Resp:  [12-28] 21  SpO2:  [89 %-100 %] 100 %  BP: ()/(51-83) 107/60   Weight: 80.2 kg (176 lb 12.9 oz)  Body mass index is 25.37 kg/m².      Intake/Output Summary (Last 24 hours) at 08/29/17 2342  Last data filed at 08/29/17 1842   Gross per 24 hour   Intake              960 ml   Output             1700 ml   Net             -740 ml       Physical Exam   Constitutional:   Frail AAM   HENT:   Head: Normocephalic and atraumatic.   Eyes: Pupils are equal, round, and reactive to light.   Cardiovascular: Normal rate, regular rhythm, normal heart sounds and intact distal pulses. No murmur heard.  Pulmonary/Chest: Effort normal and breath sounds normal. No respiratory distress. He has no wheezes. He has no rales.   On the  ventilator.   Abdominal: Soft. Bowel sounds are normal. He exhibits no distension. There is no tenderness.   Musculoskeletal: He exhibits no edema.   Neurological:   Not alert and not following commands. Responsive to pain   Skin: Skin is warm and dry. Capillary refill takes less than 2 seconds. No erythema.   Vitals reviewed.    Vents:  Vent Mode: A/C (08/29/17 2300)  Set Rate: 20 bmp (08/29/17 2300)  Vt Set: 440 mL (08/29/17 2300)  Pressure Support: 0 cmH20 (08/29/17 2300)  PEEP/CPAP: 5 cmH20 (08/29/17 2300)  Oxygen Concentration (%): 60 (08/29/17 2300)  Peak Airway Pressure: 28 cmH2O (08/29/17 2300)  Plateau Pressure: 0 cmH20 (08/29/17 2300)  Total Ve: 10 mL (08/29/17 2300)  F/VT Ratio<105 (RSBI): (!) 46.67 (08/29/17 2300)  Lines/Drains/Airways     Central Venous Catheter Line                 Hemodialysis Catheter [id number] days          Drain                 Hemodialysis AV Fistula Right forearm 49755 days          Peripheral Intravenous Line                 Peripheral IV - Single Lumen 08/29/17 0300 Left Forearm less than 1 day              Significant Labs:    CBC/Anemia Profile:    Recent Labs  Lab 08/29/17 0349 08/29/17  0937 08/29/17 2110   WBC 8.94  --  14.15*   HGB 8.2*  --  9.2*   HCT 25.9*  --  31.0*     --  229   MCV 87  --  91   RDW 16.1*  --  16.4*   IRON  --  20*  --    FERRITIN  --  365*  --         Chemistries:    Recent Labs  Lab 08/29/17  0349 08/29/17  0639 08/29/17  1147 08/29/17 2110     --   --  139   K 4.7  --   --  4.7   CL 97  --   --  103   CO2 26  --   --  19*   BUN 79*  --   --  36*   CREATININE 13.7*  --   --  8.9*   CALCIUM 8.8  --   --  9.5   ALBUMIN 2.8*  --   --  3.2*   PROT 6.7  --   --  7.7   BILITOT 0.6  --   --  1.2*   ALKPHOS 91  --   --  91   ALT 15  --   --  15   AST 12  --   --  13   MG  --   --  1.9 1.9   PHOS  --  9.0*  --  5.3*     ABGs:   Recent Labs  Lab 08/29/17  5020   PH 7.333*   PCO2 42.5   HCO3 22.6*   POCSATURATED 58*   BE -3     Significant Imaging:  I have reviewed and interpreted all pertinent imaging results/findings within the past 24 hours.     CXR (8/29/17) - mild cardiomegaly. Right sided hilar opacity, unchanged from prior CXR  Official reading notes mild retrocardiac consolidation of left base which could represent pneumonia or aspiration.    CT head (8/29/17) - no acute intracranial hemorrhage or infarct. Generalized volume loss with chronic microvascular ischemic changes. remote infarcts of bilateral occipital lobes.     Assessment/Plan:   Mr. Greco is a 77 y.o. M with severe aortic stenosis (IMANI = 0.6 cm2, PV = 4.53 m/s, MG = 43), CHF (EF 43%) on ECHO Aug '17, PAD with Rt SFA , stroke, with residual visual defecit, ESRD on HD M/W/F who was initially admitted with NSTEMI. Due to recurrent presentation for chest pain & SOB and recent LHC showing patent stent, it was thought his NSTEMI was due to demand ischemia by Cardiology team. However, his stay was complicated by acute encephalopathy of most likely seizure/convulsion etiology requiring intubation for airway protection. For associated hypotension believed to be 2/2 cardiogenic vs. Septic shock vs. Worsening aortic stenosis, he was placed on pressors (levophed). Stable overnight.     Acute encephalopathy  - most likely due to seizures  - s/p keppra load 1g and on maintenance 500mg BID  - per family, pt my have remove h/o seizures at home with h/o keppra use  - placed on EEG monitor overnight  - Neurology consulted for additional recs  - intubated for airway protection  Vent Mode: A/C  Oxygen Concentration (%):  [40-60] 40  Resp Rate Total:  [20 br/min-26 br/min] 20 br/min  Vt Set:  [440 mL] 440 mL  PEEP/CPAP:  [5 cmH20] 5 cmH20  Pressure Support:  [0 cmH20] 0 cmH20  Mean Airway Pressure:  [11 [id number] cmH20] 11 cmH20    Hypotension  - requiring levophed to maintain MAP > 65  - may be due to cardiogenic vs. septic shock vs. Worsening AS  - for cardiogenic shock & maybe worsening AS -> ECHO  ordered  - for septic shock -> empirically treating with antibiotics. See respective section for details  - holding home antihypertensives (coreg, imdur)  - goal MAP>65    Severe aortic stenosis  - per last ECHO (Aug '17): IMANI: 0.6, M, PV: 4.53  - s/p TAVR workup and scheduled to received on 17    Chronic systolic heart failure  - likely secondary to ischemia given extensive CAD  - last ECHO EF 43%  - NYHA IV symptoms  - continue aspirin. Will resume coreg, imdur once pt is off of pressors and is able to maintain MAPs > 65    CAD  Chest pain  NSTEMI  - s/p LCx/OMB PCI in Aug 2017 with BMS  - trop:  0.1 -> 12 this AM  - ECG post intubation -> mild ST depression in V5-V6  - repeat ECG this AM: pending  - pt appears to have had a NSTEMI for which he has been started on heparin drip   - will re-consult Cardiology for additional recs  - continue metoprolol, aspirin, brillinta, statin  - repeat 2D ECHO ordered    PAD -  of Right SFA  - continue aspirin, ticagrelor    HLD  - continue lipitor    Sepsis   - given elevated lactic acid and hypotension s/p intubation, concern for sepsis.   - sources include pneumonia (LLL infiltrate retrocardiac) & elevated procalcitonin (2.11).    - BCx 2 pending   - on vanc, aztreonam and moxifloxacin given penicillin-allergy confirmed with family (SOB and anaphylaxis reaction 11 yearsd ago)  - pt received IVF resuscitation (~1 L) yesterday   - lactic acid trended down to 1.2      ESRD - on HD T//Sat  - Nephrology on board  - will add novasource per recs  - EPO with HD  - will avoid nephrotoxins and renally dose meds     Critical Care Daily Checklist:    A: Awake: RASS Goal/Actual Goal:    Actual:     B: Spontaneous Breathing Trial Performed?     C: SAT & SBT Coordinated?  Not yet                D: Delirium: CAM-ICU     E: Early Mobility Performed? No   F: Feeding Goal:    Status:     Current Diet Order   Procedures    Diet renal      AS: Analgesia/Sedation n/a   T:  Thromboembolic Prophylaxis heparin   H: HOB > 300 n/a   U: Stress Ulcer Prophylaxis (if needed) n/a   G: Glucose Control n/a   B: Bowel Function Stool Occurrence: 1   I: Indwelling Catheter (Lines & [place]ac[place]) Necessity    D: De-escalation of Antimicrobials/Pharmacotherapies On vanc, cefepime    Plan for the day/ETD     Code Status:  Family/Goals of Care: Full code     Critical care was time spent personally by me on the following activities: development of treatment plan with patient or surrogate and bedside caregivers, discussions with consultants, evaluation of patient's response to treatment, examination of patient, ordering and performing treatments and interventions, ordering and review of laboratory studies, ordering and review of radiographic studies, pulse oximetry, re-evaluation of patient's condition. This critical care time did not overlap with that of any other provider or involve time for any procedures.    Patient seen and plan of care discussed with fellow. Attestation to follow     Nela Schwartz MD  Critical Care Medicine  [facility]

## 2017-08-30 NOTE — PROGRESS NOTES
Progress Note  Interventional Cardiology  [facility]      Hospital Day: 2    Subjective:  HPI:     Patient Name: Bernabe Greco Jr.  MRN: [id number]  Admission Date: 8/29/2017  Hospital Length of Stay: 0 days  Code Status: Full Code  Attending Physician: Francine Saravai MD   Primary Care Provider: Primary Doctor No   Principal Problem: Chest pain     Subjective:      HPI:   Mr. Greco is a 77 y.o. M with severe aortic stenosis (IMANI = 0.6 cm2, PV = 4.53 m/s, MG = 43), CHF (EF 43%) on ECHO Aug '17, PAD with Rt SFA , stroke  (5/2017; 2014; residual vision deficits, BLE weakness) with residual visual defecit, ESRD on HD T/Th/Sat who initially presented to the ER with chest pain and shortness of breath on exertion (NYHA IV) worse than before. He was evaluated by Cardiology in the ER, believed to have NSTEMI secondary to demand ischemia and recommended to increase coreg dose from 6.25mg BID to 12.5mg BID and be admitted to cardiology Team C/J.      After admission, patient had HD session after which complained of 5-7/10 chest pain which resolved with Nitro SL x3, had HR in 120s for which he received lopressor 5mg x2 and Temp of 102.1. When he got to his room at the time (309), patient was found to have his head gazing to the left. Due to concern for focal neuro defecits, patient had a STAT CT head (negative for ICH & ischemic stroke). On his way to/from the CT, he had recurrent episodes of seizures. Was stepped up to CCU bed for closer monitoring. However, when he came up to the ICU, he was confused, unresponsive, not following commands and was intubated for airway protection. He was transferred to CM-ICU team for further escalation of care. Per discussion with patient's wife he has a history of L sided weakness from prior CVA and although she did not recall a hx of seizure disorder she did remember him being on Keppra in the past.      In the recent past, pt presented with chest pain on 7/31 and underwent PCI of LCx/OM1  with YANE on 8/1/17 by Dr. Silva & was discharged on aspirin and brillinta. He has been evaluated for a TAVR is scheduled for 9/26/17. Pt was also admitted on 8/8 for chest pain, ischemic changes on ECG (TWI in inferolateral leads), and concern for in-stent thrombosis however LHC showed non obstructive CAD with patent OM1 stent, was optimized medically and sent home. Notably wife states they received news that patient's brother passed away yesterday.     Interval History: Pt seen and evaluated, sedated intubated on minimal vent settings. NE weaned to 0.01mcg/kg/min. Remains on Heparin gtt, Fentanyl/Precedex for sedation.     Scheduled Meds:   [START ON 8/31/2017] aspirin  81 mg Per NG tube Daily    [START ON 8/31/2017] atorvastatin  80 mg Per OG tube Daily    ceFEPime (MAXIPIME) IVPB  2 g Intravenous Q24H    [START ON 8/31/2017] citalopram  20 mg Per OG tube Daily    epoetin lyssa (PROCRIT) injection  100 Units/kg Intravenous Every Tues, Thurs, Sat    levetiracetam IVPB  500 mg Intravenous Q12H    pantoprazole  40 mg Intravenous Daily    sevelamer carbonate  1,600 mg Oral TID WM    sodium chloride 0.9%  3 mL Intravenous Q8H    ticagrelor  90 mg Per OG tube BID    vancomycin (VANCOCIN) IVPB  750 mg Intravenous Once    vitamin renal formula (B-complex-vitamin c-folic acid)  1 capsule Oral Daily       Continuous Infusions:   dexmedetomidine (PRECEDEX) infusion 0.2 mcg/kg/hr (08/30/17 0809)    fentanyl 20 mL/hr at 08/30/17 0900    heparin (porcine) in D5W 17 Units/kg/hr (08/30/17 0800)    norepinephrine bitartrate-D5W 0.02 mcg/kg/min (08/30/17 0900)       PRN Meds:sodium chloride 0.9%, acetaminophen, dextrose 50%, glucagon (human recombinant), heparin (PORCINE), heparin (PORCINE), insulin aspart, lorazepam, ondansetron    Objective:  Vital signs in last 24 hours:   Vitals:    08/30/17 1312   BP:    Pulse: 75   Resp:    Temp:        Intake/Output last shift:    Intake/Output Summary (Last 24 hours) at  "08/30/17 1340  Last data filed at 08/30/17 0900   Gross per 24 hour   Intake          2721.82 ml   Output             1600 ml   Net          1121.82 ml         Physical Exam:  /60   Pulse 75   Temp 98.8 °F (37.1 °C) (Oral)   Resp 20   Ht 5' 10" (1.778 m)   Wt 80.2 kg (176 lb 12.9 oz)   SpO2 100%   BMI 25.37 kg/m²     General Appearance:    Alert, cooperative, no distress, appears stated age   Head:    Normocephalic, without obvious abnormality, atraumatic   Eyes:    PERRL, conjunctiva/corneas clear, EOM's intact, fundi     benign, both eyes        Ears:    Normal TM's and external ear canals, both ears   Nose:   Nares normal, septum midline, mucosa normal, no drainage    or sinus tenderness   Throat:   Lips, mucosa, and tongue normal; teeth and gums normal   Neck:   Supple, symmetrical, trachea midline, no adenopathy;        thyroid:  No enlargement/tenderness/nodules; no carotid    bruit or JVD   Back:     Symmetric, no curvature, ROM normal, no CVA tenderness   Lungs:     Clear to auscultation bilaterally, respirations unlabored   Chest wall:    No tenderness or deformity   Heart:    Regular rate and rhythm, S1 and S2 normal, + murmur, no rub   or gallop   Abdomen:     Soft, non-tender, bowel sounds hypoactive all four quadrants,     no masses, no organomegaly   Extremities:   Extremities normal, L AV fistula atraumatic, no cyanosis or edema   Pulses:   2+ and symmetric all extremities   Skin:   Skin color, texture, turgor normal, no rashes or lesions   Lymph nodes:   Cervical, supraclavicular, and axillary nodes normal           Imaging  CT Head: 1.  No acute intracranial hemorrhage or major vascular distribution infarct there    2.  Generalized cerebral volume loss with chronic microvascular ischemic changes.    3.  Remote infarcts of bilateral occipital lobes.  ______________________________________     Lab Review  Lab Results   Component Value Date     08/30/2017     08/29/2017    NA " 140 08/29/2017    K 4.6 08/30/2017    K 4.7 08/29/2017    K 4.7 08/29/2017    CO2 23 08/30/2017    CO2 19 (L) 08/29/2017    CO2 26 08/29/2017    BUN 41 (H) 08/30/2017    BUN 36 (H) 08/29/2017    BUN 79 (H) 08/29/2017    CREATININE 9.4 (H) 08/30/2017    CREATININE 8.9 (H) 08/29/2017    CREATININE 13.7 (H) 08/29/2017    CALCIUM 8.7 08/30/2017    CALCIUM 9.5 08/29/2017    CALCIUM 8.8 08/29/2017     Lab Results   Component Value Date    CKTOTAL 59 03/19/2016    CKTOTAL 59 03/19/2016    CKMB 0.56 03/19/2016    CKMB 1.39 03/19/2016    TROPONINI 27.913 (H) 08/30/2017    TROPONINI 12.440 (H) 08/30/2017    TROPONINI 0.106 (H) 08/29/2017    TROPONINI <0.012 05/14/2009     Lab Results   Component Value Date    WBC 11.09 08/30/2017    WBC 14.15 (H) 08/29/2017    WBC 8.94 08/29/2017    HGB 7.7 (L) 08/30/2017    HGB 9.2 (L) 08/29/2017    HGB 8.2 (L) 08/29/2017    HCT 25.1 (L) 08/30/2017    HCT 31.0 (L) 08/29/2017    HCT 25.9 (L) 08/29/2017    MCV 91 08/30/2017    MCV 91 08/29/2017    MCV 87 08/29/2017     08/30/2017     08/29/2017     08/29/2017        Assessment:Patient Active Problem List    Diagnosis Date Noted    Chest pain 08/29/2017    Nonobstructive atherosclerosis of coronary artery 08/29/2017    ESRD (end stage renal disease) on dialysis 08/09/2017    Dependence on renal dialysis     Aortic stenosis, severe 08/02/2017    NSTEMI (non-ST elevated myocardial infarction) 07/30/2017    ESRD (end stage renal disease) 07/25/2017    Malfunction of arteriovenous dialysis fistula 05/19/2017    DNR (do not resuscitate) 05/17/2017    UTI (urinary tract infection) 05/16/2017    Thrombocytopenia, unspecified 05/14/2017    Malnutrition 05/12/2017    Suicidal ideation 05/11/2017    Chronic kidney disease-mineral and bone disorder 05/09/2017    Acute ischemic left MCA stroke 05/09/2017    Tissue plasminogen activator (t-PA) administered at other facility within 24 hours prior to current admission  05/08/2017    Altered mental status 05/08/2017    Chronic kidney disease, stage V 01/31/2015    Thrombocytopenia 01/31/2015    Delirium due to conditions classified elsewhere 01/23/2015    Acidosis 01/22/2015    Acute kidney failure, unspecified 01/22/2015    Anemia associated with chronic renal failure 01/22/2015    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    Aortic valve disorders 01/11/2015    Encephalopathy 01/08/2015    Status epilepticus 12/26/2014    Acute respiratory failure 12/26/2014    Shock 12/25/2014    Seizure 12/25/2014    ARF (acute renal failure) 12/24/2014    Metabolic acidosis 12/24/2014    Weakness 12/12/2014    Orthostatic hypotension 12/11/2014    Anemia 12/07/2014    Type 2 diabetes mellitus with diabetic neuropathy, without long-term current use of insulin 06/12/2014    Claudication in peripheral vascular disease 03/24/2014    Essential hypertension 03/24/2014    Obesity 03/24/2014    Hyperlipidemia LDL goal <70 03/24/2014    Tobacco abuse, in remission 03/24/2014       Plan:  ECHO today- will review with Dr. Silva  Last [facility] 8/8/2017 with patent LCx-OM1 YANE, small D1 ostial stenosis referred for medical treatment   Continue ASA/Brillinta- No indication for Heparin gtt   Continue work-up for suspected septic shock f/u cx and continue empiric Abx   Agree with continued medical management, TAVR scheduled 9/26/2017 no intervention required at this time       Julianna Wesley, WAYNE  Interventional Cardiology

## 2017-08-30 NOTE — CONSULTS
"[facility]  Neurology  Consult Note    Patient Name: Bernabe Greco Jr.  MRN: [id number]  Admission Date: 8/29/2017  Hospital Length of Stay: 0 days  Code Status: Full Code   Attending Provider: Francine Saravia MD   Consulting Provider: Warren Haynes MD  Primary Care Physician: Primary Doctor No  Principal Problem:Chest pain    Inpatient consult to Neurology  Consult performed by: WARREN HAYNES  Consult ordered by: GUS DELGADO  Reason for consult: seizure         Subjective:     Chief Complaint:  Seizure     HPI:   Mr. Greco is a 76 yo female w/ PMH significant for severe AS, DM, HTN, HLD, PAD, stroke (5/2017; 2014; residual vision deficits, BLE weakness), ESRD on HD MWF, who presented on 8/29 via EMS with NSTEMI.  Subsequently found to have L-sided gaze, concern for seizure activity x4, for whom neurology is consulted for seizures.    Per pt's wife and daughter, pt was in his usual state of health until day of admission.  Family reports that during dialysis yesterday,   Dialysis nurse reported that pt had "3 seizures."  Pt transported to CT scanner, where pt subsequently had another seizure by report.  Found to have NSTEMI and lactic acidosis, subsequently intubated.  Family, RN, night/day ICU teams did not visualize seizures, unclear what "seizure" consisted of.  Subsequently started on keppra 500 mg BID after loading with 1 g.  Family is unsure what kind of seizures pt had, unable to describe them as they did not see the seizures themselves.      Family reports that pt had seizures in 5/2017 and 2014 associated with strokes at that time. Per 5/2017 hospitalization discharge summary, pt had GTC seizure.  To family's knowledge, pt is not taking keppra at home (not listed in EMR, wife handles pt's medications and does not recall keppra).  Prior EEG in 2014 did not reveal epileptic activity, and (per discharge summary) 5/2017 EEG negative for epileptic activity.  Wife denies recent infections, " "although she notes pt has been coughing more frequently as of late.  Notably, she states pt's brother  within the past few days, and pt was significantly distressed from this.  She reports she has no concerns that pt ingested any substance, including alcohol, other meds during that time (history of SI w/ PEC in 2017).  Ativan is listed under pt's home meds, wife is not sure that he is taking any.  May take benadryl to help sleep every other night.      Regarding his prior strokes, in 2017 he was admitted for concern of stroke (not moving LUE nor BLE).  He received rtPA, MRI was negative for stroke.  At that time, he was documented as having no residual deficits from any prior stroke.  Additionally, the seizure activity during this admission (EEG negative), and is listed as his first-time seizure. At that time, he was discharged on ASA (no DAPT).  Family reports that pt has BLE weakness, for which he uses a walker.  Primary team noted prior visual field deficits 2/2 prior stroke.    Pt recently admitted on  for chest pain, underwent subsequent PCI with YANE placement, discharged on ASA and ticagrelor.  When asked, wife states that she just gives all the medications on his list.  She is able to recall giving ASA Qdaily, but cannot recall if she has been giving pt ticagrelor.    At baseline, family states pt is AOx3, ambulates with walker.  Report rare alcohol use.  Report recreational drug use "years ago."        Past Medical History:   Diagnosis Date    Arthritis     Diabetes mellitus     DNR (do not resuscitate) 2017    Hyperlipidemia     Hypertension     Stroke     Syncope and collapse        Past Surgical History:   Procedure Laterality Date    AV FISTULA PLACEMENT Right     HIP SURGERY      replacement right    JOINT REPLACEMENT      right hip        Review of patient's allergies indicates:   Allergen Reactions    Penicillins Rash     Received ceftriaxone and cefepime in 2017 "       Current Neurological Medications:   ASA 81 Qdaily  Ticagrelor 90 mg BID    Atorvastatin 80 Qdaily  Citalopram 20 Qdaily  Keppra 500 mg Q12hrs  Aztreonam  Cefepime  Vancomycin    Precedex  Fentanyl    No current facility-administered medications on file prior to encounter.      Current Outpatient Prescriptions on File Prior to Encounter   Medication Sig    aspirin (ECOTRIN) 81 MG EC tablet Take 1 tablet (81 mg total) by mouth once daily.    atorvastatin (LIPITOR) 80 MG tablet Take 1 tablet (80 mg total) by mouth once daily.    carvedilol (COREG) 6.25 MG tablet Take 1 tablet (6.25 mg total) by mouth 2 (two) times daily.    isosorbide mononitrate (IMDUR) 30 MG 24 hr tablet Take 1 tablet (30 mg total) by mouth once daily.    sevelamer carbonate (RENVELA) 800 mg Tab Take 2 tablets (1,600 mg total) by mouth 3 (three) times daily with meals.    ticagrelor (BRILINTA) 90 mg tablet Take 1 tablet (90 mg total) by mouth 2 (two) times daily.    carboxymethylcellulose (REFRESH PLUS) 0.5 % Dpet Place 1 drop into both eyes 5 (five) times daily.    lorazepam (ATIVAN) 0.5 MG tablet Take 0.5 mg by mouth 2 (two) times daily as needed for Anxiety.    omeprazole (PRILOSEC) 40 MG capsule Take 40 mg by mouth once daily.    vitamin renal formula, B-complex-vitamin c-folic acid, (NEPHROCAPS) 1 mg Cap Take 1 capsule by mouth once daily.    white petrolatum-mineral oil 56.8-42.5% (REFRESH LACRI-LUBE) 56.8-42.5 % Oint Apply a thin ribbon to bottom eye lid every night     Family History     Problem Relation (Age of Onset)    Hypertension Mother        Social History Main Topics    Smoking status: Former Smoker     Packs/day: 0.25     Quit date: 1/1/1978    Smokeless tobacco: Never Used    Alcohol use No    Drug use: No    Sexual activity: Not Currently     Review of Systems   Unable to perform ROS: Intubated     Objective:     Vital Signs (Most Recent):  Temp: 98.8 °F (37.1 °C) (08/30/17 0300)  Pulse: 72 (08/30/17  1146)  Resp: 20 (08/30/17 0732)  BP: 125/60 (08/30/17 0732)  SpO2: 100 % (08/30/17 1146) Vital Signs (24h Range):  Temp:  [98.8 °F (37.1 °C)-102.1 °F (38.9 °C)] 98.8 °F (37.1 °C)  Pulse:  [] 72  Resp:  [18-36] 20  SpO2:  [85 %-100 %] 100 %  BP: ()/(40-79) 125/60     Weight: 80.2 kg (176 lb 12.9 oz)  Body mass index is 25.37 kg/m².    Physical Exam   Constitutional: He appears well-developed. No distress.   Cardiovascular: Normal rate and regular rhythm.    Murmur heard.  Pulmonary/Chest:   Intubated, coarse breath sounds b/l lung fields   Abdominal: Soft. Bowel sounds are normal. He exhibits no distension. There is no tenderness.   Neurological:   Reflex Scores:       Tricep reflexes are 2+ on the right side and 2+ on the left side.       Bicep reflexes are 2+ on the right side and 2+ on the left side.       Brachioradialis reflexes are 2+ on the right side and 2+ on the left side.       Patellar reflexes are 2+ on the right side and 2+ on the left side.       Achilles reflexes are 1+ on the right side and 1+ on the left side.      NEUROLOGICAL EXAMINATION:     MENTAL STATUS        Intubated, sedated on fentanyl and precedex.  Does not arouse to voice, tactile stimulation     CRANIAL NERVES        PERRL  Difficult to perform oculocephalic maneuver 2/2 neck rigidity  Corneal reflexes intact b/l  Gag reflex intact         MOTOR EXAM   Muscle bulk: normal       Increased tone in neck  Does not withdrawal to pain in all 4 extremities.     REFLEXES     Reflexes   Right brachioradialis: 2+  Left brachioradialis: 2+  Right biceps: 2+  Left biceps: 2+  Right triceps: 2+  Left triceps: 2+  Right patellar: 2+  Left patellar: 2+  Right achilles: 1+  Left achilles: 1+  Right plantar: equivocal  Left plantar: equivocal  Right Ng: absent  Left Ng: absent  Right ankle clonus: absent  Left ankle clonus: absent    SENSORY EXAM        Does not withdrawal to pain     GAIT AND COORDINATION        Unable to assess  2/2 sedation, intubation       Significant Labs:   Recent Results (from the past 24 hour(s))   POCT glucose    Collection Time: 08/29/17  7:25 PM   Result Value Ref Range    POCT Glucose 124 (H) 70 - 110 mg/dL   POCT glucose    Collection Time: 08/29/17  9:04 PM   Result Value Ref Range    POCT Glucose 230 (H) 70 - 110 mg/dL   Comprehensive metabolic panel    Collection Time: 08/29/17  9:10 PM   Result Value Ref Range    Sodium 139 136 - 145 mmol/L    Potassium 4.7 3.5 - 5.1 mmol/L    Chloride 103 95 - 110 mmol/L    CO2 19 (L) 23 - 29 mmol/L    Glucose 192 (H) 70 - 110 mg/dL    BUN, Bld 36 (H) 8 - 23 mg/dL    Creatinine 8.9 (H) 0.5 - 1.4 mg/dL    Calcium 9.5 8.7 - 10.5 mg/dL    Total Protein 7.7 6.0 - 8.4 g/dL    Albumin 3.2 (L) 3.5 - 5.2 g/dL    Total Bilirubin 1.2 (H) 0.1 - 1.0 mg/dL    Alkaline Phosphatase 91 55 - 135 U/L    AST 13 10 - 40 U/L    ALT 15 10 - 44 U/L    Anion Gap 17 (H) 8 - 16 mmol/L    eGFR if African American 5.9 (A) >60 mL/min/1.73 m^2    eGFR if non  5.1 (A) >60 mL/min/1.73 m^2   Magnesium    Collection Time: 08/29/17  9:10 PM   Result Value Ref Range    Magnesium 1.9 1.6 - 2.6 mg/dL   Phosphorus    Collection Time: 08/29/17  9:10 PM   Result Value Ref Range    Phosphorus 5.3 (H) 2.7 - 4.5 mg/dL   CBC auto differential    Collection Time: 08/29/17  9:10 PM   Result Value Ref Range    WBC 14.15 (H) 3.90 - 12.70 K/uL    RBC 3.39 (L) 4.60 - 6.20 M/uL    Hemoglobin 9.2 (L) 14.0 - 18.0 g/dL    Hematocrit 31.0 (L) 40.0 - 54.0 %    MCV 91 82 - 98 fL    MCH 27.1 27.0 - 31.0 pg    MCHC 29.7 (L) 32.0 - 36.0 g/dL    RDW 16.4 (H) 11.5 - 14.5 %    Platelets 229 150 - 350 K/uL    MPV 10.1 9.2 - 12.9 fL    Gran # 12.5 (H) 1.8 - 7.7 K/uL    Lymph # 0.9 (L) 1.0 - 4.8 K/uL    Mono # 0.7 0.3 - 1.0 K/uL    Eos # 0.0 0.0 - 0.5 K/uL    Baso # 0.01 0.00 - 0.20 K/uL    Gran% 88.4 (H) 38.0 - 73.0 %    Lymph% 6.5 (L) 18.0 - 48.0 %    Mono% 4.7 4.0 - 15.0 %    Eosinophil% 0.1 0.0 - 8.0 %    Basophil% 0.1  0.0 - 1.9 %    Differential Method Automated    Lactic acid, plasma    Collection Time: 08/29/17  9:10 PM   Result Value Ref Range    Lactate (Lactic Acid) 8.6 (HH) 0.5 - 2.2 mmol/L   Procalcitonin    Collection Time: 08/29/17  9:19 PM   Result Value Ref Range    Procalcitonin 2.11 (H) <0.25 ng/mL   Blood culture    Collection Time: 08/29/17 11:00 PM   Result Value Ref Range    Blood Culture, Routine No Growth to date    Blood culture    Collection Time: 08/29/17 11:00 PM   Result Value Ref Range    Blood Culture, Routine No Growth to date    ISTAT PROCEDURE    Collection Time: 08/29/17 11:27 PM   Result Value Ref Range    POC PH 7.333 (L) 7.35 - 7.45    POC PCO2 42.5 35 - 45 mmHg    POC PO2 32 (LL) 40 - 60 mmHg    POC HCO3 22.6 (L) 24 - 28 mmol/L    POC BE -3 -2 to 2 mmol/L    POC SATURATED O2 58 (L) 95 - 100 %    POC TCO2 24 24 - 29 mmol/L    Rate 20     Sample VENOUS     Site Other     Allens Test N/A     DelSys Adult Vent     Mode AC/PRVC     Vt 440     PEEP 5     PiP 26     FiO2 50     Min Vol 9.43     Sp02 100    POCT glucose    Collection Time: 08/29/17 11:33 PM   Result Value Ref Range    POCT Glucose 193 (H) 70 - 110 mg/dL   Culture, Respiratory with Gram Stain    Collection Time: 08/30/17  2:05 AM   Result Value Ref Range    Gram Stain (Respiratory) <10 epithelial cells per low power field.     Gram Stain (Respiratory) No WBC's     Gram Stain (Respiratory) Rare Gram negative rods     Gram Stain (Respiratory) Rare Gram positive cocci    Lactic acid, plasma    Collection Time: 08/30/17  2:10 AM   Result Value Ref Range    Lactate (Lactic Acid) 1.2 0.5 - 2.2 mmol/L   Lactic acid, plasma    Collection Time: 08/30/17  2:10 AM   Result Value Ref Range    Lactate (Lactic Acid) 1.2 0.5 - 2.2 mmol/L   Vancomycin, random    Collection Time: 08/30/17  4:30 AM   Result Value Ref Range    Vancomycin, Random 18.5 Not established ug/mL   Troponin I    Collection Time: 08/30/17  4:30 AM   Result Value Ref Range     Troponin I 12.440 (H) 0.000 - 0.026 ng/mL   CBC auto differential    Collection Time: 08/30/17  4:30 AM   Result Value Ref Range    WBC 11.09 3.90 - 12.70 K/uL    RBC 2.77 (L) 4.60 - 6.20 M/uL    Hemoglobin 7.7 (L) 14.0 - 18.0 g/dL    Hematocrit 25.1 (L) 40.0 - 54.0 %    MCV 91 82 - 98 fL    MCH 27.8 27.0 - 31.0 pg    MCHC 30.7 (L) 32.0 - 36.0 g/dL    RDW 16.3 (H) 11.5 - 14.5 %    Platelets 193 150 - 350 K/uL    MPV 9.7 9.2 - 12.9 fL    Gran # 9.4 (H) 1.8 - 7.7 K/uL    Lymph # 0.7 (L) 1.0 - 4.8 K/uL    Mono # 1.0 0.3 - 1.0 K/uL    Eos # 0.0 0.0 - 0.5 K/uL    Baso # 0.02 0.00 - 0.20 K/uL    Gran% 84.6 (H) 38.0 - 73.0 %    Lymph% 6.2 (L) 18.0 - 48.0 %    Mono% 8.6 4.0 - 15.0 %    Eosinophil% 0.1 0.0 - 8.0 %    Basophil% 0.2 0.0 - 1.9 %    Differential Method Automated    Comprehensive metabolic panel    Collection Time: 08/30/17  4:30 AM   Result Value Ref Range    Sodium 140 136 - 145 mmol/L    Potassium 4.6 3.5 - 5.1 mmol/L    Chloride 107 95 - 110 mmol/L    CO2 23 23 - 29 mmol/L    Glucose 71 70 - 110 mg/dL    BUN, Bld 41 (H) 8 - 23 mg/dL    Creatinine 9.4 (H) 0.5 - 1.4 mg/dL    Calcium 8.7 8.7 - 10.5 mg/dL    Total Protein 6.3 6.0 - 8.4 g/dL    Albumin 2.6 (L) 3.5 - 5.2 g/dL    Total Bilirubin 0.7 0.1 - 1.0 mg/dL    Alkaline Phosphatase 76 55 - 135 U/L    AST 48 (H) 10 - 40 U/L    ALT 14 10 - 44 U/L    Anion Gap 10 8 - 16 mmol/L    eGFR if African American 5.5 (A) >60 mL/min/1.73 m^2    eGFR if non  4.8 (A) >60 mL/min/1.73 m^2   2D echo with color flow doppler    Collection Time: 08/30/17  8:00 AM   Result Value Ref Range    EF 40 (A) 55 - 65    Diastolic Dysfunction Yes (A)     Aortic Valve Stenosis SEVERE (A)     Pericardial Effusion TRIVIAL     Mitral Valve Mobility NORMAL      Microbiology Results (last 7 days)     Procedure Component Value Units Date/Time    Blood culture [[id number]] Collected:  08/29/17 2300    Order Status:  Completed Specimen:  Blood from Line, Femoral, Right Updated:   "17     Blood Culture, Routine No Growth to date    Blood culture [[id number]] Collected:  17 2300    Order Status:  Completed Specimen:  Blood from Peripheral, Antecubital, Left Updated:  17     Blood Culture, Routine No Growth to date    Culture, Respiratory with Gram Stain [[id number]] Collected:  17    Order Status:  Completed Specimen:  Respiratory from Endotracheal Aspirate Updated:  17 09     Gram Stain (Respiratory) <10 epithelial cells per low power field.     Gram Stain (Respiratory) No WBC's     Gram Stain (Respiratory) Rare Gram negative rods     Gram Stain (Respiratory) Rare Gram positive cocci    Urine culture [[id number]] Collected:  17    Order Status:  Sent Specimen:  Urine from Urine Updated:  17    Blood culture [[id number]]     Order Status:  Canceled Specimen:  Blood     Blood culture [[id number]]     Order Status:  Canceled Specimen:  Blood           Significant Imagin17 EEG: Per prelim report,  Mild encephalopathy, no epileptic seizures visualized.    17 TTE: Per report,  " 1 - Mild left atrial enlargement.     2 - Mildly to moderately depressed left ventricular systolic function (EF 40%).     3 - Wall motion abnormalities.     4 - Impaired LV relaxation, elevated LAP (grade 2 diastolic dysfunction).     5 - Severe aortic stenosis, IMANI = 0.41 cm2, peak velocity = 4.2 m/s, mean gradient = 46 mmHg.     6 - Trivial pericardial effusion. "  Hypokinetic inferolateral wall; akinetic basal inferior wall; LA mildly enlarged.    17 CTH: Per resident review,  No acute hemorrhage, mass effect.  Generalized cerebral volume loss  Remote b/l occipital lobe infarcts.            Assessment and Plan:     Seizure    Mr. Greco is a 78 yo female w/ PMH significant for severe AS, DM, HTN, HLD, PAD, stroke (2017; 2014; residual vision deficits, BLE weakness), ESRD on HD MWF, who presented on  via EMS with NSTEMI.  Subsequently " found to have L-sided gaze, concern for seizure activity x4, for whom neurology is consulted for seizures.    Questionable history of seizures in 5/2017 and 2014 associated with possible strokes at that time.  Limited history available for current seizure activity.  Found to have lactic acidosis, NSTEMI, elevated procalcitonin.  8/30 EEG prelim read negative for epileptic activity.    Recommendations  -Continue keppra 500 mg BID  -MRI Brain  -Check Vit B1, 2, 6, 12, TSH, RPR, ammonia, urine drug screen, keppra level  -Trend BUN; correct/treat any metabolic/infectious abnormalities  -Antiplatelets/anticoagulation per primary  -Seizure precautions, telemetry              VTE Risk Mitigation         Ordered     Low Risk of VTE  Once      08/29/17 0697          Thank you for your consult. I will follow-up with patient. Please contact us if you have any additional questions.    Warren Haynes MD  Neurology  [facility]

## 2017-08-30 NOTE — SUBJECTIVE & OBJECTIVE
Past Medical History:   Diagnosis Date    Arthritis     Diabetes mellitus     DNR (do not resuscitate) 5/17/2017    Hyperlipidemia     Hypertension     Stroke     Syncope and collapse        Past Surgical History:   Procedure Laterality Date    AV FISTULA PLACEMENT Right     HIP SURGERY      replacement right    JOINT REPLACEMENT      right hip        Review of patient's allergies indicates:   Allergen Reactions    Penicillins Rash     Received ceftriaxone and cefepime in 2017       Current Neurological Medications:   ASA 81 Qdaily  Ticagrelor 90 mg BID    Atorvastatin 80 Qdaily  Citalopram 20 Qdaily  Keppra 500 mg Q12hrs  Aztreonam  Cefepime  Vancomycin    Precedex  Fentanyl    No current facility-administered medications on file prior to encounter.      Current Outpatient Prescriptions on File Prior to Encounter   Medication Sig    aspirin (ECOTRIN) 81 MG EC tablet Take 1 tablet (81 mg total) by mouth once daily.    atorvastatin (LIPITOR) 80 MG tablet Take 1 tablet (80 mg total) by mouth once daily.    carvedilol (COREG) 6.25 MG tablet Take 1 tablet (6.25 mg total) by mouth 2 (two) times daily.    isosorbide mononitrate (IMDUR) 30 MG 24 hr tablet Take 1 tablet (30 mg total) by mouth once daily.    sevelamer carbonate (RENVELA) 800 mg Tab Take 2 tablets (1,600 mg total) by mouth 3 (three) times daily with meals.    ticagrelor (BRILINTA) 90 mg tablet Take 1 tablet (90 mg total) by mouth 2 (two) times daily.    carboxymethylcellulose (REFRESH PLUS) 0.5 % Dpet Place 1 drop into both eyes 5 (five) times daily.    lorazepam (ATIVAN) 0.5 MG tablet Take 0.5 mg by mouth 2 (two) times daily as needed for Anxiety.    omeprazole (PRILOSEC) 40 MG capsule Take 40 mg by mouth once daily.    vitamin renal formula, B-complex-vitamin c-folic acid, (NEPHROCAPS) 1 mg Cap Take 1 capsule by mouth once daily.    white petrolatum-mineral oil 56.8-42.5% (REFRESH LACRI-LUBE) 56.8-42.5 % Oint Apply a thin ribbon to  bottom eye lid every night     Family History     Problem Relation (Age of Onset)    Hypertension Mother        Social History Main Topics    Smoking status: Former Smoker     Packs/day: 0.25     Quit date: 1/1/1978    Smokeless tobacco: Never Used    Alcohol use No    Drug use: No    Sexual activity: Not Currently     Review of Systems   Unable to perform ROS: Intubated     Objective:     Vital Signs (Most Recent):  Temp: 98.8 °F (37.1 °C) (08/30/17 0300)  Pulse: 72 (08/30/17 1146)  Resp: 20 (08/30/17 0732)  BP: 125/60 (08/30/17 0732)  SpO2: 100 % (08/30/17 1146) Vital Signs (24h Range):  Temp:  [98.8 °F (37.1 °C)-102.1 °F (38.9 °C)] 98.8 °F (37.1 °C)  Pulse:  [] 72  Resp:  [18-36] 20  SpO2:  [85 %-100 %] 100 %  BP: ()/(40-79) 125/60     Weight: 80.2 kg (176 lb 12.9 oz)  Body mass index is 25.37 kg/m².    Physical Exam   Constitutional: He appears well-developed. No distress.   Cardiovascular: Normal rate and regular rhythm.    Murmur heard.  Pulmonary/Chest:   Intubated, coarse breath sounds b/l lung fields   Abdominal: Soft. Bowel sounds are normal. He exhibits no distension. There is no tenderness.   Neurological:   Reflex Scores:       Tricep reflexes are 2+ on the right side and 2+ on the left side.       Bicep reflexes are 2+ on the right side and 2+ on the left side.       Brachioradialis reflexes are 2+ on the right side and 2+ on the left side.       Patellar reflexes are 2+ on the right side and 2+ on the left side.       Achilles reflexes are 1+ on the right side and 1+ on the left side.      NEUROLOGICAL EXAMINATION:     MENTAL STATUS        Intubated, sedated on fentanyl and precedex.  Does not arouse to voice, tactile stimulation     CRANIAL NERVES        PERRL  Difficult to perform oculocephalic maneuver 2/2 neck rigidity  Corneal reflexes intact b/l  Gag reflex intact         MOTOR EXAM   Muscle bulk: normal       Increased tone in neck  Does not withdrawal to pain in all 4  extremities.     REFLEXES     Reflexes   Right brachioradialis: 2+  Left brachioradialis: 2+  Right biceps: 2+  Left biceps: 2+  Right triceps: 2+  Left triceps: 2+  Right patellar: 2+  Left patellar: 2+  Right achilles: 1+  Left achilles: 1+  Right plantar: equivocal  Left plantar: equivocal  Right Ng: absent  Left Ng: absent  Right ankle clonus: absent  Left ankle clonus: absent    SENSORY EXAM        Does not withdrawal to pain     GAIT AND COORDINATION        Unable to assess 2/2 sedation, intubation       Significant Labs:   Recent Results (from the past 24 hour(s))   POCT glucose    Collection Time: 08/29/17  7:25 PM   Result Value Ref Range    POCT Glucose 124 (H) 70 - 110 mg/dL   POCT glucose    Collection Time: 08/29/17  9:04 PM   Result Value Ref Range    POCT Glucose 230 (H) 70 - 110 mg/dL   Comprehensive metabolic panel    Collection Time: 08/29/17  9:10 PM   Result Value Ref Range    Sodium 139 136 - 145 mmol/L    Potassium 4.7 3.5 - 5.1 mmol/L    Chloride 103 95 - 110 mmol/L    CO2 19 (L) 23 - 29 mmol/L    Glucose 192 (H) 70 - 110 mg/dL    BUN, Bld 36 (H) 8 - 23 mg/dL    Creatinine 8.9 (H) 0.5 - 1.4 mg/dL    Calcium 9.5 8.7 - 10.5 mg/dL    Total Protein 7.7 6.0 - 8.4 g/dL    Albumin 3.2 (L) 3.5 - 5.2 g/dL    Total Bilirubin 1.2 (H) 0.1 - 1.0 mg/dL    Alkaline Phosphatase 91 55 - 135 U/L    AST 13 10 - 40 U/L    ALT 15 10 - 44 U/L    Anion Gap 17 (H) 8 - 16 mmol/L    eGFR if African American 5.9 (A) >60 mL/min/1.73 m^2    eGFR if non  5.1 (A) >60 mL/min/1.73 m^2   Magnesium    Collection Time: 08/29/17  9:10 PM   Result Value Ref Range    Magnesium 1.9 1.6 - 2.6 mg/dL   Phosphorus    Collection Time: 08/29/17  9:10 PM   Result Value Ref Range    Phosphorus 5.3 (H) 2.7 - 4.5 mg/dL   CBC auto differential    Collection Time: 08/29/17  9:10 PM   Result Value Ref Range    WBC 14.15 (H) 3.90 - 12.70 K/uL    RBC 3.39 (L) 4.60 - 6.20 M/uL    Hemoglobin 9.2 (L) 14.0 - 18.0 g/dL     Hematocrit 31.0 (L) 40.0 - 54.0 %    MCV 91 82 - 98 fL    MCH 27.1 27.0 - 31.0 pg    MCHC 29.7 (L) 32.0 - 36.0 g/dL    RDW 16.4 (H) 11.5 - 14.5 %    Platelets 229 150 - 350 K/uL    MPV 10.1 9.2 - 12.9 fL    Gran # 12.5 (H) 1.8 - 7.7 K/uL    Lymph # 0.9 (L) 1.0 - 4.8 K/uL    Mono # 0.7 0.3 - 1.0 K/uL    Eos # 0.0 0.0 - 0.5 K/uL    Baso # 0.01 0.00 - 0.20 K/uL    Gran% 88.4 (H) 38.0 - 73.0 %    Lymph% 6.5 (L) 18.0 - 48.0 %    Mono% 4.7 4.0 - 15.0 %    Eosinophil% 0.1 0.0 - 8.0 %    Basophil% 0.1 0.0 - 1.9 %    Differential Method Automated    Lactic acid, plasma    Collection Time: 08/29/17  9:10 PM   Result Value Ref Range    Lactate (Lactic Acid) 8.6 (HH) 0.5 - 2.2 mmol/L   Procalcitonin    Collection Time: 08/29/17  9:19 PM   Result Value Ref Range    Procalcitonin 2.11 (H) <0.25 ng/mL   Blood culture    Collection Time: 08/29/17 11:00 PM   Result Value Ref Range    Blood Culture, Routine No Growth to date    Blood culture    Collection Time: 08/29/17 11:00 PM   Result Value Ref Range    Blood Culture, Routine No Growth to date    ISTAT PROCEDURE    Collection Time: 08/29/17 11:27 PM   Result Value Ref Range    POC PH 7.333 (L) 7.35 - 7.45    POC PCO2 42.5 35 - 45 mmHg    POC PO2 32 (LL) 40 - 60 mmHg    POC HCO3 22.6 (L) 24 - 28 mmol/L    POC BE -3 -2 to 2 mmol/L    POC SATURATED O2 58 (L) 95 - 100 %    POC TCO2 24 24 - 29 mmol/L    Rate 20     Sample VENOUS     Site Other     Allens Test N/A     DelSys Adult Vent     Mode AC/PRVC     Vt 440     PEEP 5     PiP 26     FiO2 50     Min Vol 9.43     Sp02 100    POCT glucose    Collection Time: 08/29/17 11:33 PM   Result Value Ref Range    POCT Glucose 193 (H) 70 - 110 mg/dL   Culture, Respiratory with Gram Stain    Collection Time: 08/30/17  2:05 AM   Result Value Ref Range    Gram Stain (Respiratory) <10 epithelial cells per low power field.     Gram Stain (Respiratory) No WBC's     Gram Stain (Respiratory) Rare Gram negative rods     Gram Stain (Respiratory) Rare  Gram positive cocci    Lactic acid, plasma    Collection Time: 08/30/17  2:10 AM   Result Value Ref Range    Lactate (Lactic Acid) 1.2 0.5 - 2.2 mmol/L   Lactic acid, plasma    Collection Time: 08/30/17  2:10 AM   Result Value Ref Range    Lactate (Lactic Acid) 1.2 0.5 - 2.2 mmol/L   Vancomycin, random    Collection Time: 08/30/17  4:30 AM   Result Value Ref Range    Vancomycin, Random 18.5 Not established ug/mL   Troponin I    Collection Time: 08/30/17  4:30 AM   Result Value Ref Range    Troponin I 12.440 (H) 0.000 - 0.026 ng/mL   CBC auto differential    Collection Time: 08/30/17  4:30 AM   Result Value Ref Range    WBC 11.09 3.90 - 12.70 K/uL    RBC 2.77 (L) 4.60 - 6.20 M/uL    Hemoglobin 7.7 (L) 14.0 - 18.0 g/dL    Hematocrit 25.1 (L) 40.0 - 54.0 %    MCV 91 82 - 98 fL    MCH 27.8 27.0 - 31.0 pg    MCHC 30.7 (L) 32.0 - 36.0 g/dL    RDW 16.3 (H) 11.5 - 14.5 %    Platelets 193 150 - 350 K/uL    MPV 9.7 9.2 - 12.9 fL    Gran # 9.4 (H) 1.8 - 7.7 K/uL    Lymph # 0.7 (L) 1.0 - 4.8 K/uL    Mono # 1.0 0.3 - 1.0 K/uL    Eos # 0.0 0.0 - 0.5 K/uL    Baso # 0.02 0.00 - 0.20 K/uL    Gran% 84.6 (H) 38.0 - 73.0 %    Lymph% 6.2 (L) 18.0 - 48.0 %    Mono% 8.6 4.0 - 15.0 %    Eosinophil% 0.1 0.0 - 8.0 %    Basophil% 0.2 0.0 - 1.9 %    Differential Method Automated    Comprehensive metabolic panel    Collection Time: 08/30/17  4:30 AM   Result Value Ref Range    Sodium 140 136 - 145 mmol/L    Potassium 4.6 3.5 - 5.1 mmol/L    Chloride 107 95 - 110 mmol/L    CO2 23 23 - 29 mmol/L    Glucose 71 70 - 110 mg/dL    BUN, Bld 41 (H) 8 - 23 mg/dL    Creatinine 9.4 (H) 0.5 - 1.4 mg/dL    Calcium 8.7 8.7 - 10.5 mg/dL    Total Protein 6.3 6.0 - 8.4 g/dL    Albumin 2.6 (L) 3.5 - 5.2 g/dL    Total Bilirubin 0.7 0.1 - 1.0 mg/dL    Alkaline Phosphatase 76 55 - 135 U/L    AST 48 (H) 10 - 40 U/L    ALT 14 10 - 44 U/L    Anion Gap 10 8 - 16 mmol/L    eGFR if African American 5.5 (A) >60 mL/min/1.73 m^2    eGFR if non  4.8 (A) >60  "mL/min/1.73 m^2   2D echo with color flow doppler    Collection Time: 17  8:00 AM   Result Value Ref Range    EF 40 (A) 55 - 65    Diastolic Dysfunction Yes (A)     Aortic Valve Stenosis SEVERE (A)     Pericardial Effusion TRIVIAL     Mitral Valve Mobility NORMAL      Microbiology Results (last 7 days)     Procedure Component Value Units Date/Time    Blood culture [[id number]] Collected:  17    Order Status:  Completed Specimen:  Blood from Line, Femoral, Right Updated:  17     Blood Culture, Routine No Growth to date    Blood culture [[id number]] Collected:  17    Order Status:  Completed Specimen:  Blood from Peripheral, Antecubital, Left Updated:  17     Blood Culture, Routine No Growth to date    Culture, Respiratory with Gram Stain [[id number]] Collected:  17    Order Status:  Completed Specimen:  Respiratory from Endotracheal Aspirate Updated:  17     Gram Stain (Respiratory) <10 epithelial cells per low power field.     Gram Stain (Respiratory) No WBC's     Gram Stain (Respiratory) Rare Gram negative rods     Gram Stain (Respiratory) Rare Gram positive cocci    Urine culture [[id number]] Collected:  17    Order Status:  Sent Specimen:  Urine from Urine Updated:  17    Blood culture [[id number]]     Order Status:  Canceled Specimen:  Blood     Blood culture [[id number]]     Order Status:  Canceled Specimen:  Blood           Significant Imagin17 EEG: Per prelim report,  Mild encephalopathy, no epileptic seizures visualized.    17 TTE: Per report,  " 1 - Mild left atrial enlargement.     2 - Mildly to moderately depressed left ventricular systolic function (EF 40%).     3 - Wall motion abnormalities.     4 - Impaired LV relaxation, elevated LAP (grade 2 diastolic dysfunction).     5 - Severe aortic stenosis, IMANI = 0.41 cm2, peak velocity = 4.2 m/s, mean gradient = 46 mmHg.     6 - Trivial pericardial effusion. " ""  Hypokinetic inferolateral wall; akinetic basal inferior wall; LA mildly enlarged.    8/29/17 CTH: Per resident review,  No acute hemorrhage, mass effect.  Generalized cerebral volume loss  Remote b/l occipital lobe infarcts.          "

## 2017-08-30 NOTE — PLAN OF CARE
Problem: Patient Care Overview  Goal: Individualization & Mutuality  Outcome: Ongoing (interventions implemented as appropriate)  Pt arrived to room 3080 in resp distress.  Grunting and abdominal breathing.  No air movement heard on initial ascultation.  Pt emergently intubated with #8 ET, secured at 23cm lip.  AC22, Vt440, 40% +5 peep.  Vent settings :    Pt saturations up markedly upon intubation and resp effort relaxed.  Pt initially on diprivan, however; unable to sustain perfusion.  Fentanyl hung in its stead.  Levophed up and titrated for SBP of 90 and above.  Pt anuric, straight cath and bladder scanned, zero urine.  Labs called to MD, lactic elevated and CPK elevated.  Pt having intermittent seizure activity.  Pt closely monitored.  Free from falls and skin breakdowns.  WCTM.

## 2017-08-30 NOTE — SUBJECTIVE & OBJECTIVE
Past Medical History:   Diagnosis Date    Arthritis     Diabetes mellitus     DNR (do not resuscitate) 5/17/2017    Hyperlipidemia     Hypertension     Stroke     Syncope and collapse        Past Surgical History:   Procedure Laterality Date    AV FISTULA PLACEMENT Right     HIP SURGERY      replacement right    JOINT REPLACEMENT      right hip        Review of patient's allergies indicates:   Allergen Reactions    Penicillins Rash       Family History     Problem Relation (Age of Onset)    Hypertension Mother        Social History Main Topics    Smoking status: Former Smoker     Packs/day: 0.25     Quit date: 1/1/1978    Smokeless tobacco: Never Used    Alcohol use No    Drug use: No    Sexual activity: Not Currently      Review of Systems   Unable to perform ROS: Intubated     Objective:     Vital Signs (Most Recent):  Temp: (!) 102.1 °F (38.9 °C) (08/29/17 1933)  Pulse: 96 (08/29/17 2300)  Resp: (!) 21 (08/29/17 2300)  BP: 107/60 (08/29/17 2300)  SpO2: 100 % (08/29/17 2300) Vital Signs (24h Range):  Temp:  [98.2 °F (36.8 °C)-102.1 °F (38.9 °C)] 102.1 °F (38.9 °C)  Pulse:  [] 96  Resp:  [12-28] 21  SpO2:  [89 %-100 %] 100 %  BP: ()/(51-83) 107/60   Weight: 80.2 kg (176 lb 12.9 oz)  Body mass index is 25.37 kg/m².      Intake/Output Summary (Last 24 hours) at 08/29/17 2342  Last data filed at 08/29/17 1842   Gross per 24 hour   Intake              960 ml   Output             1700 ml   Net             -740 ml       Physical Exam   Constitutional:   Frail AAM   HENT:   Head: Normocephalic and atraumatic.   Eyes: Pupils are equal, round, and reactive to light.   Neck: No JVD present.   Cardiovascular: Normal rate, regular rhythm, normal heart sounds and intact distal pulses.    No murmur heard.  Pulmonary/Chest: Effort normal and breath sounds normal. No respiratory distress. He has no wheezes. He has no rales.   Abdominal: Soft. Bowel sounds are normal. He exhibits no distension. There is  no tenderness.   Musculoskeletal: He exhibits no edema.   Neurological:   Not alert and not following commands. Responsive to pain   Skin: Skin is warm and dry. Capillary refill takes less than 2 seconds. No erythema.   Vitals reviewed.    Vents:  Vent Mode: A/C (08/29/17 2300)  Set Rate: 20 bmp (08/29/17 2300)  Vt Set: 440 mL (08/29/17 2300)  Pressure Support: 0 cmH20 (08/29/17 2300)  PEEP/CPAP: 5 cmH20 (08/29/17 2300)  Oxygen Concentration (%): 60 (08/29/17 2300)  Peak Airway Pressure: 28 cmH2O (08/29/17 2300)  Plateau Pressure: 0 cmH20 (08/29/17 2300)  Total Ve: 10 mL (08/29/17 2300)  F/VT Ratio<105 (RSBI): (!) 46.67 (08/29/17 2300)  Lines/Drains/Airways     Central Venous Catheter Line                 Hemodialysis Catheter [id number] days          Drain                 Hemodialysis AV Fistula Right forearm [id number] days          Peripheral Intravenous Line                 Peripheral IV - Single Lumen 08/29/17 0300 Left Forearm less than 1 day              Significant Labs:    CBC/Anemia Profile:    Recent Labs  Lab 08/29/17  0349 08/29/17  0937 08/29/17 2110   WBC 8.94  --  14.15*   HGB 8.2*  --  9.2*   HCT 25.9*  --  31.0*     --  229   MCV 87  --  91   RDW 16.1*  --  16.4*   IRON  --  20*  --    FERRITIN  --  365*  --         Chemistries:    Recent Labs  Lab 08/29/17  0349 08/29/17  0639 08/29/17  1147 08/29/17 2110     --   --  139   K 4.7  --   --  4.7   CL 97  --   --  103   CO2 26  --   --  19*   BUN 79*  --   --  36*   CREATININE 13.7*  --   --  8.9*   CALCIUM 8.8  --   --  9.5   ALBUMIN 2.8*  --   --  3.2*   PROT 6.7  --   --  7.7   BILITOT 0.6  --   --  1.2*   ALKPHOS 91  --   --  91   ALT 15  --   --  15   AST 12  --   --  13   MG  --   --  1.9 1.9   PHOS  --  9.0*  --  5.3*     ABGs:   Recent Labs  Lab 08/29/17  8717   PH 7.333*   PCO2 42.5   HCO3 22.6*   POCSATURATED 58*   BE -3     Significant Imaging: I have reviewed and interpreted all pertinent imaging results/findings within the past 24  hours.     CXR (8/29/17) - mild cardiomegaly. Right sided hilar opacity, unchanged from prior CXR  Official reading notes mild retrocardiac consolidation of left base which could represent pneumonia or aspiration.    CT head (8/29/17) - no acute intracranial hemorrhage or infarct. Generalized volume loss with chronic microvascular ischemic changes. remote infarcts of bilateral occipital lobes.

## 2017-08-30 NOTE — PROGRESS NOTES
Pt arrived to back to floor with hemodialysis nurses. Pt head gazing to left.  Pt has 30 sec seizure in room, Upper body tremors. Pt placed on cardiac monitor 3LNC. Sent off to STAT CT scan of head. Dr. Kennedy notified of patient situation. Dr. Kennedy plan to have patient moved to [facility] 3080. Report given to MARLIN Scott. Pt family member at bedside and updated on patient transfer.

## 2017-08-30 NOTE — SIGNIFICANT EVENT
Received a phone call from NP saying that Mr. Greco had gone to CT scan because of concern for a left gaze, and I was asked to go and evaluate the patient.  Moments later, I received another phone call that the patient was noted to have seizures in CT, and I was asked to go evaluate the patient to see if he was medically stable for the floor. When I arrived at CT scan, the patient had already been transferred to [facility]ICU to be intubated and further evaluated for his new onset seizure.  Dr. Mathew of Cardiology notified.    Dalia Zazueta MD  [facility]  Cell:  [phone]  Spectra:  [phone]  Pager:  [phone]

## 2017-08-30 NOTE — ASSESSMENT & PLAN NOTE
- receives iHD TTS via RUE AVF at [facility] with Dr. Sandhu. Treatment duration 4 hours EDW 82kg.   - admitted with NSTEMI. Cardiology cleared for dialysis yesterday  - iHD yesterday x 3 hours with 1L removed  - transferred to ICU yesterday for septic shock vs ACS  - currently on levophed  - no need for RRT today  - will reassess in AM. Hopefully patient will be off pressors and we can proceed with intermittent HD if stable from cardiac standpoint

## 2017-08-30 NOTE — PROGRESS NOTES
Bladder scan completed and determined approximately 150 mL of urine is in the bladder. Will continue to monitor.

## 2017-08-30 NOTE — SIGNIFICANT EVENT
Notified by HD RN at 1905 that patient had complaints of chest pain, unrelieved by SL NTG s/p rinse back portion of HD treatment, orders for IVP lopressor given. HD RN updated KARIME at 1930 that no improvement in tachycardia was noted and patient had developed fever. Orders placed for lactic acid, procalcitonin, pan cultures, UA, CXR, NS bolus, and further administration of IVP lopressor with parameters. Notified of core call at 1953 by ICU RN who indicated patient was moving all extremities, able to follow commands, and respond appropriately verbally, however thought there was left gaze, stat head CT ordered and in house on call staff updated. Moments later was paged by primary RN on tele floor who reported patient had approximately 30 second seizure while being transported to CT scanner, in house on call staff again updated, further management per in house team, likely will need critical care bed for further monitoring.

## 2017-08-30 NOTE — HPI
"Mr. Greco is a 76 yo female w/ PMH significant for severe AS, DM, HTN, HLD, PAD, stroke (2017; ; residual vision deficits, BLE weakness), ESRD on HD MWF, who presented on  via EMS with NSTEMI.  Subsequently found to have L-sided gaze, concern for seizure activity x4, for whom neurology is consulted for seizures.    Per pt's wife and daughter, pt was in his usual state of health until day of admission.  Family reports that during dialysis yesterday,   Dialysis nurse reported that pt had "3 seizures."  Pt transported to CT scanner, where pt subsequently had another seizure by report.  Found to have NSTEMI and lactic acidosis, subsequently intubated.  Family, RN, night/day ICU teams did not visualize seizures, unclear what "seizure" consisted of.  Subsequently started on keppra 500 mg BID after loading with 1 g.  Family is unsure what kind of seizures pt had, unable to describe them as they did not see the seizures themselves.      Family reports that pt had seizures in 2017 and  associated with strokes at that time. Per 2017 hospitalization discharge summary, pt had GTC seizure.  To family's knowledge, pt is not taking keppra at home (not listed in EMR, wife handles pt's medications and does not recall keppra).  Prior EEG in  did not reveal epileptic activity, and (per discharge summary) 2017 EEG negative for epileptic activity.  Wife denies recent infections, although she notes pt has been coughing more frequently as of late.  Notably, she states pt's brother  within the past few days, and pt was significantly distressed from this.  She reports she has no concerns that pt ingested any substance, including alcohol, other meds during that time (history of SI w/ PEC in 2017).  Ativan is listed under pt's home meds, wife is not sure that he is taking any.  May take benadryl to help sleep every other night.      Regarding his prior strokes, in 2017 he was admitted for concern of stroke (not " "moving LUE nor BLE).  He received rtPA, MRI was negative for stroke.  At that time, he was documented as having no residual deficits from any prior stroke.  Additionally, the seizure activity during this admission (EEG negative), and is listed as his first-time seizure. At that time, he was discharged on ASA (no DAPT).  Family reports that pt has BLE weakness, for which he uses a walker.  Primary team noted prior visual field deficits 2/2 prior stroke.    Pt recently admitted on 7/31 for chest pain, underwent subsequent PCI with YANE placement, discharged on ASA and ticagrelor.  When asked, wife states that she just gives all the medications on his list.  She is able to recall giving ASA Qdaily, but cannot recall if she has been giving pt ticagrelor.    At baseline, family states pt is AOx3, ambulates with walker.  Report rare alcohol use.  Report recreational drug use "years ago."     "

## 2017-08-30 NOTE — PLAN OF CARE
Problem: Patient Care Overview  Goal: Plan of Care Review  Outcome: Ongoing (interventions implemented as appropriate)  Dialysis complete.  Blood rinsed back.  [place] pulled from LFA fistula.  Pressure held x 5 minutes.  Hemostasis achieved.  Covered with gauze and paper tape.  +thrill +bruit.  Net UF 1L.  Procrit given.  Immediately after needles removed, c/o non-radiating chest pain 5-7/10.  SL nitro given x 3 doses without relief.  HR increased to 120's sinus tach.  OPAL Kennedy NP notified.  Lopressor 5mg IV given x 2 doses without significant decrease in HR.  CP continued at 7/10.  Temp 102.1.  Pt suddenly had difficulty responding to questions.  Core called.  Continued to have slow responses, but oriented and following commands.  OPAL Kennedy NP notified per core nurse and rec'd okay to transfer pt to room 390.  EKG done.  Tylenol given.  Pt transferred from dialysis unit to room 390 by core nurse, Tianna Frazier, and this RN.  Report given to charge nurse and primary nurse.  Core nurse remained at the bedside to transfer pt to CT.

## 2017-08-30 NOTE — ASSESSMENT & PLAN NOTE
Mr. Greco is a 78 yo female w/ PMH significant for severe AS, DM, HTN, HLD, PAD, stroke (5/2017; 2014; residual vision deficits, BLE weakness), ESRD on HD MWF, who presented on 8/29 via EMS with NSTEMI.  Subsequently found to have L-sided gaze, concern for seizure activity x4, for whom neurology is consulted for seizures.    Questionable history of seizures in 5/2017 and 2014 associated with possible strokes at that time.  Limited history available for current seizure activity.  Found to have lactic acidosis, NSTEMI, elevated procalcitonin.  8/30 EEG prelim read negative for epileptic activity.    Recommendations  -Continue keppra 500 mg BID  -MRI Brain  -Check Vit B1, 2, 6, 12, TSH, RPR, ammonia, urine drug screen, keppra level  -Trend BUN; correct/treat any metabolic/infectious abnormalities  -Antiplatelets/anticoagulation per primary  -Seizure precautions, telemetry

## 2017-08-30 NOTE — SUBJECTIVE & OBJECTIVE
Interval History:     Later on at evening, patient developed left gaze and to exclude stroke, patient was sent to CT head, during the CT head, patient developed seizure like activity, CT was negative. Critical care medicine team arrived and decided to intubate the patient for airway protection. Patient's X ray showed mild new development of opacification in the left lung base concerning for pneumonia. ABx (Azternam and Vanc then Moxi and vanc for penicillin allergy status).      Review of Systems   Unable to perform ROS: intubated     Objective:     Vital Signs (Most Recent):  Temp: 98.8 °F (37.1 °C) (08/30/17 0300)  Pulse: 66 (08/30/17 0732)  Resp: 20 (08/30/17 0732)  BP: 125/60 (08/30/17 0732)  SpO2: 100 % (08/30/17 0732) Vital Signs (24h Range):  Temp:  [98.4 °F (36.9 °C)-102.1 °F (38.9 °C)] 98.8 °F (37.1 °C)  Pulse:  [] 66  Resp:  [16-36] 20  SpO2:  [85 %-100 %] 100 %  BP: ()/(40-79) 125/60     Weight: 80.2 kg (176 lb 12.9 oz)  Body mass index is 25.37 kg/m².     SpO2: 100 %  O2 Device (Oxygen Therapy): ventilator      Intake/Output Summary (Last 24 hours) at 08/30/17 0853  Last data filed at 08/30/17 0544   Gross per 24 hour   Intake             2508 ml   Output             1700 ml   Net              808 ml       Lines/Drains/Airways     Central Venous Catheter Line                 Trialysis (Dialysis) Catheter 08/29/17 2300 right femoral less than 1 day          Drain                 Hemodialysis AV Fistula Right forearm 24030 days          Airway                 Airway - Non-Surgical 08/29/17 2110 Endotracheal Tube less than 1 day          Peripheral Intravenous Line                 Peripheral IV - Single Lumen 08/29/17 0300 Left Forearm 1 day                Physical Exam   Constitutional: He appears well-developed and well-nourished.   HENT:   Head: Normocephalic and atraumatic.   Eyes:   Fixed pupils not reactive to light, could be from fentanyl 200 mcg infusion.    Neck: Normal range of motion.  Neck supple. No tracheal deviation present. No thyromegaly present.   Cardiovascular: Normal rate.  Exam reveals no friction rub.    Murmur (Systolic murmur) heard.  Pulmonary/Chest: Effort normal. He has no wheezes. He has rales.   Intubated   Vent Mode: A/C  Oxygen Concentration (%):  (40-60) 40  Resp Rate Total:  (20 br/min-26 br/min) 20 br/min  Vt Set:  (440 mL) 440 mL  PEEP/CPAP:  (5 cmH20) 5 cmH20  Pressure Support:  (0 cmH20) 0 cmH20  Mean Airway Pressure:  (11 qtH99-51 cmH20) 11 cmH20     Abdominal: Soft. Bowel sounds are normal. He exhibits no distension. There is no tenderness.   Musculoskeletal: Normal range of motion. He exhibits no edema or deformity.   Neurological:   RASS -2       CBC:   Recent Labs  Lab 08/29/17 0349 08/29/17 2110 08/30/17  0430   WBC 8.94 14.15* 11.09   RBC 2.97* 3.39* 2.77*   HGB 8.2* 9.2* 7.7*   HCT 25.9* 31.0* 25.1*    229 193   MCV 87 91 91   MCH 27.6 27.1 27.8   MCHC 31.7* 29.7* 30.7*     BMP:   Recent Labs  Lab 08/29/17 0349 08/29/17 2110 08/30/17  0430   *  --  192* 71     --  139 140   K 4.7  --  4.7 4.6   CL 97  --  103 107   CO2 26  --  19* 23   BUN 79*  --  36* 41*   CREATININE 13.7*  --  8.9* 9.4*   CALCIUM 8.8  --  9.5 8.7   MG  --   < > 1.9  --    < > = values in this interval not displayed.  Coagulation: No results for input(s): INR, APTT in the last 168 hours.    Invalid input(s): PT  Microbiology Results (last 7 days)     Procedure Component Value Units Date/Time    Culture, Respiratory with Gram Stain [[id number]] Collected:  08/30/17 0205    Order Status:  Sent Specimen:  Respiratory from Endotracheal Aspirate Updated:  08/30/17 0514    Urine culture [[id number]] Collected:  08/30/17 0205    Order Status:  Sent Specimen:  Urine from Urine Updated:  08/30/17 0206    Blood culture [[id number]] Collected:  08/29/17 2300    Order Status:  Sent Specimen:  Blood from Line, Femoral, Right Updated:  08/30/17 0036    Blood culture [[id number]]  Collected:  08/29/17 2300    Order Status:  Sent Specimen:  Blood from Peripheral, Antecubital, Left Updated:  08/30/17 0035    Blood culture [[id number]]     Order Status:  Canceled Specimen:  Blood     Blood culture [[id number]]     Order Status:  Canceled Specimen:  Blood

## 2017-08-30 NOTE — CONSULTS
[facility]  Cardiology  Consult Note    Patient Name: Bernabe Greco Jr.  MRN: [id number]  Admission Date: 8/29/2017  Hospital Length of Stay: 0 days  Code Status: Full Code   Attending Physician: Francine Saravia MD   Primary Care Physician: Primary Doctor No  Expected Discharge Date: 8/29/2017  Principal Problem:Chest pain    Subjective:     HPI  This is Mr. Bernabe Greco Jr., 77 year old male severe AS (IMANI 0.6, V 4.5 m/s, MG 43), CAD with recent PCI to OM1 with YANE on 08/01/17  and residual D1 70% stenosis not intervened upon, HTN, HLD, anemia, PAD s/p cath in 2014 with unsuccessful attempt to revascularize right SFA  , HTN, HLD, ESRD on HD Sat,T,T and stroke with residual visual deficit. He presented to ED from [place] with symptoms of pressure and chest tightness, that started midnight prior his presentation to ED. His chest pain was retrosternally, described as heaviness and pressure, did not radiate anywhere, and was associated with SOB (NYHA IV), which is different from baseline (NYHA III). Denies loss of consciousness, change in his medication, or palpitation.    Hospital Course:   8/29: patient was managed with increase coreg dose to 125 mg BID, and maintain his Imdur dose and treat as Type II demand ischemia. Later on at evening, patient developed left gaze and to exclude stroke, patient was sent to CT head, during the CT head, patient developed seizure like activity and was transfered to Medical ICU.   8/30: patient intubated and low dose of Levophed, and on fentanyl 200 mcg for sedation.    Interval History:     Later on at evening, patient developed left gaze and to exclude stroke, patient was sent to CT head, during the CT head, patient developed seizure like activity, CT was negative. Critical care medicine team arrived and decided to intubate the patient for airway protection. Patient's X ray showed mild new development of opacification in the left lung base concerning for  pneumonia. ABx (Azternam and Vanc then Moxi and vanc for penicillin allergy status).      Review of Systems   Unable to perform ROS: intubated     Objective:     Vital Signs (Most Recent):  Temp: 98.8 °F (37.1 °C) (08/30/17 0300)  Pulse: 66 (08/30/17 0732)  Resp: 20 (08/30/17 0732)  BP: 125/60 (08/30/17 0732)  SpO2: 100 % (08/30/17 0732) Vital Signs (24h Range):  Temp:  [98.4 °F (36.9 °C)-102.1 °F (38.9 °C)] 98.8 °F (37.1 °C)  Pulse:  [] 66  Resp:  [16-36] 20  SpO2:  [85 %-100 %] 100 %  BP: ()/(40-79) 125/60     Weight: 80.2 kg (176 lb 12.9 oz)  Body mass index is 25.37 kg/m².     SpO2: 100 %  O2 Device (Oxygen Therapy): ventilator      Intake/Output Summary (Last 24 hours) at 08/30/17 0853  Last data filed at 08/30/17 0544   Gross per 24 hour   Intake             2508 ml   Output             1700 ml   Net              808 ml       Lines/Drains/Airways     Central Venous Catheter Line                 Trialysis (Dialysis) Catheter 08/29/17 2300 right femoral less than 1 day          Drain                 Hemodialysis AV Fistula Right forearm 77391 days          Airway                 Airway - Non-Surgical 08/29/17 2110 Endotracheal Tube less than 1 day          Peripheral Intravenous Line                 Peripheral IV - Single Lumen 08/29/17 0300 Left Forearm 1 day                Physical Exam   Constitutional: He appears well-developed and well-nourished.   HENT:   Head: Normocephalic and atraumatic.   Eyes:   Fixed pupils not reactive to light, could be from fentanyl 200 mcg infusion.    Neck: Normal range of motion. Neck supple. No tracheal deviation present. No thyromegaly present.   Cardiovascular: Normal rate.  Exam reveals no friction rub.    Murmur (Systolic murmur) heard.  Pulmonary/Chest: Effort normal. He has no wheezes. He has rales.   Intubated   Vent Mode: A/C  Oxygen Concentration (%):  (40-60) 40  Resp Rate Total:  (20 br/min-26 br/min) 20 br/min  Vt Set:  (440 mL) 440 mL  PEEP/CPAP:  (5  cmH20) 5 cmH20  Pressure Support:  (0 cmH20) 0 cmH20  Mean Airway Pressure:  (11 vyM34-86 cmH20) 11 cmH20     Abdominal: Soft. Bowel sounds are normal. He exhibits no distension. There is no tenderness.   Musculoskeletal: Normal range of motion. He exhibits no edema or deformity.   Neurological:   RASS -2       CBC:   Recent Labs  Lab 08/29/17 0349 08/29/17 2110 08/30/17 0430   WBC 8.94 14.15* 11.09   RBC 2.97* 3.39* 2.77*   HGB 8.2* 9.2* 7.7*   HCT 25.9* 31.0* 25.1*    229 193   MCV 87 91 91   MCH 27.6 27.1 27.8   MCHC 31.7* 29.7* 30.7*     BMP:   Recent Labs  Lab 08/29/17 0349 08/29/17 2110 08/30/17 0430   *  --  192* 71     --  139 140   K 4.7  --  4.7 4.6   CL 97  --  103 107   CO2 26  --  19* 23   BUN 79*  --  36* 41*   CREATININE 13.7*  --  8.9* 9.4*   CALCIUM 8.8  --  9.5 8.7   MG  --   < > 1.9  --    < > = values in this interval not displayed.  Coagulation: No results for input(s): INR, APTT in the last 168 hours.    Invalid input(s): PT  Microbiology Results (last 7 days)     Procedure Component Value Units Date/Time    Culture, Respiratory with Gram Stain [[id number]] Collected:  08/30/17 0205    Order Status:  Sent Specimen:  Respiratory from Endotracheal Aspirate Updated:  08/30/17 0514    Urine culture [[id number]] Collected:  08/30/17 0205    Order Status:  Sent Specimen:  Urine from Urine Updated:  08/30/17 0206    Blood culture [[id number]] Collected:  08/29/17 2300    Order Status:  Sent Specimen:  Blood from Line, Femoral, Right Updated:  08/30/17 0036    Blood culture [[id number]] Collected:  08/29/17 2300    Order Status:  Sent Specimen:  Blood from Peripheral, Antecubital, Left Updated:  08/30/17 0035    Blood culture [[id number]]     Order Status:  Canceled Specimen:  Blood     Blood culture [[id number]]     Order Status:  Canceled Specimen:  Blood             Assessment and Plan:     NSTEMI (non-ST elevated myocardial infarction)    - Known to have CAD with recent PCI to  OM1 with YANE on 08/01/17  and residual D1 70% stenosis not intervened upon  - Presentation with chest pain, and troponin of 0.053 > .086, with no significant ECG changes. RAMU score for NSTEMI is 5   - On 8/29 evening, he developed seizure like activity, and sen for CT head to exclude stroke, which was negative, was having fever and leokocytosis and started on ABx.  - His Troponin jumped to 12, DDx could be worsening NSTEMI, Type II ischemia (Type II myocardiac infarction), and in the setting of septic shock, which could worsening his troponinemia.   - ECG and rhythm showed: initially 1st degree HB, and then sinus tachycardia (around the episode of seizure), then rhythm switched to junctional tachycardia, and then eventually to his baseline rhythm. No sings of ischemia in his ECG.  - Currently on low dose of pressors (Levophed 0.01) and fentanyl 200 mcg.   - We recommend:   - Continue dual antiplatelet    - Continue Heparin infusion for the time being               - Agree with repeating 2D echo to exclude any etiology for his shock (valve leaflet rupture, worsening heart failure or pericardial effusion)          The patient Bernabe Greco Jr. was seen, assessed, evaluated with the presence of the cardiac fellow and discussed with cardiology staff, recommendations to follow.     Fredi Gallo MD  Cardiology  [facility]

## 2017-08-30 NOTE — SUBJECTIVE & OBJECTIVE
Interval History:   Waited for cardiology clearance prior to proceeding with HD yesterday. Patient received HD x 3 hours yesterday; 1L removed. After needles removed, patient developed chest pain and was noted to be febrile. Rapid response called; patient went back to room from [facility]. While receiving CTH, patient reportedly had seizure-like activity and respiratory distress. Transferred to ICU and intubated. Developed hypotension after receiving sedation and was started on levophed. Weaning off overnight. Troponin elevated at 12.4 this morning. Minimal vent settings. Hourly intake 60cc/hr with levophed, heparin, fentanyl, and precedex. Net +~500cc yesterday.     Review of patient's allergies indicates:   Allergen Reactions    Penicillins Rash     Received ceftriaxone and cefepime in 2017     Current Facility-Administered Medications   Medication Frequency    0.9%  NaCl infusion PRN    acetaminophen tablet 650 mg Q8H PRN    [START ON 8/31/2017] aspirin EC tablet 81 mg Daily    [START ON 8/31/2017] atorvastatin tablet 80 mg Daily    ceFEPIme in dextrose 5% 2 gram/50 mL IVPB 2 g Q24H    [START ON 8/31/2017] citalopram tablet 20 mg Daily    dexmedetomidine (PRECEDEX) 400mcg/100mL 0.9% NaCL infusion Continuous    dextrose 50% injection 12.5 g PRN    epoetin lyssa injection 8,000 Units Every Tues, Thurs, Sat    fentaNYL 2500 mcg in D5W 250 mL infusion premix (titrating) (conc: 10 mcg/mL) Continuous    glucagon (human recombinant) injection 1 mg PRN    heparin 25,000 units in dextrose 5% 250 mL (100 units/mL) bolus from bag; ADDITIONAL PRN BOLUS PRN    heparin 25,000 units in dextrose 5% 250 mL (100 units/mL) bolus from bag; ADDITIONAL PRN BOLUS PRN    heparin 25,000 units in dextrose 5% 250 mL (100 units/mL) infusion Continuous    insulin aspart pen 1-10 Units Q6H PRN    levetiracetam in NaCl (iso-os) IVPB 500 mg Q12H    lorazepam tablet 0.5 mg BID PRN    norepinephrine 4 mg in dextrose 5% 250 mL infusion  (premix) (titrating) Continuous    ondansetron injection 4 mg Q6H PRN    pantoprazole injection 40 mg Daily    sevelamer carbonate tablet 1,600 mg TID WM    sodium chloride 0.9% flush 3 mL Q8H    ticagrelor tablet 90 mg BID    vancomycin 750 mg in dextrose 5 % 250 mL IVPB (ready to mix system) Once    vitamin renal formula (B-complex-vitamin c-folic acid) 1 mg per capsule 1 capsule Daily       Objective:     Vital Signs (Most Recent):  Temp: 98.8 °F (37.1 °C) (08/30/17 0300)  Pulse: 72 (08/30/17 0918)  Resp: 20 (08/30/17 0732)  BP: 125/60 (08/30/17 0732)  SpO2: 100 % (08/30/17 0918)  O2 Device (Oxygen Therapy): ventilator (08/30/17 0918) Vital Signs (24h Range):  Temp:  [98.8 °F (37.1 °C)-102.1 °F (38.9 °C)] 98.8 °F (37.1 °C)  Pulse:  [] 72  Resp:  [16-36] 20  SpO2:  [85 %-100 %] 100 %  BP: ()/(40-79) 125/60     Weight: 80.2 kg (176 lb 12.9 oz) (08/29/17 0645)  Body mass index is 25.37 kg/m².  Body surface area is 1.99 meters squared.    I/O last 3 completed shifts:  In: 2508 [P.O.:360; I.V.:1548; Other:600]  Out: 1700 [Urine:100; Other:1600]    Physical Exam   Constitutional: He appears well-developed and well-nourished.   HENT:   Head: Normocephalic and atraumatic.   Cardiovascular: Normal rate and regular rhythm.    Pulmonary/Chest: He has rhonchi. He has rales.   Abdominal: Soft. He exhibits no distension.   Musculoskeletal: He exhibits no edema or deformity.   Skin: Skin is warm and dry.       Significant Labs:  ABGs:   Recent Labs  Lab 08/29/17  2327   PH 7.333*   PCO2 42.5   HCO3 22.6*   POCSATURATED 58*   BE -3     Cardiac Markers: No results for input(s): CKMB, TROPONINT, MYOGLOBIN in the last 168 hours.  CBC:   Recent Labs  Lab 08/30/17  0430   WBC 11.09   RBC 2.77*   HGB 7.7*   HCT 25.1*      MCV 91   MCH 27.8   MCHC 30.7*     CMP:   Recent Labs  Lab 08/30/17  0430   GLU 71   CALCIUM 8.7   ALBUMIN 2.6*   PROT 6.3      K 4.6   CO2 23      BUN 41*   CREATININE 9.4*    ALKPHOS 76   ALT 14   AST 48*   BILITOT 0.7     Coagulation: No results for input(s): INR, APTT in the last 168 hours.    Invalid input(s): PT  All labs within the past 24 hours have been reviewed.     Significant Imaging:  Labs: Reviewed

## 2017-08-30 NOTE — PROCEDURES
"Bernabe Greco Jr. is a 77 y.o. male patient.    Temp: (!) 102.1 °F (38.9 °C) (08/29/17 1933)  Pulse: (!) 129 (08/29/17 2124)  Resp: (!) 28 (08/29/17 2124)  BP: (!) 86/51 (08/29/17 2124)  SpO2: 100 % (08/29/17 2124)  Weight: 80.2 kg (176 lb 12.9 oz) (08/29/17 0645)  Height: 5' 10" (177.8 cm) (08/29/17 0321)       Central Line  Date/Time: 8/29/2017 10:50 PM  Location procedure was performed: [facility] CRITICAL CARE MEDICINE  Performed by: NELA SCHWARTZ  Assisting provider: TAYLOR MENDOSA  Pre-operative Diagnosis: shock  Post-operative diagnosis: shock  Consent Done: Emergent Situation  Time out: Immediately prior to procedure a "time out" was called to verify the correct patient, procedure, equipment, support staff and site/side marked as required.  Indications: vascular access, hemodynamic monitoring and hemodialysis  Anesthesia: local infiltration    Anesthesia:  Local Anesthetic: lidocaine 1% without epinephrine  Preparation: skin prepped with ChloraPrep  Skin prep agent dried: skin prep agent completely dried prior to procedure  Sterile barriers: all five maximum sterile barriers used - cap, mask, sterile gown, sterile gloves, and large sterile sheet  Hand hygiene: hand hygiene performed prior to central venous catheter insertion  Location details: right femoral  Site selection rationale: stenosed R IJ, pt's neck too stiff to move to other side; appropriate vascualr access  Catheter type: triple lumen  Catheter size: 12 Fr  Ultrasound guidance: yes  Vessel Caliber: medium, patent, compressibility normal  Needle advanced into vessel with real time Ultrasound guidance.  Guidewire confirmed in vessel.  Manometry: Yes  Number of attempts: 1  Assessment: successful placement  Complications: none  Estimated blood loss (mL): 10  Post-procedure: line sutured,  chlorhexidine patch,  sterile dressing applied and blood return through all ports  Complications: No      Nela Schwartz  8/29/2017  "

## 2017-08-30 NOTE — CODE DOCUMENTATION
Transferred from [facility] to room 390 A via bed by Tianna Frazier RN and this RN.  Met in room by primary nurse and charge nurse.  Daughter at the bedside.  Transferred from [facility] bed to 390 bed.  Rapid response nurse still at the bedside.  Care assumed by primary nurse.

## 2017-08-30 NOTE — PROGRESS NOTES
"[facility]  Nephrology  Progress Note    Patient Name: Bernabe Greco Jr.  MRN: [id number]  Admission Date: 8/29/2017  Hospital Length of Stay: 0 days  Attending Provider: Francine Saravia MD   Primary Care Physician: Primary Doctor No  Principal Problem:Chest pain    Subjective:     HPI: 76 yo AA male with significant history for hypertension, hyperlipidemia, CAD sp recent NSTEMI/YANE to Lcx/OM1 also found to have D1 70% non intervene tx medically on 8/1/17, Systolic heart failure (EF 43% 9/1) PAD, severe aortic stenosis with TVAR work up in progress, and ESRD on HD who presented to hospital via EMS for non radiating mid sternal chest "pressure" that started at 9 pm last night associated with palpitation, shortness of breath and diaphoresis. Patient did not have home nitro. Laying down did not help and by 12 am chest pressure worsen 8/10 then wife called 911. Symptoms improved with nitro SL en route to hospital and completed resolved with morphine and GI cocktail in ED. Elevated troponin down from previous admit 0.058>0.086. BMP up from previous. EKG NSR first degree AVB with NSTWA. This 830 pm patient got news that brother next door just passed away and patient currently have mid sternal chest pressure 8/10. Nurse is going to give patient nitro now. Wife states symptoms are similar to MI patient had earlier this month. Nephrology consult for HD. HD TTS 4 hrs duration via RFA AVF at [facility] under care of Dr. Romo. EDW 82 kg. He is under is EDW. Reports UF 3-4 in between HD days. Very minimal urine.       Interval History:   Waited for cardiology clearance prior to proceeding with HD yesterday. Patient received HD x 3 hours yesterday; 1L removed. After needles removed, patient developed chest pain and was noted to be febrile. Rapid response called; patient went back to room from [facility]. While receiving CTH, patient reportedly had seizure-like activity and respiratory distress. Transferred to " ICU and intubated. Developed hypotension after receiving sedation and was started on levophed. Weaning off overnight. Troponin elevated at 12.4 this morning. Minimal vent settings. Hourly intake 60cc/hr with levophed, heparin, fentanyl, and precedex. Net +~500cc yesterday.     Review of patient's allergies indicates:   Allergen Reactions    Penicillins Rash     Received ceftriaxone and cefepime in 2017     Current Facility-Administered Medications   Medication Frequency    0.9%  NaCl infusion PRN    acetaminophen tablet 650 mg Q8H PRN    [START ON 8/31/2017] aspirin EC tablet 81 mg Daily    [START ON 8/31/2017] atorvastatin tablet 80 mg Daily    ceFEPIme in dextrose 5% 2 gram/50 mL IVPB 2 g Q24H    [START ON 8/31/2017] citalopram tablet 20 mg Daily    dexmedetomidine (PRECEDEX) 400mcg/100mL 0.9% NaCL infusion Continuous    dextrose 50% injection 12.5 g PRN    epoetin lyssa injection 8,000 Units Every Tues, Thurs, Sat    fentaNYL 2500 mcg in D5W 250 mL infusion premix (titrating) (conc: 10 mcg/mL) Continuous    glucagon (human recombinant) injection 1 mg PRN    heparin 25,000 units in dextrose 5% 250 mL (100 units/mL) bolus from bag; ADDITIONAL PRN BOLUS PRN    heparin 25,000 units in dextrose 5% 250 mL (100 units/mL) bolus from bag; ADDITIONAL PRN BOLUS PRN    heparin 25,000 units in dextrose 5% 250 mL (100 units/mL) infusion Continuous    insulin aspart pen 1-10 Units Q6H PRN    levetiracetam in NaCl (iso-os) IVPB 500 mg Q12H    lorazepam tablet 0.5 mg BID PRN    norepinephrine 4 mg in dextrose 5% 250 mL infusion (premix) (titrating) Continuous    ondansetron injection 4 mg Q6H PRN    pantoprazole injection 40 mg Daily    sevelamer carbonate tablet 1,600 mg TID WM    sodium chloride 0.9% flush 3 mL Q8H    ticagrelor tablet 90 mg BID    vancomycin 750 mg in dextrose 5 % 250 mL IVPB (ready to mix system) Once    vitamin renal formula (B-complex-vitamin c-folic acid) 1 mg per capsule 1 capsule  Daily       Objective:     Vital Signs (Most Recent):  Temp: 98.8 °F (37.1 °C) (08/30/17 0300)  Pulse: 72 (08/30/17 0918)  Resp: 20 (08/30/17 0732)  BP: 125/60 (08/30/17 0732)  SpO2: 100 % (08/30/17 0918)  O2 Device (Oxygen Therapy): ventilator (08/30/17 0918) Vital Signs (24h Range):  Temp:  [98.8 °F (37.1 °C)-102.1 °F (38.9 °C)] 98.8 °F (37.1 °C)  Pulse:  [] 72  Resp:  [16-36] 20  SpO2:  [85 %-100 %] 100 %  BP: ()/(40-79) 125/60     Weight: 80.2 kg (176 lb 12.9 oz) (08/29/17 0645)  Body mass index is 25.37 kg/m².  Body surface area is 1.99 meters squared.    I/O last 3 completed shifts:  In: 2508 [P.O.:360; I.V.:1548; Other:600]  Out: 1700 [Urine:100; Other:1600]    Physical Exam   Constitutional: He appears well-developed and well-nourished.   HENT:   Head: Normocephalic and atraumatic.   Cardiovascular: Normal rate and regular rhythm.    Pulmonary/Chest: He has rhonchi. He has rales.   Abdominal: Soft. He exhibits no distension.   Musculoskeletal: He exhibits no edema or deformity.   Skin: Skin is warm and dry.       Significant Labs:  ABGs:   Recent Labs  Lab 08/29/17  2327   PH 7.333*   PCO2 42.5   HCO3 22.6*   POCSATURATED 58*   BE -3     Cardiac Markers: No results for input(s): CKMB, TROPONINT, MYOGLOBIN in the last 168 hours.  CBC:   Recent Labs  Lab 08/30/17  0430   WBC 11.09   RBC 2.77*   HGB 7.7*   HCT 25.1*      MCV 91   MCH 27.8   MCHC 30.7*     CMP:   Recent Labs  Lab 08/30/17  0430   GLU 71   CALCIUM 8.7   ALBUMIN 2.6*   PROT 6.3      K 4.6   CO2 23      BUN 41*   CREATININE 9.4*   ALKPHOS 76   ALT 14   AST 48*   BILITOT 0.7     Coagulation: No results for input(s): INR, APTT in the last 168 hours.    Invalid input(s): PT  All labs within the past 24 hours have been reviewed.     Significant Imaging:  Labs: Reviewed    Assessment/Plan:     ESRD (end stage renal disease) on dialysis    - receives iHD TTS via RUE AVF at [facility] with Dr. Sandhu.  Treatment duration 4 hours EDW 82kg.   - admitted with NSTEMI. Cardiology cleared for dialysis yesterday  - iHD yesterday x 3 hours with 1L removed  - transferred to ICU yesterday for septic shock vs ACS  - currently on levophed  - no need for RRT today  - will reassess in AM. Hopefully patient will be off pressors and we can proceed with intermittent HD if stable from cardiac standpoint            Malinda Lopez, PGY-5  Nephrology Fellow  [facility]  Pager: [phone]      I have reviewed and concur with the fellow's history, physical, assessment, and plan. I have personally interviewed and examined the patient at bedside. See below addendum for my evaluation and additional findings. I will discuss with IC about the tropnenemia

## 2017-08-30 NOTE — MEDICAL/APP STUDENT
[facility]  Neurology  Consult Note    Patient Name: Bernabe Greco Jr.  MRN: [id number]  Admission Date: 8/29/2017  Hospital Length of Stay: 0 days  Code Status: Full Code   Attending Provider: Francine Saravia MD   Consulting Provider: Raz Keyes  Primary Care Physician: Primary Doctor No  Principal Problem:Chest pain    Consults  Subjective:     Chief Complaint: Seizures    HPI: Mr. Greco is a 76 y/o AA male with PMH of aortic stenosis, PAD, HTN, ESRD on HD and stroke. Patient presented to the ED with chest pain which was initially resolved with NG . Patient began having chest pain again while on dialysis and became febrile. CT was recommended because patient appeared to have a left gaze and had another seizure on the way to get the Ct. Overall the patient had 4 seizures in a short period of time. Neuro was consulted to rule out status epilepticus.     Past Medical History:   Diagnosis Date    Arthritis     Diabetes mellitus     DNR (do not resuscitate) 5/17/2017    Hyperlipidemia     Hypertension     Stroke     Syncope and collapse        Past Surgical History:   Procedure Laterality Date    AV FISTULA PLACEMENT Right     HIP SURGERY      replacement right    JOINT REPLACEMENT      right hip        Review of patient's allergies indicates:   Allergen Reactions    Penicillins Rash     Received ceftriaxone and cefepime in 2017       Current Neurological Medications: ***    No current facility-administered medications on file prior to encounter.      Current Outpatient Prescriptions on File Prior to Encounter   Medication Sig    aspirin (ECOTRIN) 81 MG EC tablet Take 1 tablet (81 mg total) by mouth once daily.    atorvastatin (LIPITOR) 80 MG tablet Take 1 tablet (80 mg total) by mouth once daily.    carvedilol (COREG) 6.25 MG tablet Take 1 tablet (6.25 mg total) by mouth 2 (two) times daily.    isosorbide mononitrate (IMDUR) 30 MG 24 hr tablet Take 1 tablet (30 mg total) by  mouth once daily.    sevelamer carbonate (RENVELA) 800 mg Tab Take 2 tablets (1,600 mg total) by mouth 3 (three) times daily with meals.    ticagrelor (BRILINTA) 90 mg tablet Take 1 tablet (90 mg total) by mouth 2 (two) times daily.    carboxymethylcellulose (REFRESH PLUS) 0.5 % Dpet Place 1 drop into both eyes 5 (five) times daily.    lorazepam (ATIVAN) 0.5 MG tablet Take 0.5 mg by mouth 2 (two) times daily as needed for Anxiety.    omeprazole (PRILOSEC) 40 MG capsule Take 40 mg by mouth once daily.    vitamin renal formula, B-complex-vitamin c-folic acid, (NEPHROCAPS) 1 mg Cap Take 1 capsule by mouth once daily.    white petrolatum-mineral oil 56.8-42.5% (REFRESH LACRI-LUBE) 56.8-42.5 % Oint Apply a thin ribbon to bottom eye lid every night      Family History     Problem Relation (Age of Onset)    Hypertension Mother        Social History Main Topics    Smoking status: Former Smoker     Packs/day: 0.25     Quit date: 1/1/1978    Smokeless tobacco: Never Used    Alcohol use No    Drug use: No    Sexual activity: Not Currently     Review of Systems   Unable to perform ROS: Intubated     Objective:     Vital Signs (Most Recent):  Temp: 98.8 °F (37.1 °C) (08/30/17 0300)  Pulse: 75 (08/30/17 1312)  Resp: 20 (08/30/17 0732)  BP: 125/60 (08/30/17 0732)  SpO2: 100 % (08/30/17 1312) Vital Signs (24h Range):  Temp:  [98.8 °F (37.1 °C)-102.1 °F (38.9 °C)] 98.8 °F (37.1 °C)  Pulse:  [] 75  Resp:  [18-36] 20  SpO2:  [85 %-100 %] 100 %  BP: ()/(40-76) 125/60     Weight: 80.2 kg (176 lb 12.9 oz)  Body mass index is 25.37 kg/m².    Physical Exam   Constitutional: He appears well-developed and well-nourished.   HENT:   Head: Normocephalic and atraumatic.   Cardiovascular: Normal rate.    Murmur heard.  Pulmonary/Chest: Effort normal and breath sounds normal.   Abdominal: Soft. Bowel sounds are normal.   Musculoskeletal: Normal range of motion.   Skin: Skin is warm and dry.   Vitals  reviewed.      NEUROLOGICAL EXAMINATION:     MENTAL STATUS   Level of consciousness: unresponsive to painful stimuli       Patient intubated and sedated. Not responsive to pain or voice.      CRANIAL NERVES        Unable to perform     MOTOR EXAM        Unable to perform         Significant Labs:   HgB: 7.7  Hematocrit: 25.1  BUN: 41  Cr: 9.4  Alb: 2.6  TROP I: 12.4    Significant Imaging: CT: I have reviewed all pertinent results/findings within the past 24 hours and my personal findings are:  No acute findings on CT    Assessment and Plan:   Assessement:  Mr. Greco is a 68 y/o male with PMH of aortic stenosis, PAD, HTN, ESRD on HD and stroke. Seizures could be due to    Infection: not likely because patient is now afebrile but is being treated with Abx.  Metabolic: CBC and CMP appear to be normal  Toxic: UDS -  Stroke: CT scan negative for acute changes    Plan:  Continue 500mg Keppra BID for seizure control  Active Diagnoses:    Diagnosis Date Noted POA    PRINCIPAL PROBLEM:  Chest pain [R07.9] 08/29/2017 Yes    Nonobstructive atherosclerosis of coronary artery [I70.91] 08/29/2017 Yes    ESRD (end stage renal disease) on dialysis [N18.6, Z99.2] 08/09/2017 Not Applicable    NSTEMI (non-ST elevated myocardial infarction) [I21.4] 07/30/2017 Yes    Malfunction of arteriovenous dialysis fistula [T82.590A] 05/19/2017 Yes    Anemia associated with chronic renal failure [D63.1] 01/22/2015 Yes    Aortic valve disorders [I35.9] 01/11/2015 Yes    Shock [R57.9] 12/25/2014 Yes    Seizure [R56.9] 12/25/2014 Yes    Type 2 diabetes mellitus with diabetic neuropathy, without long-term current use of insulin [E11.40] 06/12/2014 Yes    Essential hypertension [I10] 03/24/2014 Yes    Hyperlipidemia LDL goal <70 [E78.5] 03/24/2014 Yes      Problems Resolved During this Admission:    Diagnosis Date Noted Date Resolved POA       VTE Risk Mitigation         Ordered     Low Risk of VTE  Once      08/29/17 0623          Thank  you for your consult. I will sign off. Please contact us if you have any additional questions.    Raz Keyes  Neurology  [facility]

## 2017-08-30 NOTE — PROCEDURES
Procedure Note    Procedure- Intubation   Fellow- Antelmo    08/29/2017    Bernabe Greco     3080/3080 A    Attending present in the room: Anesthesia (resident)     Specific indication for intubation: AMS, airway protection     Urgency of intubation: Emergent     Pre-oxygenation device used: nasal cannula/BVM     Sedative medications used Propofol 50     Paralytic medication used: Succinylcholine- 100    Ventilation between medication administration and laryngoscopy: BVM     Position of patient during intubation: Supine       Laryngoscopy device used on initial intubation attempt: Direct   Type and size of blade used on initial intubation attempt: Curved 4   Size of endotracheal tube: 8.0     Endotracheal tube location: At the teeth- 23cm     ETT location confirmed by by auscultation, CXR, EtCO2.    Cormack-Lehane Glottic view on first Attempt: I    Airway description: easy      Complications: No immediate complications1    Number of attempts:1      Luis A Rodriges M.D.   Pulmonary/Critical Care Fellow- PGY VIII

## 2017-08-30 NOTE — PLAN OF CARE
CM spoke with Dr. Lazo of [facility] physician team to request admit to inpatient order as patient was originally placed in observation status.  CM will continue to follow.    Shweta Jimenez RN, BSN  Case Management  [facility]  Ext. [phone]

## 2017-08-30 NOTE — ASSESSMENT & PLAN NOTE
- Known to have CAD with recent PCI to OM1 with YANE on 08/01/17  and residual D1 70% stenosis not intervened upon  - Presentation with chest pain, and troponin of 0.053 > .086, with no significant ECG changes. RAMU score for NSTEMI is 5   - On 8/29 evening, he developed seizure like activity, and sen for CT head to exclude stroke, which was negative, was having fever and leokocytosis and started on ABx.  - ECG and rhythm showed: initially 1st degree HB, and then sinus tachycardia (around the episode of seizure), then rhythm switched to junctional tachycardia, and then eventually to his baseline rhythm. No sings of ischemia in his ECG.  - Currently on low dose of pressors (Levophed 0.01) and fentanyl 200 mcg.   - We recommend:   - Continue dual antiplatelet    - Continue Heparin infusion for the time being

## 2017-08-31 NOTE — PROGRESS NOTES
"[facility]  Nephrology  Progress Note    Patient Name: Bernabe Greco Jr.  MRN: [id number]  Admission Date: 8/29/2017  Hospital Length of Stay: 1 days  Attending Provider: Francine Saravia MD   Primary Care Physician: Primary Doctor No  Principal Problem:Chest pain    Subjective:     HPI: 78 yo AA male with significant history for hypertension, hyperlipidemia, CAD sp recent NSTEMI/YANE to Lcx/OM1 also found to have D1 70% non intervene tx medically on 8/1/17, Systolic heart failure (EF 43% 9/1) PAD, severe aortic stenosis with TVAR work up in progress, and ESRD on HD who presented to hospital via EMS for non radiating mid sternal chest "pressure" that started at 9 pm last night associated with palpitation, shortness of breath and diaphoresis. Patient did not have home nitro. Laying down did not help and by 12 am chest pressure worsen 8/10 then wife called 911. Symptoms improved with nitro SL en route to hospital and completed resolved with morphine and GI cocktail in ED. Elevated troponin down from previous admit 0.058>0.086. BMP up from previous. EKG NSR first degree AVB with NSTWA. This 830 pm patient got news that brother next door just passed away and patient currently have mid sternal chest pressure 8/10. Nurse is going to give patient nitro now. Wife states symptoms are similar to MI patient had earlier this month. Nephrology consult for HD. HD TTS 4 hrs duration via RFA AVF at [facility] under care of Dr. Romo. EDW 82 kg. He is under is EDW. Reports UF 3-4 in between HD days. Very minimal urine.       Interval History:   Evaluated by cardiology; recommended medical management. No events overnight. Off pressors this morning. Remains intubated and on precedex. MAP 78 this AM. Minimal vent settings. Net +1.5L yesterday.     Review of patient's allergies indicates:   Allergen Reactions    Penicillins Rash     Received ceftriaxone and cefepime in 2017     Current Facility-Administered " Medications   Medication Frequency    0.9%  NaCl infusion (for blood administration) Q24H PRN    0.9%  NaCl infusion PRN    acetaminophen tablet 650 mg Q8H PRN    aspirin EC tablet 81 mg Daily    atorvastatin tablet 80 mg Daily    ceFEPIme in dextrose 5% 2 gram/50 mL IVPB 2 g Q24H    citalopram tablet 20 mg Daily    dexmedetomidine (PRECEDEX) 400mcg/100mL 0.9% NaCL infusion Continuous    dextrose 50% injection 12.5 g PRN    epoetin lyssa injection 8,000 Units Every Tues, Thurs, Sat    fentaNYL 2500 mcg in D5W 250 mL infusion premix (titrating) (conc: 10 mcg/mL) Continuous    glucagon (human recombinant) injection 1 mg PRN    heparin 25,000 units in dextrose 5% 250 mL (100 units/mL) bolus from bag; ADDITIONAL PRN BOLUS PRN    heparin 25,000 units in dextrose 5% 250 mL (100 units/mL) bolus from bag; ADDITIONAL PRN BOLUS PRN    insulin aspart pen 1-10 Units Q6H PRN    levetiracetam in NaCl (iso-os) IVPB 500 mg Q12H    lorazepam tablet 0.5 mg BID PRN    norepinephrine 4 mg in dextrose 5% 250 mL infusion (premix) (titrating) Continuous    ondansetron injection 4 mg Q6H PRN    pantoprazole injection 40 mg BID    sevelamer carbonate tablet 1,600 mg TID WM    sodium chloride 0.9% flush 3 mL Q8H    ticagrelor tablet 90 mg BID    vitamin renal formula (B-complex-vitamin c-folic acid) 1 mg per capsule 1 capsule Daily       Objective:     Vital Signs (Most Recent):  Temp: 97.7 °F (36.5 °C) (08/31/17 0701)  Pulse: 68 (08/31/17 1217)  Resp: (!) 27 (08/31/17 1217)  BP: (!) 102/54 (08/31/17 1200)  SpO2: 100 % (08/31/17 1217)  O2 Device (Oxygen Therapy): T-Piece Trial (08/31/17 1217) Vital Signs (24h Range):  Temp:  [97.7 °F (36.5 °C)-100.5 °F (38.1 °C)] 97.7 °F (36.5 °C)  Pulse:  [] 68  Resp:  [10-39] 27  SpO2:  [90 %-100 %] 100 %  BP: ()/(50-75) 102/54     Weight: 80.2 kg (176 lb 12.9 oz) (08/29/17 0645)  Body mass index is 25.37 kg/m².  Body surface area is 1.99 meters squared.    I/O last 3  completed shifts:  In: 3021.3 [I.V.:2621.3; NG/GT:100; IV Piggyback:300]  Out: -     Physical Exam   Constitutional: He appears well-developed and well-nourished.   HENT:   Head: Normocephalic and atraumatic.   Cardiovascular: Normal rate and regular rhythm.    Murmur heard.  Pulmonary/Chest: Effort normal and breath sounds normal.   Abdominal: Soft. He exhibits no distension.   Musculoskeletal: He exhibits no edema or deformity.   Skin: Skin is warm and dry.       Significant Labs:  CBC:   Recent Labs  Lab 08/31/17  1028   WBC 7.20   RBC 2.86*   HGB 7.8*   HCT 25.6*      MCV 90   MCH 27.3   MCHC 30.5*     CMP:   Recent Labs  Lab 08/31/17  0334   *   CALCIUM 8.9   ALBUMIN 2.5*   PROT 6.4      K 5.2*   CO2 17*      BUN 53*   CREATININE 11.6*   ALKPHOS 73   ALT 18   AST 44*   BILITOT 0.6     All labs within the past 24 hours have been reviewed.     Significant Imaging:  Labs: Reviewed    Assessment/Plan:     ESRD (end stage renal disease) on dialysis    - receives iHD TTS via RUE AVF at [facility] with Dr. Sandhu. Treatment duration 4 hours EDW 82kg.   - admitted with NSTEMI. Medical management per cardiology  - last HD on Tuesday  - MAP 78 this AM; a little lower later this AM  - iHD x 3 hours today. No UF. Midodrine PRN before dialysis if needed.             Malinda Lopez, PGY-5  Nephrology Fellow  [facility]  Pager: [phone]      I have reviewed and concur with the fellow's history, physical, assessment, and plan. I have personally interviewed and examined the patient at bedside. See below addendum for my evaluation and additional findings.

## 2017-08-31 NOTE — PROCEDURES
DATE OF SERVICE:  08/30/2017.    DURATION:  9 hours and 6 minutes.    ICU EEG/VIDEO MONITORING REPORT    METHODOLOGY:  Electroencephalographic (EEG) is recorded with electrodes placed   according to the International 10-20 placement system.  Thirty two (32) channels   of digital signal (sampling rate of 512/sec), including T1 and T2, were   simultaneously recorded from the scalp and may include EKG, EMG, and/or eye   monitors.  Recording band pass was 0.1 to 512 Hz.  Digital video recording of   the patient is simultaneously recorded with the EEG.  The patient is instructed   to report clinical symptoms which may occur during the recording session.  EEG   and video recording are stored and archived in digital format.  Activation   procedures, which include photic stimulation, hyperventilation and instructing   patients to perform simple tasks, are done in selected patients.    The EEG is displayed on a monitor screen and can be reviewed using different   montages.  Computer-assisted analysis is employed to detect spike and   electrographic seizure activity.   The entire record is submitted for computer   analysis.  The entire recording is visually reviewed, and the times identified   by computer analysis as being spikes or seizures are reviewed again.    Compressed spectral analysis (CSA) is also performed on the activity recorded   from each individual channel.  This is displayed as a power display of   frequencies from 0 to 30 Hz over time.   The CSA is reviewed looking for   asymmetries in power between homologous areas of the scalp, then compared with   the original EEG recording.    [employer] software was also utilized in the review of this study.  This software   suite analyzes the EEG recording in multiple domains.  Coherence and rhythmicity   are computed to identify EEG sections which may contain organized seizures.    Each channel undergoes analysis to detect the presence of spike and sharp waves   which  have special and morphological characteristics of epileptic activity.  The   routine EEG recording is converted from special into frequency domain.  This is   then displayed comparing homologous areas to identify areas of significant   asymmetry.  Algorithm to identify non-cortically generated artifact is used to   separate artifact from the EEG.    RECORDING TIMES:  The study begins at 08/29/2017 at 2302 and concludes to   08/30/2017 at 0808.    FINDINGS:  This is an electrode cap study, but it is of excellent text technical   quality and is readable throughout the entire duration.  The overall background   consists of mixed frequency activity with low-amplitude alpha and beta seen   frequently throughout the record.  There is no posterior dominant rhythm, but   abundant faster frequencies are seen.  There is some low-amplitude delta   activity, at times.  There is central beta activity seen at times as well.    There is good variability throughout the record, and there are portions in which   the patient is more alert, and a faster background consisting primarily of   diffuse alpha and theta is also seen.    There were no epileptiform discharges, electrographic seizures, rhythmic runs or   clinical events at any point during this study.  There were no focal findings.    INTERPRETATION:  Abnormal EEG due to mild slowing and disorganization, but no   epileptiform findings or focal features were seen at any point during this   9-hour video study.      MADAN/WENDY  dd: 08/30/2017 13:07:02 (CDT)  td: 08/30/2017 20:58:25 (CDT)  Doc ID   #[id number]  Job ID #[id number]    CC:

## 2017-08-31 NOTE — SUBJECTIVE & OBJECTIVE
Interval History:   Evaluated by cardiology; recommended medical management. No events overnight. Off pressors this morning. Remains intubated and on precedex. MAP 78 this AM. Minimal vent settings. Net +1.5L yesterday.     Review of patient's allergies indicates:   Allergen Reactions    Penicillins Rash     Received ceftriaxone and cefepime in 2017     Current Facility-Administered Medications   Medication Frequency    0.9%  NaCl infusion (for blood administration) Q24H PRN    0.9%  NaCl infusion PRN    acetaminophen tablet 650 mg Q8H PRN    aspirin EC tablet 81 mg Daily    atorvastatin tablet 80 mg Daily    ceFEPIme in dextrose 5% 2 gram/50 mL IVPB 2 g Q24H    citalopram tablet 20 mg Daily    dexmedetomidine (PRECEDEX) 400mcg/100mL 0.9% NaCL infusion Continuous    dextrose 50% injection 12.5 g PRN    epoetin lyssa injection 8,000 Units Every Tues, Thurs, Sat    fentaNYL 2500 mcg in D5W 250 mL infusion premix (titrating) (conc: 10 mcg/mL) Continuous    glucagon (human recombinant) injection 1 mg PRN    heparin 25,000 units in dextrose 5% 250 mL (100 units/mL) bolus from bag; ADDITIONAL PRN BOLUS PRN    heparin 25,000 units in dextrose 5% 250 mL (100 units/mL) bolus from bag; ADDITIONAL PRN BOLUS PRN    insulin aspart pen 1-10 Units Q6H PRN    levetiracetam in NaCl (iso-os) IVPB 500 mg Q12H    lorazepam tablet 0.5 mg BID PRN    norepinephrine 4 mg in dextrose 5% 250 mL infusion (premix) (titrating) Continuous    ondansetron injection 4 mg Q6H PRN    pantoprazole injection 40 mg BID    sevelamer carbonate tablet 1,600 mg TID WM    sodium chloride 0.9% flush 3 mL Q8H    ticagrelor tablet 90 mg BID    vitamin renal formula (B-complex-vitamin c-folic acid) 1 mg per capsule 1 capsule Daily       Objective:     Vital Signs (Most Recent):  Temp: 97.7 °F (36.5 °C) (08/31/17 0701)  Pulse: 68 (08/31/17 1217)  Resp: (!) 27 (08/31/17 1217)  BP: (!) 102/54 (08/31/17 1200)  SpO2: 100 % (08/31/17 1217)  O2  Device (Oxygen Therapy): T-Piece Trial (08/31/17 1217) Vital Signs (24h Range):  Temp:  [97.7 °F (36.5 °C)-100.5 °F (38.1 °C)] 97.7 °F (36.5 °C)  Pulse:  [] 68  Resp:  [10-39] 27  SpO2:  [90 %-100 %] 100 %  BP: ()/(50-75) 102/54     Weight: 80.2 kg (176 lb 12.9 oz) (08/29/17 0645)  Body mass index is 25.37 kg/m².  Body surface area is 1.99 meters squared.    I/O last 3 completed shifts:  In: 3021.3 [I.V.:2621.3; NG/GT:100; IV Piggyback:300]  Out: -     Physical Exam   Constitutional: He appears well-developed and well-nourished.   HENT:   Head: Normocephalic and atraumatic.   Cardiovascular: Normal rate and regular rhythm.    Murmur heard.  Pulmonary/Chest: Effort normal and breath sounds normal.   Abdominal: Soft. He exhibits no distension.   Musculoskeletal: He exhibits no edema or deformity.   Skin: Skin is warm and dry.       Significant Labs:  CBC:   Recent Labs  Lab 08/31/17  1028   WBC 7.20   RBC 2.86*   HGB 7.8*   HCT 25.6*      MCV 90   MCH 27.3   MCHC 30.5*     CMP:   Recent Labs  Lab 08/31/17  0334   *   CALCIUM 8.9   ALBUMIN 2.5*   PROT 6.4      K 5.2*   CO2 17*      BUN 53*   CREATININE 11.6*   ALKPHOS 73   ALT 18   AST 44*   BILITOT 0.6     All labs within the past 24 hours have been reviewed.     Significant Imaging:  Labs: Reviewed

## 2017-08-31 NOTE — PLAN OF CARE
Problem: Patient Care Overview  Goal: Plan of Care Review  Outcome: Ongoing (interventions implemented as appropriate)  Plan of care updated and reviewed with pt. VS and assessments per flowsheet. Pt spiked temp of 100.5, tylenol given, repeat blood cultures drawn. Blood cultures from 8/29 positive for gram neg rods, team notified. CT scan complete. No urine output throughout shift, bladder scan revealed 250 ml. Pt awake and able to follow commands. Levo and Precedex titrated off, pt bradycardic (HR 55) since 0530.  All questions and concerns addressed. Continue to monitor.

## 2017-08-31 NOTE — ASSESSMENT & PLAN NOTE
- receives iHD TTS via RUE AVF at [facility] with Dr. Sandhu. Treatment duration 4 hours EDW 82kg.   - admitted with NSTEMI. Medical management per cardiology  - last HD on Tuesday  - MAP 78 this AM; a little lower later this AM  - iHD x 3 hours today. No UF. Midodrine PRN before dialysis if needed.

## 2017-08-31 NOTE — ASSESSMENT & PLAN NOTE
- Known to have CAD with recent PCI to OM1 with [facility] on 08/01/17  and residual D1 70% stenosis not intervened upon  - Presentation with chest pain, and troponin of 0.053 > .086, with no significant ECG changes. RAMU score for NSTEMI is 5   - On 8/29 evening, he developed seizure like activity, and sen for CT head to exclude stroke, which was negative, was having fever and leokocytosis and started on ABx.  - Trending down of Troponin, we recommend not to continue  trending down troponin   - ECG and rhythm showed: initially 1st degree HB, and then sinus tachycardia (around the episode of seizure), then rhythm switched to junctional tachycardia, and then eventually to his baseline rhythm. No sings of ischemia in his ECG.  - Off pressors.   - We recommend:   - Continue dual antiplatelet    - Hold Heparin infusion

## 2017-08-31 NOTE — SUBJECTIVE & OBJECTIVE
Past Medical History:   Diagnosis Date    Arthritis     Diabetes mellitus     DNR (do not resuscitate) 5/17/2017    Hyperlipidemia     Hypertension     Stroke     Syncope and collapse        Past Surgical History:   Procedure Laterality Date    AV FISTULA PLACEMENT Right     HIP SURGERY      replacement right    JOINT REPLACEMENT      right hip        Review of patient's allergies indicates:   Allergen Reactions    Penicillins Rash     Received ceftriaxone and cefepime in 2017       No current facility-administered medications on file prior to encounter.      Current Outpatient Prescriptions on File Prior to Encounter   Medication Sig    aspirin (ECOTRIN) 81 MG EC tablet Take 1 tablet (81 mg total) by mouth once daily.    atorvastatin (LIPITOR) 80 MG tablet Take 1 tablet (80 mg total) by mouth once daily.    carvedilol (COREG) 6.25 MG tablet Take 1 tablet (6.25 mg total) by mouth 2 (two) times daily.    isosorbide mononitrate (IMDUR) 30 MG 24 hr tablet Take 1 tablet (30 mg total) by mouth once daily.    sevelamer carbonate (RENVELA) 800 mg Tab Take 2 tablets (1,600 mg total) by mouth 3 (three) times daily with meals.    ticagrelor (BRILINTA) 90 mg tablet Take 1 tablet (90 mg total) by mouth 2 (two) times daily.    carboxymethylcellulose (REFRESH PLUS) 0.5 % Dpet Place 1 drop into both eyes 5 (five) times daily.    lorazepam (ATIVAN) 0.5 MG tablet Take 0.5 mg by mouth 2 (two) times daily as needed for Anxiety.    omeprazole (PRILOSEC) 40 MG capsule Take 40 mg by mouth once daily.    vitamin renal formula, B-complex-vitamin c-folic acid, (NEPHROCAPS) 1 mg Cap Take 1 capsule by mouth once daily.    white petrolatum-mineral oil 56.8-42.5% (REFRESH LACRI-LUBE) 56.8-42.5 % Oint Apply a thin ribbon to bottom eye lid every night     Family History     Problem Relation (Age of Onset)    Hypertension Mother        Social History Main Topics    Smoking status: Former Smoker     Packs/day: 0.25     Quit  date: 1/1/1978    Smokeless tobacco: Never Used    Alcohol use No    Drug use: No    Sexual activity: Not Currently     Review of Systems   Unable to perform ROS: intubated     Objective:     Vital Signs (Most Recent):  Temp: 97.7 °F (36.5 °C) (08/31/17 0701)  Pulse: 68 (08/31/17 1217)  Resp: (!) 27 (08/31/17 1217)  BP: (!) 102/54 (08/31/17 1200)  SpO2: 100 % (08/31/17 1217) Vital Signs (24h Range):  Temp:  [97.7 °F (36.5 °C)-100.5 °F (38.1 °C)] 97.7 °F (36.5 °C)  Pulse:  [] 68  Resp:  [10-39] 27  SpO2:  [90 %-100 %] 100 %  BP: ()/(50-75) 102/54     Weight: 80.2 kg (176 lb 12.9 oz)  Body mass index is 25.37 kg/m².    SpO2: 100 %  O2 Device (Oxygen Therapy): T-Piece Trial      Intake/Output Summary (Last 24 hours) at 08/31/17 1249  Last data filed at 08/31/17 0700   Gross per 24 hour   Intake           719.48 ml   Output                0 ml   Net           719.48 ml       Lines/Drains/Airways     Central Venous Catheter Line                 Trialysis (Dialysis) Catheter 08/29/17 2300 right femoral 1 day          Drain                 Hemodialysis AV Fistula Right forearm 07819 days         NG/OG Tube 08/29/17 2300 1 day          Airway                 Airway - Non-Surgical 08/29/17 2110 Endotracheal Tube 1 day          Peripheral Intravenous Line                 Peripheral IV - Single Lumen 08/29/17 0300 Left Forearm 2 days         Peripheral IV - Single Lumen 08/30/17 0701 Left Antecubital 1 day                Physical Exam   Constitutional: He appears well-developed and well-nourished. No distress.   HENT:   Head: Normocephalic and atraumatic.   Eyes: Conjunctivae are normal. Pupils are equal, round, and reactive to light.   Neck: No JVD present. No tracheal deviation present. No thyromegaly present.   Cardiovascular: Normal rate.  Exam reveals no friction rub.    Murmur heard.  Pulmonary/Chest: Effort normal.   Vent Mode: Spont  Oxygen Concentration (%):  (30) 30  Resp Rate Total:  (11 br/min-34  br/min) 34 br/min  Vt Set:  (440 mL) 440 mL  PEEP/CPAP:  (5 cmH20) 5 cmH20  Pressure Support:  (0 cmH20-7 cmH20) 0 cmH20  Mean Airway Pressure:  (6.2 ggY09-00 cmH20) 6.2 cmH20     Abdominal: Soft. Bowel sounds are normal. He exhibits no distension. There is no tenderness.   Musculoskeletal: Normal range of motion. He exhibits no edema or deformity.     CBC:   Recent Labs  Lab 08/30/17 0430 08/30/17 2018 08/31/17  1028   WBC 11.09 10.90 7.20   RBC 2.77* 3.03* 2.86*   HGB 7.7* 8.4* 7.8*   HCT 25.1* 27.2* 25.6*    183 165   MCV 91 90 90   MCH 27.8 27.7 27.3   MCHC 30.7* 30.9* 30.5*     BMP:   Recent Labs  Lab 08/29/17  2110 08/30/17 0430 08/31/17  0334   * 71 152*    140 136   K 4.7 4.6 5.2*    107 104   CO2 19* 23 17*   BUN 36* 41* 53*   CREATININE 8.9* 9.4* 11.6*   CALCIUM 9.5 8.7 8.9   MG 1.9  --   --      Coagulation: No results for input(s): INR, APTT in the last 168 hours.    Invalid input(s): PT  Microbiology Results (last 7 days)     Procedure Component Value Units Date/Time    Blood culture [[id number]] Collected:  08/30/17 2303    Order Status:  Completed Specimen:  Blood from Peripheral, Hand, Left Updated:  08/31/17 0945     Blood Culture, Routine No Growth to date    Narrative:       Aerobic and anaerobic from site #1    Blood culture [[id number]] Collected:  08/30/17 2227    Order Status:  Completed Specimen:  Blood from Line, Femoral, Right Updated:  08/31/17 0945     Blood Culture, Routine No Growth to date    Narrative:       Aerobic and anaerobic from site #2    Blood culture [[id number]] Collected:  08/29/17 2300    Order Status:  Completed Specimen:  Blood from Line, Femoral, Right Updated:  08/31/17 0101     Blood Culture, Routine Gram stain aer bottle: Gram negative rods      Blood Culture, Routine Gram stain winston bottle: Gram negative rods      Blood Culture, Routine Results called to and read back by: Ebenezer Bean RN  08/31/2017  01:01    Blood culture [[id number]]  Collected:  08/29/17 2300    Order Status:  Completed Specimen:  Blood from Peripheral, Antecubital, Left Updated:  08/31/17 0101     Blood Culture, Routine Gram stain [place] bottle: Gram negative rods      Blood Culture, Routine Results called to and read back by: Ebenezer Bean RN  08/31/2017  01:01    Culture, Respiratory with Gram Stain [[id number]] Collected:  08/30/17 0205    Order Status:  Completed Specimen:  Respiratory from Endotracheal Aspirate Updated:  08/30/17 0901     Gram Stain (Respiratory) <10 epithelial cells per low power field.     Gram Stain (Respiratory) No WBC's     Gram Stain (Respiratory) Rare Gram negative rods     Gram Stain (Respiratory) Rare Gram positive cocci    Urine culture [[id number]] Collected:  08/30/17 0205    Order Status:  Sent Specimen:  Urine from Urine Updated:  08/30/17 0206    Blood culture [[id number]]     Order Status:  Canceled Specimen:  Blood     Blood culture [[id number]]     Order Status:  Canceled Specimen:  Blood

## 2017-09-01 NOTE — PROGRESS NOTES
"Dr. Silva with interventional cardiology rounding at bedside. Pt yelling "my stomach hurts." Pt very agitated, pulling at lines, wanting to get out of bed. Moderate relief obtained with prn fentanyl. Orders received for GI cocktail and KUB. Will continue to monitor closely.   "

## 2017-09-01 NOTE — PROGRESS NOTES
"Patient complaining of 8/10 chest pain. Denies n/v, SOB.  Patient stating "I feel smothered." Vitals stable. Notified Dr. Jones with [facility]. Orders received for 1 time dose of 25mcg fentanyl IV push. Continue to monitor closely.   "

## 2017-09-01 NOTE — PROGRESS NOTES
HD completed x 3 hrs with no UF, pt ran net even, pt tolerated. Blood rinsed back with NS and needles pulled with bleeding time 8 mins. CLean gauze dsg applied with paper tape. Report given to primary RN.

## 2017-09-01 NOTE — PROGRESS NOTES
[facility]  Neurology  Progress Note    Patient Name: Bernabe Greco Jr.  MRN: [id number]  Admission Date: 8/29/2017  Hospital Length of Stay: 1 days  Code Status: DNR   Attending Provider: Francine Saravia MD  Primary Care Physician: Primary Doctor No   Principal Problem:Chest pain      Subjective:     Interval History:   Only on precedex gtt this AM.  Able to follow commands; remains intubated.    Current Neurological Medications:   Keppra 500 mg BID  Precedex gtt  Citalopram 20 mg Qdaily  ASA 81, ticagrelor 90 BID, atorvastatin 80 qdaily    Current Facility-Administered Medications   Medication Dose Route Frequency Provider Last Rate Last Dose    0.9%  NaCl infusion (for blood administration)   Intravenous Q24H PRN Magda Lazo MD        0.9%  NaCl infusion   Intravenous PRN Malinda Lyles MD        acetaminophen tablet 650 mg  650 mg Oral Q8H PRN Smith Haynes MD   650 mg at 08/30/17 2059    aspirin EC tablet 81 mg  81 mg Per NG tube Daily Nela Schwartz MD   81 mg at 08/31/17 0843    atorvastatin tablet 80 mg  80 mg Per OG tube Daily Nela Schwartz MD   80 mg at 08/31/17 0843    ceFEPIme in dextrose 5% 2 gram/50 mL IVPB 2 g  2 g Intravenous Q24H Richardson Gutierrez  mL/hr at 08/31/17 0843 2 g at 08/31/17 0843    citalopram tablet 20 mg  20 mg Per OG tube Daily Nela Schwartz MD   20 mg at 08/31/17 0843    dexmedetomidine (PRECEDEX) 400mcg/100mL 0.9% NaCL infusion  0.2 mcg/kg/hr Intravenous Continuous Alton Heredia MD   Stopped at 08/31/17 0700    dextrose 50% injection 12.5 g  12.5 g Intravenous PRN Nela Schwartz MD        epoetin lyssa injection 8,000 Units  100 Units/kg Intravenous Every Tues, Thurs, Sat Constance Demetrius Daigle NP   8,000 Units at 08/31/17 1423    fentaNYL 2500 mcg in D5W 250 mL infusion premix (titrating) (conc: 10 mcg/mL)   Intravenous Continuous Nela Schwartz MD   Stopped at 08/31/17 0701    glucagon (human recombinant) injection 1 mg  1 mg  Intramuscular PRN Nela Schwartz MD        heparin 25,000 units in dextrose 5% 250 mL (100 units/mL) bolus from bag; ADDITIONAL PRN BOLUS  30 Units/kg Intravenous PRN Anatoliy Cazares MD        heparin 25,000 units in dextrose 5% 250 mL (100 units/mL) bolus from bag; ADDITIONAL PRN BOLUS  15 Units/kg Intravenous PRN Anatoliy Cazares MD        insulin aspart pen 1-10 Units  1-10 Units Subcutaneous Q6H PRN Nela Schwartz MD   2 Units at 08/31/17 0624    levetiracetam in NaCl (iso-os) IVPB 500 mg  500 mg Intravenous Q12H Nela Schwartz  mL/hr at 08/31/17 0843 500 mg at 08/31/17 0843    lorazepam tablet 0.5 mg  0.5 mg Oral BID PRN Smith Haynes MD        midodrine tablet 10 mg  10 mg Oral Once PRN Malinda Lyles MD        norepinephrine 4 mg in dextrose 5% 250 mL infusion (premix) (titrating)  0.02 mcg/kg/min (Dosing Weight) Intravenous Continuous Nela Schwartz MD   Stopped at 08/31/17 0515    ondansetron injection 4 mg  4 mg Intravenous Q6H PRN Smith Haynes MD        pantoprazole injection 40 mg  40 mg Intravenous BID Alton Heredia MD   40 mg at 08/31/17 0843    sevelamer carbonate tablet 1,600 mg  1,600 mg Oral TID  Smith Haynes MD   1,600 mg at 08/31/17 1839    sodium chloride 0.9% flush 3 mL  3 mL Intravenous Q8H Smith Haynes MD   3 mL at 08/31/17 1424    ticagrelor tablet 90 mg  90 mg Per OG tube BID Nela Schwartz MD   90 mg at 08/31/17 0843    vitamin renal formula (B-complex-vitamin c-folic acid) 1 mg per capsule 1 capsule  1 capsule Oral Daily Smith Haynes MD   1 capsule at 08/29/17 0923       Review of Systems   Unable to perform ROS: Intubated     Objective:     Vital Signs (Most Recent):  Temp: 98.1 °F (36.7 °C) (08/31/17 1500)  Pulse: 72 (08/31/17 1800)  Resp: 16 (08/31/17 1800)  BP: (!) 118/56 (08/31/17 1830)  SpO2: 100 % (08/31/17 1800) Vital Signs (24h Range):  Temp:  [97.7 °F (36.5 °C)-100.5 °F (38.1 °C)] 98.1 °F (36.7 °C)  Pulse:  [56-89]  72  Resp:  [5-27] 16  SpO2:  [90 %-100 %] 100 %  BP: (102-167)/(53-79) 118/56     Weight: 80.2 kg (176 lb 12.9 oz)  Body mass index is 25.37 kg/m².    Physical Exam   Constitutional: He appears well-developed. No distress.   Eyes: EOM are normal. Pupils are equal, round, and reactive to light.   Cardiovascular: Normal rate and regular rhythm.    Murmur heard.  Pulmonary/Chest:   Intubated, coarse breath sounds b/l lung fields   Abdominal: Soft. Bowel sounds are normal. He exhibits no distension. There is no tenderness.   Neurological:   Reflex Scores:       Tricep reflexes are 2+ on the right side and 2+ on the left side.       Bicep reflexes are 2+ on the right side and 2+ on the left side.       Brachioradialis reflexes are 2+ on the right side and 2+ on the left side.       Patellar reflexes are 2+ on the right side and 2+ on the left side.       Achilles reflexes are 1+ on the right side and 1+ on the left side.      NEUROLOGICAL EXAMINATION:     MENTAL STATUS   Level of consciousness: drowsy       Able to follow 1 step commands, show thumbs up.     CRANIAL NERVES     CN III, IV, VI   Pupils are equal, round, and reactive to light.  Extraocular motions are normal.        Blinks to threat b/l     MOTOR EXAM   Muscle bulk: normal  Overall muscle tone: normal    Strength   Right interossei: 4/5  Left interossei: 4/5    REFLEXES     Reflexes   Right brachioradialis: 2+  Left brachioradialis: 2+  Right biceps: 2+  Left biceps: 2+  Right triceps: 2+  Left triceps: 2+  Right patellar: 2+  Left patellar: 2+  Right achilles: 1+  Left achilles: 1+      Significant Labs:   Recent Results (from the past 24 hour(s))   CBC auto differential    Collection Time: 08/30/17  8:18 PM   Result Value Ref Range    WBC 10.90 3.90 - 12.70 K/uL    RBC 3.03 (L) 4.60 - 6.20 M/uL    Hemoglobin 8.4 (L) 14.0 - 18.0 g/dL    Hematocrit 27.2 (L) 40.0 - 54.0 %    MCV 90 82 - 98 fL    MCH 27.7 27.0 - 31.0 pg    MCHC 30.9 (L) 32.0 - 36.0 g/dL     RDW 16.3 (H) 11.5 - 14.5 %    Platelets 183 150 - 350 K/uL    MPV 9.9 9.2 - 12.9 fL    Gran # 9.6 (H) 1.8 - 7.7 K/uL    Lymph # 0.6 (L) 1.0 - 4.8 K/uL    Mono # 0.7 0.3 - 1.0 K/uL    Eos # 0.0 0.0 - 0.5 K/uL    Baso # 0.02 0.00 - 0.20 K/uL    Gran% 87.6 (H) 38.0 - 73.0 %    Lymph% 5.9 (L) 18.0 - 48.0 %    Mono% 6.1 4.0 - 15.0 %    Eosinophil% 0.1 0.0 - 8.0 %    Basophil% 0.2 0.0 - 1.9 %    Differential Method Automated    Blood culture    Collection Time: 08/30/17 10:27 PM   Result Value Ref Range    Blood Culture, Routine No Growth to date    Blood culture    Collection Time: 08/30/17 11:03 PM   Result Value Ref Range    Blood Culture, Routine No Growth to date    Anti-Xa Heparin Monitoring    Collection Time: 08/30/17 11:04 PM   Result Value Ref Range    Heparin Anti-Xa <0.10 (L) 0.30 - 0.70 IU/mL   POCT glucose    Collection Time: 08/30/17 11:15 PM   Result Value Ref Range    POCT Glucose 133 (H) 70 - 110 mg/dL   Comprehensive metabolic panel    Collection Time: 08/31/17  3:34 AM   Result Value Ref Range    Sodium 136 136 - 145 mmol/L    Potassium 5.2 (H) 3.5 - 5.1 mmol/L    Chloride 104 95 - 110 mmol/L    CO2 17 (L) 23 - 29 mmol/L    Glucose 152 (H) 70 - 110 mg/dL    BUN, Bld 53 (H) 8 - 23 mg/dL    Creatinine 11.6 (H) 0.5 - 1.4 mg/dL    Calcium 8.9 8.7 - 10.5 mg/dL    Total Protein 6.4 6.0 - 8.4 g/dL    Albumin 2.5 (L) 3.5 - 5.2 g/dL    Total Bilirubin 0.6 0.1 - 1.0 mg/dL    Alkaline Phosphatase 73 55 - 135 U/L    AST 44 (H) 10 - 40 U/L    ALT 18 10 - 44 U/L    Anion Gap 15 8 - 16 mmol/L    eGFR if African American 4.3 (A) >60 mL/min/1.73 m^2    eGFR if non  3.7 (A) >60 mL/min/1.73 m^2   Anti-Xa Heparin Monitoring    Collection Time: 08/31/17  3:34 AM   Result Value Ref Range    Heparin Anti-Xa <0.10 (L) 0.30 - 0.70 IU/mL   Vancomycin, random    Collection Time: 08/31/17  3:34 AM   Result Value Ref Range    Vancomycin, Random 19.7 Not established ug/mL   POCT glucose    Collection Time: 08/31/17   6:22 AM   Result Value Ref Range    POCT Glucose 165 (H) 70 - 110 mg/dL   CBC auto differential    Collection Time: 17 10:28 AM   Result Value Ref Range    WBC 7.20 3.90 - 12.70 K/uL    RBC 2.86 (L) 4.60 - 6.20 M/uL    Hemoglobin 7.8 (L) 14.0 - 18.0 g/dL    Hematocrit 25.6 (L) 40.0 - 54.0 %    MCV 90 82 - 98 fL    MCH 27.3 27.0 - 31.0 pg    MCHC 30.5 (L) 32.0 - 36.0 g/dL    RDW 16.2 (H) 11.5 - 14.5 %    Platelets 165 150 - 350 K/uL    MPV 9.6 9.2 - 12.9 fL    Gran # 6.0 1.8 - 7.7 K/uL    Lymph # 0.6 (L) 1.0 - 4.8 K/uL    Mono # 0.6 0.3 - 1.0 K/uL    Eos # 0.1 0.0 - 0.5 K/uL    Baso # 0.02 0.00 - 0.20 K/uL    Gran% 82.7 (H) 38.0 - 73.0 %    Lymph% 7.8 (L) 18.0 - 48.0 %    Mono% 8.3 4.0 - 15.0 %    Eosinophil% 0.8 0.0 - 8.0 %    Basophil% 0.3 0.0 - 1.9 %    Differential Method Automated    POCT glucose    Collection Time: 17  2:29 PM   Result Value Ref Range    POCT Glucose 139 (H) 70 - 110 mg/dL   POCT glucose    Collection Time: 17  6:36 PM   Result Value Ref Range    POCT Glucose 126 (H) 70 - 110 mg/dL         Significant Imagin17 MRI Brain w/o: Per resident review,  Prior R frontal, b/l occipital old infarcts.  Hemisoderin encephalomalacia b/l parieto-occipital regions.              Assessment and Plan:     Seizure    Mr. Greco is a 76 yo female w/ PMH significant for severe AS, DM, HTN, HLD, PAD, stroke (2017; 2014; residual vision deficits, BLE weakness), ESRD on HD MWF, who presented on  via EMS with NSTEMI.  Subsequently found to have L-sided gaze, concern for seizure activity x4, for whom neurology is consulted for seizures.    Questionable history of seizures in 2017 and  associated with possible strokes at that time.  Limited history available for current seizure activity.  Found to have lactic acidosis, NSTEMI, elevated procalcitonin.   EEG prelim read negative for epileptic activity.       -> exam significantly improved, awake and following commands while  intubated.    Recommendations  -Continue keppra 500 mg BID  -Discontinue benadryl (lowers seizure threshold, taking at home for sleep)  -MRI Brain unrevealing  -F/u remaining Vit B labs and replete appropriately; keppra level  -Trend BUN; correct/treat any metabolic/infectious abnormalities  -Antiplatelets/anticoagulation per primary  -Seizure precautions, telemetry    -Pt must refrain from driving for 6 months after having a seizure, and must be cleared by a neurologist to legally resume driving.  Physician team not required to report pt to DMV.  Additionally, advise pt to avoid bathing alone, working in high places, and to not supervise children swimming alone.  (unable to discuss with pt at this time, while on sedation/intubated; please remind pt of the above when extubated and appropriate).      Neurology will sign off.  Please call for any questions.              VTE Risk Mitigation         Ordered     Low Risk of VTE  Once      08/29/17 0627          Warren Haynes MD  Neurology  [facility]

## 2017-09-01 NOTE — PROGRESS NOTES
Pt c/o chest pain after HD completed and needles pulled. Primary RN notified and CC MD called to the bedside. Fluid bolus started. No fluid was removed with HD.

## 2017-09-01 NOTE — PROGRESS NOTES
[facility]  Critical Care Medicine  Progress Note    Patient Name: Bernabe Greco Jr.  MRN: [id number]  Admission Date: 8/29/2017  Hospital Length of Stay: 1 days  Code Status: DNR  Attending Physician: Francine Saravia MD   Primary Care Provider: Primary Doctor No   Principal Problem: Chest pain    Subjective:     HPI:   Mr. Greco is a 77 y.o. M with severe aortic stenosis (IMANI = 0.6 cm2, PV = 4.53 m/s, MG = 43), CHF (EF 43%) on ECHO Aug '17, PAD with Rt SFA , stroke, with residual visual defecit, ESRD on HD T/Th/Sat who was admitted for acute CHF exacerbation but had NSTEMI, seizure-like activity for which he was admitted to the ICU on 8/29.     Hospital Course  Overnight, no acute events. Vital signs within normal limits. Of note, he has grown gram negative rods in the blood from 8/29.   Pt to undergo SBT/T-piece trial and extubate today.    Review of Systems   Unable to perform ROS: Intubated     Objective:     Vital Signs (Most Recent):  Temp: (!) 102.1 °F (38.9 °C) (08/29/17 1933)  Pulse: 96 (08/29/17 2300)  Resp: (!) 21 (08/29/17 2300)  BP: 107/60 (08/29/17 2300)  SpO2: 100 % (08/29/17 2300) Vital Signs (24h Range):  Temp:  [98.2 °F (36.8 °C)-102.1 °F (38.9 °C)] 102.1 °F (38.9 °C)  Pulse:  [] 96  Resp:  [12-28] 21  SpO2:  [89 %-100 %] 100 %  BP: ()/(51-83) 107/60   Weight: 80.2 kg (176 lb 12.9 oz)  Body mass index is 25.37 kg/m².      Intake/Output Summary (Last 24 hours) at 08/29/17 2342  Last data filed at 08/29/17 1842   Gross per 24 hour   Intake              960 ml   Output             1700 ml   Net             -740 ml       Physical Exam   Constitutional:   Frail AAM   HENT:   Head: Normocephalic and atraumatic.   Eyes: Pupils are equal, round, and reactive to light.   Cardiovascular: Normal rate, regular rhythm, normal heart sounds and intact distal pulses. No murmur heard.  Pulmonary/Chest: Effort normal and breath sounds normal. No respiratory distress. He has no wheezes.  He has no rales.   On the ventilator.   Abdominal: Soft. Bowel sounds are normal. He exhibits no distension. There is no tenderness.   Musculoskeletal: He exhibits no edema.   Neurological:   Not alert and not following commands. Responsive to pain   Skin: Skin is warm and dry. Capillary refill takes less than 2 seconds. No erythema.   Vitals reviewed.    Vents:  Vent Mode: Spont  Oxygen Concentration (%):  [30] 30  Resp Rate Total:  [11 br/min-34 br/min] 34 br/min  Vt Set:  [440 mL] 440 mL  PEEP/CPAP:  [5 cmH20] 5 cmH20  Pressure Support:  [0 cmH20-7 cmH20] 0 cmH20  Mean Airway Pressure:  [6.2 fqA02-54 cmH20] 6.2 cmH20    Lines/Drains/Airways     Central Venous Catheter Line                 Hemodialysis Catheter 06785 days          Drain                 Hemodialysis AV Fistula Right forearm 88431 days          Peripheral Intravenous Line                 Peripheral IV - Single Lumen 08/29/17 0300 Left Forearm less than 1 day              Significant Labs:    CBC/Anemia Profile:  Lab 08/29/17  0349 08/29/17  0937 08/29/17  2110   WBC 8.94  --  14.15*   HGB 8.2*  --  9.2*   HCT 25.9*  --  31.0*     --  229   MCV 87  --  91   RDW 16.1*  --  16.4*   IRON  --  20*  --    FERRITIN  --  365*  --         Chemistries:  Lab 08/29/17  0349 08/29/17  0639 08/29/17  1147 08/29/17  2110     --   --  139   K 4.7  --   --  4.7   CL 97  --   --  103   CO2 26  --   --  19*   BUN 79*  --   --  36*   CREATININE 13.7*  --   --  8.9*   CALCIUM 8.8  --   --  9.5   ALBUMIN 2.8*  --   --  3.2*   PROT 6.7  --   --  7.7   BILITOT 0.6  --   --  1.2*   ALKPHOS 91  --   --  91   ALT 15  --   --  15   AST 12  --   --  13   MG  --   --  1.9 1.9   PHOS  --  9.0*  --  5.3*     ABGs:   Lab 08/29/17  2327   PH 7.333*   PCO2 42.5   HCO3 22.6*   POCSATURATED 58*   BE -3     Significant Imaging: I have reviewed and interpreted all pertinent imaging results/findings within the past 24 hours.     CXR (8/29/17) - mild cardiomegaly. Right sided  hilar opacity, unchanged from prior CXR  Official reading notes mild retrocardiac consolidation of left base which could represent pneumonia or aspiration.    CT head (8/29/17) - no acute intracranial hemorrhage or infarct. Generalized volume loss with chronic microvascular ischemic changes. remote infarcts of bilateral occipital lobes.     Assessment/Plan:   Mr. Greco is a 77 y.o. M with severe aortic stenosis (IMANI = 0.6 cm2, PV = 4.53 m/s, MG = 43), CHF (EF 43%) on ECHO Aug '17, PAD with Rt SFA , stroke, with residual visual defecit, ESRD on HD M/W/F who was initially admitted with NSTEMI. Due to recurrent presentation for chest pain & SOB and recent LHC showing patent stent, it was thought his NSTEMI was due to demand ischemia by Cardiology team. However, his stay was complicated by acute encephalopathy of most likely seizure/convulsion etiology requiring intubation for airway protection. Of note has gram negative bacteremia from cultures drawn 8/29.    Acute encephalopathy  - most likely due to seizures  - s/p keppra load 1g and on maintenance 500mg BID  - per family, pt my have remove h/o seizures at home with h/o keppra use  - placed on EEG monitor overnight  - Neurology consulted for additional recs: recommend MRI brain which was unrevealing. Vit B labs, keppra level  - seizure precautions  - intubated for airway protection  Vent Mode: Spont  Oxygen Concentration (%):  [30] 30  Resp Rate Total:  [11 br/min-34 br/min] 34 br/min  Vt Set:  [440 mL] 440 mL  PEEP/CPAP:  [5 cmH20] 5 cmH20  Pressure Support:  [0 cmH20-7 cmH20] 0 cmH20  Mean Airway Pressure:  [6.2 koN43-13 cmH20] 6.2 cmH20    Hypotension  - requiring levophed to maintain MAP > 65  - may be due to cardiogenic vs. septic shock vs. Worsening AS  - for cardiogenic shock & maybe worsening AS -> ECHO ordered  - for septic shock -> empirically treating with antibiotics. See respective section for details  - holding home antihypertensives (coreg,  imdur)  - goal MAP>65    Severe aortic stenosis  - per last ECHO (Aug '17): IMANI: 0.6, M, PV: 4.53  - s/p TAVR workup and scheduled to received on 17    Chronic systolic heart failure  - likely secondary to ischemia given extensive CAD  - last ECHO EF 43%  - NYHA IV symptoms  - continue aspirin. Will resume coreg, imdur once pt is off of pressors and is able to maintain MAPs > 65    CAD  Chest pain  NSTEMI  - s/p LCx/OMB PCI in Aug 2017 with BMS  - trop:  0.1 -> 12 this AM  - ECG post intubation -> mild ST depression in V5-V6  - repeat ECG this AM: pending  - pt appears to have had a NSTEMI for which he has been started on heparin drip   - will re-consult Cardiology for additional recs  - continue metoprolol, aspirin, brillinta, statin  - repeat 2D ECHO ordered    PAD -  of Right SFA  - continue aspirin, ticagrelor    HLD  - continue lipitor    Sepsis   Gram negative rods bacteremia  - given elevated lactic acid and hypotension s/p intubation, concern for sepsis.   - sources include pneumonia (LLL infiltrate retrocardiac) & elevated procalcitonin (2.11).    - BCx () - gram negative rods in 3/4 bottles.  Repeat BCx  pending  - on vanc and cefepime  - pt received IVF resuscitation (~1 L) yesterday   - lactic acid trended down to 1.2    ESRD - on HD T//Sat  - Nephrology on board. Last HD on   - will add novasource per recs  - EPO with HD  - will avoid nephrotoxins and renally dose meds     Critical Care Daily Checklist:    A: Awake: RASS Goal/Actual Goal:    Actual: Day Agitation Sedation Scale (RASS): Alert and calm   B: Spontaneous Breathing Trial Performed?     C: SAT & SBT Coordinated?  Not yet                D: Delirium: CAM-ICU Overall CAM-ICU: Negative   E: Early Mobility Performed? No   F: Feeding Goal:    Status:     Current Diet Order   Procedures    Diet renal      AS: Analgesia/Sedation n/a   T: Thromboembolic Prophylaxis heparin   H: HOB > 300 n/a   U: Stress Ulcer  Prophylaxis (if needed) n/a   G: Glucose Control n/a   B: Bowel Function Stool Occurrence: 1   I: Indwelling Catheter (Lines & Peacock) Necessity    D: De-escalation of Antimicrobials/Pharmacotherapies On vanc, cefepime    Plan for the day/ETD 1. Extubate     Code Status:  Family/Goals of Care: Full code     Providing supportive care. Cards, Nephro & Neuro following. Plan to extubate today if passes trial.    Patient seen and plan of care discussed with fellow. Attestation to follow     Nela Schwartz MD  Critical Care Medicine  [facility]

## 2017-09-01 NOTE — PT/OT/SLP PROGRESS
Physical Therapy      Bernabe Greco Jr.  MRN: [id number]    Patient not seen today secondary to  (pt passed MOVE screen, however awaiting extubation, pt extubated in PM with PT unable to return following extubation). Will follow-up as appropriate.    Dagmar Jefferson, PT   8/31/2017

## 2017-09-01 NOTE — PROGRESS NOTES
"Pt agitated, restless. Attempting to get out of bed. Patient pulled peripheral IV out at this time.  Screaming "Help me. My stomach is killing me, I'm going to die." Dr. Jones with [facility] called to bedside. Orders received for prn dilaudid, lactic acid, and CXR. Complete relief obtained with dilaudid. Continue to monitor closely.   "

## 2017-09-01 NOTE — SUBJECTIVE & OBJECTIVE
Subjective:     Interval History:   Only on precedex gtt this AM.  Able to follow commands; remains intubated.    Current Neurological Medications:   Keppra 500 mg BID  Precedex gtt  Citalopram 20 mg Qdaily  ASA 81, ticagrelor 90 BID, atorvastatin 80 qdaily    Current Facility-Administered Medications   Medication Dose Route Frequency Provider Last Rate Last Dose    0.9%  NaCl infusion (for blood administration)   Intravenous Q24H PRN Magda Lazo MD        0.9%  NaCl infusion   Intravenous PRN Malinda Lyles MD        acetaminophen tablet 650 mg  650 mg Oral Q8H PRN Smith Haynes MD   650 mg at 08/30/17 2059    aspirin EC tablet 81 mg  81 mg Per NG tube Daily Nela Schwartz MD   81 mg at 08/31/17 0843    atorvastatin tablet 80 mg  80 mg Per OG tube Daily Nela Schwartz MD   80 mg at 08/31/17 0843    ceFEPIme in dextrose 5% 2 gram/50 mL IVPB 2 g  2 g Intravenous Q24H Richardson Gutierrez  mL/hr at 08/31/17 0843 2 g at 08/31/17 0843    citalopram tablet 20 mg  20 mg Per OG tube Daily Nela Schwartz MD   20 mg at 08/31/17 0843    dexmedetomidine (PRECEDEX) 400mcg/100mL 0.9% NaCL infusion  0.2 mcg/kg/hr Intravenous Continuous Alton Heredia MD   Stopped at 08/31/17 0700    dextrose 50% injection 12.5 g  12.5 g Intravenous PRN Nela Schwartz MD        epoetin lyssa injection 8,000 Units  100 Units/kg Intravenous Every Tues, Thurs, Sat Constance Demetrius Daigle NP   8,000 Units at 08/31/17 1423    fentaNYL 2500 mcg in D5W 250 mL infusion premix (titrating) (conc: 10 mcg/mL)   Intravenous Continuous Nela Schwartz MD   Stopped at 08/31/17 0701    glucagon (human recombinant) injection 1 mg  1 mg Intramuscular PRN Nela Schwartz MD        heparin 25,000 units in dextrose 5% 250 mL (100 units/mL) bolus from bag; ADDITIONAL PRN BOLUS  30 Units/kg Intravenous PRN Anatoliy Cazares MD        heparin 25,000 units in dextrose 5% 250 mL (100 units/mL) bolus from bag; ADDITIONAL PRN BOLUS  15  Units/kg Intravenous PRN Anatoliy Cazares MD        insulin aspart pen 1-10 Units  1-10 Units Subcutaneous Q6H PRN Nela Schwartz MD   2 Units at 08/31/17 0624    levetiracetam in NaCl (iso-os) IVPB 500 mg  500 mg Intravenous Q12H Nela Schwartz  mL/hr at 08/31/17 0843 500 mg at 08/31/17 0843    lorazepam tablet 0.5 mg  0.5 mg Oral BID PRN Smith Haynes MD        midodrine tablet 10 mg  10 mg Oral Once PRN Malinda Lyles MD        norepinephrine 4 mg in dextrose 5% 250 mL infusion (premix) (titrating)  0.02 mcg/kg/min (Dosing Weight) Intravenous Continuous Nela Schwartz MD   Stopped at 08/31/17 0515    ondansetron injection 4 mg  4 mg Intravenous Q6H PRN Smith Haynes MD        pantoprazole injection 40 mg  40 mg Intravenous BID Alton Heredia MD   40 mg at 08/31/17 0843    sevelamer carbonate tablet 1,600 mg  1,600 mg Oral TID  Smith Haynes MD   1,600 mg at 08/31/17 1839    sodium chloride 0.9% flush 3 mL  3 mL Intravenous Q8H Smith Haynes MD   3 mL at 08/31/17 1424    ticagrelor tablet 90 mg  90 mg Per OG tube BID Nela Schwartz MD   90 mg at 08/31/17 0843    vitamin renal formula (B-complex-vitamin c-folic acid) 1 mg per capsule 1 capsule  1 capsule Oral Daily Smith Haynes MD   1 capsule at 08/29/17 0923       Review of Systems   Unable to perform ROS: Intubated     Objective:     Vital Signs (Most Recent):  Temp: 98.1 °F (36.7 °C) (08/31/17 1500)  Pulse: 72 (08/31/17 1800)  Resp: 16 (08/31/17 1800)  BP: (!) 118/56 (08/31/17 1830)  SpO2: 100 % (08/31/17 1800) Vital Signs (24h Range):  Temp:  [97.7 °F (36.5 °C)-100.5 °F (38.1 °C)] 98.1 °F (36.7 °C)  Pulse:  [56-89] 72  Resp:  [5-27] 16  SpO2:  [90 %-100 %] 100 %  BP: (102-167)/(53-79) 118/56     Weight: 80.2 kg (176 lb 12.9 oz)  Body mass index is 25.37 kg/m².    Physical Exam   Constitutional: He appears well-developed. No distress.   Eyes: EOM are normal. Pupils are equal, round, and reactive to light.    Cardiovascular: Normal rate and regular rhythm.    Murmur heard.  Pulmonary/Chest:   Intubated, coarse breath sounds b/l lung fields   Abdominal: Soft. Bowel sounds are normal. He exhibits no distension. There is no tenderness.   Neurological:   Reflex Scores:       Tricep reflexes are 2+ on the right side and 2+ on the left side.       Bicep reflexes are 2+ on the right side and 2+ on the left side.       Brachioradialis reflexes are 2+ on the right side and 2+ on the left side.       Patellar reflexes are 2+ on the right side and 2+ on the left side.       Achilles reflexes are 1+ on the right side and 1+ on the left side.      NEUROLOGICAL EXAMINATION:     MENTAL STATUS   Level of consciousness: drowsy       Able to follow 1 step commands, show thumbs up.     CRANIAL NERVES     CN III, IV, VI   Pupils are equal, round, and reactive to light.  Extraocular motions are normal.        Blinks to threat b/l     MOTOR EXAM   Muscle bulk: normal  Overall muscle tone: normal    Strength   Right interossei: 4/5  Left interossei: 4/5    REFLEXES     Reflexes   Right brachioradialis: 2+  Left brachioradialis: 2+  Right biceps: 2+  Left biceps: 2+  Right triceps: 2+  Left triceps: 2+  Right patellar: 2+  Left patellar: 2+  Right achilles: 1+  Left achilles: 1+      Significant Labs:   Recent Results (from the past 24 hour(s))   CBC auto differential    Collection Time: 08/30/17  8:18 PM   Result Value Ref Range    WBC 10.90 3.90 - 12.70 K/uL    RBC 3.03 (L) 4.60 - 6.20 M/uL    Hemoglobin 8.4 (L) 14.0 - 18.0 g/dL    Hematocrit 27.2 (L) 40.0 - 54.0 %    MCV 90 82 - 98 fL    MCH 27.7 27.0 - 31.0 pg    MCHC 30.9 (L) 32.0 - 36.0 g/dL    RDW 16.3 (H) 11.5 - 14.5 %    Platelets 183 150 - 350 K/uL    MPV 9.9 9.2 - 12.9 fL    Gran # 9.6 (H) 1.8 - 7.7 K/uL    Lymph # 0.6 (L) 1.0 - 4.8 K/uL    Mono # 0.7 0.3 - 1.0 K/uL    Eos # 0.0 0.0 - 0.5 K/uL    Baso # 0.02 0.00 - 0.20 K/uL    Gran% 87.6 (H) 38.0 - 73.0 %    Lymph% 5.9 (L) 18.0 -  48.0 %    Mono% 6.1 4.0 - 15.0 %    Eosinophil% 0.1 0.0 - 8.0 %    Basophil% 0.2 0.0 - 1.9 %    Differential Method Automated    Blood culture    Collection Time: 08/30/17 10:27 PM   Result Value Ref Range    Blood Culture, Routine No Growth to date    Blood culture    Collection Time: 08/30/17 11:03 PM   Result Value Ref Range    Blood Culture, Routine No Growth to date    Anti-Xa Heparin Monitoring    Collection Time: 08/30/17 11:04 PM   Result Value Ref Range    Heparin Anti-Xa <0.10 (L) 0.30 - 0.70 IU/mL   POCT glucose    Collection Time: 08/30/17 11:15 PM   Result Value Ref Range    POCT Glucose 133 (H) 70 - 110 mg/dL   Comprehensive metabolic panel    Collection Time: 08/31/17  3:34 AM   Result Value Ref Range    Sodium 136 136 - 145 mmol/L    Potassium 5.2 (H) 3.5 - 5.1 mmol/L    Chloride 104 95 - 110 mmol/L    CO2 17 (L) 23 - 29 mmol/L    Glucose 152 (H) 70 - 110 mg/dL    BUN, Bld 53 (H) 8 - 23 mg/dL    Creatinine 11.6 (H) 0.5 - 1.4 mg/dL    Calcium 8.9 8.7 - 10.5 mg/dL    Total Protein 6.4 6.0 - 8.4 g/dL    Albumin 2.5 (L) 3.5 - 5.2 g/dL    Total Bilirubin 0.6 0.1 - 1.0 mg/dL    Alkaline Phosphatase 73 55 - 135 U/L    AST 44 (H) 10 - 40 U/L    ALT 18 10 - 44 U/L    Anion Gap 15 8 - 16 mmol/L    eGFR if African American 4.3 (A) >60 mL/min/1.73 m^2    eGFR if non  3.7 (A) >60 mL/min/1.73 m^2   Anti-Xa Heparin Monitoring    Collection Time: 08/31/17  3:34 AM   Result Value Ref Range    Heparin Anti-Xa <0.10 (L) 0.30 - 0.70 IU/mL   Vancomycin, random    Collection Time: 08/31/17  3:34 AM   Result Value Ref Range    Vancomycin, Random 19.7 Not established ug/mL   POCT glucose    Collection Time: 08/31/17  6:22 AM   Result Value Ref Range    POCT Glucose 165 (H) 70 - 110 mg/dL   CBC auto differential    Collection Time: 08/31/17 10:28 AM   Result Value Ref Range    WBC 7.20 3.90 - 12.70 K/uL    RBC 2.86 (L) 4.60 - 6.20 M/uL    Hemoglobin 7.8 (L) 14.0 - 18.0 g/dL    Hematocrit 25.6 (L) 40.0 - 54.0  %    MCV 90 82 - 98 fL    MCH 27.3 27.0 - 31.0 pg    MCHC 30.5 (L) 32.0 - 36.0 g/dL    RDW 16.2 (H) 11.5 - 14.5 %    Platelets 165 150 - 350 K/uL    MPV 9.6 9.2 - 12.9 fL    Gran # 6.0 1.8 - 7.7 K/uL    Lymph # 0.6 (L) 1.0 - 4.8 K/uL    Mono # 0.6 0.3 - 1.0 K/uL    Eos # 0.1 0.0 - 0.5 K/uL    Baso # 0.02 0.00 - 0.20 K/uL    Gran% 82.7 (H) 38.0 - 73.0 %    Lymph% 7.8 (L) 18.0 - 48.0 %    Mono% 8.3 4.0 - 15.0 %    Eosinophil% 0.8 0.0 - 8.0 %    Basophil% 0.3 0.0 - 1.9 %    Differential Method Automated    POCT glucose    Collection Time: 17  2:29 PM   Result Value Ref Range    POCT Glucose 139 (H) 70 - 110 mg/dL   POCT glucose    Collection Time: 17  6:36 PM   Result Value Ref Range    POCT Glucose 126 (H) 70 - 110 mg/dL         Significant Imagin17 MRI Brain w/o: Per resident review,  Prior R frontal, b/l occipital old infarcts.  Hemisoderin encephalomalacia b/l parieto-occipital regions.

## 2017-09-01 NOTE — PLAN OF CARE
Problem: Patient Care Overview  Goal: Plan of Care Review  Outcome: Ongoing (interventions implemented as appropriate)  Plan of care updated and reviewed with pt and family. VS and assessments per flowsheet. Acute event immediately after dialysis (see progress note). Dr. Schwartz at bedside most of the night, continued to titrate levo. 750 ml NS fluid bolus given. Pt also complained of SOB and N/V, O2 increased to 10L, zofran given to ease N/V. Family called to bedside to discuss current status and goals of care. All questions and concerns addressed. Continue to monitor.

## 2017-09-01 NOTE — ASSESSMENT & PLAN NOTE
- receives iHD TTS via RUE AVF at [facility] with Dr. Sandhu. Treatment duration 4 hours EDW 82kg.   - admitted with NSTEMI. Medical management per cardiology.  - didn't tolerate HD on 8/29; subsequently admitted to ICU  - didn't tolerate HD last night even though no fluid removed and low BFR used.   - do not think patient will be able to tolerate HD in the future unless cardiac issues resolved  - agree with family discussions  - poor prognosis

## 2017-09-01 NOTE — PROGRESS NOTES
"At approx 2145, immediately after dialysis pt began having complaints of chest pain. Pt states "I've never felt pain in my chest like this before, I feel like I'm going to pass out." CC Dr. Schwartz called, to bedside immediately. EKG performed.    2150 Pt blood pressure dropped down to 60s/40s. Liam bump 0.3 mg given, levo started.   "

## 2017-09-01 NOTE — PROGRESS NOTES
Progress Note  Interventional Cardiology  Val[facility]      Hospital Day: 4    Patient Name: Bernabe Greco Jr.  MRN: [id number]  Admission Date: 8/29/2017  Hospital Length of Stay: 0 days  Code Status: Full Code  Attending Physician: Francine Saravia MD   Primary Care Provider: Primary Doctor No   Principal Problem: Chest pain     Subjective:      Interval History: Extubated yesterday, patient sitting up talking on evening rounds. Gram - rods from blood cx 9/29 on empiric Vanc and Cefepime. Following HD yesterday evening unrelieved by nitroglycerin, morphine, dilaudid or fentanyl. Pt became hypotensive after the incident per chart review with IVF administered, adan bump x2 and Norepinephrine gtt started. EKG showed ST elevation in aVR, and ST depression in anterolateral leads, which is consistent with prior ECGs and Troponin of 43. Cardiology contacted per primary. Family called in and patient code status changed to DNR in [facility]. Wife not present this am as she was at the hospital late last night.     Scheduled Meds:   aspirin  81 mg Per NG tube Daily    atorvastatin  80 mg Per OG tube Daily    ceFEPime (MAXIPIME) IVPB  2 g Intravenous Q24H    citalopram  20 mg Per OG tube Daily    epoetin lyssa (PROCRIT) injection  100 Units/kg Intravenous Every Tues, Thurs, Sat    fentaNYL  25 mcg Intravenous Once    levetiracetam IVPB  500 mg Intravenous Q12H    pantoprazole  40 mg Intravenous BID    sevelamer carbonate  1,600 mg Oral TID WM    sodium chloride 0.9%  3 mL Intravenous Q8H    ticagrelor  90 mg Per OG tube BID    vitamin renal formula (B-complex-vitamin c-folic acid)  1 capsule Oral Daily       Continuous Infusions:   dexmedetomidine (PRECEDEX) infusion Stopped (08/31/17 0700)    fentanyl Stopped (08/31/17 0701)    heparin (porcine) in D5W 17 Units/kg/hr (09/01/17 1300)    norepinephrine bitartrate-D5W 0.44 mcg/kg/min (09/01/17 1316)       PRN Meds:acetaminophen, dextrose 50%, glucagon (human recombinant),  "heparin (PORCINE), heparin (PORCINE), HYDROmorphone, insulin aspart, lorazepam, ondansetron    Objective:  Vital signs in last 24 hours:   Vitals:    09/01/17 1300   BP: (!) 71/37   Pulse: 105   Resp: (!) 28   Temp:        Intake/Output last shift:    Intake/Output Summary (Last 24 hours) at 09/01/17 1403  Last data filed at 09/01/17 1300   Gross per 24 hour   Intake          2066.52 ml   Output              530 ml   Net          1536.52 ml         Physical Exam:  BP (!) 71/37 (BP Location: Left arm, Patient Position: Lying)   Pulse 105   Temp 98.1 °F (36.7 °C) (Oral)   Resp (!) 28   Ht 5' 10" (1.778 m)   Wt 80.2 kg (176 lb 12.9 oz)   SpO2 95%   BMI 25.37 kg/m²     General Appearance:    Alert, cooperative, no distress, appears stated age   Head:    Normocephalic, without obvious abnormality, atraumatic   Eyes:    PERRL, conjunctiva/corneas clear, EOM's intact, fundi     benign, both eyes        Ears:    Normal TM's and external ear canals, both ears   Nose:   Nares normal, septum midline, mucosa normal, no drainage    or sinus tenderness   Throat:   Lips, mucosa, and tongue normal; teeth and gums normal   Neck:   Supple, symmetrical, trachea midline, no adenopathy;        thyroid:  No enlargement/tenderness/nodules; no carotid    bruit or JVD   Back:     Symmetric, no curvature, ROM normal, no CVA tenderness   Lungs:     Clear to auscultation bilaterally, respirations unlabored   Chest wall:    No tenderness or deformity   Heart:    Regular rate and rhythm, S1 and S2 normal, + murmur, no rub   or gallop   Abdomen:     Soft, diffusely tender, no guarding, pt writhing in pain    Extremities:   Extremities normal, R AV fistula atraumatic, no cyanosis or edema   Pulses:   2+ and symmetric all extremities   Skin:   Skin color, texture, turgor normal, no rashes or lesions               Imaging  CT Head: 1.  No acute intracranial hemorrhage or major vascular distribution infarct there    2.  Generalized cerebral " volume loss with chronic microvascular ischemic changes.    3.  Remote infarcts of bilateral occipital lobes.  ______________________________________     Lab Review  Lab Results   Component Value Date     09/01/2017     08/31/2017     08/30/2017    K 4.5 09/01/2017    K 5.2 (H) 08/31/2017    K 4.6 08/30/2017    CO2 19 (L) 09/01/2017    CO2 17 (L) 08/31/2017    CO2 23 08/30/2017    BUN 37 (H) 09/01/2017    BUN 53 (H) 08/31/2017    BUN 41 (H) 08/30/2017    CREATININE 8.3 (H) 09/01/2017    CREATININE 11.6 (H) 08/31/2017    CREATININE 9.4 (H) 08/30/2017    CALCIUM 8.5 (L) 09/01/2017    CALCIUM 8.9 08/31/2017    CALCIUM 8.7 08/30/2017     Lab Results   Component Value Date    CKTOTAL 59 03/19/2016    CKTOTAL 59 03/19/2016    CKMB 0.56 03/19/2016    CKMB 1.39 03/19/2016    TROPONINI >50.000 (H) 09/01/2017    TROPONINI 43.588 (H) 09/01/2017    TROPONINI 10.320 (H) 08/31/2017    TROPONINI <0.012 05/14/2009     Lab Results   Component Value Date    WBC 13.11 (H) 09/01/2017    WBC 11.73 08/31/2017    WBC 7.74 08/31/2017    HGB 8.4 (L) 09/01/2017    HGB 8.6 (L) 08/31/2017    HGB 8.0 (L) 08/31/2017    HCT 27.4 (L) 09/01/2017    HCT 27.4 (L) 08/31/2017    HCT 25.6 (L) 08/31/2017    MCV 89 09/01/2017    MCV 89 08/31/2017    MCV 89 08/31/2017     09/01/2017     08/31/2017     08/31/2017        Assessment:  Patient Active Problem List    Diagnosis Date Noted    CAD (coronary artery disease) 08/30/2017    Chest pain 08/29/2017    Nonobstructive atherosclerosis of coronary artery 08/29/2017    ESRD (end stage renal disease) on dialysis 08/09/2017    Dependence on renal dialysis     Aortic stenosis, severe 08/02/2017    Unstable angina pectoris 08/02/2017    NSTEMI (non-ST elevated myocardial infarction) 07/30/2017    ESRD (end stage renal disease) 07/25/2017    Malfunction of arteriovenous dialysis fistula 05/19/2017    DNR (do not resuscitate) 05/17/2017    UTI (urinary tract  infection) 05/16/2017    Thrombocytopenia, unspecified 05/14/2017    Malnutrition 05/12/2017    Suicidal ideation 05/11/2017    Chronic kidney disease-mineral and bone disorder 05/09/2017    Acute ischemic left MCA stroke 05/09/2017    Tissue plasminogen activator (t-PA) administered at other facility within 24 hours prior to current admission 05/08/2017    Altered mental status 05/08/2017    Chronic kidney disease, stage V 01/31/2015    Thrombocytopenia 01/31/2015    Delirium due to conditions classified elsewhere 01/23/2015    Acidosis 01/22/2015    Acute kidney failure, unspecified 01/22/2015    Anemia associated with chronic renal failure 01/22/2015    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    Aortic valve disorders 01/11/2015    Encephalopathy 01/08/2015    Status epilepticus 12/26/2014    Acute respiratory failure 12/26/2014    Shock 12/25/2014    Seizure 12/25/2014    ARF (acute renal failure) 12/24/2014    Metabolic acidosis 12/24/2014    Weakness 12/12/2014    Orthostatic hypotension 12/11/2014    Anemia 12/07/2014    Type 2 diabetes mellitus with diabetic neuropathy, without long-term current use of insulin 06/12/2014    Claudication in peripheral vascular disease 03/24/2014    Essential hypertension 03/24/2014    Obesity 03/24/2014    Hyperlipidemia LDL goal <70 03/24/2014    Tobacco abuse, in remission 03/24/2014       Plan:  NSTEMI- Last [facility] 8/8/2017 with patent LCx-OM1 YANE, small D1 ostial stenosis recommended medical treatment- Continue ASA/Brillinta, H&H dropped when on Heparin gtt two days ago requiring transfusion, would be cautious to restart   --Bedside ECHO by Dr. Silva today showed LVEF 40%, no effusion.  Severe Abdominal pain- Lac 5.6, Recommend complete work-up beginning with CT- Morganella morganii + bacteremia likely from GI source   Suspected Septic Shock- On empiric Vanc/Cefepime. Recommend arterial line placement as patient is requiring increasing pressors,  blood cx 9/29 positive for Morganella Morganii. Not a candidate for valve replacement at this time with active bacteremia given extreme risk of endocarditis.    ESRD-Continue HD as indicated may require CRRT with clinical picture of shock    Have asked RN to contact Dr. Silva when wife is present for discussion    Julianna Wesley NP  Interventional Cardiology     Staff    I have seen Mr Greco at the bedside with the nurse practitioner. I agree with her findings.    He is in a difficult situation. He has coronary artery disease and severe aortic stenosis and presented with excruciating chest pain. He has significantly elevated troponin. He has had 2 coronary angiograms in the last 4 weeks and we know that he has CAD. His presentation this time included Gram negative virgilio bacteremia a seizures. He was intubated recently.     Unfortunately bacteremia is a contraindication for valve replacement. I do not believe further coronary intervention will change his outcome and I have reviewed the case with my partners. His symptoms, even though I am aware he has an MI, are not those typical of anginal pain. Additionally, he has abdominal pain. He is very uncomfortable in the bed screaming out in pain which is not a common angina presentation. Aortic stenosis also does not present like this. I would defer evaluation of abdominal symptoms to the primary team. He is now a DNR per them.    Vince Silva MD  Interventional Cardiology  Structural/Valvular heart disease  [phone]

## 2017-09-01 NOTE — PROGRESS NOTES
"Patient yelling "help me, help me. I feel smothered. My chest and my stomach are burning." HR -115bpm. Other vitals stable. Denies SOB. Notified Dr. Cheney with [facility]. Orders received for 25mcg fentanyl IV for one dose. Will continue monitoring closely.   "

## 2017-09-01 NOTE — PROGRESS NOTES
"Called to bedside by nurse for patient's complaints of chest pain. Chest pain left sided and worst than he has ever felt. BP 60/40s. Liam bump given x2 and patient started on levophed drip. SBP continued to remain in 80s. Concerned that he was having anginal pain 2/2 severe AS vs. CAD. He was given 500cc + 250cc IVF bolus over the next hour to improve preload but BP would improve only temporarily. Further administration of fluids inhibited by bilateral crackles in lungs. He also had an episode of tachycardia for which he received 1 x 5mg IV lopressor. ECG - showed ST elevation in aVR, and ST depression in anterolateral leads, which is consistent with prior ECGs. Troponin: 10.3 -> 43.6 trending upward.    I also touched base with the Cardiology fellow on call overnight and reviewed the case. He suggested vasopressin should we need to decrease amount of levophed used however that wasn't required at the time. Given the gram positive bacteremia, levophed was used however we contemplated use of neosynepherine, vasopressin and dobutamine, all of which have significant risk and some benefit to patient.     I believe the anginal chest pain and troponin was secondary to  NSTEMI. Hypotension 2/2 cardiogenic vs. Septic shock exacerbating the Aortic stenosis. For relief of the chest pain, patient was given 2mg IV morphine, IV dilaudid and IV fentanyl, neither of which relived his chest pain. Furthermore, morphine and nitro lowered BP.     Family was called in due to the critical nature of patient's case. It was explained that patient would likely continue to have hypotension-induced AS excerbations, which would lead to coronary ischemia, difficulty breathing and chest pain. Although wife was wanting to initiate comfort care measures,  Said " I don't want him to suffer no more", the patient insisted that he want to continue taking medications and not withdraw care at this time. His wishes were respected and pt was continued to be " medically treated conservatively with fluids, antibiotics.     Will re-touch base with Cardiology in the morning.     eNla Schwartz MD  Internal Medicine, PGY-2  [phone]

## 2017-09-01 NOTE — PLAN OF CARE
Problem: Patient Care Overview  Goal: Plan of Care Review  Patient remains oriented to self, place and year. Disoriented to situation and month/day. Complaints of pain off and on throughout shift that had full relief obtained with prn dilaudid. Levo titrated to keep map >65. Plan for CT of abdomen this evening. Heparin gtt infusing per orders. Wife at bedside. Updated on plan of care, questions answered. Verbalized no questions at this time. Continue plan of care.

## 2017-09-01 NOTE — SUBJECTIVE & OBJECTIVE
"Interval History:   Extubated yesterday afternoon. Did well. Received HD yesterday x 3 hours. No UF. Low BFRs. About 30 min after treatment patient complained of severe chest pain. Also developed hypotension. Levophed started. This morning patient continues to complain of chest pain or SOB. Asked for "help". Planning for family discussion today.     Review of patient's allergies indicates:   Allergen Reactions    Penicillins Rash     Received ceftriaxone and cefepime in 2017     Current Facility-Administered Medications   Medication Frequency    acetaminophen tablet 650 mg Q8H PRN    aspirin EC tablet 81 mg Daily    atorvastatin tablet 80 mg Daily    ceFEPIme in dextrose 5% 2 gram/50 mL IVPB 2 g Q24H    citalopram tablet 20 mg Daily    dexmedetomidine (PRECEDEX) 400mcg/100mL 0.9% NaCL infusion Continuous    dextrose 50% injection 12.5 g PRN    epoetin lyssa injection 8,000 Units Every Tues, Thurs, Sat    fentaNYL 2500 mcg in D5W 250 mL infusion premix (titrating) (conc: 10 mcg/mL) Continuous    fentaNYL injection 25 mcg Once    glucagon (human recombinant) injection 1 mg PRN    heparin 25,000 units in dextrose 5% 250 mL (100 units/mL) bolus from bag; ADDITIONAL PRN BOLUS PRN    heparin 25,000 units in dextrose 5% 250 mL (100 units/mL) bolus from bag; ADDITIONAL PRN BOLUS PRN    heparin 25,000 units in dextrose 5% 250 mL (100 units/mL) infusion Continuous    HYDROmorphone injection 0.5 mg Q3H PRN    insulin aspart pen 1-10 Units Q6H PRN    levetiracetam in NaCl (iso-os) IVPB 500 mg Q12H    lorazepam tablet 0.5 mg BID PRN    norepinephrine 32 mg in dextrose 5 % 250 mL infusion Continuous    ondansetron injection 4 mg Q6H PRN    pantoprazole injection 40 mg BID    sevelamer carbonate tablet 1,600 mg TID WM    sodium chloride 0.9% flush 3 mL Q8H    ticagrelor tablet 90 mg BID    vitamin renal formula (B-complex-vitamin c-folic acid) 1 mg per capsule 1 capsule Daily       Objective:     Vital " Signs (Most Recent):  Temp: 98.1 °F (36.7 °C) (09/01/17 1100)  Pulse: 105 (09/01/17 1300)  Resp: (!) 28 (09/01/17 1300)  BP: (!) 71/37 (09/01/17 1300)  SpO2: 95 % (09/01/17 1300)  O2 Device (Oxygen Therapy): nasal cannula (09/01/17 1300) Vital Signs (24h Range):  Temp:  [98.1 °F (36.7 °C)-99.4 °F (37.4 °C)] 98.1 °F (36.7 °C)  Pulse:  [] 105  Resp:  [5-38] 28  SpO2:  [86 %-100 %] 95 %  BP: ()/(37-79) 71/37     Weight: 80.2 kg (176 lb 12.9 oz) (08/29/17 0645)  Body mass index is 25.37 kg/m².  Body surface area is 1.99 meters squared.    I/O last 3 completed shifts:  In: 2275 [I.V.:1485; Other:300; NG/GT:90; IV Piggyback:400]  Out: 525 [Urine:225; Other:300]    Physical Exam   Constitutional: He is oriented to person, place, and time. He appears well-developed and well-nourished.   HENT:   Head: Normocephalic and atraumatic.   Eyes: Conjunctivae and EOM are normal.   Cardiovascular: Normal rate and regular rhythm.    Pulmonary/Chest: Effort normal. He has rales (mild crackles).   Abdominal: Soft. He exhibits no distension.   Musculoskeletal: He exhibits edema (mild edema). He exhibits no deformity.   Neurological: He is alert and oriented to person, place, and time.   Skin: Skin is warm and dry.       Significant Labs:  CBC:   Recent Labs  Lab 09/01/17  0830   WBC 13.11*   RBC 3.09*   HGB 8.4*   HCT 27.4*      MCV 89   MCH 27.2   MCHC 30.7*     CMP:   Recent Labs  Lab 09/01/17  0549   *   CALCIUM 8.5*   ALBUMIN 2.4*   PROT 6.3      K 4.5   CO2 19*      BUN 37*   CREATININE 8.3*   ALKPHOS 79   ALT 30   *   BILITOT 0.6     All labs within the past 24 hours have been reviewed.     Significant Imaging:  Labs: Reviewed

## 2017-09-01 NOTE — PROGRESS NOTES
"[facility]  Nephrology  Progress Note    Patient Name: Bernabe Greco Jr.  MRN: [id number]  Admission Date: 8/29/2017  Hospital Length of Stay: 2 days  Attending Provider: Francine Saravia MD   Primary Care Physician: Primary Doctor No  Principal Problem:Chest pain    Subjective:     HPI: 78 yo AA male with significant history for hypertension, hyperlipidemia, CAD sp recent NSTEMI/YANE to Lcx/OM1 also found to have D1 70% non intervene tx medically on 8/1/17, Systolic heart failure (EF 43% 9/1) PAD, severe aortic stenosis with TVAR work up in progress, and ESRD on HD who presented to hospital via EMS for non radiating mid sternal chest "pressure" that started at 9 pm last night associated with palpitation, shortness of breath and diaphoresis. Patient did not have home nitro. Laying down did not help and by 12 am chest pressure worsen 8/10 then wife called 911. Symptoms improved with nitro SL en route to hospital and completed resolved with morphine and GI cocktail in ED. Elevated troponin down from previous admit 0.058>0.086. BMP up from previous. EKG NSR first degree AVB with NSTWA. This 830 pm patient got news that brother next door just passed away and patient currently have mid sternal chest pressure 8/10. Nurse is going to give patient nitro now. Wife states symptoms are similar to MI patient had earlier this month. Nephrology consult for HD. HD TTS 4 hrs duration via RFA AVF at [facility] under care of Dr. Romo. EDW 82 kg. He is under is EDW. Reports UF 3-4 in between HD days. Very minimal urine.       Interval History:   Extubated yesterday afternoon. Did well. Received HD yesterday x 3 hours. No UF. Low BFRs. About 30 min after treatment patient complained of severe chest pain. Also developed hypotension. Levophed started. This morning patient continues to complain of chest pain or SOB. Asked for "help". Planning for family discussion today.     Review of patient's allergies indicates: "   Allergen Reactions    Penicillins Rash     Received ceftriaxone and cefepime in 2017     Current Facility-Administered Medications   Medication Frequency    acetaminophen tablet 650 mg Q8H PRN    aspirin EC tablet 81 mg Daily    atorvastatin tablet 80 mg Daily    ceFEPIme in dextrose 5% 2 gram/50 mL IVPB 2 g Q24H    citalopram tablet 20 mg Daily    dexmedetomidine (PRECEDEX) 400mcg/100mL 0.9% NaCL infusion Continuous    dextrose 50% injection 12.5 g PRN    epoetin lyssa injection 8,000 Units Every Tues, Thurs, Sat    fentaNYL 2500 mcg in D5W 250 mL infusion premix (titrating) (conc: 10 mcg/mL) Continuous    fentaNYL injection 25 mcg Once    glucagon (human recombinant) injection 1 mg PRN    heparin 25,000 units in dextrose 5% 250 mL (100 units/mL) bolus from bag; ADDITIONAL PRN BOLUS PRN    heparin 25,000 units in dextrose 5% 250 mL (100 units/mL) bolus from bag; ADDITIONAL PRN BOLUS PRN    heparin 25,000 units in dextrose 5% 250 mL (100 units/mL) infusion Continuous    HYDROmorphone injection 0.5 mg Q3H PRN    insulin aspart pen 1-10 Units Q6H PRN    levetiracetam in NaCl (iso-os) IVPB 500 mg Q12H    lorazepam tablet 0.5 mg BID PRN    norepinephrine 32 mg in dextrose 5 % 250 mL infusion Continuous    ondansetron injection 4 mg Q6H PRN    pantoprazole injection 40 mg BID    sevelamer carbonate tablet 1,600 mg TID WM    sodium chloride 0.9% flush 3 mL Q8H    ticagrelor tablet 90 mg BID    vitamin renal formula (B-complex-vitamin c-folic acid) 1 mg per capsule 1 capsule Daily       Objective:     Vital Signs (Most Recent):  Temp: 98.1 °F (36.7 °C) (09/01/17 1100)  Pulse: 105 (09/01/17 1300)  Resp: (!) 28 (09/01/17 1300)  BP: (!) 71/37 (09/01/17 1300)  SpO2: 95 % (09/01/17 1300)  O2 Device (Oxygen Therapy): nasal cannula (09/01/17 1300) Vital Signs (24h Range):  Temp:  [98.1 °F (36.7 °C)-99.4 °F (37.4 °C)] 98.1 °F (36.7 °C)  Pulse:  [] 105  Resp:  [5-38] 28  SpO2:  [86 %-100 %] 95  %  BP: ()/(37-79) 71/37     Weight: 80.2 kg (176 lb 12.9 oz) (08/29/17 0645)  Body mass index is 25.37 kg/m².  Body surface area is 1.99 meters squared.    I/O last 3 completed shifts:  In: 2275 [I.V.:1485; Other:300; NG/GT:90; IV Piggyback:400]  Out: 525 [Urine:225; Other:300]    Physical Exam   Constitutional: He is oriented to person, place, and time. He appears well-developed and well-nourished.   HENT:   Head: Normocephalic and atraumatic.   Eyes: Conjunctivae and EOM are normal.   Cardiovascular: Normal rate and regular rhythm.    Pulmonary/Chest: Effort normal. He has rales (mild crackles).   Abdominal: Soft. He exhibits no distension.   Musculoskeletal: He exhibits edema (mild edema). He exhibits no deformity.   Neurological: He is alert and oriented to person, place, and time.   Skin: Skin is warm and dry.       Significant Labs:  CBC:   Recent Labs  Lab 09/01/17  0830   WBC 13.11*   RBC 3.09*   HGB 8.4*   HCT 27.4*      MCV 89   MCH 27.2   MCHC 30.7*     CMP:   Recent Labs  Lab 09/01/17  0549   *   CALCIUM 8.5*   ALBUMIN 2.4*   PROT 6.3      K 4.5   CO2 19*      BUN 37*   CREATININE 8.3*   ALKPHOS 79   ALT 30   *   BILITOT 0.6     All labs within the past 24 hours have been reviewed.     Significant Imaging:  Labs: Reviewed    Assessment/Plan:     ESRD (end stage renal disease) on dialysis    - receives iHD TTS via RUE AVF at [facility] with Dr. Sandhu. Treatment duration 4 hours EDW 82kg.   - admitted with NSTEMI. Medical management per cardiology.  - didn't tolerate HD on 8/29; subsequently admitted to ICU  - didn't tolerate HD last night even though no fluid removed and low BFR used.   - do not think patient will be able to tolerate HD in the future unless cardiac issues resolved  - agree with family discussions  - poor prognosis            Malinda Lopez, PGY-5  Nephrology Fellow  [facility]: [phone]    I have reviewed  and concur with the fellow's history, physical, assessment, and plan. I have personally interviewed and examined the patient at bedside. See below addendum for my evaluation and additional findings.

## 2017-09-01 NOTE — ASSESSMENT & PLAN NOTE
Mr. Greco is a 76 yo female w/ PMH significant for severe AS, DM, HTN, HLD, PAD, stroke (5/2017; 2014; residual vision deficits, BLE weakness), ESRD on HD MWF, who presented on 8/29 via EMS with NSTEMI.  Subsequently found to have L-sided gaze, concern for seizure activity x4, for whom neurology is consulted for seizures.    Questionable history of seizures in 5/2017 and 2014 associated with possible strokes at that time.  Limited history available for current seizure activity.  Found to have lactic acidosis, NSTEMI, elevated procalcitonin.  8/30 EEG prelim read negative for epileptic activity.      8/31 -> exam significantly improved, awake and following commands while intubated.    Recommendations  -Continue keppra 500 mg BID  -Discontinue benadryl (lowers seizure threshold, taking at home for sleep)  -MRI Brain unrevealing  -F/u remaining Vit B labs and replete appropriately; keppra level  -Trend BUN; correct/treat any metabolic/infectious abnormalities  -Antiplatelets/anticoagulation per primary  -Seizure precautions, telemetry    -Pt must refrain from driving for 6 months after having a seizure, and must be cleared by a neurologist to legally resume driving.  Physician team not required to report pt to [facility].  Additionally, advise pt to avoid bathing alone, working in high places, and to not supervise children swimming alone.  (unable to discuss with pt at this time, while on sedation/intubated; please remind pt of the above when extubated and appropriate).      Neurology will sign off.  Please call for any questions.

## 2017-09-02 NOTE — SUBJECTIVE & OBJECTIVE
Interval History:   Patient is evaluated at bedside, decrease blood pressures, has not been     Review of patient's allergies indicates:   Allergen Reactions    Penicillins Rash     Received ceftriaxone and cefepime in 2017     Current Facility-Administered Medications   Medication Frequency    morphine 2 mg/mL injection     0.9%  NaCl infusion (CRRT USE ONLY) PRN    acetaminophen tablet 650 mg Q8H PRN    aspirin EC tablet 81 mg Daily    atorvastatin tablet 80 mg Daily    ceFEPIme in dextrose 5% 2 gram/50 mL IVPB 2 g Q24H    citalopram tablet 20 mg Daily    dexmedetomidine (PRECEDEX) 400mcg/100mL 0.9% NaCL infusion Continuous    dextrose 50% injection 12.5 g PRN    epoetin lyssa injection 8,000 Units Every Tues, Thurs, Sat    fentaNYL 2500 mcg in D5W 250 mL infusion premix (titrating) (conc: 10 mcg/mL) Continuous    fentaNYL injection 25 mcg Once    glucagon (human recombinant) injection 1 mg PRN    heparin 25,000 units in dextrose 5% 250 mL (100 units/mL) bolus from bag; ADDITIONAL PRN BOLUS PRN    heparin 25,000 units in dextrose 5% 250 mL (100 units/mL) bolus from bag; ADDITIONAL PRN BOLUS PRN    heparin 25,000 units in dextrose 5% 250 mL (100 units/mL) infusion Continuous    HYDROmorphone injection 1 mg Q3H PRN    insulin aspart pen 1-10 Units Q6H PRN    levetiracetam in NaCl (iso-os) IVPB 500 mg Q12H    lorazepam tablet 0.5 mg BID PRN    lorazepam tablet 1 mg Q4H PRN    magnesium sulfate 2g in water 50mL IVPB (premix) PRN    morphine 100 mg in dextrose 5 % 100 mL infusion Continuous    morphine injection 2 mg Once    morphine injection 6 mg Once    norepinephrine 32 mg in dextrose 5 % 250 mL infusion Continuous    omnipaque oral solution 15 mL PRN    ondansetron injection 4 mg Q6H PRN    pantoprazole injection 40 mg BID    scopolamine 1.3-1.5 mg (1 mg over 3 days) 1 patch Q3 Days    sevelamer carbonate tablet 1,600 mg TID WM    sodium chloride 0.9% flush 3 mL Q8H    ticagrelor  tablet 90 mg BID    vitamin renal formula (B-complex-vitamin c-folic acid) 1 mg per capsule 1 capsule Daily       Objective:     Vital Signs (Most Recent):  Temp: 97.6 °F (36.4 °C) (09/02/17 0745)  Pulse: (!) 114 (09/02/17 0951)  Resp: 20 (09/02/17 0951)  BP: (!) 93/56 (09/02/17 0830)  SpO2: (!) 90 % (09/02/17 0951)  O2 Device (Oxygen Therapy): High Flow nasal Cannula (09/02/17 0951) Vital Signs (24h Range):  Temp:  [97.6 °F (36.4 °C)-98.1 °F (36.7 °C)] 97.6 °F (36.4 °C)  Pulse:  [] 114  Resp:  [18-39] 20  SpO2:  [77 %-100 %] 90 %  BP: ()/(37-78) 93/56     Weight: 80.2 kg (176 lb 12.9 oz) (08/29/17 0645)  Body mass index is 25.37 kg/m².  Body surface area is 1.99 meters squared.    I/O last 3 completed shifts:  In: 2736.7 [P.O.:240; I.V.:1790.6; Other:300; IV Piggyback:406.1]  Out: 540 [Urine:240; Other:300]    Physical Exam   Constitutional: He is oriented to person, place, and time. He appears well-developed and well-nourished.   HENT:   Head: Normocephalic and atraumatic.   Eyes: Conjunctivae and EOM are normal.   Cardiovascular: Normal rate and regular rhythm.    Pulmonary/Chest: Effort normal. He has rales (mild crackles).   Abdominal: Soft. He exhibits no distension.   Musculoskeletal: He exhibits edema (mild edema). He exhibits no deformity.   Neurological: He is alert and oriented to person, place, and time.   Skin: Skin is warm and dry.       Significant Labs:  BMP:   Recent Labs  Lab 08/29/17  2110  09/02/17  0505   *  < > 232*     < > 101   CO2 19*  < > 17*   BUN 36*  < > 50*   CREATININE 8.9*  < > 10.3*   CALCIUM 9.5  < > 8.8   MG 1.9  --   --    < > = values in this interval not displayed.  CBC:   Recent Labs  Lab 09/02/17  0753   WBC 19.44*   RBC 3.32*   HGB 9.0*   HCT 28.7*      MCV 86   MCH 27.1   MCHC 31.4*     CMP:   Recent Labs  Lab 09/02/17  0505   *   CALCIUM 8.8   ALBUMIN 2.4*   PROT 6.6   *   K 5.4*   CO2 17*      BUN 50*   CREATININE 10.3*    ALKPHOS 84   ALT 93*   *   BILITOT 0.6     All labs within the past 24 hours have been reviewed.

## 2017-09-02 NOTE — PROGRESS NOTES
"[facility]  Nephrology  Progress Note    Patient Name: Bernabe Greco Jr.  MRN: [id number]  Admission Date: 8/29/2017  Hospital Length of Stay: 3 days  Attending Provider: Francine Saravia MD   Primary Care Physician: Primary Doctor No  Principal Problem:Chest pain    Subjective:     HPI: 76 yo AA male with significant history for hypertension, hyperlipidemia, CAD sp recent NSTEMI/YANE to Lcx/OM1 also found to have D1 70% non intervene tx medically on 8/1/17, Systolic heart failure (EF 43% 9/1) PAD, severe aortic stenosis with TVAR work up in progress, and ESRD on HD who presented to hospital via EMS for non radiating mid sternal chest "pressure" that started at 9 pm last night associated with palpitation, shortness of breath and diaphoresis. Patient did not have home nitro. Laying down did not help and by 12 am chest pressure worsen 8/10 then wife called 911. Symptoms improved with nitro SL en route to hospital and completed resolved with morphine and GI cocktail in ED. Elevated troponin down from previous admit 0.058>0.086. BMP up from previous. EKG NSR first degree AVB with NSTWA. This 830 pm patient got news that brother next door just passed away and patient currently have mid sternal chest pressure 8/10. Nurse is going to give patient nitro now. Wife states symptoms are similar to MI patient had earlier this month. Nephrology consult for HD. HD TTS 4 hrs duration via RFA AVF at [facility] under care of Dr. Romo. EDW 82 kg. He is under is EDW. Reports UF 3-4 in between HD days. Very minimal urine.       Interval History:   Patient is evaluated at bedside, decrease blood pressures, has not been     Review of patient's allergies indicates:   Allergen Reactions    Penicillins Rash     Received ceftriaxone and cefepime in 2017     Current Facility-Administered Medications   Medication Frequency    morphine 2 mg/mL injection     0.9%  NaCl infusion (CRRT USE ONLY) PRN    acetaminophen " tablet 650 mg Q8H PRN    aspirin EC tablet 81 mg Daily    atorvastatin tablet 80 mg Daily    ceFEPIme in dextrose 5% 2 gram/50 mL IVPB 2 g Q24H    citalopram tablet 20 mg Daily    dexmedetomidine (PRECEDEX) 400mcg/100mL 0.9% NaCL infusion Continuous    dextrose 50% injection 12.5 g PRN    epoetin lyssa injection 8,000 Units Every Tues, Thurs, Sat    fentaNYL 2500 mcg in D5W 250 mL infusion premix (titrating) (conc: 10 mcg/mL) Continuous    fentaNYL injection 25 mcg Once    glucagon (human recombinant) injection 1 mg PRN    heparin 25,000 units in dextrose 5% 250 mL (100 units/mL) bolus from bag; ADDITIONAL PRN BOLUS PRN    heparin 25,000 units in dextrose 5% 250 mL (100 units/mL) bolus from bag; ADDITIONAL PRN BOLUS PRN    heparin 25,000 units in dextrose 5% 250 mL (100 units/mL) infusion Continuous    HYDROmorphone injection 1 mg Q3H PRN    insulin aspart pen 1-10 Units Q6H PRN    levetiracetam in NaCl (iso-os) IVPB 500 mg Q12H    lorazepam tablet 0.5 mg BID PRN    lorazepam tablet 1 mg Q4H PRN    magnesium sulfate 2g in water 50mL IVPB (premix) PRN    morphine 100 mg in dextrose 5 % 100 mL infusion Continuous    morphine injection 2 mg Once    morphine injection 6 mg Once    norepinephrine 32 mg in dextrose 5 % 250 mL infusion Continuous    omnipaque oral solution 15 mL PRN    ondansetron injection 4 mg Q6H PRN    pantoprazole injection 40 mg BID    scopolamine 1.3-1.5 mg (1 mg over 3 days) 1 patch Q3 Days    sevelamer carbonate tablet 1,600 mg TID WM    sodium chloride 0.9% flush 3 mL Q8H    ticagrelor tablet 90 mg BID    vitamin renal formula (B-complex-vitamin c-folic acid) 1 mg per capsule 1 capsule Daily       Objective:     Vital Signs (Most Recent):  Temp: 97.6 °F (36.4 °C) (09/02/17 0745)  Pulse: (!) 114 (09/02/17 0951)  Resp: 20 (09/02/17 0951)  BP: (!) 93/56 (09/02/17 0830)  SpO2: (!) 90 % (09/02/17 0951)  O2 Device (Oxygen Therapy): High Flow nasal Cannula (09/02/17 0951)  Vital Signs (24h Range):  Temp:  [97.6 °F (36.4 °C)-98.1 °F (36.7 °C)] 97.6 °F (36.4 °C)  Pulse:  [] 114  Resp:  [18-39] 20  SpO2:  [77 %-100 %] 90 %  BP: ()/(37-78) 93/56     Weight: 80.2 kg (176 lb 12.9 oz) (08/29/17 0645)  Body mass index is 25.37 kg/m².  Body surface area is 1.99 meters squared.    I/O last 3 completed shifts:  In: 2736.7 [P.O.:240; I.V.:1790.6; Other:300; IV Piggyback:406.1]  Out: 540 [Urine:240; Other:300]    Physical Exam   Constitutional: He is oriented to person, place, and time. He appears well-developed and well-nourished.   HENT:   Head: Normocephalic and atraumatic.   Eyes: Conjunctivae and EOM are normal.   Cardiovascular: Normal rate and regular rhythm.    Pulmonary/Chest: Effort normal. He has rales (mild crackles).   Abdominal: Soft. He exhibits no distension.   Musculoskeletal: He exhibits edema (mild edema). He exhibits no deformity.   Neurological: He is alert and oriented to person, place, and time.   Skin: Skin is warm and dry.       Significant Labs:  BMP:   Recent Labs  Lab 08/29/17  2110  09/02/17  0505   *  < > 232*     < > 101   CO2 19*  < > 17*   BUN 36*  < > 50*   CREATININE 8.9*  < > 10.3*   CALCIUM 9.5  < > 8.8   MG 1.9  --   --    < > = values in this interval not displayed.  CBC:   Recent Labs  Lab 09/02/17  0753   WBC 19.44*   RBC 3.32*   HGB 9.0*   HCT 28.7*      MCV 86   MCH 27.1   MCHC 31.4*     CMP:   Recent Labs  Lab 09/02/17  0505   *   CALCIUM 8.8   ALBUMIN 2.4*   PROT 6.6   *   K 5.4*   CO2 17*      BUN 50*   CREATININE 10.3*   ALKPHOS 84   ALT 93*   *   BILITOT 0.6     All labs within the past 24 hours have been reviewed.         Assessment/Plan:     ESRD (end stage renal disease) on dialysis    - receives iHD TTS via RUE AVF at [facility] with Dr. Sandhu. Treatment duration 4 hours EDW 82kg.   - admitted with NSTEMI. Medical management per cardiology.  - didn't tolerate HD on 8/29;  subsequently admitted to ICU  - didn't tolerate HD yesterday even though no fluid removed and low BFR used.   - Family members decided that comfort care was best for patient at current moment, sow SLED was withheld.             Alfredo Garcia  Nephrology  Fellow  [facility]    Pager [phone].      I have reviewed and concur with the fellow's history, physical, assessment, and plan. I have personally interviewed and examined the patient at bedside. See below addendum for my evaluation and additional findings, very poor prognosis,  Palliative care approach is very reasonable.

## 2017-09-02 NOTE — PROGRESS NOTES
[facility]  Critical Care Medicine  Progress Note    Patient Name: Bernabe Greco Jr.  MRN: [id number]  Admission Date: 8/29/2017  Hospital Length of Stay: 3 days  Code Status: DNR  Attending Physician: Francine Saravia MD   Primary Care Provider: Primary Doctor No   Principal Problem: Chest pain    Subjective:     HPI:   Mr. Greco is a 77 y.o. M with severe aortic stenosis (IMANI = 0.6 cm2, PV = 4.53 m/s, MG = 43), CHF (EF 43%) on ECHO Aug '17, PAD with Rt SFA , stroke, with residual visual defecit, ESRD on HD T/Th/Sat who was admitted for acute CHF exacerbation but had NSTEMI, seizure-like activity after which he was unresponsive, unable to follow commands, was intubated and admitted to ICU on 8/28. Please see H&P for details of presentation.    Hospital Course  Patient admitted to ICU 8/28  Intubated 8/28 - 8/30 8/29 - gram negative bacteremia in blood. Pt already on cefepime.   8/31 - after dialysis, pt complained of chest pain, elevated troponins refractory to nitro, morphine, dilaudid, fentanyl. Hypotensive with SBP 80-90 s/p IVF resus, adan bumps x2, on levophed drip titrated to maintain MAP > 65. Please see progress note dated 8/31 for details of significant event.  9/1 - further eval by Interventional Cards   9/2 - pt's physical exam worsening: decreased heart sounds, thready pulse, pt less oriented and unable to speak clearly, has rales bilaterally. VS - continues have low MAPs requiring more pressors, tachypneic.     Review of Systems   Review of Systems   Constitutional: Negative for chills and fever.   Respiratory: Negative for cough and shortness of breath.    Cardiovascular: Positive for chest pain (8/10 ). Negative for palpitations and leg swelling.   Gastrointestinal: Negative for abdominal pain, nausea and vomiting.   Musculoskeletal: Positive for myalgias. Negative for back pain.   Neurological: Negative for dizziness and headaches.     Objective:     Vital Signs (Most  Recent):  Temp: (!) 102.1 °F (38.9 °C) (08/29/17 1933)  Pulse: 96 (08/29/17 2300)  Resp: (!) 21 (08/29/17 2300)  BP: 107/60 (08/29/17 2300)  SpO2: 100 % (08/29/17 2300) Vital Signs (24h Range):  Temp:  [98.2 °F (36.8 °C)-102.1 °F (38.9 °C)] 102.1 °F (38.9 °C)  Pulse:  [] 96  Resp:  [12-28] 21  SpO2:  [89 %-100 %] 100 %  BP: ()/(51-83) 107/60   Weight: 80.2 kg (176 lb 12.9 oz)  Body mass index is 25.37 kg/m².    Intake/Output Summary (Last 24 hours) at 08/29/17 2342  Last data filed at 08/29/17 1842   Gross per 24 hour   Intake              960 ml   Output             1700 ml   Net             -740 ml     Physical Exam   Constitutional: Frail AAM, no longer alert and oriented. Responsive but verbalized incomprehensible words  HENT:   Head: Normocephalic and atraumatic.   Eyes: Pupils are equal, round, and reactive to light.   Cardiovascular: Decreased heart sounds. Has thready pulse bilaterally  Pulmonary/Chest: Effort normal. Rales bilaterally  Abdominal: Soft. Bowel sounds are normal. He exhibits no distension. There is no tenderness.   Musculoskeletal: He exhibits no edema.   Neurological:   Not alert and not following commands. Responsive to pain   Skin: Skin is warm and dry. Capillary refill takes less than 2 seconds. No erythema.   Vitals reviewed.    Lines/Drains/Airways     Central Venous Catheter Line                 Hemodialysis Catheter [id number] days          Drain                 Hemodialysis AV Fistula Right forearm 85971 days          Peripheral Intravenous Line                 Peripheral IV - Single Lumen 08/29/17 0300 Left Forearm less than 1 day              Significant Labs:    CBC/Anemia Profile:  Lab 08/29/17  0349 08/29/17  0937 08/29/17  2110   WBC 8.94  --  14.15*   HGB 8.2*  --  9.2*   HCT 25.9*  --  31.0*     --  229   MCV 87  --  91   RDW 16.1*  --  16.4*   IRON  --  20*  --    FERRITIN  --  365*  --         Chemistries:  Lab 08/29/17  0349 08/29/17  0639 08/29/17  1147  08/29/17  2110     --   --  139   K 4.7  --   --  4.7   CL 97  --   --  103   CO2 26  --   --  19*   BUN 79*  --   --  36*   CREATININE 13.7*  --   --  8.9*   CALCIUM 8.8  --   --  9.5   ALBUMIN 2.8*  --   --  3.2*   PROT 6.7  --   --  7.7   BILITOT 0.6  --   --  1.2*   ALKPHOS 91  --   --  91   ALT 15  --   --  15   AST 12  --   --  13   MG  --   --  1.9 1.9   PHOS  --  9.0*  --  5.3*     ABGs:   Lab 08/29/17  2327   PH 7.333*   PCO2 42.5   HCO3 22.6*   POCSATURATED 58*   BE -3     Troponin in last 24 hours: 10.32 -> 43.59 -> 50+    Significant Imaging: I have reviewed and interpreted all pertinent imaging results/findings within the past 24 hours.     CXR (8/29/17) - mild cardiomegaly. Right sided hilar opacity, unchanged from prior CXR  Official reading notes mild retrocardiac consolidation of left base which could represent pneumonia or aspiration.    CXR (9/1/17) - cardiomegaly, worsening bilateral pulmonary edema    CT head (8/29/17) - no acute intracranial hemorrhage or infarct. Generalized volume loss with chronic microvascular ischemic changes. remote infarcts of bilateral occipital lobes.     Assessment/Plan:   Mr. Greco is a 77 y.o. M with severe aortic stenosis (IMANI = 0.6 cm2, PV = 4.53 m/s, MG = 43), CHF (EF 43%) on ECHO Aug '17, PAD with Rt SFA , stroke, with residual visual defecit, ESRD on HD M/W/F who was initially admitted with NSTEMI. Due to recurrent presentation for chest pain & SOB and recent LHC showing patent stent, it was thought his NSTEMI was due to demand ischemia by Cardiology team. However, his stay was complicated by acute encephalopathy of most likely seizure/convulsion etiology requiring intubation for airway protection. Of note has gram negative bacteremia from cultures drawn 8/29 which have speciated morganella morgani.    Overnight, pt's had another episode of chest pain, hypotension and tachycardia requiring pressors. Troponins > 50. This morning, he has evidence of  worsening multiorgan failure - encephalopathy, circulatory/cardiogenic shock, severe AS, severe hypotension requiring high doses of levophed (pressors), worsening leukocytosis from sepsis, worsening kidney function, oliguria and worsening transaminases.    His vitals and physical exam has declined. Pt was actively dying. Family called this morning and notified of update. Per wife's request, he was made comfortable until their arrival.    Pt passed away at 12:53 PM. Cause of death: Cardiogenic shock.    Patient seen and plan of care discussed with staff. Attestation to follow     Nela Schwartz MD  Critical Care Medicine  [facility]

## 2017-09-02 NOTE — SIGNIFICANT EVENT
Death Note    Called to bedside by patient's nurse. Nursing supervisor notified. Family at bedside.  has been called and is also at bedside.    Patient is not responding to verbal or tactile stimuli. Patient does not have a pupillary or corneal reflex. His pupils are fixed and dilated. No heart or breath sounds on auscultation. No respirations. No palpable pulses.     Time of death: 12:53 PM     Cause of Death: Cardiogenic shock    Nela Schwartz MD  Internal Medicine, PGY-2  Pager: [phone]  [email or web address]

## 2017-09-02 NOTE — ASSESSMENT & PLAN NOTE
- receives iHD TTS via RUE AVF at [facility] with Dr. Sandhu. Treatment duration 4 hours EDW 82kg.   - admitted with NSTEMI. Medical management per cardiology.  - didn't tolerate HD on 8/29; subsequently admitted to ICU  - didn't tolerate HD yesterday even though no fluid removed and low BFR used.   - Family members decided that comfort care was best for patient at current moment, sow SLED was withheld.

## 2017-09-02 NOTE — DISCHARGE SUMMARY
DISCHARGE SUMMARY  Critical Care Medicine    Team: [facility] CRITICAL CARE MEDICINE    Patient Name: Bernabe Greco Jr.  YOB: 1940    Admit Date: 8/29/2017    Discharge Date: 09/02/2017    Discharge Attending Physician: Francine Saravia MD     Resident on Service: Nela Schwartz MD    Chief Complaint:     Princilpal Diagnoses:  Active Hospital Problems    Diagnosis  POA    *Chest pain [R07.9]  Yes    CAD (coronary artery disease) [I25.10]  Yes    Nonobstructive atherosclerosis of coronary artery [I70.91]  Yes    ESRD (end stage renal disease) on dialysis [N18.6, Z99.2]  Not Applicable    NSTEMI (non-ST elevated myocardial infarction) [I21.4]  Yes    Malfunction of arteriovenous dialysis fistula [T82.590A]  Yes    Anemia associated with chronic renal failure [D63.1]  Yes    Aortic valve disorders [I35.9]  Yes    Shock [R57.9]  Yes    Seizure [R56.9]  Yes    Type 2 diabetes mellitus with diabetic neuropathy, without long-term current use of insulin [E11.40]  Yes    Essential hypertension [I10]  Yes    Hyperlipidemia LDL goal <70 [E78.5]  Yes      Resolved Hospital Problems    Diagnosis Date Resolved POA   No resolved problems to display.       Discharged Condition: Admit problems have resolved     HOSPITAL COURSE:      Initial Presentation:  Mr. Greco is a 77 y.o. M with severe aortic stenosis (IMANI = 0.6 cm2, PV = 4.53 m/s, MG = 43), CHF (EF 43%) on ECHO Aug '17, PAD with Rt SFA , stroke, with residual visual defecit, ESRD on HD T/Th/Sat who initially presented on 8/to the ER with chest pain and shortness of breath on exertion (NYHA IV) worse than before. He was evaluated by Cardiology in the ER, believed to have NSTEMI secondary to demand ischemia and recommended to increase coreg dose from 6.25mg BID to 12.5mg BID and be admitted to cardiology Team C/J.      After admission, patient had HD session after which complained of 5-7/10 chest pain which resolved with Nitro SL x3, had HR in 120s for  which he received lopressor 5mg x2 and Temp of 102.1. When he got to his room at the time (309), patient was found to have his head gazing to the left. Due to concern for focal neuro defecits, patient had a STAT CT head (negative for ICH & ischemic stroke). On his way to/from the CT, he had recurrent episodes of seizures. Was stepped up to CCU bed for closer monitoring. However, when he came up to the ICU, he was confused, unresponsive, not following commands and was intubated for airway protection. He was transferred to CM-ICU team for further escalation of care.      In the recent past, pt presented with chest pain on 7/31 and underwent PCI of LCx/OM1 with YANE on 8/1/17 & discharged on aspirin and brillinta. He has been evaluated for a TAVR and scheduled to receive it on 9/8/17. Pt was also admitted on 8/8 for chest pain, ischemic changes on ECG (TWI in inferolateral leads), and concern for in-stent thrombosis however LHC showed non obstructive CAD with patent OM1 stent, was optimized medically and sent home.    Course of Hospital Stay:  Patient's stay was complicated by acute encephalopathy of most likely seizure/convulsion etiology requiring intubation for airway protection on 8/29. He also had hypotension refractory to fluid resuscitation so was started on pressors. Had central line placed in Right femoral groin 8/29. CT head was negative for acute intracranial pathology. Neurology was consulted for evaluation and EEG did not reveal status epilepticus. His mental status was waxing/waning over the next day.     Patient had 2 episodes of NSTEMI with chest pain and elevated troponemia on 8/30 and 8/31. ECG was unchanged with ST elevation in aVR and ST depression in V3-V6. Cardiology was consulted and attributed it to demand ischemia. Interventional cardiology evaluated the patient and said that he is not a candidate for TAVR with gram negative bacteremia. Pt continued to decline medically and had evidence of  worsening multiorgan failure - worsening encephalopathy, circulatory/cardiogenic shock, severe AS, severe hypotension requiring increasingly high doses of levophed (pressors), worsening leukocytosis from sepsis, worsening kidney function, oliguria and worsening transaminases.     His vitals and physical exam has declined this morning on exam - thready pulses, heart sounds distant, bilateral rales and crackles, no longer alert and oriented indicative of actively dying. Family called this morning and notified of update. Per wife's request, he was made comfortable until their arrival.     Pt passed away at 12:53 PM. Cause of death: Cardiogenic shock.    CONSULTS: Nephrology, Cardiology, Interventional Cardiology, Neurology     Last CBC/BMP/HgbA1c (if applicable):  Recent Results (from the past 336 hour(s))   CBC auto differential    Collection Time: 09/02/17  7:53 AM   Result Value Ref Range    WBC 19.44 (H) 3.90 - 12.70 K/uL    Hemoglobin 9.0 (L) 14.0 - 18.0 g/dL    Hematocrit 28.7 (L) 40.0 - 54.0 %    Platelets 250 150 - 350 K/uL   CBC auto differential    Collection Time: 09/01/17  7:42 PM   Result Value Ref Range    WBC 14.32 (H) 3.90 - 12.70 K/uL    Hemoglobin 8.5 (L) 14.0 - 18.0 g/dL    Hematocrit 27.0 (L) 40.0 - 54.0 %    Platelets 231 150 - 350 K/uL   CBC auto differential    Collection Time: 09/01/17  5:33 PM   Result Value Ref Range    WBC 15.42 (H) 3.90 - 12.70 K/uL    Hemoglobin 8.7 (L) 14.0 - 18.0 g/dL    Hematocrit 28.1 (L) 40.0 - 54.0 %    Platelets 238 150 - 350 K/uL     No results found for this or any previous visit (from the past 336 hour(s)).  Lab Results   Component Value Date    HGBA1C 6.7 (H) 08/01/2017     Other Pertinent Lab Findings:  Troponin 10.32 -> 43.59 -> 50+    Pertinent/Significant Diagnostic Studies:    CT head: (8/29/17) - no acute intracranial hemorrhage or infarct. Generalized volume loss with chronic microvascular ischemic changes. remote infarcts of bilateral occipital lobes.      ECG (17): ST elevation in AVR, ST depression in V3-V6.     Disposition:      Future Scheduled Appointments: none    Discharge Medication List: none    Signing Physician:    Nela Schwartz MD  Internal Medicine, PGY-2  Pager: [phone]

## 2017-09-02 NOTE — PROGRESS NOTES
"I spoke to wife (Marnie) and notified her of Mr. Greco's worsening clinical condition -his respiratory function is declining, hypotensive requiring more pressors, tachycardic, decreased alertness, diaphoretic, complaining of chest pain and pain "all over".   She would like us to keep him comfortable. Will be here before noon.     Nela Schwartz MD  Internal Medicine, PGY-2  [phone]    "

## 2017-09-02 NOTE — PLAN OF CARE
Problem: Patient Care Overview  Goal: Plan of Care Review  Outcome: Ongoing (interventions implemented as appropriate)  Pt oriented to self,but disoriented to time place and situation, he follows commands. Remains full DNR. Had complaints of generalized pain throughout the shift  and gets restless intermittently throughout the shift, relieved with prn Dilaudid. Remains on Heparin and Levo drip (to keep MAP above 65). Informed team of latest labs K-5.4.No new orders received.   POC reviewed with pt and spouse. All concerns and questions addressed. Will continue to monitor.

## 2017-09-02 NOTE — NURSING
Patient assessed after dilaudid given for pain and agitation . Relief not obtained to optimal level . Patient confused and complaining of severe pain ion abdomen stating I can not do this anymore. Patient diphoretic and BP dropping to 82/59 , sats dropping into the 70's on 15 liters non rebreather .  Patient very restless and uncomfortable . Critical care  Service called. Dr Miranda  Responed. Morpheine 6 mg given for comfort. All other measures continued.

## 2017-09-02 NOTE — PROGRESS NOTES
CRRT orders written and machine and ro set up and transported to patients' room. Pt's BP in low 60's MAP. Dr Sandhu notified. Prior to any CRRT intervention pt placed on comfort care. Set up and cancel.

## 2017-09-02 NOTE — NURSING
Spoke with critical care service to clarify order for morphine to be given while pressor support is still in use for this patient.  Patient is in agonal breathing rythym and ia tachycardic at 121 bpm he has runs on vtach  With periods of apnea.  CC team is aware and again clarifys that family is aware and wants patient kept comfortable and is aware morphine is being used for comfort measures.

## 2017-09-02 NOTE — PROGRESS NOTES
[facility]  Critical Care Medicine  Progress Note    Patient Name: Bernabe Greco Jr.  MRN: [id number]  Admission Date: 8/29/2017  Hospital Length of Stay: 2 days  Code Status: DNR  Attending Physician: Francine Saravia MD   Primary Care Provider: Primary Doctor No   Principal Problem: Chest pain    Subjective:     HPI:   Mr. Greco is a 77 y.o. M with severe aortic stenosis (IMANI = 0.6 cm2, PV = 4.53 m/s, MG = 43), CHF (EF 43%) on ECHO Aug '17, PAD with Rt SFA , stroke, with residual visual defecit, ESRD on HD T/Th/Sat who was admitted for acute CHF exacerbation but had NSTEMI, seizure-like activity after which he was unresponsive, unable to follow commands, was intubated and admitted to ICU on 8/28.    Hospital Course  Patient admitted to ICU 8/28  Intubated 8/28 - 8/30 8/29 - gram negative bacteremia in blood. Pt already on cefepime.   8/31 - after dialysis, pt complained of chest pain, elevated troponins refractory to nitro, morphine, dilaudid, fentanyl. Hypotensive with SBP 80-90 s/p IVF resus, adan bumps x2, on levophed drip titrated to maintain MAP > 65. Please see progress note dated 8/31 for details of significant event.  9/1 - further eval by Interventional Cards     Review of Systems   Review of Systems   Constitutional: Negative for chills and fever.   Respiratory: Negative for cough and shortness of breath.    Cardiovascular: Positive for chest pain (8/10 ). Negative for palpitations and leg swelling.   Gastrointestinal: Negative for abdominal pain, nausea and vomiting.   Musculoskeletal: Positive for myalgias. Negative for back pain.   Neurological: Negative for dizziness and headaches.     Objective:     Vital Signs (Most Recent):  Temp: (!) 102.1 °F (38.9 °C) (08/29/17 1933)  Pulse: 96 (08/29/17 2300)  Resp: (!) 21 (08/29/17 2300)  BP: 107/60 (08/29/17 2300)  SpO2: 100 % (08/29/17 2300) Vital Signs (24h Range):  Temp:  [98.2 °F (36.8 °C)-102.1 °F (38.9 °C)] 102.1 °F (38.9 °C)  Pulse:   [] 96  Resp:  [12-28] 21  SpO2:  [89 %-100 %] 100 %  BP: ()/(51-83) 107/60   Weight: 80.2 kg (176 lb 12.9 oz)  Body mass index is 25.37 kg/m².      Intake/Output Summary (Last 24 hours) at 08/29/17 2342  Last data filed at 08/29/17 1842   Gross per 24 hour   Intake              960 ml   Output             1700 ml   Net             -740 ml       Physical Exam   Constitutional:   Frail AAM   HENT:   Head: Normocephalic and atraumatic.   Eyes: Pupils are equal, round, and reactive to light.   Cardiovascular: Normal rate, regular rhythm, normal heart sounds and intact distal pulses. No murmur heard.  Pulmonary/Chest: Effort normal and breath sounds normal. No respiratory distress. He has no wheezes. He has no rales.   Abdominal: Soft. Bowel sounds are normal. He exhibits no distension. There is no tenderness.   Musculoskeletal: He exhibits no edema.   Neurological:   Not alert and not following commands. Responsive to pain   Skin: Skin is warm and dry. Capillary refill takes less than 2 seconds. No erythema.   Vitals reviewed.    Lines/Drains/Airways     Central Venous Catheter Line                 Hemodialysis Catheter 13684 days          Drain                 Hemodialysis AV Fistula Right forearm 65215 days          Peripheral Intravenous Line                 Peripheral IV - Single Lumen 08/29/17 0300 Left Forearm less than 1 day              Significant Labs:    CBC/Anemia Profile:  Lab 08/29/17  0349 08/29/17  0937 08/29/17  2110   WBC 8.94  --  14.15*   HGB 8.2*  --  9.2*   HCT 25.9*  --  31.0*     --  229   MCV 87  --  91   RDW 16.1*  --  16.4*   IRON  --  20*  --    FERRITIN  --  365*  --         Chemistries:  Lab 08/29/17  0349 08/29/17  0639 08/29/17  1147 08/29/17  2110     --   --  139   K 4.7  --   --  4.7   CL 97  --   --  103   CO2 26  --   --  19*   BUN 79*  --   --  36*   CREATININE 13.7*  --   --  8.9*   CALCIUM 8.8  --   --  9.5   ALBUMIN 2.8*  --   --  3.2*   PROT 6.7  --   --   7.7   BILITOT 0.6  --   --  1.2*   ALKPHOS 91  --   --  91   ALT 15  --   --  15   AST 12  --   --  13   MG  --   --  1.9 1.9   PHOS  --  9.0*  --  5.3*     ABGs:   Lab 08/29/17  2327   PH 7.333*   PCO2 42.5   HCO3 22.6*   POCSATURATED 58*   BE -3     Significant Imaging: I have reviewed and interpreted all pertinent imaging results/findings within the past 24 hours.     CXR (8/29/17) - mild cardiomegaly. Right sided hilar opacity, unchanged from prior CXR  Official reading notes mild retrocardiac consolidation of left base which could represent pneumonia or aspiration.    CT head (8/29/17) - no acute intracranial hemorrhage or infarct. Generalized volume loss with chronic microvascular ischemic changes. remote infarcts of bilateral occipital lobes.     Assessment/Plan:   Mr. Greco is a 77 y.o. M with severe aortic stenosis (IMANI = 0.6 cm2, PV = 4.53 m/s, MG = 43), CHF (EF 43%) on ECHO Aug '17, PAD with Rt SFA , stroke, with residual visual defecit, ESRD on HD M/W/F who was initially admitted with NSTEMI. Due to recurrent presentation for chest pain & SOB and recent LHC showing patent stent, it was thought his NSTEMI was due to demand ischemia by Cardiology team. However, his stay was complicated by acute encephalopathy of most likely seizure/convulsion etiology requiring intubation for airway protection. Of note has gram negative bacteremia from cultures drawn 8/29.    Acute encephalopathy - resolved  - Maybe 2/2 to seizures vs. Decreased brain perfusion from cardiogenic etiology  - s/p keppra load 1g on 8/29 and and on maintenance 500mg BID since  - s/p EEG eval: not status epilepticus  - s/p Neurology eval for additional recs: recommend MRI brain which was unrevealing. Vit B labs, keppra level  - seizure precautions  - s/p intubation 8/29 - 8/31 for airway protection  - currently A & O x3    Hypotension  - may be due to cardiogenic vs. septic shock vs. Worsening AS  - for cardiogenic shock & maybe worsening AS  -> ECHO revealed EF 40%  - for septic shock 2/2 gram negative bacteremia-> on cefepime  - holding home antihypertensives (coreg, imdur)  - requiring levophed gtt to maintain MAP > 65      Severe aortic stenosis  - per last ECHO (Aug '17): IMANI: 0.6, M, PV: 4.53  - s/p TAVR workup and scheduled to received on 17 but given change in code status and worsening clinical situation, no longer a candidate for AV replacement    Chronic systolic and diastolic heart failure  - likely secondary to ischemia given extensive CAD  - last ECHO () EF 40%, diastolic dysfunction  - NYHA IV symptoms  - continue aspirin. Will resume coreg, imdur once pt is off of pressors and is able to maintain MAPs > 65    CAD  Chest pain  NSTEMI  - s/p LCx/OMB PCI in Aug 2017 with BMS  - trop:  10 -> 43  - ECG: ST elevation in AVR, ST depression in V3-V6  - has had 2 NSTEMI episodes since admission to ICU: on  & on . Currently on heparin drip   - Cardiology no longer following   - continue metoprolol, aspirin, statin  - cardiac tele    PAD -  of Right SFA  - continue aspirin, ticagrelor    HLD  - continue lipitor    Sepsis   Gram negative rods bacteremia  - given elevated lactic acid and hypotension s/p intubation, concern for sepsis.   - sources include pneumonia (LLL infiltrate retrocardiac) & elevated procalcitonin (2.11).    - BCx () - gram negative rods in 3/4 bottles.  Repeat BCx  NGTD  - on vanc and cefepime  - pt received IVF resuscitation (~1 L) yesterday   - lactic acid trended down to 1.2    ESRD - on HD T//Sat  - Nephrology on board. Last HD on   - will add novasource per recs  - EPO with HD  - will avoid nephrotoxins and renally dose meds     Critical Care Daily Checklist:    A: Awake: RASS Goal/Actual Goal:    Actual: Day Agitation Sedation Scale (RASS): Alert and calm   B: Spontaneous Breathing Trial Performed?     C: SAT & SBT Coordinated?  Not yet                D: Delirium: CAM-ICU Overall  CAM-ICU: Negative   E: Early Mobility Performed? No   F: Feeding Goal:    Status:     Current Diet Order   Procedures    Diet renal      AS: Analgesia/Sedation n/a   T: Thromboembolic Prophylaxis heparin   H: HOB > 300 n/a   U: Stress Ulcer Prophylaxis (if needed) n/a   G: Glucose Control n/a   B: Bowel Function Stool Occurrence: 1   I: Indwelling Catheter (Lines & Peacock) Necessity Peacock placed due to immobilized & critically ill pt   D: De-escalation of Antimicrobials/Pharmacotherapies On vanc, cefepime    Plan for the day/ETD 1. Treating NSTEMI episode from last night w/heparin gtt, aspirin, brillinta..  2. Interventional cards recs  3. Abdominal Pain eval. CT Abdomen pending    Code Status:  Family/Goals of Care: DNR/DNI     Providing supportive care. Nephro following.   Pt reassessed by Interventional Cardiology this AM after NSTEMI last night. They do not believe he is a candidate for AVR at this time. Gen Cards no longer following pt and attribute NSTEMI with trop 43 to demand-ischemia/type 2 angina    Patient seen and plan of care discussed with fellow. Attestation to follow     Nela Schwartz MD  Critical Care Medicine  [facility]

## 2017-09-03 LAB
BLD PROD TYP BPU: NORMAL
BLOOD UNIT EXPIRATION DATE: NORMAL
BLOOD UNIT TYPE CODE: 5100
BLOOD UNIT TYPE: NORMAL
CODING SYSTEM: NORMAL
DISPENSE STATUS: NORMAL
TRANS ERYTHROCYTES VOL PATIENT: NORMAL ML

## 2017-09-05 LAB
BACTERIA BLD CULT: NORMAL
BACTERIA BLD CULT: NORMAL
CORONARY STENOSIS: ABNORMAL
PYRIDOXAL SERPL-MCNC: 26 UG/L (ref 5–50)
VIT B1 SERPL-MCNC: 76 UG/L (ref 38–122)

## 2017-09-07 NOTE — PHYSICIAN QUERY
PT Name: Bernabe Greco Jr.  MR #: [id number]    Physician Query Form - Cause and Effect Relationship Clarification      CDS/: Ester Hennessy RN, CDS               Contact information: [email or web address]    This form is a permanent document in the medical record.     Query Date: September 7, 2017    By submitting this query, we are merely seeking further clarification of documentation. Please utilize your independent clinical judgment when addressing the question(s) below.    The Medical record contains the following:  Supporting Clinical Findings   Location in record    --Sepsis           - on vanc, aztreonam and moxifloxacin given penicillin-allergy confirmed with family       --Sepsis   Gram negative rods bacteremia                                                               [facility] H&P 8/30          [facility] Progress Note 8/31      --Of note has gram negative bacteremia from cultures drawn 8/29 which have speciated morganella morgani.                                                                                                                                                       [facility] Progress Note 9/2         Provider, please clarify if there is any correlation between ____Sepsis______ and _Morganella Morgani_____.           Are the conditions:     [ x ] Due to or associated with each other     [  ] Unrelated to each other     [  ] Other (Please Specify): _________________________     [  ] Clinically Undetermined

## 2017-09-07 NOTE — PHYSICIAN QUERY
PT Name: Bernabe Greco Jr.  MR #: [id number]     Physician Query Form - Diagnosis Clarification      CDS/: Ester Hennessy RN, CDS               Contact information: [email or web address]    This form is a permanent document in the medical record.     Query Date: September 7, 2017    By submitting this query, we are merely seeking further clarification of documentation.  Please utilize your independent clinical judgment when addressing the question(s) below.     The medical record contains the following:      Findings Supporting Clinical Information Location in Medical Record   --Septic shock   --For associated hypotension believed to be 2/2 cardiogenic vs. Septic shock vs. Worsening aortic stenosis, he was placed on pressors (levophed).    --Sepsis   - given elevated lactic acid and hypotension s/p intubation, concern for sepsis.   - sources include pneumonia (LLL infiltrate retrocardiac) & elevated procalcitonin (2.11).    - BCx 2 pending   - on vanc, aztreonam and moxifloxacin given penicillin-allergy     --DDx could be worsening NSTEMI, Type II ischemia (Type II myocardiac infarction), and in the setting of septic shock,    --Continue work-up for suspected septic shock f/u cx and continue empiric Abx     --Morganella morganii + bacteremia likely from GI source   Suspected Septic Shock- On empiric Vanc/Cefepime.      CCM H&P 8/30                        Cards C/S 8/30        Interventional Cards Progress Note 8/30    Interventional Cards Progress Note 9/1       Please clarify if the ___Septic Shock______ diagnosis has been:    [ x ] Ruled In  [  ] Ruled In, Now Resolved  [  ] Resolved Prior to My Assessment  [  ] Ruled Out  [  ] Clinically insignificant  [  ] Clinically undetermined  [  ] Other/Clarification of findings (please specify)_______________________________    Please document in your progress notes daily for the duration of treatment, until resolved, and include in your discharge summary.

## 2017-11-28 NOTE — PT/OT/SLP PROGRESS
"Occupational Therapy  Treatment    Bernabe Greco Jr.   MRN: [id number]   Admitting Diagnosis: Seizure    OT Date of Treatment: 17   OT Start Time: 1249  OT Stop Time: 1322  OT Total Time (min): 33 min    Billable Minutes:  Self Care/Home Management 15 min and Therapeutic Activity 18 min    General Precautions: Standard, fall  Orthopedic Precautions: N/A  Braces: N/A    Do you have any cultural, spiritual, Yazidism conflicts, given your current situation?: Rastafari    Subjective:  Communicated with nurse prior to session.  "I'll walk when they let me."    Pain Ratin/10     Objective:  Patient found with: telemetry     Functional Mobility:  Bed Mobility:  Rolling/Turning to Left: Contact guard assistance  Scooting/Bridging: Contact Guard Assistance  Supine to Sit: Minimum Assistance (min asist with UB - pt able to lower legs to floor)  Sit to Supine: Contact Guard Assistance    Transfers:   Sit <> Stand Assistance: Contact Guard Assistance  Sit <> Stand Assistive Device: Rolling Walker    Functional Ambulation: Pt walked ~75 feet with OT with 1 rest break needed using RW and CGA-min assist.  Pt needed assist maneuvering RW on L side as he would veer L and not avoid items on L side.     Activities of Daily Living:   UE Dressing Level of Assistance: Minimum assistance (assist with L UE for coordination to get into sleeve - donned hosp gown as robe while seated EOB)     LE Dressing Level of Assistance: Minimum assistance (pt able to doff socks without assist - min assist while donning to straighten socks only)    Grooming Position: Standing at sink  Grooming Level of Assistance: Contact guard assistance (washing hands - needed vcs to locate items)     Toileting Level of Assistance: Activity did not occur     Bathing Level of Assistance: Activity did not occur      Balance:   Static Sit: GOOD-: Takes MODERATE challenges from all directions but inconsistently  Dynamic Sit: FAIR+: Maintains balance through MINIMAL " excursions of active trunk motion  Static Stand: FAIR+: Takes MINIMAL challenges from all directions  Dynamic stand: FAIR: Needs CONTACT GUARD during gait    Therapeutic Activities and Exercises:  · Pt completed self care and func mobility tasks as noted above  · Pt worked noted with decreased coordination and visual motor skills on L side as opposed to R.  Noted with drifting of L UE during UE exercises, veering L and hitting items on L when ambulation with RW, and difficulty inserting L UE into sleeve.   · Pt completed 1 set of 12 reps of B UE AROM exercises in order to work towards increasing her UB strength/endurance to assist with mobility and self care skills.  Pt completed the following exercises: shoulder flex/ext, elbow flex/ext, forearm sup/pro, and hand pumps.  As noted above, drifting noted during L shoulder exercises.   · Whiteboard updated    -PAC 6 CLICK ADL   How much help from another person does this patient currently need?   1 = Unable, Total/Dependent Assistance  2 = A lot, Maximum/Moderate Assistance  3 = A little, Minimum/Contact Guard/Supervision  4 = None, Modified [place]/Independent    Putting on and taking off regular lower body clothing? : 3  Bathing (including washing, rinsing, drying)?: 3  Toileting, which includes using toilet, bedpan, or urinal? : 3  Putting on and taking off regular upper body clothing?: 3  Taking care of personal grooming such as brushing teeth?: 3  Eating meals?: 3  Total Score: 18     AM-PAC Raw Score CMS G-Code Modifier Level of Impairment Assistance   6 % Total / Unable   7 - 9 CM 80 - 100% Maximal Assist   10 - 14 CL 60 - 80% Moderate Assist   15 - 19 CK 40 - 60% Moderate Assist   20 - 22 CJ 20 - 40% Minimal Assist   23 CI 1-20% SBA / CGA   24 CH 0% Independent/ Mod I     Patient left HOB elevated with call button in reach, bed alarm on and sitter present    ASSESSMENT:  Bernabe Angus Johnson is a 77 y.o. male with a medical diagnosis of Seizure and  presents with decreased safety awareness, decreased coordination, decreased balance, gait instability, decreased self care, and decreased func mobility.  Pt was agreeable to OT and was noted to make progress towards his goals in therapy.  Pt met goals for UB and LB dressing during today's session. Pt's goals were updated and remain appropriate at this time.  Pt noted with decreased coordination and visual motor skills on L side during ADLs, exercises and ambulation. He will continue to benefit from skilled OT services in order to assist him with increasing his safety and level of independence with self care and mobility tasks.         Rehab identified problem list/impairments: Rehab identified problem list/impairments: weakness, impaired endurance, impaired self care skills, impaired functional mobilty, decreased coordination, impaired cognition, impaired balance, gait instability, decreased upper extremity function, decreased lower extremity function, decreased safety awareness, impaired coordination, impaired fine motor    Rehab potential is good.    Activity tolerance: Good    Discharge recommendations: Discharge Facility/Level Of Care Needs: nursing facility, skilled     Barriers to discharge: Barriers to Discharge: Decreased caregiver support    Equipment recommendations: bedside commode, walker, rolling, wheelchair     GOALS:   Occupational Therapy Goals        Problem: Occupational Therapy Goal    Goal Priority Disciplines Outcome Interventions   Occupational Therapy Goal     OT, PT/OT Ongoing (interventions implemented as appropriate)    Description:  Goals to be met by: 5/16/17     Patient will increase functional independence with ADLs by performing:    UE Dressing with Minimal Assistance. GOAL MET 05/12/17  NEW GOAL: UE Dressing with SBA  LE Dressing with Moderate Assistance. GOAL MET 05/12/17  NEW GOAL: LE dressing with CGA  Grooming while standing with Stand-by Assistance.  Toileting from bedside  commode with Moderate Assistance for hygiene and clothing management.   Rolling to Bilateral with Minimal Assistance.   Supine to sit with Minimal Assistance.  Stand pivot transfers with Stand-by Assistance.                 Plan:  Patient to be seen 5 x/week to address the above listed problems via self-care/home management, therapeutic activities, therapeutic exercises, neuromuscular re-education, cognitive retraining, sensory integration  Plan of Care expires: 06/08/17  Plan of Care reviewed with: patient         Tamika Castillo, OT  05/12/2017   “You can access the [employer] Patient Portal, offered by [facility], by registering with the following website: [email or web address]”

## 2019-04-15 NOTE — CONSULTS
"[facility]  Nephrology  Consult Note    Patient Name: Bernabe Greco Jr.  MRN: [id number]  Admission Date: 8/29/2017  Hospital Length of Stay: 0 days  Attending Provider: Eve Mathew MD   Primary Care Physician: Primary Doctor No  Principal Problem:<principal problem not specified>    Consults  Subjective:     HPI: 78 yo AA male with significant history for hypertension, hyperlipidemia, CAD sp recent NSTEMI/YANE to Lcx/OM1 also found to have D1 70% non intervene tx medically on 8/1/17, Systolic heart failure (EF 43% 9/1) PAD, severe aortic stenosis with TVAR work up in progress, and ESRD on HD who presented to hospital via EMS for non radiating mid sternal chest "pressure" that started at 9 pm last night associated with palpitation, shortness of breath and diaphoresis. Patient did not have home nitro. Laying down did not help and by 12 am chest pressure worsen 8/10 then wife called 911. Symptoms improved with nitro SL en route to hospital and completed resolved with morphine and GI cocktail in ED. Elevated troponin down from previous admit 0.058>0.086. BMP up from previous. EKG NSR first degree AVB with NSTWA. This 830 pm patient got news that brother next door just passed away and patient currently have mid sternal chest pressure 8/10. Nurse is going to give patient nitro now. Wife states symptoms are similar to MI patient had earlier this month. Nephrology consult for HD. HD TTS 4 hrs duration via RFA AVF at [facility] under care of Dr. Romo. EDW 82 kg. He is under is EDW. Reports UF 3-4 in between HD days. Very minimal urine.       Past Medical History:   Diagnosis Date    Arthritis     Diabetes mellitus     DNR (do not resuscitate) 5/17/2017    Hyperlipidemia     Hypertension     Stroke     Syncope and collapse        Past Surgical History:   Procedure Laterality Date    AV FISTULA PLACEMENT Right     HIP SURGERY      replacement right    JOINT REPLACEMENT      right hip  "       Review of patient's allergies indicates:   Allergen Reactions    Penicillins Rash     Current Facility-Administered Medications   Medication Frequency    acetaminophen tablet 650 mg Q8H PRN    aspirin EC tablet 81 mg Daily    atorvastatin tablet 80 mg Daily    carvedilol tablet 6.25 mg BID    citalopram tablet 20 mg Daily    isosorbide mononitrate 24 hr tablet 30 mg Daily    lorazepam tablet 0.5 mg BID PRN    mirtazapine tablet 15 mg QHS    ondansetron injection 4 mg Q6H PRN    pantoprazole EC tablet 40 mg Daily    polyethylene glycol packet 17 g Daily PRN    sevelamer carbonate tablet 1,600 mg TID WM    sodium chloride 0.9% flush 3 mL Q8H    ticagrelor tablet 90 mg BID    vitamin renal formula (B-complex-vitamin c-folic acid) 1 mg per capsule 1 capsule Daily     Family History     Problem Relation (Age of Onset)    Hypertension Mother        Social History Main Topics    Smoking status: Former Smoker     Packs/day: 0.25     Quit date: 1/1/1978    Smokeless tobacco: Never Used    Alcohol use No    Drug use: No    Sexual activity: Not Currently     Review of Systems   Constitutional: Negative for chills, fatigue and fever.   HENT: Negative.    Eyes: Negative.    Respiratory: Positive for chest tightness and shortness of breath. Negative for wheezing.    Cardiovascular: Positive for chest pain and palpitations. Negative for leg swelling.   Gastrointestinal: Negative for abdominal distention, abdominal pain, constipation, diarrhea, nausea and vomiting.   Endocrine: Negative.    Genitourinary: Positive for decreased urine volume.   Musculoskeletal: Negative.    Skin: Negative.    Neurological: Negative.    Hematological: Negative.    Psychiatric/Behavioral: Negative.      Objective:     Vital Signs (Most Recent):  Temp: 98.2 °F (36.8 °C) (08/29/17 0502)  Pulse: 79 (08/29/17 0750)  Resp: 12 (08/29/17 0750)  BP: (!) 147/83 (08/29/17 0750)  SpO2: 96 % (08/29/17 0750)  O2 Device (Oxygen Therapy):  Patient is a 7y5m old  Female who presents with a chief complaint of Worsening respiratory status (15 Apr 2019 07:23)    Interval History:  - No acute events  - Labetalol restarted yesterday as BPs had been uptrending  - BPs still elevated although taken in lower extremities  - KCl supplementation increased to BID dosing yesterday for mild hypokalemia  - Continues to have good urine output  - Tolerating respiratory vent weans    [] No New Complaints  [] All Review of Systems Negative    MEDICATIONS  (STANDING):  ALBUTerol  Intermittent Nebulization - Peds 2.5 milliGRAM(s) Nebulizer every 3 hours  amLODIPine Oral Liquid - Peds 7.5 milliGRAM(s) Enteral Tube daily  buDESOnide   for Nebulization - Peds 0.5 milliGRAM(s) Nebulizer every 12 hours  calcium carbonate Oral Liquid - Peds 625 milliGRAM(s) Elemental Calcium Enteral Tube daily  cholecalciferol Oral Liquid - Peds 1200 Unit(s) Enteral Tube daily  Clobazam Oral Liquid - Peds 5 milliGRAM(s) Oral daily  Clobazam Oral Liquid - Peds 2.5 milliGRAM(s) Oral daily  cloNIDine 0.3 mG/24Hr(s) Transdermal Patch - Peds 1 Patch Transdermal <User Schedule>  dextrose 5% + sodium chloride 0.9%. - Pediatric 1000 milliLiter(s) (60 mL/Hr) IV Continuous <Continuous>  epoetin mague Injection - Peds 3000 Unit(s) SubCutaneous every 7 days  eslicarbazepine Oral Tab/Cap - Peds 200 milliGRAM(s) Oral daily  ferrous sulfate Oral Liquid - Peds 65 milliGRAM(s) Elemental Iron Enteral Tube daily  fludroCORTISONE Oral Tab/Cap - Peds 0.1 milliGRAM(s) Oral every 12 hours  labetalol  Oral Liquid - Peds 300 milliGRAM(s) Oral two times a day  lacosamide  Oral Liquid - Peds 200 milliGRAM(s) Oral every 12 hours  levoFLOXacin IV Intermittent - Peds 360 milliGRAM(s) IV Intermittent daily  loperamide Oral Liquid - Peds 1 milliGRAM(s) Enteral Tube every 12 hours  magnesium oxide Tab/Cap - Peds 400 milliGRAM(s) Oral daily  mycophenolate mofetil  Oral Liquid - Peds 400 milliGRAM(s) Enteral Tube every 12 hours  potassium chloride  Oral Liquid - Peds 10 milliEquivalent(s) Enteral Tube every 12 hours  prednisoLONE  Oral Liquid - Peds 3 milliGRAM(s) Enteral Tube daily  sabril 500 milliGRAM(s) 500 milliGRAM(s) Enteral Tube daily  sabril 625 milliGRAM(s) 625 milliGRAM(s) Enteral Tube daily  sodium chloride 3% for Nebulization - Peds 4 milliLiter(s) Nebulizer three times a day  sodium citrate/citric acid Oral Liquid - Peds 15 milliEquivalent(s) Oral every 12 hours  tacrolimus  Oral Liquid - Peds 4.1 milliGRAM(s) Enteral Tube every 12 hours  warfarin Oral Tab/Cap - Peds 2.5 milliGRAM(s) Oral daily    MEDICATIONS  (PRN):  acetaminophen   Oral Liquid - Peds. 480 milliGRAM(s) Enteral Tube every 6 hours PRN Mild Pain (1 - 3)  cloNIDine  Oral Liquid - Peds 0.1 milliGRAM(s) Enteral Tube once PRN BP >130/90  hydrALAZINE  Oral Liquid - Peds 3 milliGRAM(s) Oral once PRN BP >130/90      Vital Signs Last 24 Hrs  T(C): 37.4 (15 Apr 2019 08:00), Max: 37.4 (15 Apr 2019 08:00)  T(F): 99.3 (15 Apr 2019 08:00), Max: 99.3 (15 Apr 2019 08:00)  HR: 103 (15 Apr 2019 08:00) (87 - 111)  BP: 129/77 (15 Apr 2019 08:00) (103/85 - 129/77)  BP(mean): 92 (15 Apr 2019 08:00) (78 - 97)  RR: 21 (15 Apr 2019 08:00) (21 - 41)  SpO2: 100% (15 Apr 2019 08:00) (93% - 100%)  I&O's Detail    14 Apr 2019 07:01  -  15 Apr 2019 07:00  --------------------------------------------------------  IN:    dextrose 5% + sodium chloride 0.9%. - Pediatric: 780 mL    Enteral Tube Flush: 430 mL    IV PiggyBack: 100 mL    Suplena: 690 mL  Total IN: 2000 mL    OUT:    Ileostomy: 335 mL    Incontinent per Diaper: 1265 mL  Total OUT: 1600 mL    UOP: 1.5 cc/kg/hr over 24 hours    Total NET: 400 mL      15 Apr 2019 07:01  -  15 Apr 2019 09:02  --------------------------------------------------------  IN:    Suplena: 175 mL  Total IN: 175 mL    OUT:    Ileostomy: 54 mL  Total OUT: 54 mL    Total NET: 121 mL        Daily     Daily   Change in Weight:    Physical Exam  All physical exam findings normal, except for those marked:  General:	No apparent distress  .		[] Abnormal:  HEENT:	Normal: normocephalic atraumatic, no conjunctival injection, no discharge, no   .		photophobia, intact extraocular movements, scleras not icteric, normal tympanic   .		membranes; external ear normal, nares normal without discharge, no pharyngeal   .		erythema or exudates, no oral mucosal lesions, normal tongue and lips  .		[] Abnormal:  Neck		Normal: supple, full range of motion, no nuchal rigidity  .		[] Abnormal:  Lymph Nodes	Normal: normal size and consistency, non-tender  .		[] Abnormal:  Cardiovascular	Normal: regular rate, normal S1, S2, no murmurs  .		[] Abnormal:  Respiratory	Normal: normal respiratory pattern, CTA B/L, no retractions  .		[] Abnormal:  Abdominal	Normal: soft, ND, NT, bowel sounds present, no masses, no organomegaly  .		[] Abnormal:  		Normal: normal genitalia, testes descended, circumcised/uncircumcised  .		[] Abnormal:  Extremities	Normal: FROM x4, no cyanosis or edema, symmetric pulses  .		[] Abnormal:  Skin		Normal: intact and not indurated, no rash, no desquamation  .		[] Abnormal:  Musculoskeletal	Normal: no joint swelling, erythema, or tenderness; full range of motion with no   .		contractures; no muscle tenderness; no clubbing; no cyanosis; no edema  .		[] Abnormal:  Neurologic	Normal: alert, oriented as age-appropriate, affect appropriate; no weakness, no   .		facial asymmetry, moves all extremities, normal gait-child older than 18 months  .		[] Abnormal:    Lab Results:    04-14    141  |  92<L>  |  14  ----------------------------<  93  3.2<L>   |  35<H>  |  1.17<H>    Ca    10.0      14 Apr 2019 09:28  Phos  3.6     04-14  Mg     2.0     04-14        PT/INR - ( 14 Apr 2019 09:28 )   PT: 17.0 SEC;   INR: 1.47          PTT - ( 14 Apr 2019 09:28 )  PTT:34.6 SEC nasal cannula (08/29/17 0502) Vital Signs (24h Range):  Temp:  [98.2 °F (36.8 °C)-98.3 °F (36.8 °C)] 98.2 °F (36.8 °C)  Pulse:  [67-81] 79  Resp:  [12-18] 12  SpO2:  [96 %-100 %] 96 %  BP: (127-147)/(59-83) 147/83     Weight: 80.2 kg (176 lb 12.9 oz) (08/29/17 0645)  Body mass index is 25.37 kg/m².  Body surface area is 1.99 meters squared.    No intake/output data recorded.    Physical Exam   Constitutional: He is oriented to person, place, and time. He appears well-developed and well-nourished.   Teary due to news brother who lives next door passed away    Cardiovascular: Normal rate and regular rhythm.    Murmur heard.  Pulmonary/Chest: Effort normal and breath sounds normal.   Abdominal: Soft. Bowel sounds are normal. He exhibits no distension.   Musculoskeletal: Normal range of motion.   Neurological: He is alert and oriented to person, place, and time.   Skin: Skin is warm and dry.   RFA AVF good bruit and thrill   Psychiatric: He has a normal mood and affect.       Significant Labs:  All labs within the past 24 hours have been reviewed.    Significant Imaging:  Labs: Reviewed    Assessment/Plan:     Chronic kidney disease-mineral and bone disorder    -Phos level pending. Borderline highcorrected Ca level 9.9.   -Avoid any Ca based supplements or Ca containing binders. Continue Renvela. Renal diet. Albumin low-add norvasource.           ESRD (end stage renal disease) on dialysis    -HD x 3 yrs TTS 4 hrs [facility] Dr Romo . EDW 82 kg. Last HD Sat 8/26  -No signs of volume overload. Electrolytes and acid/base stable. BP stable.   -UF 1-1.5 and adjust EDW. Dialysate to be adjusted to current labs.   -Hold off HD until okay with Cardiology.         Anemia associated with chronic renal failure    Continue epogen. Will review outpatient HD flowsheet.             Thank you for your consult. I will follow-up with patient. Please contact us if you have any additional questions.    Constance Daigle  NP  Nephrology  [facility]   Patient is a 8y5m old  Female who presents with a chief complaint of Worsening respiratory status (15 Apr 2019 07:23)    Interval History:  - No acute events  - Labetalol restarted yesterday as BPs had been uptrending  - BPs still elevated although taken in lower extremities  - KCl supplementation increased to BID dosing yesterday for mild hypokalemia  - Continues to have good urine output  - Tolerating respiratory vent weans    [] No New Complaints  [] All Review of Systems Negative    MEDICATIONS  (STANDING):  ALBUTerol  Intermittent Nebulization - Peds 2.5 milliGRAM(s) Nebulizer every 3 hours  amLODIPine Oral Liquid - Peds 7.5 milliGRAM(s) Enteral Tube daily  buDESOnide   for Nebulization - Peds 0.5 milliGRAM(s) Nebulizer every 12 hours  calcium carbonate Oral Liquid - Peds 625 milliGRAM(s) Elemental Calcium Enteral Tube daily  cholecalciferol Oral Liquid - Peds 1200 Unit(s) Enteral Tube daily  Clobazam Oral Liquid - Peds 5 milliGRAM(s) Oral daily  Clobazam Oral Liquid - Peds 2.5 milliGRAM(s) Oral daily  cloNIDine 0.3 mG/24Hr(s) Transdermal Patch - Peds 1 Patch Transdermal <User Schedule>  dextrose 5% + sodium chloride 0.9%. - Pediatric 1000 milliLiter(s) (60 mL/Hr) IV Continuous <Continuous>  epoetin mague Injection - Peds 3000 Unit(s) SubCutaneous every 7 days  eslicarbazepine Oral Tab/Cap - Peds 200 milliGRAM(s) Oral daily  ferrous sulfate Oral Liquid - Peds 65 milliGRAM(s) Elemental Iron Enteral Tube daily  fludroCORTISONE Oral Tab/Cap - Peds 0.1 milliGRAM(s) Oral every 12 hours  labetalol  Oral Liquid - Peds 300 milliGRAM(s) Oral two times a day  lacosamide  Oral Liquid - Peds 200 milliGRAM(s) Oral every 12 hours  levoFLOXacin IV Intermittent - Peds 360 milliGRAM(s) IV Intermittent daily  loperamide Oral Liquid - Peds 1 milliGRAM(s) Enteral Tube every 12 hours  magnesium oxide Tab/Cap - Peds 400 milliGRAM(s) Oral daily  mycophenolate mofetil  Oral Liquid - Peds 400 milliGRAM(s) Enteral Tube every 12 hours  potassium chloride  Oral Liquid - Peds 10 milliEquivalent(s) Enteral Tube every 12 hours  prednisoLONE  Oral Liquid - Peds 3 milliGRAM(s) Enteral Tube daily  sabril 500 milliGRAM(s) 500 milliGRAM(s) Enteral Tube daily  sabril 625 milliGRAM(s) 625 milliGRAM(s) Enteral Tube daily  sodium chloride 3% for Nebulization - Peds 4 milliLiter(s) Nebulizer three times a day  sodium citrate/citric acid Oral Liquid - Peds 15 milliEquivalent(s) Oral every 12 hours  tacrolimus  Oral Liquid - Peds 4.1 milliGRAM(s) Enteral Tube every 12 hours  warfarin Oral Tab/Cap - Peds 2.5 milliGRAM(s) Oral daily    MEDICATIONS  (PRN):  acetaminophen   Oral Liquid - Peds. 480 milliGRAM(s) Enteral Tube every 6 hours PRN Mild Pain (1 - 3)  cloNIDine  Oral Liquid - Peds 0.1 milliGRAM(s) Enteral Tube once PRN BP >130/90  hydrALAZINE  Oral Liquid - Peds 3 milliGRAM(s) Oral once PRN BP >130/90      Vital Signs Last 24 Hrs  T(C): 37.4 (15 Apr 2019 08:00), Max: 37.4 (15 Apr 2019 08:00)  T(F): 99.3 (15 Apr 2019 08:00), Max: 99.3 (15 Apr 2019 08:00)  HR: 103 (15 Apr 2019 08:00) (87 - 111)  BP: 129/77 (15 Apr 2019 08:00) (103/85 - 129/77)  BP(mean): 92 (15 Apr 2019 08:00) (78 - 97)  RR: 21 (15 Apr 2019 08:00) (21 - 41)  SpO2: 100% (15 Apr 2019 08:00) (93% - 100%)  I&O's Detail    14 Apr 2019 07:01  -  15 Apr 2019 07:00  --------------------------------------------------------  IN:    dextrose 5% + sodium chloride 0.9%. - Pediatric: 780 mL    Enteral Tube Flush: 430 mL    IV PiggyBack: 100 mL    Suplena: 690 mL  Total IN: 2000 mL    OUT:    Ileostomy: 335 mL    Incontinent per Diaper: 1265 mL  Total OUT: 1600 mL    UOP: 1.5 cc/kg/hr over 24 hours    Total NET: 400 mL      15 Apr 2019 07:01  -  15 Apr 2019 09:02  --------------------------------------------------------  IN:    Suplena: 175 mL  Total IN: 175 mL    OUT:    Ileostomy: 54 mL  Total OUT: 54 mL    Total NET: 121 mL        Daily     Daily   Change in Weight:    Physical Exam  All physical exam findings normal, except for those marked:  General:	No apparent distress  .		[] Abnormal: had an emesis episode during examination  HEENT:	Normal: atraumatic, no conjunctival injection, no discharge, no  photophobia, intact extraocular   .                       movements, scleras not icteric, external ear normal, nares normal, normal tongue and lips  .		[x] Abnormal: facial edema, tracheostomy in place, cloudy white rhinorrhea  Neck		Normal: supple  .		[x] Abnormal: tracheostomy in place  Lymph Nodes	Normal: normal size and consistency, non-tender  .		[] Abnormal:  Cardiovascular	Normal:  normal S1, S2, no murmurs  .		[] Abnormal:  Respiratory	Normal: no respiratory distress, good aeration throughout, no retractions  .		[x] Abnormal: coarse diffuse crackles  Abdominal	Normal: soft, ND, NT, no masses, no organomegaly  .		[x] Abnormal: colostomy in place  		deferred  Extremities	Normal: warm and well perfused, lower extremities with no pitting edema b/l  .		[x] Abnormal: RUE swelling from upper arm to palm and finger, non pitting  Skin		Normal: intact and not indurated, no rash, no desquamation  .		[] Abnormal:  Musculoskeletal	Normal: no joint swelling, erythema, or tenderness  .		[] Abnormal:  Neurologic	Normal: asleep but easily arousable, no facial asymmetry  .		[] Abnormal:    Lab Results:    04-14    141  |  92<L>  |  14  ----------------------------<  93  3.2<L>   |  35<H>  |  1.17<H>    Ca    10.0      14 Apr 2019 09:28  Phos  3.6     04-14  Mg     2.0     04-14        PT/INR - ( 14 Apr 2019 09:28 )   PT: 17.0 SEC;   INR: 1.47          PTT - ( 14 Apr 2019 09:28 )  PTT:34.6 SEC

## 2020-08-06 NOTE — PROGRESS NOTES
[facility]  Nephrology  Progress Note    Patient Name: Bernabe Greco Jr.  MRN: [id number]  Admission Date: 5/8/2017  Hospital Length of Stay: 9 days  Attending Provider: Pb Concepcion MD   Primary Care Physician: Provider Notinsystem  Principal Problem:Seizure    Subjective:     Interval History: NAEON. Evaluated during HD and patient taking breakfast. Refers no complaints and is feeling fine. Wants to go home.     Review of patient's allergies indicates:   Allergen Reactions    Penicillins Rash     Current Facility-Administered Medications   Medication Frequency    0.9%  NaCl infusion PRN    0.9%  NaCl infusion PRN    0.9%  NaCl infusion PRN    0.9%  NaCl infusion Once    amlodipine tablet 10 mg Daily    aspirin EC tablet 81 mg Daily    atorvastatin tablet 40 mg Daily    cefTRIAXone (ROCEPHIN) 1 g in dextrose 5 % 50 mL IVPB Q24H    dextrose 50% injection 12.5 g PRN    diphenhydrAMINE capsule 25 mg Q6H PRN    GI cocktail (mylanta 30 mL, lidocaine 2 % viscous 10 mL, dicyclomine 10 mL) 50 mL Q8H PRN    glucagon (human recombinant) injection 1 mg PRN    heparin (porcine) injection 2,000 Units PRN    heparin (porcine) injection 5,000 Units Q8H    heparin (porcine) injection 500 Units PRN    insulin aspart pen 1-10 Units QID (AC + HS) PRN    ondansetron injection 4 mg Q6H PRN    risperidone tablet 0.5 mg Q8H PRN    sevelamer carbonate tablet 1,600 mg TID WM    sodium chloride 0.9% flush 3 mL Q8H       Objective:     Vital Signs (Most Recent):  Temp: 98.6 °F (37 °C) (05/17/17 0815)  Pulse: 79 (05/17/17 0346)  Resp: 16 (05/17/17 0346)  BP: 120/60 (05/17/17 0346)  SpO2: 97 % (05/17/17 0346)  O2 Device (Oxygen Therapy): room air (05/17/17 0346) Vital Signs (24h Range):  Temp:  [98.6 °F (37 °C)-99.4 °F (37.4 °C)] 98.6 °F (37 °C)  Pulse:  [76-91] 79  Resp:  [16] 16  SpO2:  [95 %-97 %] 97 %  BP: (117-141)/(60-63) 120/60     Weight: 90.1 kg (198 lb 11.2 oz) (05/17/17 0700)  Body mass  [facility] Dermatology Record:  Store and Forward and Telephone [phone]    Dermatology Problem List:  1. Hidradenitis suppurativa: diagnosed age 11, initially on waistline (used to weigh 300 lbs) which cleared up -> intermittent outbreaks -> for the past 10 years has had in bilateral axilla, groin, thighs, and buttocks.   - Prior: infliximab, clobetasol, topical morphine, ILK, humira, oral antibiotics, rifampin, finasteride, spironolactone (6/2019- 9/5/19, stopped due to abnormal uterine bleeding), topical morphine gel, and percocet for severe pain  - Referral placed to plastic surgery for excision of the R axilla - still pending  - Provided the patient a sample of [employer] clothing for the R axilla and bandages. Prescribed Cutaned sorbion 5.0 x 5.0 inch bandages via [employer].  2. Scaly papules on the left proximal forearm. Could possibly be early psoriasis in the setting of infliximab.  3. Keratosis pilaris. Recommended OTC AmLactin  4. L olecranon bursitis.   -uric acid checked due to possibility of gout, on 1/9/20 wnl (4.4). Seems inflammatory, likely not traumatic. Seems chronic. No concern for infection..  SH: [place] an [place]/running around)    Encounter Date: Aug 6, 2020    CC: HS    History of Present Illness:  Klarissa Curtis is a 45 year old female who presents for planned follow up.  Last seen in derm clinic via virtual visit on 6/11/2020.  At that time she was given a steroid burst and taper. She was hesitant to retrial infliximab due to cost and inefficacy in the past. Currently she is dealing with flares in the armpits and right groin.  She was able to upload pictures of these today. Describes them as the size of a marble, draining in axilla and groin folds. Currently not taking anything for HS. She quit prednisone. Discussed stelara at last appointment but patient believes it was denied. Has been getting     ROS: Patient is  generally feeling well today     Physical Examination:  General: Well-sounding, appropriately-developed individual.  Skin: Focused examination including groin and axilla was performed.   - inflamed papules and nodules with drainage on bilateral axilla and within groin folds     Past Medical History:   Patient Active Problem List   Diagnosis     Hidradenitis suppurativa     Fatigue     Past Medical History:   Diagnosis Date     NO ACTIVE PROBLEMS      Past Surgical History:   Procedure Laterality Date     BUNIONECTOMY      x2     OTHER SURGICAL HISTORY      right pointer finger foreign body removal       Social History:  Patient reports that she has been smoking cigarettes. She has a 10.00 pack-year smoking history. She has never used smokeless tobacco. She reports current alcohol use of about 6.0 - 9.0 standard drinks of alcohol per week. She reports that she does not use drugs.    Family History:  Family History   Problem Relation Age of Onset     Diabetes Sister      Hyperlipidemia Sister      Breast Cancer Mother      Arthritis Mother      Myocardial Infarction Father 47       Medications:  Current Outpatient Medications   Medication     calcium carbonate (CALCIUM CARBONATE) 600 MG tablet     calcium carbonate 750 MG CHEW     FLUoxetine (PROZAC) 40 MG capsule     oxyCODONE-acetaminophen (PERCOCET) 5-325 MG tablet     predniSONE (DELTASONE) 10 MG tablet     predniSONE (DELTASONE) 10 MG tablet     predniSONE (DELTASONE) 20 MG tablet     predniSONE (DELTASONE) 20 MG tablet     traZODone (DESYREL) 50 MG tablet     Vitamin D3 (CHOLECALCIFEROL) 2000 units (50 mcg) tablet     No current facility-administered medications for this visit.        Allergies   Allergen Reactions     Keflex [Cephalexin] Other (See Comments)     Not true allergy- intolerance     Penicillins Hives, Itching and Rash     Impression and Recommendations (Patient Counseled on the Following):    # Hidradenitis suppurativa, Iesha stage II. Prior  index is 26.22 kg/(m^2).  Body surface area is 2.15 meters squared.    I/O last 3 completed shifts:  In: 363 [P.O.:360; I.V.:3]  Out: 200 [Urine:200]    Physical Exam   Constitutional: He appears well-developed and well-nourished. No distress.   HENT:   Head: Normocephalic and atraumatic.   Right Ear: External ear normal.   Left Ear: External ear normal.   Mouth/Throat: Oropharynx is clear and moist.   Neck: Normal range of motion. Neck supple. No JVD present.   Cardiovascular: Normal rate, regular rhythm and normal heart sounds.  Exam reveals no gallop and no friction rub.    No murmur heard.  Pulmonary/Chest: Effort normal and breath sounds normal. No respiratory distress. He has no wheezes. He has no rales.   Abdominal: Soft. Bowel sounds are normal. He exhibits no distension. There is no tenderness.   Musculoskeletal: He exhibits no edema.   Neurological: He is alert.   Skin: Skin is warm and dry. He is not diaphoretic.   Access: Right radiocephalic AV fistula    Significant Labs:  CBC:   Recent Labs  Lab 05/17/17  0345   WBC 9.13   RBC 3.31*   HGB 9.4*   HCT 30.0*      MCV 91   MCH 28.4   MCHC 31.3*     CMP:   Recent Labs  Lab 05/17/17  0345   GLU 91   CALCIUM 9.2   ALBUMIN 2.9*   PROT 6.5      K 5.2*   CO2 20*      BUN 82*   CREATININE 11.8*   ALKPHOS 83   ALT 22   AST 25   BILITOT 0.5        Significant Imaging:  Labs: Reviewed  X-Ray: Reviewed    Assessment/Plan:     ESRD (end stage renal disease) on dialysis  - Will provide dialysis for metabolic clearance and volume   - Seen and examined today during hemodialysis; tolerating treatment well without issues. Denied headaches, chest pain, abdominal pain, or muscle cramps   - Target ultrafiltration up to 1-2L as tolerated by patient  - Dialysate adjusted to current labs              Anemia associated with chronic renal failure  - Goal in ESRD of 10-11.5  - Currently not at goal   - Will increase epoetin to 7,000 units today on HD      Chronic  kidney disease-mineral and bone disorder  - Phos elevated at 5.9  - Continue Renvela to 1,600 mg PO TID with meals  - Corrected calcium WNL     Duy Bravo MD  Nephrology  [facility]   treatments include infliximab, prednisone. Continued discussion for Stelara however patient was denied. Had minor improvement with humira previously.   - Start double-dose humira, start with 160mg on day 1, then 80mg weekly starting on day 15    - Prednisone course if needed   - Patient on medical marijuana for pain control, prescribed by psychiatrist     Follow-up: 1 month phone visit      Staff and resident    Tiffanie Aponte MD    Dermatology Resident .  [facility]     Staff Physician Comments:   I saw and evaluated the patient with the resident and I agree with the assessment and plan.  I was present for the examination.    Jean Kenny MD, FAAD    Departments of Internal Medicine and Dermatology  [facility]  [phone]      _____________________________________________________________________    Teledermatology information:  - Location of patient: [place]  - Patient presented as: return  - Location of teledermatologist:  ([facility] )  - Reason teledermatology is appropriate:  of National Emergency Regarding Coronavirus disease (COVID 19) Outbreak  - Image quality and interpretability: acceptable  - Physician has received verbal consent for a Video/Photos Visit from the patient? Yes  - In-person dermatology visit recommendation: no  - Date of images: 8/6/20  - Service start time: 3:45 PM  - Service end time: 4:09 PM   - Date of report: 8/6/2020

## 2022-07-06 NOTE — CONSULTS
[facility]  Cardiology  Consult Note    Patient Name: Bernabe Greco Jr.  MRN: [id number]  Admission Date: 8/29/2017  Hospital Length of Stay: 1 days  Code Status: DNR   Attending Provider: Francine Saravia MD   Consulting Provider: Fredi Gallo MD  Primary Care Physician: Primary Doctor No  Principal Problem:Chest pain    Patient information was obtained from spouse/SO and ER records.     Consults  Subjective:     Chief Complaint:  Chest pain      HPI:   This is Mr. Bernabe Greco Jr., 77 year old male severe AS (IMANI 0.6, V 4.5 m/s, MG 43), CAD with recent PCI to OM1 with YANE on 08/01/17  and residual D1 70% stenosis not intervened upon, HTN, HLD, anemia, PAD s/p cath in 2014 with unsuccessful attempt to revascularize right SFA  , HTN, HLD, ESRD on HD Sat,T,T and stroke with residual visual deficit. He presented to ED from [place] with symptoms of pressure and chest tightness, that started midnight prior his presentation to ED. His chest pain was retrotarsally, described as heaviness and pressure, did not radiate anywhere, and was associated with SOB (NYHA IV), which is different from baseline (NYHA III). Denies loss of consciousness, change in his medication, or palpitation.    Past Medical History:   Diagnosis Date    Arthritis     Diabetes mellitus     DNR (do not resuscitate) 5/17/2017    Hyperlipidemia     Hypertension     Stroke     Syncope and collapse        Past Surgical History:   Procedure Laterality Date    AV FISTULA PLACEMENT Right     HIP SURGERY      replacement right    JOINT REPLACEMENT      right hip        Review of patient's allergies indicates:   Allergen Reactions    Penicillins Rash     Received ceftriaxone and cefepime in 2017       No current facility-administered medications on file prior to encounter.      Current Outpatient Prescriptions on File Prior to Encounter   Medication Sig    aspirin (ECOTRIN) 81 MG EC tablet Take 1 tablet (81 mg total) by mouth  Clinical Pharmacy Note  Vancomycin Consult    Minesh Wild is a 27 y.o. male ordered Vancomycin for pneumonia; consult received from Dr. Susanna Ley to manage therapy. Also receiving Zosyn, fluconazole. Allergies:  Patient has no known allergies. Temp max:  Temp (24hrs), Av.7 °F (37.1 °C), Min:97.2 °F (36.2 °C), Max:99.4 °F (37.4 °C)      Recent Labs     22  0954   WBC 4.0       Recent Labs     22  0954   BUN 22*   CREATININE 1.7*         Intake/Output Summary (Last 24 hours) at 2022 2135  Last data filed at 2022 1249  Gross per 24 hour   Intake 600 ml   Output --   Net 600 ml       Culture Results:  Pendign    Ht Readings from Last 1 Encounters:   22 6' 1\" (1.854 m)        Wt Readings from Last 1 Encounters:   22 239 lb 10.2 oz (108.7 kg)         Estimated Creatinine Clearance: 78 mL/min (A) (based on SCr of 1.7 mg/dL (H)). Assessment/Plan:  Vancomycin 1750 mg IV x 1 given in ER. Will check 18 hour level tomorrow morning with further dosing to follow. Regimen projects a trough level of 15-20 mg/L. Level ordered for 22 at 0500. Thank you for the consult.    LEONORA Barrow [facility], PharmD, 2022 9:37 PM once daily.    atorvastatin (LIPITOR) 80 MG tablet Take 1 tablet (80 mg total) by mouth once daily.    carvedilol (COREG) 6.25 MG tablet Take 1 tablet (6.25 mg total) by mouth 2 (two) times daily.    isosorbide mononitrate (IMDUR) 30 MG 24 hr tablet Take 1 tablet (30 mg total) by mouth once daily.    sevelamer carbonate (RENVELA) 800 mg Tab Take 2 tablets (1,600 mg total) by mouth 3 (three) times daily with meals.    ticagrelor (BRILINTA) 90 mg tablet Take 1 tablet (90 mg total) by mouth 2 (two) times daily.    carboxymethylcellulose (REFRESH PLUS) 0.5 % Dpet Place 1 drop into both eyes 5 (five) times daily.    lorazepam (ATIVAN) 0.5 MG tablet Take 0.5 mg by mouth 2 (two) times daily as needed for Anxiety.    omeprazole (PRILOSEC) 40 MG capsule Take 40 mg by mouth once daily.    vitamin renal formula, B-complex-vitamin c-folic acid, (NEPHROCAPS) 1 mg Cap Take 1 capsule by mouth once daily.    white petrolatum-mineral oil 56.8-42.5% (REFRESH LACRI-LUBE) 56.8-42.5 % Oint Apply a thin ribbon to bottom eye lid every night     Family History     Problem Relation (Age of Onset)    Hypertension Mother        Social History Main Topics    Smoking status: Former Smoker     Packs/day: 0.25     Quit date: 1/1/1978    Smokeless tobacco: Never Used    Alcohol use No    Drug use: No    Sexual activity: Not Currently     Review of Systems   Unable to perform ROS: intubated     Objective:     Vital Signs (Most Recent):  Temp: 97.7 °F (36.5 °C) (08/31/17 0701)  Pulse: 68 (08/31/17 1217)  Resp: (!) 27 (08/31/17 1217)  BP: (!) 102/54 (08/31/17 1200)  SpO2: 100 % (08/31/17 1217) Vital Signs (24h Range):  Temp:  [97.7 °F (36.5 °C)-100.5 °F (38.1 °C)] 97.7 °F (36.5 °C)  Pulse:  [] 68  Resp:  [10-39] 27  SpO2:  [90 %-100 %] 100 %  BP: ()/(50-75) 102/54     Weight: 80.2 kg (176 lb 12.9 oz)  Body mass index is 25.37 kg/m².    SpO2: 100 %  O2 Device (Oxygen Therapy): T-Piece Trial      Intake/Output Summary (Last  24 hours) at 08/31/17 1249  Last data filed at 08/31/17 0700   Gross per 24 hour   Intake           719.48 ml   Output                0 ml   Net           719.48 ml       Lines/Drains/Airways     Central Venous Catheter Line                 Trialysis (Dialysis) Catheter 08/29/17 2300 right femoral 1 day          Drain                 Hemodialysis AV Fistula Right forearm 20011 days         NG/OG Tube 08/29/17 2300 1 day          Airway                 Airway - Non-Surgical 08/29/17 2110 Endotracheal Tube 1 day          Peripheral Intravenous Line                 Peripheral IV - Single Lumen 08/29/17 0300 Left Forearm 2 days         Peripheral IV - Single Lumen 08/30/17 0701 Left Antecubital 1 day                Physical Exam   Constitutional: He appears well-developed and well-nourished. No distress.   HENT:   Head: Normocephalic and atraumatic.   Eyes: Conjunctivae are normal. Pupils are equal, round, and reactive to light.   Neck: No JVD present. No tracheal deviation present. No thyromegaly present.   Cardiovascular: Normal rate.  Exam reveals no friction rub.    Murmur heard.  Pulmonary/Chest: Effort normal.   Vent Mode: Spont  Oxygen Concentration (%):  (30) 30  Resp Rate Total:  (11 br/min-34 br/min) 34 br/min  Vt Set:  (440 mL) 440 mL  PEEP/CPAP:  (5 cmH20) 5 cmH20  Pressure Support:  (0 cmH20-7 cmH20) 0 cmH20  Mean Airway Pressure:  (6.2 wqF76-19 cmH20) 6.2 cmH20     Abdominal: Soft. Bowel sounds are normal. He exhibits no distension. There is no tenderness.   Musculoskeletal: Normal range of motion. He exhibits no edema or deformity.     CBC:   Recent Labs  Lab 08/30/17 0430 08/30/17 2018 08/31/17  1028   WBC 11.09 10.90 7.20   RBC 2.77* 3.03* 2.86*   HGB 7.7* 8.4* 7.8*   HCT 25.1* 27.2* 25.6*    183 165   MCV 91 90 90   MCH 27.8 27.7 27.3   MCHC 30.7* 30.9* 30.5*     BMP:   Recent Labs  Lab 08/29/17 2110 08/30/17 0430 08/31/17  0334   * 71 152*    140 136   K 4.7 4.6 5.2*     107 104   CO2 19* 23 17*   BUN 36* 41* 53*   CREATININE 8.9* 9.4* 11.6*   CALCIUM 9.5 8.7 8.9   MG 1.9  --   --      Coagulation: No results for input(s): INR, APTT in the last 168 hours.    Invalid input(s): PT  Microbiology Results (last 7 days)     Procedure Component Value Units Date/Time    Blood culture [[id number]] Collected:  08/30/17 2303    Order Status:  Completed Specimen:  Blood from Peripheral, Hand, Left Updated:  08/31/17 0945     Blood Culture, Routine No Growth to date    Narrative:       Aerobic and anaerobic from site #1    Blood culture [[id number]] Collected:  08/30/17 2227    Order Status:  Completed Specimen:  Blood from Line, Femoral, Right Updated:  08/31/17 0945     Blood Culture, Routine No Growth to date    Narrative:       Aerobic and anaerobic from site #2    Blood culture [[id number]] Collected:  08/29/17 2300    Order Status:  Completed Specimen:  Blood from Line, Femoral, Right Updated:  08/31/17 0101     Blood Culture, Routine Gram stain aer bottle: Gram negative rods      Blood Culture, Routine Gram stain winston bottle: Gram negative rods      Blood Culture, Routine Results called to and read back by: Ebenezer Bean RN  08/31/2017  01:01    Blood culture [[id number]] Collected:  08/29/17 2300    Order Status:  Completed Specimen:  Blood from Peripheral, Antecubital, Left Updated:  08/31/17 0101     Blood Culture, Routine Gram stain winston bottle: Gram negative rods      Blood Culture, Routine Results called to and read back by: Ebenezer Bean RN  08/31/2017  01:01    Culture, Respiratory with Gram Stain [[id number]] Collected:  08/30/17 0205    Order Status:  Completed Specimen:  Respiratory from Endotracheal Aspirate Updated:  08/30/17 0901     Gram Stain (Respiratory) <10 epithelial cells per low power field.     Gram Stain (Respiratory) No WBC's     Gram Stain (Respiratory) Rare Gram negative rods     Gram Stain (Respiratory) Rare Gram positive cocci    Urine culture [[id number]]  Collected:  08/30/17 0205    Order Status:  Sent Specimen:  Urine from Urine Updated:  08/30/17 0206    Blood culture [[id number]]     Order Status:  Canceled Specimen:  Blood     Blood culture [[id number]]     Order Status:  Canceled Specimen:  Blood             Assessment and Plan:     NSTEMI (non-ST elevated myocardial infarction)    - Known to have CAD with recent PCI to OM1 with YANE on 08/01/17  and residual D1 70% stenosis not intervened upon  - Presentation with chest pain, and troponin of 0.053 > .086, with no significant ECG changes. RAMU score for NSTEMI is 5   - On 8/29 evening, he developed seizure like activity, and sen for CT head to exclude stroke, which was negative, was having fever and leokocytosis and started on ABx.  - Trending down of Troponin, we recommend not to continue  trending down troponin   - ECG and rhythm showed: initially 1st degree HB, and then sinus tachycardia (around the episode of seizure), then rhythm switched to junctional tachycardia, and then eventually to his baseline rhythm. No sings of ischemia in his ECG.  - Off pressors.   - We recommend:   - Continue dual antiplatelet    - Hold Heparin infusion            VTE Risk Mitigation         Ordered     Low Risk of VTE  Once      08/29/17 0623          Thank you for your consult. I will sign off. Please contact us if you have any additional questions.    Fredi Gallo MD  Cardiology   [facility]

## 2023-04-20 NOTE — ASSESSMENT & PLAN NOTE
-- DO NOT REPLY / DO NOT REPLY ALL --  -- Message is from Engagement Center Operations (EC[employer]) --    General Patient Message: Patient called stating he uses a cream for his elbow and it's not helping. He was wondering if there is a medication he could take instead for it otherwise he's wondering if he should see a dermatologist. Please call back.      Alternative phone number: n/a    Can a detailed message be left? Yes    Message Turnaround: [place]:    Refer to site's KB page for routing instructions    Please give this turnaround time to the caller:   \"You can expect to receive a response 2-3 business days after your provider's clinical team reviews the message\"               -no LVO seen on CTA multiphase. MRI negative for acute stroke. Etiology of patient's symptoms more likely due to seizure and not likely stroke.
